# Patient Record
Sex: MALE | Race: WHITE | NOT HISPANIC OR LATINO | Employment: UNEMPLOYED | URBAN - METROPOLITAN AREA
[De-identification: names, ages, dates, MRNs, and addresses within clinical notes are randomized per-mention and may not be internally consistent; named-entity substitution may affect disease eponyms.]

---

## 2018-05-04 ENCOUNTER — CLINICAL SUPPORT (OUTPATIENT)
Dept: FAMILY MEDICINE | Facility: CLINIC | Age: 58
End: 2018-05-04
Payer: COMMERCIAL

## 2018-05-04 DIAGNOSIS — E11.65 UNCONTROLLED TYPE 2 DIABETES MELLITUS WITH COMPLICATION, UNSPECIFIED LONG TERM INSULIN USE STATUS: Primary | ICD-10-CM

## 2018-05-04 DIAGNOSIS — E11.8 UNCONTROLLED TYPE 2 DIABETES MELLITUS WITH COMPLICATION, UNSPECIFIED LONG TERM INSULIN USE STATUS: Primary | ICD-10-CM

## 2018-05-04 DIAGNOSIS — E78.2 MULTIPLE-TYPE HYPERLIPIDEMIA: ICD-10-CM

## 2018-05-04 PROCEDURE — 36415 COLL VENOUS BLD VENIPUNCTURE: CPT | Performed by: FAMILY MEDICINE

## 2018-05-05 LAB
ALBUMIN SERPL-MCNC: 4.4 G/DL (ref 3.5–5.5)
ALBUMIN/GLOB SERPL: 1.6 {RATIO} (ref 1.2–2.2)
ALP SERPL-CCNC: 60 IU/L (ref 39–117)
ALT SERPL-CCNC: 16 IU/L (ref 0–44)
AST SERPL-CCNC: 11 IU/L (ref 0–40)
BILIRUB SERPL-MCNC: 0.6 MG/DL (ref 0–1.2)
BUN SERPL-MCNC: 28 MG/DL (ref 6–24)
BUN/CREAT SERPL: 25 (ref 9–20)
CALCIUM SERPL-MCNC: 10 MG/DL (ref 8.7–10.2)
CHLORIDE SERPL-SCNC: 99 MMOL/L (ref 96–106)
CHOLEST SERPL-MCNC: 151 MG/DL (ref 100–199)
CO2 SERPL-SCNC: 23 MMOL/L (ref 18–29)
CREAT SERPL-MCNC: 1.1 MG/DL (ref 0.76–1.27)
GFR SERPLBLD CREATININE-BSD FMLA CKD-EPI: 75 ML/MIN/1.73
GFR SERPLBLD CREATININE-BSD FMLA CKD-EPI: 87 ML/MIN/1.73
GLOBULIN SER CALC-MCNC: 2.7 G/DL (ref 1.5–4.5)
GLUCOSE SERPL-MCNC: 270 MG/DL (ref 65–99)
HBA1C MFR BLD: 10.2 % (ref 4.8–5.6)
HDLC SERPL-MCNC: 32 MG/DL
LDLC SERPL CALC-MCNC: 66 MG/DL (ref 0–99)
POTASSIUM SERPL-SCNC: 5.3 MMOL/L (ref 3.5–5.2)
PROT SERPL-MCNC: 7.1 G/DL (ref 6–8.5)
SODIUM SERPL-SCNC: 138 MMOL/L (ref 134–144)
TRIGL SERPL-MCNC: 267 MG/DL (ref 0–149)
VLDLC SERPL CALC-MCNC: 53 MG/DL (ref 5–40)

## 2018-05-15 RX ORDER — INSULIN PUMP SYRINGE, 3 ML
EACH MISCELLANEOUS
COMMUNITY
Start: 2014-08-22

## 2018-05-15 RX ORDER — LISINOPRIL 20 MG/1
20 TABLET ORAL
COMMUNITY
Start: 2017-12-18 | End: 2019-01-23

## 2018-05-15 RX ORDER — BLOOD SUGAR DIAGNOSTIC
STRIP MISCELLANEOUS
COMMUNITY
Start: 2017-07-10 | End: 2018-05-15 | Stop reason: SDUPTHER

## 2018-05-15 RX ORDER — BLOOD SUGAR DIAGNOSTIC
STRIP MISCELLANEOUS
Qty: 300 EACH | Refills: 3 | Status: SHIPPED | OUTPATIENT
Start: 2018-05-15

## 2018-05-15 RX ORDER — METFORMIN HYDROCHLORIDE 1000 MG/1
TABLET ORAL
Status: ON HOLD | COMMUNITY
Start: 2017-07-07 | End: 2023-09-19 | Stop reason: ENTERED-IN-ERROR

## 2018-05-15 RX ORDER — LANCETS
EACH MISCELLANEOUS
COMMUNITY
Start: 2016-03-30

## 2018-05-15 RX ORDER — INSULIN GLARGINE 100 [IU]/ML
28 INJECTION, SOLUTION SUBCUTANEOUS NIGHTLY
COMMUNITY
Start: 2017-12-18

## 2018-05-15 RX ORDER — ROSUVASTATIN CALCIUM 20 MG/1
20 TABLET, COATED ORAL DAILY
COMMUNITY
Start: 2017-12-18 | End: 2023-09-26 | Stop reason: HOSPADM

## 2018-05-15 RX ORDER — CLOPIDOGREL BISULFATE 75 MG/1
75 TABLET ORAL
COMMUNITY
Start: 2017-07-07 | End: 2018-07-02 | Stop reason: SDUPTHER

## 2018-05-16 RX ORDER — BLOOD SUGAR DIAGNOSTIC
STRIP MISCELLANEOUS
Qty: 300 EACH | Refills: 3 | Status: SHIPPED | OUTPATIENT
Start: 2018-05-16

## 2018-05-29 NOTE — PROGRESS NOTES
Spoke with patient and HgbA1c 10.2 and he is going to really watch his diet and start to change his lifestyle.  He has FBW and follow up in 8 weeks.  Cholesterol and LDL at goal.  Trigs elevated likely from diabetes.

## 2018-07-02 RX ORDER — CLOPIDOGREL BISULFATE 75 MG/1
75 TABLET ORAL DAILY
Qty: 90 TABLET | Refills: 1 | Status: SHIPPED | OUTPATIENT
Start: 2018-07-02 | End: 2018-12-27 | Stop reason: SDUPTHER

## 2018-09-24 ENCOUNTER — PATIENT OUTREACH (OUTPATIENT)
Dept: FAMILY MEDICINE | Facility: CLINIC | Age: 58
End: 2018-09-24

## 2018-09-24 NOTE — PROGRESS NOTES
Care Gap Team has outreached to Harvey DE LA FUENTE Nic Maldonado on behalf of their primary care provider.      Care Gap Source:: IPPIP Gap Report         Care Gap Status:: Due    Outreach via:: Telephone    Adult Preventive Wellness Protocol(s) Used: : Colorectal Cancer Screening, HbA1c Completion, Diabetic Nephropathy Screening, Diabetic Retinopathy Screening    Chart Review Completed:: Yes  Patient interview completed:: Yes  Inclusion Criteria:: Met  Exclusion Criteria: None  Patient educated on recommended care:: Yes                                  I spoke at length with patient regarding past due testing. Patient stated that he is no longer a patient within the practice. I encouraged patient to inform his insurance company that he has switched providers.

## 2018-12-27 RX ORDER — CLOPIDOGREL BISULFATE 75 MG/1
TABLET ORAL
Qty: 90 TABLET | Refills: 0 | Status: SHIPPED | OUTPATIENT
Start: 2018-12-27

## 2018-12-27 NOTE — TELEPHONE ENCOUNTER
Please let patient know prescription filled but he is doing for office visit with Dr. Castro.  Last office visit was 1 year ago.

## 2019-01-04 ENCOUNTER — ANESTHESIA (INPATIENT)
Dept: OPERATING ROOM | Facility: HOSPITAL | Age: 59
DRG: 853 | End: 2019-01-04
Payer: COMMERCIAL

## 2019-01-04 ENCOUNTER — HOSPITAL ENCOUNTER (INPATIENT)
Facility: HOSPITAL | Age: 59
LOS: 15 days | Discharge: HOME HEALTH | DRG: 853 | End: 2019-01-19
Attending: PODIATRIST | Admitting: PODIATRIST
Payer: COMMERCIAL

## 2019-01-04 ENCOUNTER — ANESTHESIA EVENT (INPATIENT)
Dept: OPERATING ROOM | Facility: HOSPITAL | Age: 59
DRG: 853 | End: 2019-01-04
Payer: COMMERCIAL

## 2019-01-04 DIAGNOSIS — Z01.818 PREOP EXAMINATION: ICD-10-CM

## 2019-01-04 DIAGNOSIS — R60.0 LOCALIZED EDEMA: ICD-10-CM

## 2019-01-04 DIAGNOSIS — R78.81 BACTEREMIA: ICD-10-CM

## 2019-01-04 DIAGNOSIS — S91.309A OPEN WOUND OF ANKLE AND FOOT: ICD-10-CM

## 2019-01-04 DIAGNOSIS — S91.009A OPEN WOUND OF ANKLE AND FOOT: ICD-10-CM

## 2019-01-04 DIAGNOSIS — A48.0 GAS GANGRENE (CMS/HCC): Primary | ICD-10-CM

## 2019-01-04 PROBLEM — L08.9 LEFT FOOT INFECTION: Status: ACTIVE | Noted: 2019-01-04

## 2019-01-04 PROBLEM — D72.829 LEUKOCYTOSIS: Status: ACTIVE | Noted: 2019-01-04

## 2019-01-04 PROBLEM — D64.9 ANEMIA: Status: ACTIVE | Noted: 2019-01-04

## 2019-01-04 PROBLEM — D70.0: Status: ACTIVE | Noted: 2019-01-04

## 2019-01-04 PROBLEM — L02.612 CELLULITIS AND ABSCESS OF TOE OF LEFT FOOT: Status: ACTIVE | Noted: 2019-01-04

## 2019-01-04 PROBLEM — L03.032 CELLULITIS AND ABSCESS OF TOE OF LEFT FOOT: Status: ACTIVE | Noted: 2019-01-04

## 2019-01-04 LAB
ANION GAP SERPL CALC-SCNC: 15 MEQ/L (ref 3–15)
APTT PPP: 31 SEC (ref 23–35)
ATRIAL RATE: 100
BASOPHILS # BLD: 0 K/UL (ref 0.01–0.1)
BASOPHILS NFR BLD: 0 %
BUN SERPL-MCNC: 37 MG/DL (ref 8–20)
CALCIUM SERPL-MCNC: 8.4 MG/DL (ref 8.9–10.3)
CHLORIDE SERPL-SCNC: 88 MEQ/L (ref 98–109)
CO2 SERPL-SCNC: 21 MEQ/L (ref 22–32)
CREAT SERPL-MCNC: 1.4 MG/DL
CRP SERPL-MCNC: 292.7 MG/L
DIFFERENTIAL METHOD BLD: ABNORMAL
EOSINOPHIL # BLD: 0 K/UL (ref 0.04–0.54)
EOSINOPHIL NFR BLD: 0 %
ERYTHROCYTE [DISTWIDTH] IN BLOOD BY AUTOMATED COUNT: 13.8 % (ref 11.6–14.4)
ERYTHROCYTE [SEDIMENTATION RATE] IN BLOOD BY WESTERGREN METHOD: 71 MM/HR
EST. AVERAGE GLUCOSE BLD GHB EST-MCNC: 246 MG/DL
GFR SERPL CREATININE-BSD FRML MDRD: 52.4 ML/MIN/1.73M*2
GLUCOSE BLD-MCNC: 232 MG/DL (ref 70–99)
GLUCOSE BLD-MCNC: 233 MG/DL (ref 70–99)
GLUCOSE BLD-MCNC: 236 MG/DL (ref 70–99)
GLUCOSE BLD-MCNC: 303 MG/DL (ref 70–99)
GLUCOSE BLD-MCNC: 348 MG/DL (ref 70–99)
GLUCOSE BLD-MCNC: 348 MG/DL (ref 70–99)
GLUCOSE SERPL-MCNC: 363 MG/DL (ref 70–99)
HBA1C MFR BLD HPLC: 10.2 %
HCT VFR BLDCO AUTO: 32.5 %
HGB BLD-MCNC: 10.7 G/DL
INR PPP: 1.4 INR
LYMPHOCYTES # BLD: 0.2 K/UL (ref 1.2–3.5)
LYMPHOCYTES NFR BLD: 1 %
MCH RBC QN AUTO: 26.3 PG (ref 28–33.2)
MCHC RBC AUTO-ENTMCNC: 32.9 G/DL (ref 32.2–36.5)
MCV RBC AUTO: 79.9 FL (ref 83–98)
MONOCYTES # BLD: 2.66 K/UL (ref 0.3–1)
MONOCYTES NFR BLD: 13 %
NEUTS BAND # BLD: 17.6 K/UL (ref 1.7–7)
NEUTS SEG NFR BLD: 86 %
P AXIS: 26
PATH REV BLD -IMP: NORMAL
PDW BLD AUTO: 10.7 FL (ref 9.4–12.4)
PLAT MORPH BLD: NORMAL
PLATELET # BLD AUTO: 320 K/UL
PLATELET # BLD EST: ABNORMAL 10*3/UL
POCT TEST: ABNORMAL
POTASSIUM SERPL-SCNC: 4.5 MEQ/L (ref 3.6–5.1)
PR INTERVAL: 158
PROTHROMBIN TIME: 16.5 SEC (ref 12.2–14.5)
QRS DURATION: 104
QT INTERVAL: 340
QTC CALCULATION(BAZETT): 438
R AXIS: 38
RBC # BLD AUTO: 4.07 M/UL (ref 4.5–5.8)
RBC MORPH BLD: NORMAL
SODIUM SERPL-SCNC: 124 MEQ/L (ref 136–144)
T WAVE AXIS: 45
VENTRICULAR RATE: 100
WBC # BLD AUTO: 20.47 K/UL

## 2019-01-04 PROCEDURE — 87150 DNA/RNA AMPLIFIED PROBE: CPT | Performed by: PODIATRIST

## 2019-01-04 PROCEDURE — 87040 BLOOD CULTURE FOR BACTERIA: CPT | Performed by: PODIATRIST

## 2019-01-04 PROCEDURE — 85025 COMPLETE CBC W/AUTO DIFF WBC: CPT | Performed by: HOSPITALIST

## 2019-01-04 PROCEDURE — 36000012 HC OR LEVEL 2 EA ADDL MIN: Performed by: PODIATRIST

## 2019-01-04 PROCEDURE — 0J9R0ZX DRAINAGE OF LEFT FOOT SUBCUTANEOUS TISSUE AND FASCIA, OPEN APPROACH, DIAGNOSTIC: ICD-10-PCS | Performed by: PODIATRIST

## 2019-01-04 PROCEDURE — 25800000 HC PHARMACY IV SOLUTIONS: Performed by: NURSE ANESTHETIST, CERTIFIED REGISTERED

## 2019-01-04 PROCEDURE — 99253 IP/OBS CNSLTJ NEW/EST LOW 45: CPT | Performed by: HOSPITALIST

## 2019-01-04 PROCEDURE — 25800000 HC PHARMACY IV SOLUTIONS: Performed by: ANESTHESIOLOGY

## 2019-01-04 PROCEDURE — 71000011 HC PACU PHASE 1 EA ADDL MIN: Performed by: PODIATRIST

## 2019-01-04 PROCEDURE — 86140 C-REACTIVE PROTEIN: CPT | Performed by: PODIATRIST

## 2019-01-04 PROCEDURE — 85730 THROMBOPLASTIN TIME PARTIAL: CPT | Performed by: PODIATRIST

## 2019-01-04 PROCEDURE — 25000000 HC PHARMACY GENERAL: Performed by: PODIATRIST

## 2019-01-04 PROCEDURE — 85652 RBC SED RATE AUTOMATED: CPT | Performed by: PODIATRIST

## 2019-01-04 PROCEDURE — 87205 SMEAR GRAM STAIN: CPT | Performed by: PODIATRIST

## 2019-01-04 PROCEDURE — 63600000 HC DRUGS/DETAIL CODE: Performed by: STUDENT IN AN ORGANIZED HEALTH CARE EDUCATION/TRAINING PROGRAM

## 2019-01-04 PROCEDURE — 80048 BASIC METABOLIC PNL TOTAL CA: CPT | Performed by: HOSPITALIST

## 2019-01-04 PROCEDURE — 63600000 HC DRUGS/DETAIL CODE: Performed by: ANESTHESIOLOGY

## 2019-01-04 PROCEDURE — 93005 ELECTROCARDIOGRAM TRACING: CPT | Performed by: HOSPITALIST

## 2019-01-04 PROCEDURE — 63600000 HC DRUGS/DETAIL CODE: Performed by: PODIATRIST

## 2019-01-04 PROCEDURE — 25800000 HC PHARMACY IV SOLUTIONS: Performed by: STUDENT IN AN ORGANIZED HEALTH CARE EDUCATION/TRAINING PROGRAM

## 2019-01-04 PROCEDURE — 85610 PROTHROMBIN TIME: CPT | Performed by: HOSPITALIST

## 2019-01-04 PROCEDURE — 36000002 HC OR LEVEL 2 INITIAL 30MIN: Performed by: PODIATRIST

## 2019-01-04 PROCEDURE — 25000000 HC PHARMACY GENERAL: Performed by: NURSE ANESTHETIST, CERTIFIED REGISTERED

## 2019-01-04 PROCEDURE — 83036 HEMOGLOBIN GLYCOSYLATED A1C: CPT | Performed by: HOSPITALIST

## 2019-01-04 PROCEDURE — 37000001 HC ANESTHESIA GENERAL: Performed by: PODIATRIST

## 2019-01-04 PROCEDURE — 25000000 HC PHARMACY GENERAL: Performed by: STUDENT IN AN ORGANIZED HEALTH CARE EDUCATION/TRAINING PROGRAM

## 2019-01-04 PROCEDURE — 12000000 HC ROOM AND CARE MED/SURG

## 2019-01-04 PROCEDURE — 71000001 HC PACU PHASE 1 INITIAL 30MIN: Performed by: PODIATRIST

## 2019-01-04 PROCEDURE — 25000000 HC PHARMACY GENERAL: Performed by: INTERNAL MEDICINE

## 2019-01-04 PROCEDURE — 36415 COLL VENOUS BLD VENIPUNCTURE: CPT | Performed by: PODIATRIST

## 2019-01-04 PROCEDURE — 63600000 HC DRUGS/DETAIL CODE: Performed by: INTERNAL MEDICINE

## 2019-01-04 PROCEDURE — 63700000 HC SELF-ADMINISTRABLE DRUG: Performed by: PODIATRIST

## 2019-01-04 PROCEDURE — 63600000 HC DRUGS/DETAIL CODE: Performed by: HOSPITALIST

## 2019-01-04 RX ORDER — DEXTROSE 40 %
15-30 GEL (GRAM) ORAL AS NEEDED
Status: DISCONTINUED | OUTPATIENT
Start: 2019-01-04 | End: 2019-01-19 | Stop reason: HOSPADM

## 2019-01-04 RX ORDER — PHENYLEPHRINE HYDROCHLORIDE 10 MG/ML
INJECTION INTRAVENOUS AS NEEDED
Status: DISCONTINUED | OUTPATIENT
Start: 2019-01-04 | End: 2019-01-04 | Stop reason: SURG

## 2019-01-04 RX ORDER — LISINOPRIL 20 MG/1
20 TABLET ORAL DAILY
Status: DISCONTINUED | OUTPATIENT
Start: 2019-01-04 | End: 2019-01-05

## 2019-01-04 RX ORDER — SODIUM CHLORIDE 9 MG/ML
INJECTION, SOLUTION INTRAVENOUS CONTINUOUS PRN
Status: DISCONTINUED | OUTPATIENT
Start: 2019-01-04 | End: 2019-01-04 | Stop reason: SURG

## 2019-01-04 RX ORDER — HYDROMORPHONE HYDROCHLORIDE 1 MG/ML
.25-.5 INJECTION, SOLUTION INTRAMUSCULAR; INTRAVENOUS; SUBCUTANEOUS
Status: DISPENSED | OUTPATIENT
Start: 2019-01-04 | End: 2019-01-18

## 2019-01-04 RX ORDER — ACETAMINOPHEN 325 MG/1
650 TABLET ORAL EVERY 4 HOURS PRN
Status: DISCONTINUED | OUTPATIENT
Start: 2019-01-04 | End: 2019-01-19 | Stop reason: HOSPADM

## 2019-01-04 RX ORDER — MIDAZOLAM HYDROCHLORIDE 2 MG/2ML
INJECTION, SOLUTION INTRAMUSCULAR; INTRAVENOUS AS NEEDED
Status: DISCONTINUED | OUTPATIENT
Start: 2019-01-04 | End: 2019-01-04 | Stop reason: SURG

## 2019-01-04 RX ORDER — VANCOMYCIN 2 GRAM/500 ML IN 0.9 % SODIUM CHLORIDE INTRAVENOUS
2000 ONCE
Status: COMPLETED | OUTPATIENT
Start: 2019-01-04 | End: 2019-01-04

## 2019-01-04 RX ORDER — METRONIDAZOLE 500 MG/100ML
500 INJECTION, SOLUTION INTRAVENOUS
Status: DISCONTINUED | OUTPATIENT
Start: 2019-01-04 | End: 2019-01-08

## 2019-01-04 RX ORDER — LIDOCAINE HYDROCHLORIDE 10 MG/ML
INJECTION, SOLUTION INFILTRATION; PERINEURAL AS NEEDED
Status: DISCONTINUED | OUTPATIENT
Start: 2019-01-04 | End: 2019-01-04 | Stop reason: SURG

## 2019-01-04 RX ORDER — OXYCODONE HYDROCHLORIDE 5 MG/1
5-10 TABLET ORAL EVERY 4 HOURS PRN
Status: DISPENSED | OUTPATIENT
Start: 2019-01-04 | End: 2019-01-18

## 2019-01-04 RX ORDER — HYDROMORPHONE HYDROCHLORIDE 1 MG/ML
0.5 INJECTION, SOLUTION INTRAMUSCULAR; INTRAVENOUS; SUBCUTANEOUS
Status: DISCONTINUED | OUTPATIENT
Start: 2019-01-04 | End: 2019-01-04 | Stop reason: HOSPADM

## 2019-01-04 RX ORDER — FENTANYL CITRATE 50 UG/ML
INJECTION, SOLUTION INTRAMUSCULAR; INTRAVENOUS AS NEEDED
Status: DISCONTINUED | OUTPATIENT
Start: 2019-01-04 | End: 2019-01-04 | Stop reason: SURG

## 2019-01-04 RX ORDER — ONDANSETRON HYDROCHLORIDE 2 MG/ML
4 INJECTION, SOLUTION INTRAVENOUS
Status: DISCONTINUED | OUTPATIENT
Start: 2019-01-04 | End: 2019-01-04 | Stop reason: HOSPADM

## 2019-01-04 RX ORDER — PANTOPRAZOLE SODIUM 20 MG/1
20 TABLET, DELAYED RELEASE ORAL DAILY
Status: DISCONTINUED | OUTPATIENT
Start: 2019-01-04 | End: 2019-01-19 | Stop reason: HOSPADM

## 2019-01-04 RX ORDER — LISINOPRIL 20 MG/1
40 TABLET ORAL
Status: DISCONTINUED | OUTPATIENT
Start: 2019-01-05 | End: 2019-01-05

## 2019-01-04 RX ORDER — DIPHENHYDRAMINE HCL 25 MG
25 CAPSULE ORAL EVERY 6 HOURS PRN
Status: DISCONTINUED | OUTPATIENT
Start: 2019-01-04 | End: 2019-01-19 | Stop reason: HOSPADM

## 2019-01-04 RX ORDER — PROPOFOL 10 MG/ML
INJECTION, EMULSION INTRAVENOUS AS NEEDED
Status: DISCONTINUED | OUTPATIENT
Start: 2019-01-04 | End: 2019-01-04 | Stop reason: SURG

## 2019-01-04 RX ORDER — ETOMIDATE 2 MG/ML
INJECTION INTRAVENOUS AS NEEDED
Status: DISCONTINUED | OUTPATIENT
Start: 2019-01-04 | End: 2019-01-04 | Stop reason: SURG

## 2019-01-04 RX ORDER — FENTANYL CITRATE 50 UG/ML
50 INJECTION, SOLUTION INTRAMUSCULAR; INTRAVENOUS
Status: DISCONTINUED | OUTPATIENT
Start: 2019-01-04 | End: 2019-01-04 | Stop reason: HOSPADM

## 2019-01-04 RX ORDER — CEFAZOLIN SODIUM 2 G/50ML
2 SOLUTION INTRAVENOUS
Status: COMPLETED | OUTPATIENT
Start: 2019-01-04 | End: 2019-01-04

## 2019-01-04 RX ORDER — CLOPIDOGREL BISULFATE 75 MG/1
75 TABLET ORAL
Status: DISCONTINUED | OUTPATIENT
Start: 2019-01-05 | End: 2019-01-09

## 2019-01-04 RX ORDER — ONDANSETRON HYDROCHLORIDE 2 MG/ML
4 INJECTION, SOLUTION INTRAVENOUS EVERY 8 HOURS PRN
Status: DISCONTINUED | OUTPATIENT
Start: 2019-01-04 | End: 2019-01-19 | Stop reason: HOSPADM

## 2019-01-04 RX ORDER — ENOXAPARIN SODIUM 100 MG/ML
40 INJECTION SUBCUTANEOUS
Status: DISCONTINUED | OUTPATIENT
Start: 2019-01-05 | End: 2019-01-19 | Stop reason: HOSPADM

## 2019-01-04 RX ORDER — OMEPRAZOLE 20 MG/1
20 CAPSULE, DELAYED RELEASE ORAL
Refills: 0 | COMMUNITY
Start: 2019-01-02 | End: 2019-01-23

## 2019-01-04 RX ORDER — LISINOPRIL 40 MG/1
40 TABLET ORAL
Refills: 3 | COMMUNITY
Start: 2018-11-09 | End: 2023-09-26 | Stop reason: HOSPADM

## 2019-01-04 RX ORDER — ONDANSETRON 4 MG/1
4 TABLET, ORALLY DISINTEGRATING ORAL EVERY 8 HOURS PRN
Status: DISCONTINUED | OUTPATIENT
Start: 2019-01-04 | End: 2019-01-19 | Stop reason: HOSPADM

## 2019-01-04 RX ORDER — ALUMINUM HYDROXIDE, MAGNESIUM HYDROXIDE, AND SIMETHICONE 1200; 120; 1200 MG/30ML; MG/30ML; MG/30ML
30 SUSPENSION ORAL EVERY 4 HOURS PRN
Status: DISCONTINUED | OUTPATIENT
Start: 2019-01-04 | End: 2019-01-19 | Stop reason: HOSPADM

## 2019-01-04 RX ORDER — BUPIVACAINE HYDROCHLORIDE 5 MG/ML
INJECTION, SOLUTION PERINEURAL AS NEEDED
Status: DISCONTINUED | OUTPATIENT
Start: 2019-01-04 | End: 2019-01-04 | Stop reason: HOSPADM

## 2019-01-04 RX ORDER — DIPHENHYDRAMINE HCL 50 MG/ML
25 VIAL (ML) INJECTION EVERY 6 HOURS PRN
Status: DISCONTINUED | OUTPATIENT
Start: 2019-01-04 | End: 2019-01-19 | Stop reason: HOSPADM

## 2019-01-04 RX ORDER — DEXTROSE 50 % IN WATER (D50W) INTRAVENOUS SYRINGE
25 AS NEEDED
Status: DISCONTINUED | OUTPATIENT
Start: 2019-01-04 | End: 2019-01-19 | Stop reason: HOSPADM

## 2019-01-04 RX ORDER — INSULIN ASPART 100 [IU]/ML
3-5 INJECTION, SOLUTION INTRAVENOUS; SUBCUTANEOUS
Status: DISCONTINUED | OUTPATIENT
Start: 2019-01-04 | End: 2019-01-08

## 2019-01-04 RX ORDER — ROSUVASTATIN CALCIUM 10 MG/1
10 TABLET, COATED ORAL DAILY
Status: DISCONTINUED | OUTPATIENT
Start: 2019-01-04 | End: 2019-01-19 | Stop reason: HOSPADM

## 2019-01-04 RX ORDER — SODIUM CHLORIDE, SODIUM GLUCONATE, SODIUM ACETATE, POTASSIUM CHLORIDE AND MAGNESIUM CHLORIDE 30; 37; 368; 526; 502 MG/100ML; MG/100ML; MG/100ML; MG/100ML; MG/100ML
125 INJECTION, SOLUTION INTRAVENOUS CONTINUOUS
Status: DISCONTINUED | OUTPATIENT
Start: 2019-01-04 | End: 2019-01-05

## 2019-01-04 RX ORDER — SODIUM CHLORIDE, SODIUM GLUCONATE, SODIUM ACETATE, POTASSIUM CHLORIDE AND MAGNESIUM CHLORIDE 30; 37; 368; 526; 502 MG/100ML; MG/100ML; MG/100ML; MG/100ML; MG/100ML
INJECTION, SOLUTION INTRAVENOUS CONTINUOUS PRN
Status: DISCONTINUED | OUTPATIENT
Start: 2019-01-04 | End: 2019-01-04 | Stop reason: SURG

## 2019-01-04 RX ORDER — METFORMIN HYDROCHLORIDE 500 MG/1
1000 TABLET ORAL
Status: DISCONTINUED | OUTPATIENT
Start: 2019-01-04 | End: 2019-01-05

## 2019-01-04 RX ORDER — LIDOCAINE HYDROCHLORIDE 10 MG/ML
INJECTION, SOLUTION INFILTRATION; PERINEURAL AS NEEDED
Status: DISCONTINUED | OUTPATIENT
Start: 2019-01-04 | End: 2019-01-04 | Stop reason: HOSPADM

## 2019-01-04 RX ORDER — SENNOSIDES 8.6 MG/1
1 TABLET ORAL 2 TIMES DAILY PRN
Status: DISCONTINUED | OUTPATIENT
Start: 2019-01-04 | End: 2019-01-19 | Stop reason: HOSPADM

## 2019-01-04 RX ADMIN — ROSUVASTATIN CALCIUM 10 MG: 10 TABLET, FILM COATED ORAL at 20:50

## 2019-01-04 RX ADMIN — PANTOPRAZOLE SODIUM 20 MG: 20 TABLET, DELAYED RELEASE ORAL at 20:50

## 2019-01-04 RX ADMIN — SODIUM CHLORIDE, SODIUM GLUCONATE, SODIUM ACETATE, POTASSIUM CHLORIDE AND MAGNESIUM CHLORIDE: 526; 502; 368; 37; 30 INJECTION, SOLUTION INTRAVENOUS at 15:55

## 2019-01-04 RX ADMIN — FENTANYL CITRATE 50 MCG: 50 INJECTION, SOLUTION INTRAMUSCULAR; INTRAVENOUS at 15:08

## 2019-01-04 RX ADMIN — METFORMIN HYDROCHLORIDE 1000 MG: 500 TABLET ORAL at 20:50

## 2019-01-04 RX ADMIN — CEFTAZIDIME 2 G: 2 INJECTION, SOLUTION INTRAVENOUS at 16:38

## 2019-01-04 RX ADMIN — LISINOPRIL 20 MG: 20 TABLET ORAL at 20:51

## 2019-01-04 RX ADMIN — HYDROMORPHONE HYDROCHLORIDE 0.5 MG: 1 INJECTION, SOLUTION INTRAMUSCULAR; INTRAVENOUS; SUBCUTANEOUS at 19:11

## 2019-01-04 RX ADMIN — Medication 2000 MG: at 18:31

## 2019-01-04 RX ADMIN — SODIUM CHLORIDE, SODIUM GLUCONATE, SODIUM ACETATE, POTASSIUM CHLORIDE AND MAGNESIUM CHLORIDE 125 ML/HR: 526; 502; 368; 37; 30 INJECTION, SOLUTION INTRAVENOUS at 18:55

## 2019-01-04 RX ADMIN — HYDROMORPHONE HYDROCHLORIDE 0.25 MG: 1 INJECTION, SOLUTION INTRAMUSCULAR; INTRAVENOUS; SUBCUTANEOUS at 13:19

## 2019-01-04 RX ADMIN — FENTANYL CITRATE 50 MCG: 50 INJECTION, SOLUTION INTRAMUSCULAR; INTRAVENOUS at 15:14

## 2019-01-04 RX ADMIN — FENTANYL CITRATE 100 MCG: 50 INJECTION, SOLUTION INTRAMUSCULAR; INTRAVENOUS at 15:00

## 2019-01-04 RX ADMIN — Medication 120 MG: at 14:47

## 2019-01-04 RX ADMIN — PHENYLEPHRINE HYDROCHLORIDE 100 MCG: 10 INJECTION INTRAVENOUS at 15:24

## 2019-01-04 RX ADMIN — METRONIDAZOLE 500 MG: 500 INJECTION, SOLUTION INTRAVENOUS at 17:25

## 2019-01-04 RX ADMIN — INSULIN HUMAN 10 UNITS: 100 INJECTION, SOLUTION PARENTERAL at 14:51

## 2019-01-04 RX ADMIN — PROPOFOL 100 MG: 10 INJECTION, EMULSION INTRAVENOUS at 14:47

## 2019-01-04 RX ADMIN — SITAGLIPTIN 100 MG: 100 TABLET, FILM COATED ORAL at 20:49

## 2019-01-04 RX ADMIN — INSULIN HUMAN 15 UNITS: 100 INJECTION, SOLUTION PARENTERAL at 15:14

## 2019-01-04 RX ADMIN — INSULIN ASPART 3 UNITS: 100 INJECTION, SOLUTION INTRAVENOUS; SUBCUTANEOUS at 22:02

## 2019-01-04 RX ADMIN — PHENYLEPHRINE HYDROCHLORIDE 50 MCG: 10 INJECTION INTRAVENOUS at 15:18

## 2019-01-04 RX ADMIN — ETOMIDATE 10 MG: 2 INJECTION, SOLUTION INTRAVENOUS at 14:47

## 2019-01-04 RX ADMIN — FENTANYL CITRATE 25 MCG: 50 INJECTION, SOLUTION INTRAMUSCULAR; INTRAVENOUS at 14:45

## 2019-01-04 RX ADMIN — LIDOCAINE HYDROCHLORIDE 5 ML: 10 INJECTION, SOLUTION INFILTRATION; PERINEURAL at 14:47

## 2019-01-04 RX ADMIN — INSULIN ASPART 5 UNITS: 100 INJECTION, SOLUTION INTRAVENOUS; SUBCUTANEOUS at 13:50

## 2019-01-04 RX ADMIN — SODIUM CHLORIDE: 900 INJECTION, SOLUTION INTRAVENOUS at 14:39

## 2019-01-04 RX ADMIN — CEFAZOLIN 2 G: 10 INJECTION, POWDER, FOR SOLUTION INTRAVENOUS at 14:51

## 2019-01-04 RX ADMIN — MIDAZOLAM HYDROCHLORIDE 2 MG: 1 INJECTION, SOLUTION INTRAMUSCULAR; INTRAVENOUS at 14:37

## 2019-01-04 ASSESSMENT — LIFESTYLE VARIABLES: TOBACCO_USE: 1

## 2019-01-04 NOTE — ASSESSMENT & PLAN NOTE
He has gas gangrene of the left foot, he has a history of MSSA and beta hemolytic strep  Cultures will be taken in the OR today  He will be covered broadly with vancomycin dosing by pharmacy with a trough goal of 15-20,  Fortaz 2 g IV every 8 hours and Flagyl 500 IV every 8 hours

## 2019-01-04 NOTE — ANESTHESIA POSTPROCEDURE EVALUATION
Patient: Harvey Lucero Jr    Procedure Summary     Date:  01/04/19 Room / Location:  [facility] OR  / [facility] OR    Anesthesia Start:  1439 Anesthesia Stop:  1605    Procedure:  INCISION & DRAINAGE/HEMATOMA EVACUATION KNEE/LEG/ FOOT/ANKLE, POSSIBLE FTSG, VAC *NOT POST TOTAL* (Left ) Diagnosis:       Gas gangrene (CMS/HCC)      (Gas gangrene (CMS/HCC) [A48.0])    Surgeon:  Fabricio Tee DPM Responsible Provider:  Jose Nj MD    Anesthesia Type:  general ASA Status:  3 - Emergent          Anesthesia Type: general  PACU Vitals  1/4/2019 1556 - 1/4/2019 1623      1/4/2019 1601 1/4/2019 1615          BP: 122/65 116/63      Temp: 36.7 °C (98.1 °F) -      Pulse: 93 92      Resp: 18 20      SpO2: 100 % 100 %              Anesthesia Post Evaluation    Pain management: adequate  Mode of pain management: IV medication  Patient location during evaluation: PACU  Patient participation: complete - patient participated  Level of consciousness: awake and alert  Cardiovascular status: acceptable  Airway Patency: adequate  Respiratory status: acceptable and nasal cannula  Hydration status: acceptable  Anesthetic complications: no

## 2019-01-04 NOTE — CONSULTS
Infectious Disease Consult Note    Patient Name: Harvey Lucero Jr  MR#: [id number]  : 1960  Admission Date: 2019  Consult Date: 19 3:24 PM   Consultant: Severo Dias MD    Reason for Consult: Antibiotic recommendations  Referring Provider: Dr. Tee        Harvey Lucero Jr is a 58 y.o. male who was admitted on 2019.  This patient with a history of foot infections was found to have soft tissue gas in the podiatrist's office after feeling ill for 5 days with fevers feeling rundown fatigued and nauseous.  He was taken emergently to the OR for full debridement, he does have a history of MSSA and beta hemolytic strep in .  He has a history of CVA diabetes mellitus hypertension and apparently he had a callus that opened and started to drain fluid 3 days ago.  Fever was as high as 100.4 °F and his leukocytosis is 20.47      Allergies:   Allergies   Allergen Reactions   • No Known Allergies        Medical History:   Past Medical History:   Diagnosis Date   • GERD (gastroesophageal reflux disease)    • History of CVA (cerebrovascular accident)    • Hypertension    • Lipid disorder    • Type 2 diabetes mellitus (CMS/HCC) (HCC)        Surgical History: History reviewed. No pertinent surgical history.    Social History   Substance Use Topics   • Smoking status: Former Smoker   • Smokeless tobacco: Never Used   • Alcohol use No       Family History: History reviewed. No pertinent family history.    Review of Systems    All other systems reviewed and negative except as noted in the HPI.    Medications:    Current IP Meds         Frequency     insulin aspart U-100 (NovoLOG) pen 3-5 Units      4 times daily with meals and nightly     bacitracin irrigation 50,000/3000 mL NSS      As needed     phenylephrine (CANDELARIA-SYNEPHRINE) injection      As needed     bupivacaine (MARCAINE) 0.5 % (5 mg/mL) injection      As needed     lidocaine (XYLOCAINE) 10 mg/mL (1 %) injection      As needed     etomidate  (AMIDATE) injection      As needed     succinylcholine (ANECTINE) injection      As needed     propofol (DIPRIVAN) continuous infusion      As needed     lidocaine (XYLOCAINE) 10 mg/mL (1 %) injection      As needed     insulin regular 1 unit/mL infusion      Once     fentaNYL (PF) (SUBLIMAZE) injection      As needed     sodium chloride 0.9 % infusion      Continuous PRN     midazolam (VERSED) 1 mg/mL injection      As needed     ceFAZolin in dextrose (iso-os) (ANCEF) IVPB 2 g      Pre-op     dextrose 40 % oral gel 15-30 g of dextrose  (Hypoglycemia Treatment Protocol and Hyperglycemia Validation Protocol)      As needed     glucagon (GLUCAGEN) injection 1 mg  (Hypoglycemia Treatment Protocol and Hyperglycemia Validation Protocol)      As needed     dextrose in water injection 12.5 g  (Hypoglycemia Treatment Protocol and Hyperglycemia Validation Protocol)      As needed     acetaminophen (TYLENOL) tablet 650 mg      Every 4 hours PRN     oxyCODONE (ROXICODONE) immediate release tablet 5-10 mg  (Analgesics - Oral and IV Opiate combination orders)      Every 4 hours PRN     HYDROmorphone (DILAUDID) injection 0.25-0.5 mg  (Analgesics - Oral and IV Opiate combination orders)      Every 3 hours PRN     alum-mag hydroxide-simeth (MAALOX) 200-200-20 mg/5 mL suspension 30 mL  (Indigestion)      Every 4 hours PRN     ondansetron ODT (ZOFRAN-ODT) disintegrating tablet 4 mg  (Nausea/Vomiting)      Every 8 hours PRN     ondansetron (ZOFRAN) injection 4 mg  (Nausea/Vomiting)      Every 8 hours PRN     senna (SENOKOT) tablet 1 tablet  (Constipation)      2 times daily PRN     diphenhydrAMINE (BENADRYL) capsule 25 mg  (Itching/Pruritis)      Every 6 hours PRN     diphenhydrAMINE (BENADRYL) injection 25 mg  (Itching/Pruritis)      Every 6 hours PRN          Anti-infectives     Start     Dose/Rate Route Frequency Ordered Stop    01/04/19 1522  bacitracin irrigation 50,000/3000 mL NSS        As needed 01/04/19 1522               Vital Signs:    Temp:  [38 °C (100.4 °F)] 38 °C (100.4 °F)  Heart Rate:  [100] 100  Resp:  [16] 16  BP: (120)/(63) 120/63    Temp (72hrs), Av °C (100.4 °F), Min:38 °C (100.4 °F), Max:38 °C (100.4 °F)      Physical Exam     Gen: Aox3  HEENT: OP clear  Neck: Supple  LAD: No cervical LAD  Lungs: CTAB  CV: RRR no murmurs  Abd: Soft NTND +BS  Ext: Dressing clean dry and intact  Skin: no rash  Neuro: II-XII intact        Lines, Drains, Airways, Wounds:       Labs:    Lab Results   Component Value Date    WBC 20.47 (H) 2019    HGB 10.7 (L) 2019    HCT 32.5 (L) 2019    MCV 79.9 (L) 2019     2019     Lab Results   Component Value Date    GLUCOSE 363 (H) 2019    CALCIUM 8.4 (L) 2019     (LL) 2019    K 4.5 2019    CO2 21 (L) 2019    CL 88 (L) 2019    BUN 37 (H) 2019    CREATININE 1.4 (H) 2019     Lab Results   Component Value Date    ALT 16 2018    AST 11 2018    ALKPHOS 60 2018    BILITOT 0.6 2018     UA Results     No lab values to display.        Microbiology Results     ** No results found for the last 720 hours. **           Pathology Results     ** No results found for the last 720 hours. **          Echo:         Imaging:    Radiology Imaging   XR FOOT 3+ VW LEFT    Narrative DIAGNOSTIC RADIOLOGY - Aug 19 2014  1:47PM  - L FOOT COMPLETE  CLINICAL HISTORY: Edema.  COMMENT:  Comparison: Left foot radiographs 2012  Technique: Four radiographs of the left foot were performed.  Findings:  The joints maintain anatomic alignment. No acute fracture or dislocation.  There is osseous erosion of the fifth metatarsal head with associated  lateral soft tissue swelling and a soft tissue defect, likely related to  an ulcer. There are mild degenerative changes at the first  metatarsal-phalangeal joint. There is a small calcaneal heel spur.  IMPRESSION:  Findings suspicious for osteomyelitis involving the  left fifth metatarsal  head.  Findings were discussed with Dr. Tresa Taylor at 3:34 p.m. on 08/19/2014.  Transcribed by:              n/a : Aug 19 2014  3:25P  Dictated by:                    HEMA BROOK :   Aug 19 2014  3:25P  Dictation signed by:        KANE DOE :   Aug 19 2014  3:34P  CPT Code: 26070       Patient Active Problem List   Diagnosis Code   • Gas gangrene (CMS/HCC) A48.0   • History of CVA (cerebrovascular accident) Z86.73   • Type 2 diabetes mellitus (CMS/HCC) ([facility]) E11.9   • Lipid disorder E78.9   • Hypertension I10   • Encounter for other preprocedural examination Z01.818   • Left foot infection L08.9   • Anemia D64.9   • Cellulitis and abscess of toe of left foot L03.032, L02.612   • 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

## 2019-01-04 NOTE — ASSESSMENT & PLAN NOTE
Plan for graft and debridement tomorrow   Denies any hx of CAD, no arrhythmias, no CHF, no preoperative creatinine >2  Does have a hx of CVA 5 yrs ago and an insulin dependent diabetic. RCRI 6.6%  EKG benign without any signs of ischemia  Good exercise capacity able to reach>4 METs activity level    P  Can proceed  with  surgery without additional cardiac testing at an acceptable risk

## 2019-01-04 NOTE — H&P
General Surgery History and Physical        HPI     Patient is a 58 y.o. male who presents with left foot soft tissue gas. Patient to go to OR shortly. Patient has been feeling sick the last 5 days. In the office gas was seen in the foot xray.  Patient came in today as a direct admission.  His wife was bedside with him.  Reports feeling rundown and fatigued as well as nauseous.  Denies any current shortness of breath.    Medical History: No past medical history on file.    Surgical History: No past surgical history on file.    Social History:   Social History     Social History Narrative   • No narrative on file       Family History: No family history on file.    Allergies: Patient has no allergy information on record.    Home Medications:  •  blood sugar diagnostic, for blood glucose monitoring 3-4x day  •  blood-glucose meter, for blood glucose monitoring  •  clopidogrel, TAKE 1 TABLET BY MOUTH EVERY DAY  •  insulin glargine, 26 units at bedtime  •  lancets, for blood glucose monitoring 3x day  •  lisinopril, 20 mg.  •  metFORMIN, take 1 tablet by oral route 2 times every day with morning and evening meals  •  pen needle, diabetic, For insulin administration once daily  •  rosuvastatin, 10 mg.  •  SITagliptin, Take 1 tablet (100 mg total) by mouth daily.    Current Medications:    Physicial Exam    General appearance: alert, appears stated age and cooperative  Head: normocephalic, without obvious abnormality, atraumatic  Eyes: conjunctivae/corneas clear. PERRL, EOM's intact. Fundi benign.  Neck: no adenopathy, no carotid bruit, no JVD, supple, symmetrical, trachea midline and thyroid not enlarged, symmetric, no tenderness/mass/nodules  Back: symmetric, no curvature. ROM normal. No CVA tenderness.  Lungs: clear to auscultation bilaterally  Chest wall: no tenderness  Heart: regular rate and rhythm, S1, S2 normal, no murmur, click, rub or gallop  Abdomen: soft, non-tender; bowel sounds normal; no masses, no  organomegaly  Neurologic: Grossly normal    Objective:   LE Exam  Vascular: Dressings remained intact.  No strike through noted.          Capillary refill time <3seconds B/L.  MSK: +DP/PF of digits intact B/L.    No pain on palpation to the posterior legs B/L. No signs of clinical DVT B/L.   Derm: Dressings C/D/I. No strike through.  Neuro: Light touch and protective sensation is diminished.     Labs  Labs reviewed     Imaging  Imaging reviewed    -Assessment and Plan: Patient presents with emphysema of the left foot.  -Patient going to the operating room for an incision and drainage to open up the area.  - Patient examined, evaluated, and treated. All questions and concerns addressed  - Admitting to the service of Dr. Tee  - Patient dressings were left intact  - Patient may bear weight as tolerated to B/L LE.  With the use of walker, crutches, wheelchair as needed.   - Consulted [facility] for assistance in medical management and preoperative clearance - recommendations appreciated. We appreciate you stating in your note that the patient is clear for surgery. Thank you!   - Consulted Infectious Disease   - Vanc was started. Antibiotic recommendations per ID appreciated. We appreciate your assistance with appropriate abx course for the patient while he is inhouse as well as for d/c.Thank you!  - DVT prophylaxis Lovenox 40mg   - Pain control on board as patient needs.   - X rays show: Emphysema left foot   -Tib-fib x-rays pending  - Wound cultures will be taken intra operatively   - Blood cultures : pending  - Consulted CM/SW for discharge planning. We appreciate the help.   - Please contact on call podiatry resident with any questions or concerns. [phone] before 5.   - Thank you for this consult!    Suad Payne [id number]  Pager #[phone]      Code Status: Full Code

## 2019-01-04 NOTE — ANESTHESIA PREPROCEDURE EVALUATION
Anesthesia ROS/MED HX    Anesthesia History    Previous anesthetics  No history of anesthetic complications  Pulmonary    history of tobacco use and ex-smoker  Neuro/Psych    CVA  Cardiovascular   dyslipidemia   hypertension  Comments: BP stable, normal mentation A&O x3  Hematological    anemia (Hgb 10.7)  GI/Hepatic   GERD  Endo/Other   Diabetes and patient Insulin dependent  Body Habitus: Overweight  ROS/MED HX Comments:    Musculoskeletal: Gas gangrene - LLE   Hematological: WBC > 20      No past surgical history on file.    Physical Exam    Airway   Mallampati: II   TM distance: >3 FB   Neck ROM: full  Cardiovascular - normal   Rhythm: regular   Rate: normal  Pulmonary - normal   clear to auscultation  Other Findings   BP stable, normal mentation A&O x3  Dental - normal        Anesthesia Plan    Plan: general    Technique: general endotracheal     Lines and Monitors: PIV and additional IV     Airway: direct visual laryngoscopy and oral intubation   ASA 3 - emergent  Anesthetic plan and risks discussed with: patient  Induction:    intravenous   Postop Plan:   Pain Management: IV analgesics  Comments:    Plan: RSI, possible A line

## 2019-01-04 NOTE — CONSULTS
Hospital Medicine Service -  Inpatient Consultation         Requesting Physician: Dr. Tee    Reason for Consultation: Preop evaluation and post op medical management     HISTORY OF PRESENT ILLNESS        This is a 58 y.o. male with past medical history significant for history of CVA, type 2 diabetes mellitus, hypertension presenting with left foot infection and noted gas on his foot x-rays.  Patient states that he has been following podiatry for left foot callus for the last 2 years and was doing well until about 3 days ago when the callus opened up and started draining.  He also complains of increased left foot pain.  Today went to see his podiatrist Dr. Tee and while being evaluated in the office it was noted that he has a cast on his left foot x-ray so was sent to the hospital immediately and no plan for urgent surgical intervention.  It is noted that he does have a low-grade temp 100 0.4F though he denies any fevers at home.  Denies any chest pain or shortness of breath, no palpitations, no dizziness or lightheadedness, no headaches or visual changes, cough/cold, no abdominal pain, no nausea or vomiting or diarrhea, no urinary symptoms such as dysuria or increased frequency or hematuria, no bright red blood per rectum or melena.  Denies any history of blood clots.  No history of apnea.  In terms of activity he states prior to this left foot issue which started few days ago was easily able to go up a flight of stairs or walk 2-3 blocks at a good pace without any exertional symptoms.  Denies any cardiac history and states he had a stress test after his CVA 5 years ago which was normal.    PAST MEDICAL AND SURGICAL HISTORY        Past Medical History:   Diagnosis Date   • GERD (gastroesophageal reflux disease)    • History of CVA (cerebrovascular accident)    • Hypertension    • Lipid disorder    • Type 2 diabetes mellitus (CMS/HCC) (HCC)        No past surgical history on file.    PCP: Dianne Castro  "A, DO    MEDICATIONS        Home Medications:  Prescriptions Prior to Admission   Medication Sig Dispense Refill Last Dose   • clopidogrel (PLAVIX) 75 mg tablet TAKE 1 TABLET BY MOUTH EVERY DAY 90 tablet 0 1/3/2019 at Unknown time   • insulin glargine (BASAGLAR KWIKPEN U-100 INSULIN) 100 unit/mL (3 mL) subcutaneous pen 26 units at bedtime   1/3/2019 at Unknown time   • lisinopril (PRINIVIL) 20 mg tablet 20 mg.   1/3/2019 at Unknown time   • lisinopril (PRINIVIL) 40 mg tablet Take 40 mg by mouth once daily.  3 1/3/2019 at Unknown time   • metFORMIN (GLUCOPHAGE) 1,000 mg tablet take 1 tablet by oral route 2 times every day with morning and evening meals   1/3/2019 at Unknown time   • omeprazole (PriLOSEC) 20 mg capsule Take 20 mg by mouth once daily.  0 1/3/2019 at Unknown time   • rosuvastatin (CRESTOR) 10 mg tablet 10 mg.   1/3/2019 at Unknown time   • SITagliptin (JANUVIA) 100 mg tablet Take 1 tablet (100 mg total) by mouth daily. 90 tablet 2 1/3/2019 at Unknown time   • blood sugar diagnostic (ONETOUCH ULTRA TEST) strip for blood glucose monitoring 3-4x day      • blood-glucose meter kit for blood glucose monitoring      • lancets misc for blood glucose monitoring 3x day      • pen needle, diabetic (NOVOFINE 32) 32 gauge x 1/4\" needle For insulin administration once daily 300 each 3        Current inpatient medications were personally reviewed.    ALLERGIES        No known allergies    FAMILY HISTORY        No family history on file.    SOCIAL HISTORY        Social History     Social History   • Marital status:      Spouse name: N/A   • Number of children: N/A   • Years of education: N/A     Social History Main Topics   • Smoking status: Former Smoker   • Smokeless tobacco: Never Used   • Alcohol use No   • Drug use: No   • Sexual activity: Not on file     Other Topics Concern   • Not on file     Social History Narrative   • No narrative on file       REVIEW OF SYSTEMS        All other systems reviewed and " "negative except as noted in HPI    PHYSICAL EXAMINATION        /63 (BP Location: Left upper arm, Patient Position: Lying)   Pulse 100   Temp 38 °C (100.4 °F) (Oral)   Resp 16   Ht 1.854 m (6' 1\")   Wt 93.2 kg (205 lb 6.4 oz)   BMI 27.10 kg/m²   Body mass index is 27.1 kg/m².    Intake/Output Summary (Last 24 hours) at 01/04/19 1354  Last data filed at 01/04/19 1300   Gross per 24 hour   Intake                0 ml   Output              200 ml   Net             -200 ml       Physical Exam   Constitutional: He is oriented to person, place, and time. He appears well-developed and well-nourished. No distress.   HENT:   Head: Normocephalic and atraumatic.   Eyes: Conjunctivae are normal. Pupils are equal, round, and reactive to light.   Neck: Normal range of motion. Neck supple.   Cardiovascular: Normal rate, regular rhythm and normal heart sounds.    No murmur heard.  Pulmonary/Chest: Effort normal and breath sounds normal. No respiratory distress. He has no wheezes. He has no rales.   Abdominal: Soft. Bowel sounds are normal. He exhibits no distension. There is no tenderness. There is no guarding.   Musculoskeletal:   Left foot dressing in place   Neurological: He is alert and oriented to person, place, and time. No cranial nerve deficit.   Skin: Skin is warm and dry. No rash noted.   Psychiatric: He has a normal mood and affect.       LABS / EKG        Labs  Wbc 20.47  hgb - 10.7  ESR - 71  POCT - 348  BMP and coags pending    ECG/Telemetry  EKG personally reviewed shows normal sinus rhythm    Imaging  none    ASSESSMENT AND RECOMMENDATIONS           Encounter for other preprocedural examination   Assessment & Plan    Plan for urgent OR for left foot infection/gas   Denies any hx of CAD, no arrhythmias, no CHF, no preoperative creatinine >2  Does have a hx of CVA 5 yrs ago and an insulin dependent diabetic. RCRI 6.6%  EKG benign without any signs of ischemia  Good exercise capacity able to reach>4 METs " activity level    P  Can proceed with this urgent surgery without additional cardiac testing at an acceptable risk.          Anemia   Assessment & Plan    No recent hgb for comparion. Hgb 15.1 1 yr ago  Pt denies any symptoms. No signs of active bleeding  Microcytic anemia - suggestive of iron deficiency?  Will need further evaluation but this can be held off for post op  Monitor hgb        Type 2 diabetes mellitus (CMS/HCC) (HCC)   Assessment & Plan    A1C 10.8 in May 2018.   accucheck today 348 which could be exerbation from his infection vs poorly controlled diabetes  Check HgbA1C  Cont otupt meds for now  SSI, monitor accuchecks  Diabetic diet  Would need aggressive glycemic control for adequate wound healing          Left foot infection   Assessment & Plan    Plan for OR per podiatry  ID consulted  for antibiotic recs  Currently with low grade fever, leukocytosis.  Follow wound cultures. Check blood cultures  Trend wbc, temps.          Hypertension   Assessment & Plan    Hold lisinopril preop  Monitor bp        Lipid disorder   Assessment & Plan    Cont statin                  Vasu Whaley MD  1/4/2019

## 2019-01-04 NOTE — ANESTHESIA PROCEDURE NOTES
Airway  Urgency: elective    Start Time: 1/4/2019 2:49 PM  Airway not difficult    General Information and Staff    Patient location during procedure: OR  Anesthesiologist: INGRIS RAMIREZ  Resident/CRNA: OSIEL ZAYAS  Performed: resident/CRNA     Indications and Patient Condition  Indications for airway management: anesthesia  Sedation level: general  Preoxygenated: yes  Patient position: sniffing  Mask difficulty assessment: 0 - not attempted    Final Airway Details  Final airway type: endotracheal airway      Successful airway: ETT    Successful intubation technique: direct laryngoscopy  Facilitating devices/methods: intubating stylet and cricoid pressure  Endotracheal tube insertion site: oral  Blade: Jadiel  Blade size: #3  ETT size: 7.0 mm  Cormack-Lehane Classification: grade I - full view of glottis  Placement verified by: chest auscultation and capnometry   Measured from: lips  ETT to lips (cm): 22  Number of attempts at approach: 1  Atraumatic airway insertion

## 2019-01-04 NOTE — OP NOTE
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           Disposition: PACU - hemodynamically stable.           Condition: stable    Operative Note:   Summary: Patient presents to [facility]  for surgical intervention of their left foot.  Patient has necrotizing fasciitis of the left foot.  He reports he felt nauseous for 5 days and just came in today to the office, where significant gas was noted on x-ray.    The patient was transferred to the operating room and placed on the operating room table in the supine position.  The correct surgical site was identified which is the left foot.Once anesthesia was achieved, the above mentioned blocked was administered. The foot and ankle were sterilely prepped and draped in the usual aseptic technique and a timeout was performed before the beginning of the procedure.     Procedure:    Attention was directed to the left foot two incisions were made over the dorsal foot, an incision was made over the lateral ankle, an incision was made over the medial arch and an incision was made over the plantar foot.  All incisions were made over areas where  there was noted feeling of fluctuance.  The areas were bluntly opened with a curved hemostat.  It was noted that especially over the dorsal aspect of the foot there was significant purulence over 100 cc.  Culture was taken from the 3 separate pockets of pus noted.  The soft tissue looks a dusky most notably dorsally.  The area was copiously irrigated with the pulse lavage with bacitracin.  All incision sites were packed with iodoform packing.  He was dressed with gauze, ABDs, Terri, Coban and.      The patient tolerated the procedure well with all vital signs stable and neurovascular status intact. Once aroused from anesthesia the patient was transferred from the operating room to PACU and went back to his room    Patient is to be nonweightbearing to the left lower extremity. He will return to the OR on Monday for either closure or further debridement.    Fabricio Tee DPM  Phone Number: [phone]

## 2019-01-04 NOTE — PERIOPERATIVE NURSING NOTE
Updated about plan of care. Pt vital signs stable waiting for a bed assignment. Mentioned that patient wasn't interested in ordering food may want to get something in cafeteria for him.

## 2019-01-04 NOTE — PROGRESS NOTES
Vancomycin Dosing by Pharmacy Consult Initiated    Harvey Lucero Jr is a 58 y.o. male who has been consulted for vancomycin dosing for foot infection .    Reviewed relevant clinical data including weight, renal function, previous vancomycin doses, and vancomycin levels:  Creatinine   Date Value Ref Range Status   01/04/2019 1.4 (H) 0.8 - 1.3 mg/dL Final     Creatinine, Serum   Date Value Ref Range Status   05/04/2018 1.10 0.76 - 1.27 mg/dL Final   12/14/2017 1.05 0.76 - 1.27 mg/dL    06/28/2017 1.05 0.76 - 1.27 mg/dL            Vancomycin Administrations (last 96 hours)       None              Assessment/Plan  The patient is ordered vancomycin dosing by pharmacy.  The dose will be based on:   Wt Readings from Last 1 Encounters:   01/04/19 93.2 kg (205 lb 6.4 oz)     Estimated Creatinine Clearance: 66.6 mL/min (A) (by C-G formula based on SCr of 1.4 mg/dL (H))..      Will initiate a loading dose  dose 2000 mg IV x1 and maintenance dose at 1000 mg IV every 12 hours.    Target a trough of 15-20 ug/mL per vancomycin dosing per pharmacy order.  Pharmacy will continue to follow the patient's vancomycin dosing daily.      Eduardo Johnson RP

## 2019-01-05 ENCOUNTER — APPOINTMENT (INPATIENT)
Dept: RADIOLOGY | Facility: HOSPITAL | Age: 59
DRG: 853 | End: 2019-01-05
Attending: PODIATRIST
Payer: COMMERCIAL

## 2019-01-05 PROBLEM — D70.0: Status: RESOLVED | Noted: 2019-01-04 | Resolved: 2019-01-05

## 2019-01-05 PROBLEM — N17.9 ACUTE KIDNEY INJURY (CMS/HCC): Status: ACTIVE | Noted: 2019-01-05

## 2019-01-05 PROBLEM — L03.032 CELLULITIS AND ABSCESS OF TOE OF LEFT FOOT: Status: RESOLVED | Noted: 2019-01-04 | Resolved: 2019-01-05

## 2019-01-05 PROBLEM — E87.1 HYPONATREMIA: Status: ACTIVE | Noted: 2019-01-05

## 2019-01-05 PROBLEM — A48.0 GAS GANGRENE (CMS/HCC): Status: RESOLVED | Noted: 2019-01-04 | Resolved: 2019-01-05

## 2019-01-05 PROBLEM — L02.612 CELLULITIS AND ABSCESS OF TOE OF LEFT FOOT: Status: RESOLVED | Noted: 2019-01-04 | Resolved: 2019-01-05

## 2019-01-05 LAB
A BAUMANNII DNA SPEC QL NAA+PROBE: NOT DETECTED
ANION GAP SERPL CALC-SCNC: 12 MEQ/L (ref 3–15)
BASOPHILS # BLD: 0.06 K/UL (ref 0.01–0.1)
BASOPHILS NFR BLD: 0.3 %
BUN SERPL-MCNC: 46 MG/DL (ref 8–20)
C ALBICANS DNA BLD QL NAA+PROBE: NOT DETECTED
C GLABRATA DNA BLD QL NAA+PROBE: NOT DETECTED
C KRUSEI DNA BLD QL NAA+PROBE: NOT DETECTED
C PARAP DNA BLD QL NAA+PROBE: NOT DETECTED
C TROPICLS DNA BLD QL NAA+PROBE: NOT DETECTED
CALCIUM SERPL-MCNC: 7.5 MG/DL (ref 8.9–10.3)
CHLORIDE SERPL-SCNC: 95 MEQ/L (ref 98–109)
CO2 SERPL-SCNC: 21 MEQ/L (ref 22–32)
CREAT SERPL-MCNC: 1.7 MG/DL
DIFFERENTIAL METHOD BLD: ABNORMAL
E CLOAC COMP DNA BLD POS NAA+NON-PROBE: NOT DETECTED
E COLI DNA SPEC QL NAA+PROBE: NOT DETECTED
ENTEROBACTERIACEAE: NOT DETECTED
ENTEROCOC DNA SPEC QL NAA+PROBE: NOT DETECTED
EOSINOPHIL # BLD: 0.01 K/UL (ref 0.04–0.54)
EOSINOPHIL NFR BLD: 0.1 %
ERYTHROCYTE [DISTWIDTH] IN BLOOD BY AUTOMATED COUNT: 14.5 % (ref 11.6–14.4)
FERRITIN SERPL-MCNC: 1062 NG/ML (ref 24–250)
GFR SERPL CREATININE-BSD FRML MDRD: 41.9 ML/MIN/1.73M*2
GLUCOSE BLD-MCNC: 221 MG/DL (ref 70–99)
GLUCOSE BLD-MCNC: 226 MG/DL (ref 70–99)
GLUCOSE BLD-MCNC: 293 MG/DL (ref 70–99)
GLUCOSE BLD-MCNC: 305 MG/DL (ref 70–99)
GLUCOSE SERPL-MCNC: 288 MG/DL (ref 70–99)
GP B STREP DNA SPEC QL NAA+PROBE: NOT DETECTED
HAEM INFLU DNA SPEC QL NAA+PROBE: NOT DETECTED
HCT VFR BLDCO AUTO: 27.9 %
HGB BLD-MCNC: 9.2 G/DL
IMM GRANULOCYTES # BLD AUTO: 0.68 K/UL (ref 0–0.08)
IMM GRANULOCYTES NFR BLD AUTO: 3.5 %
IRON SATN MFR SERPL: 11 % (ref 15–45)
IRON SERPL-MCNC: 14 UG/DL (ref 35–150)
K OXYTOCA DNA BLD QL NAA+PROBE: NOT DETECTED
K PNEUMON DNA SPEC QL NAA+PROBE: NOT DETECTED
L MONOCYTOG DNA SPEC QL NAA+PROBE: NOT DETECTED
LYMPHOCYTES # BLD: 0.97 K/UL (ref 1.2–3.5)
LYMPHOCYTES NFR BLD: 4.9 %
MCH RBC QN AUTO: 26.7 PG (ref 28–33.2)
MCHC RBC AUTO-ENTMCNC: 33 G/DL (ref 32.2–36.5)
MCV RBC AUTO: 81.1 FL (ref 83–98)
MONOCYTES # BLD: 1.77 K/UL (ref 0.3–1)
MONOCYTES NFR BLD: 9 %
N MEN DNA BLD QL NAA+PROBE: NOT DETECTED
NEUTROPHILS # BLD: 16.12 K/UL (ref 1.7–7)
NEUTS SEG NFR BLD: 82.2 %
NRBC BLD-RTO: 0 %
P AERUGINOSA DNA SPEC QL NAA+PROBE: NOT DETECTED
PDW BLD AUTO: 10.9 FL (ref 9.4–12.4)
PLATELET # BLD AUTO: 305 K/UL
POCT TEST: ABNORMAL
POTASSIUM SERPL-SCNC: 4.1 MEQ/L (ref 3.6–5.1)
PROTEUS SP DNA BLD POS QL NAA+NON-PROBE: NOT DETECTED
RBC # BLD AUTO: 3.44 M/UL (ref 4.5–5.8)
S AUREUS DNA SPEC QL NAA+PROBE: NOT DETECTED
S AUREUS+CONS DNA BLD POS NAA+NON-PROBE: NOT DETECTED
S MARCESCENS DNA SPEC QL NAA+PROBE: NOT DETECTED
S PNEUM DNA BLD QL NAA+PROBE: NOT DETECTED
S PYO DNA SPEC NAA+PROBE: NOT DETECTED
SODIUM SERPL-SCNC: 128 MEQ/L (ref 136–144)
STREPTOCOCCUS DNA BLD QL NAA+PROBE: DETECTED
TEST PERFORMANCE INFO SPEC: ABNORMAL
TIBC SERPL-MCNC: 133 UG/DL (ref 270–460)
UIBC SERPL-MCNC: 119 UG/DL (ref 180–360)
WBC # BLD AUTO: 19.61 K/UL

## 2019-01-05 PROCEDURE — 63600000 HC DRUGS/DETAIL CODE: Performed by: PODIATRIST

## 2019-01-05 PROCEDURE — 73718 MRI LOWER EXTREMITY W/O DYE: CPT | Mod: LT

## 2019-01-05 PROCEDURE — 25800000 HC PHARMACY IV SOLUTIONS: Performed by: ANESTHESIOLOGY

## 2019-01-05 PROCEDURE — 83540 ASSAY OF IRON: CPT | Performed by: INTERNAL MEDICINE

## 2019-01-05 PROCEDURE — 12000000 HC ROOM AND CARE MED/SURG

## 2019-01-05 PROCEDURE — 63700000 HC SELF-ADMINISTRABLE DRUG: Performed by: INTERNAL MEDICINE

## 2019-01-05 PROCEDURE — 63600000 HC DRUGS/DETAIL CODE: Performed by: INTERNAL MEDICINE

## 2019-01-05 PROCEDURE — 25800000 HC PHARMACY IV SOLUTIONS: Performed by: INTERNAL MEDICINE

## 2019-01-05 PROCEDURE — 36415 COLL VENOUS BLD VENIPUNCTURE: CPT | Performed by: PODIATRIST

## 2019-01-05 PROCEDURE — 87040 BLOOD CULTURE FOR BACTERIA: CPT | Performed by: PODIATRIST

## 2019-01-05 PROCEDURE — 63700000 HC SELF-ADMINISTRABLE DRUG: Performed by: PODIATRIST

## 2019-01-05 PROCEDURE — 82728 ASSAY OF FERRITIN: CPT | Performed by: INTERNAL MEDICINE

## 2019-01-05 PROCEDURE — 25000000 HC PHARMACY GENERAL: Performed by: INTERNAL MEDICINE

## 2019-01-05 PROCEDURE — 85025 COMPLETE CBC W/AUTO DIFF WBC: CPT | Performed by: PODIATRIST

## 2019-01-05 PROCEDURE — 80048 BASIC METABOLIC PNL TOTAL CA: CPT | Performed by: PODIATRIST

## 2019-01-05 PROCEDURE — 99233 SBSQ HOSP IP/OBS HIGH 50: CPT | Performed by: INTERNAL MEDICINE

## 2019-01-05 RX ORDER — INSULIN ASPART 100 [IU]/ML
5 INJECTION, SOLUTION INTRAVENOUS; SUBCUTANEOUS
Status: DISCONTINUED | OUTPATIENT
Start: 2019-01-05 | End: 2019-01-19 | Stop reason: HOSPADM

## 2019-01-05 RX ORDER — METFORMIN HYDROCHLORIDE 500 MG/1
1000 TABLET ORAL 2 TIMES DAILY WITH MEALS
Status: DISCONTINUED | OUTPATIENT
Start: 2019-01-05 | End: 2019-01-06

## 2019-01-05 RX ORDER — INSULIN GLARGINE 100 [IU]/ML
25 INJECTION, SOLUTION SUBCUTANEOUS NIGHTLY
Status: DISCONTINUED | OUTPATIENT
Start: 2019-01-05 | End: 2019-01-10

## 2019-01-05 RX ORDER — SODIUM CHLORIDE 9 MG/ML
INJECTION, SOLUTION INTRAVENOUS CONTINUOUS
Status: DISCONTINUED | OUTPATIENT
Start: 2019-01-05 | End: 2019-01-09

## 2019-01-05 RX ADMIN — SODIUM CHLORIDE, SODIUM GLUCONATE, SODIUM ACETATE, POTASSIUM CHLORIDE AND MAGNESIUM CHLORIDE 125 ML/HR: 526; 502; 368; 37; 30 INJECTION, SOLUTION INTRAVENOUS at 06:12

## 2019-01-05 RX ADMIN — VANCOMYCIN HYDROCHLORIDE 1000 MG: 1 INJECTION, POWDER, LYOPHILIZED, FOR SOLUTION INTRAVENOUS at 19:00

## 2019-01-05 RX ADMIN — INSULIN ASPART 3 UNITS: 100 INJECTION, SOLUTION INTRAVENOUS; SUBCUTANEOUS at 23:08

## 2019-01-05 RX ADMIN — SITAGLIPTIN 100 MG: 100 TABLET, FILM COATED ORAL at 08:47

## 2019-01-05 RX ADMIN — METRONIDAZOLE 500 MG: 500 INJECTION, SOLUTION INTRAVENOUS at 01:42

## 2019-01-05 RX ADMIN — METFORMIN HYDROCHLORIDE 1000 MG: 500 TABLET ORAL at 09:57

## 2019-01-05 RX ADMIN — SODIUM CHLORIDE: 9 INJECTION, SOLUTION INTRAVENOUS at 12:20

## 2019-01-05 RX ADMIN — LISINOPRIL 40 MG: 20 TABLET ORAL at 06:11

## 2019-01-05 RX ADMIN — ROSUVASTATIN CALCIUM 10 MG: 10 TABLET, FILM COATED ORAL at 08:44

## 2019-01-05 RX ADMIN — CLOPIDOGREL BISULFATE 75 MG: 75 TABLET, FILM COATED ORAL at 06:06

## 2019-01-05 RX ADMIN — LISINOPRIL 20 MG: 20 TABLET ORAL at 08:45

## 2019-01-05 RX ADMIN — INSULIN ASPART 5 UNITS: 100 INJECTION, SOLUTION INTRAVENOUS; SUBCUTANEOUS at 18:36

## 2019-01-05 RX ADMIN — CEFTAZIDIME 2 G: 2 INJECTION, SOLUTION INTRAVENOUS at 16:43

## 2019-01-05 RX ADMIN — CEFTAZIDIME 2 G: 2 INJECTION, SOLUTION INTRAVENOUS at 08:46

## 2019-01-05 RX ADMIN — ENOXAPARIN SODIUM 40 MG: 100 INJECTION SUBCUTANEOUS at 18:38

## 2019-01-05 RX ADMIN — INSULIN ASPART 5 UNITS: 100 INJECTION, SOLUTION INTRAVENOUS; SUBCUTANEOUS at 12:43

## 2019-01-05 RX ADMIN — CEFTAZIDIME 2 G: 2 INJECTION, SOLUTION INTRAVENOUS at 00:32

## 2019-01-05 RX ADMIN — METRONIDAZOLE 500 MG: 500 INJECTION, SOLUTION INTRAVENOUS at 09:57

## 2019-01-05 RX ADMIN — INSULIN ASPART 5 UNITS: 100 INJECTION, SOLUTION INTRAVENOUS; SUBCUTANEOUS at 09:08

## 2019-01-05 RX ADMIN — ACETAMINOPHEN 650 MG: 325 TABLET, FILM COATED ORAL at 08:46

## 2019-01-05 RX ADMIN — VANCOMYCIN HYDROCHLORIDE 1000 MG: 1 INJECTION, POWDER, LYOPHILIZED, FOR SOLUTION INTRAVENOUS at 06:48

## 2019-01-05 RX ADMIN — INSULIN GLARGINE 25 UNITS: 100 INJECTION, SOLUTION SUBCUTANEOUS at 23:08

## 2019-01-05 RX ADMIN — METFORMIN HYDROCHLORIDE 1000 MG: 500 TABLET ORAL at 17:44

## 2019-01-05 RX ADMIN — METRONIDAZOLE 500 MG: 500 INJECTION, SOLUTION INTRAVENOUS at 17:44

## 2019-01-05 RX ADMIN — PANTOPRAZOLE SODIUM 20 MG: 20 TABLET, DELAYED RELEASE ORAL at 08:46

## 2019-01-05 ASSESSMENT — ENCOUNTER SYMPTOMS
RESPIRATORY NEGATIVE: 1
PSYCHIATRIC NEGATIVE: 1
DIAPHORESIS: 0
NEUROLOGICAL NEGATIVE: 1
FEVER: 0
CARDIOVASCULAR NEGATIVE: 1
GASTROINTESTINAL NEGATIVE: 1

## 2019-01-05 NOTE — PROGRESS NOTES
Patient: Harvey Lucero Jr  Location: [facility] 3C 0362  MRN: [id number]  Today's date: 1/5/2019    Attempted to see patient for therapy. Unable due to patient at test or procedure (test in AM, pt declined in PM - very fatigued).

## 2019-01-05 NOTE — SIGNIFICANT EVENT
Called by nursing that patient was sleepy  He has postive BCx and on abx  His BP is stable - continue IVF   Hold lisinopril   He is in the room with his wife and oriented   Repeat Bcx drawn   Lungs clear   Heart reg with m     Likely source of bacteremia is his foot infection

## 2019-01-05 NOTE — PROGRESS NOTES
Podiatry Progress Note    Subjective   58 y.o. patient POD#1 s/p L foot incision and drainage for gas gangrene. Dressing to the LLE intact with minimal strikethrough. Seen this morning with Dr. Tee, patient aware of the severity of his infection and possibility that LE may not be completely salvagable. Will need return to OR in several days.    NAD, no overnight events. Still a little nauseated. Still has not had much to eat. Denies f/c/sob.    All PMH/social hx/surgical hx/family hx reviewed for the patient for today's visit.    Objective   Labs  labs ordered this morning, pending   CBC Results       01/05/19 01/04/19 12/14/17                    0855 1324          WBC 19.61 (H) 20.47 (H) 9.9         RBC 3.44 (L) 4.07 (L) 5.10         HGB 9.2 (L) 10.7 (L) 15.1         HCT 27.9 (L) 32.5 (L) 43.8         MCV 81.1 (L) 79.9 (L) 86         MCH 26.7 (L) 26.3 (L) 29.6         MCHC 33.0 32.9 34.5          320 274                     BMP Results       01/05/19 01/04/19 05/04/18                    0855 1324 0758          (L) 124 (LL) 138         K 4.1 4.5 5.3 (H)         Cl 95 (L) 88 (L) 99         CO2 21 (L) 21 (L) 23         Glucose 288 (H) 363 (H) 270 (H)         BUN 46 (H) 37 (H) 28 (H)         Creatinine 1.7 (H) 1.4 (H) 1.10         Calcium 7.5 (L) 8.4 (L) -         Anion Gap 12 15 -         EGFR 41.9 (L) 52.4 (L) 75            87                       Imaging  I have reviewed the Imaging from the last 24 hrs.    L MRI ordered : pending    Vital signs in last 24 hours:  Temp:  [36.7 °C (98.1 °F)-38 °C (100.4 °F)] 37.3 °C (99.1 °F)  Heart Rate:  [] 94  Resp:  [13-20] 18  BP: (100-134)/(55-66) 117/62      Intake/Output Summary (Last 24 hours) at 01/05/19 0812  Last data filed at 01/05/19 0015   Gross per 24 hour   Intake             1000 ml   Output              625 ml   Net              375 ml       Physical Exam:  General appearance: AAOx3, appears stated age,cooperative  Head: normocephalic,  without obvious abnormality, atraumatic  Eyes: conjunctivae/corneas clear. PERRL, EOM's intact. Fundi benign.  Ears: normal TM's and external ear canals both ears  Nose: Nares normal. Septum midline. Mucosa normal. No drainage or sinus tenderness.  Lungs: clear to auscultation bilaterally  Heart:: regular rate and rhythm  Abdomen: soft, non-tender; bowel sounds normal; no masses, no organomegaly      LE Focused Physical Exam :  Vascular : non-palpable DP and PT pulses LLE, palpable on the R. CRT < 5 sec. Skin temp warm to warm with increase at the L distal leg/ankle/foot. No pain to the posterior proximal calf - no clinical signs of DVT.  Neuro : Gross sensation intact. Light touch and protective sensation diminished.  Ortho : DF/PF of remaining digits present. Equinus B/L ankle joints.   Derm : Surgical incisions at the medial L heel, plantar central midfoot, lateral foot, dorsal lateral foot, dorsal medial foot to the ankle. Entire foot/ankle is erythematous and edematous.   Plantar incision about 3cm in length with granular wound base, serosanginous drainage only. No signs of necrosis to tissue. No crepitus or fluctuance plantarly  Medial heel incision about 4-5cm in length and 2cm in width with granular wound base, serosanguinous drainage only. No purulence, no signs of necrosis  Lateral foot incision about 4-5 cm in length with granular wound base, serosanguinous drainage noted. No signs of necrosis  Dorsal lateral incision about 8cm in length and 2cm wide with exposed tendons. Mostly granular but medially edge is grey/dusky in appearance. Odor noted from this wound but no purulence expressed. Tunnels medial across dorsal foot  Dorsal medial incision about 10cm in length and 2cm wide with exposed tendon. Mostly granular but laterally with grey area. Odor noted from this wound, no purulence expressed.    A/P: 58 y.o. patient seen this morning with Dr. Tee s/p POD# L foot incision and drainage of gas  gangrene/soft tissue emphysema.  - Dressing changed today : flush/pack to all incision sites, redressed with DSD/ABD/kerlix. Nursing may reinforce as needed for strikethrough.  - Continue IV abx per ID recs : vancomycin, fortaz, flagyl  -In OR significant pus was drained over 200cc's. 3 cultures were taken from different pockets of pus.  - OR wound cultures : 2 out of 3 (+) for 4+ GPC  - Patient will need return to the OR in several days (Monday or Tuesday) for at least second washout/debridement. Pending tissue viability, possible wound vac, possible partial amputation. Hope to salvage as mush as possible for the patient.  - L Foot MRI ordered : pending  - L tib-fib views ordered to rule out soft tissue emphysema proximal : pending  - Labs ordered this morning, will follow and monitor  - Adult regular diet, 2000cal consistent carb, NCS. Adjust as needed  - Patient is to be NWB to the LLE with the use of crutches, walker, or wheelchair.   - Pain medication on board as patient needs.    - DVT prophylaxsis Lovenox  - Please contact on call podiatry resident with any questions or concerns     Jen Urban DPM PGY-2  Pager [phone]

## 2019-01-05 NOTE — PLAN OF CARE
Problem: Patient Care Overview  Goal: Plan of Care Review  Outcome: Ongoing (interventions implemented as appropriate)   01/04/19 2113   Coping/Psychosocial   Plan Of Care Reviewed With patient   Plan of Care Review   Progress improving   Outcome Summary Pt had surgery today. Pain is controlled with Dilaudid. IV fluids maintained. DVT at midnight. LLE elevated on pillows. Ice applied to area.       Problem: Pressure Ulcer Risk (Enmanuel Scale) (Adult,Obstetrics,Pediatric)  Goal: Identify Related Risk Factors and Signs and Symptoms  Outcome: Outcome(s) Achieved Date Met: 01/04/19    Goal: Skin Integrity  Outcome: Ongoing (interventions implemented as appropriate)

## 2019-01-05 NOTE — PROGRESS NOTES
Hospital Medicine Service -  Daily Progress Note       Patient Name: Harvey Lucero Jr  Patient MRN: [id number]  Date/Time: 01/05/19 12:11 PM  LOS: 1 day/days  Primary Care Physician: Dianne Castro DO    SUBJECTIVE   Interval History:   Chart was reviewed. He is doing well post-op. His BS are running higher and meds adjusted.     MEDICATIONS     Infusion Medications:   electrolyte-A 125 mL/hr Last Rate: 125 mL/hr (01/05/19 0612)   sodium chloride 0.9 %         Scheduled Medications:   cefTAZidime 2 g intravenous q8h INT   clopidogrel 75 mg oral Daily (6a)   enoxaparin 40 mg subcutaneous Daily (6p)   insulin aspart U-100 3-5 Units subcutaneous With meals & nightly   insulin aspart U-100 5 Units subcutaneous TID with meals   insulin glargine 25 Units subcutaneous Nightly   lisinopril 20 mg oral Daily   lisinopril 40 mg oral Daily (6a)   metFORMIN 1,000 mg oral BID with meals   metroNIDAZOLE 500 mg intravenous q8h INT   pantoprazole 20 mg oral Daily   rosuvastatin 10 mg oral Daily   SITagliptin 100 mg oral Daily   vancomycin 15-20 mg/kg intravenous q12h INT       PRN Medications: •  acetaminophen  •  alum-mag hydroxide-simeth  •  dextrose **OR** glucagon **OR** dextrose in water  •  diphenhydrAMINE **OR** diphenhydrAMINE  •  oxyCODONE **OR** HYDROmorphone  •  ondansetron ODT **OR** ondansetron  •  senna    REVIEW OF SYSTEMS   Review of Systems   Constitution: Negative for diaphoresis and fever.   HENT: Negative.    Cardiovascular: Negative.    Respiratory: Negative.    Musculoskeletal:        Left foot wrapped    Gastrointestinal: Negative.    Genitourinary: Negative.    Neurological: Negative.    Psychiatric/Behavioral: Negative.    All other systems reviewed and are negative.         OBJECTIVE      Vitals:    01/04/19 2200 01/05/19 0211 01/05/19 0600 01/05/19 0700   BP: 116/60 112/60 117/62 (!) 128/55   BP Location: Left upper arm Left upper arm Right upper arm Right upper arm   Patient Position: Lying Lying  Lying Lying   Pulse: 93 94  86   Resp: 18 18  18   Temp: 37.1 °C (98.8 °F) 37.3 °C (99.1 °F)  37.3 °C (99.2 °F)   TempSrc: Temporal Temporal  Oral   SpO2: 92% 93%  94%   Weight:       Height:         Temp:  [36.7 °C (98.1 °F)-38 °C (100.4 °F)] 37.3 °C (99.2 °F)  Heart Rate:  [] 86  Resp:  [13-20] 18  BP: (100-134)/(55-66) 128/55    Intake/Output Summary (Last 24 hours) at 01/05/19 1211  Last data filed at 01/05/19 0015   Gross per 24 hour   Intake             1000 ml   Output              625 ml   Net              375 ml     I/O last 3 completed shifts:  In: 1000 [I.V.:1000]  Out: 625 [Urine:625]  Weight change, 24 hours: Weight change:       PHYSICAL EXAMINATION      Physical Exam   Constitutional: He is oriented to person, place, and time. No distress.   HENT:   Head: Normocephalic and atraumatic.   Eyes: EOM are normal. Pupils are equal, round, and reactive to light. Left eye exhibits no discharge.   Neck: Neck supple. No JVD present.   Cardiovascular: Normal rate, regular rhythm and normal heart sounds.    No murmur heard.  Pulmonary/Chest: Effort normal and breath sounds normal.   Abdominal: Soft. Bowel sounds are normal. He exhibits no distension.   Musculoskeletal:   Left foot wrapped per podiatry    Neurological: He is alert and oriented to person, place, and time.   Psychiatric: He has a normal mood and affect. His behavior is normal. Thought content normal.   Vitals reviewed.     LINES, CATHETERS, DRAINS, AIRWAYS, AND WOUNDS   Lines, Drains, Airways, Wounds:  Peripheral IV 01/04/19 Right Arm (Active)   Number of days: 1       Surgical Incision Foot Left (Active)   Number of days: 1       Comments:      LABS / IMAGING / TELE        Laboratory Studies  Lab Results   Component Value Date    GLUCOSE 288 (H) 01/05/2019    CALCIUM 7.5 (L) 01/05/2019     (L) 01/05/2019    K 4.1 01/05/2019    CO2 21 (L) 01/05/2019    CL 95 (L) 01/05/2019    BUN 46 (H) 01/05/2019    CREATININE 1.7 (H) 01/05/2019        Lab Results   Component Value Date    HGBA1C 10.2 (H) 01/04/2019         Lab Results   Component Value Date    WBC 19.61 (H) 01/05/2019    HGB 9.2 (L) 01/05/2019    HCT 27.9 (L) 01/05/2019    MCV 81.1 (L) 01/05/2019     01/05/2019       Microbiology Results     Procedure Component Value Units Date/Time    Bacterial culture / smear, aerobic and anaerobic [[id number]] Collected:  01/04/19 1526    Specimen:  Tissue from Foot, Left Updated:  01/04/19 2237     Gram Stain Result 4+ Gram positive cocci      No WBC Seen    Bacterial culture / smear, aerobic and anaerobic [[id number]] Collected:  01/04/19 1512    Specimen:  Tissue from Foot, Left Updated:  01/04/19 2238     Gram Stain Result No WBC Seen      No organisms seen    Bacterial culture / smear, aerobic and anaerobic [[id number]] Collected:  01/04/19 1503    Specimen:  Tissue from Foot, Left Updated:  01/04/19 2238     Gram Stain Result 4+ Gram positive cocci      1+ WBC            Lab Results   Component Value Date    INR 1.4 01/04/2019       Imaging:    No results found.        ASSESSMENT AND PLAN      Principal Problem:    Left foot infection  Active Problems:    Type 2 diabetes mellitus (CMS/HCC) ([facility])    Acute kidney injury (CMS/HCC) ([facility])    History of CVA (cerebrovascular accident)    Hypertension    Anemia    Hyponatremia    Lipid disorder    Encounter for other preprocedural examination    Leukocytosis        Acute kidney injury (CMS/HCC) ([facility])   Assessment & Plan    Cr in May was 1.1   Has underlying DM and HTN   BUN also up - suspect pre-renal   Will give IVF        Type 2 diabetes mellitus (CMS/HCC) ([facility])   Assessment & Plan    A1C 10.8 in May 2018.   accucheck today 348 which could be exerbation from his infection vs poorly controlled diabetes  Cont otupt meds for now  Resume lanutus, add meal time insulin   Diabetic diet  Aim for tighter control with active infection     Lab Results   Component Value Date    HGBA1C 10.2 (H) 01/04/2019                * Left foot infection   Assessment & Plan    POD #1 per podiatry   Vanco/fortaz and MTNZ per ID   Currently with low grade fever, leukocytosis.  Bcx and   wound cultures so far neg . .            Hyponatremia   Assessment & Plan    Partly pseudo from increased BS  Control BS and trend   Will give NSS        Anemia   Assessment & Plan    No recent hgb for comparion. Hgb 15.1 1 yr ago  Pt denies any symptoms. No signs of active bleeding  Microcytic anemia - suggestive of iron deficiency?  Iron level ordered     Lab Results   Component Value Date    WBC 19.61 (H) 01/05/2019    HGB 9.2 (L) 01/05/2019    HCT 27.9 (L) 01/05/2019    MCV 81.1 (L) 01/05/2019     01/05/2019   .        Hypertension   Assessment & Plan    BP ok   Creatinine up slightly   Repeat in am         History of CVA (cerebrovascular accident)   Assessment & Plan    Cont plavix and statin        Leukocytosis   Assessment & Plan    Secondary to acute infection         Encounter for other preprocedural examination   Assessment & Plan    Plan for urgent OR for left foot infection/gas   Denies any hx of CAD, no arrhythmias, no CHF, no preoperative creatinine >2  Does have a hx of CVA 5 yrs ago and an insulin dependent diabetic. RCRI 6.6%  EKG benign without any signs of ischemia  Good exercise capacity able to reach>4 METs activity level    P  Can proceed to with this urgent surgery without additional cardiac testing at an acceptable risk          Lipid disorder   Assessment & Plan    Cont statin             VTE Assessment: Padua    VTE Prophylaxis Plan: lovenox   Code Status: Full Code  Estimated Discharge Date: 1/8/2019  Disposition Planning: TBD per podiatry      Kali Abernathy MD  1/5/2019

## 2019-01-05 NOTE — PROGRESS NOTES
Patient: Harvey Lucero   Location: [facility] 3C 0362  MRN: [id number]  Today's date: 1/5/2019    Attempted to see patient for therapy. Unable due to  .  Pt at MRI - unavailable for OT Eval

## 2019-01-05 NOTE — PLAN OF CARE
Problem: Patient Care Overview  Goal: Plan of Care Review   01/05/19 0431   Plan of Care Review   Outcome Summary pt. voiding well, R. leg elevated on 1 pillow        Problem: Pressure Ulcer Risk (Enmanuel Scale) (Adult,Obstetrics,Pediatric)  Goal: Skin Integrity  Outcome: Ongoing (interventions implemented as appropriate)

## 2019-01-05 NOTE — PROGRESS NOTES
RN CC met with patient for initial DC planning. Lives with spouse has full flight of stairs to go to bedroom and full bath, 1st floor powder room. Has transportation home. Has never had home care. No definitve medical management has been identified at this time. RN CC will continue to follow.

## 2019-01-06 PROBLEM — R78.81 BACTEREMIA: Status: ACTIVE | Noted: 2019-01-06

## 2019-01-06 PROBLEM — M86.072 ACUTE HEMATOGENOUS OSTEOMYELITIS OF LEFT FOOT (CMS/HCC): Status: ACTIVE | Noted: 2019-01-04

## 2019-01-06 LAB
ANION GAP SERPL CALC-SCNC: 8 MEQ/L (ref 3–15)
BASOPHILS # BLD: 0.08 K/UL (ref 0.01–0.1)
BASOPHILS NFR BLD: 0.4 %
BUN SERPL-MCNC: 40 MG/DL (ref 8–20)
CALCIUM SERPL-MCNC: 7.2 MG/DL (ref 8.9–10.3)
CHLORIDE SERPL-SCNC: 100 MEQ/L (ref 98–109)
CO2 SERPL-SCNC: 22 MEQ/L (ref 22–32)
CREAT SERPL-MCNC: 1.5 MG/DL
DIFFERENTIAL METHOD BLD: ABNORMAL
EOSINOPHIL # BLD: 0.11 K/UL (ref 0.04–0.54)
EOSINOPHIL NFR BLD: 0.5 %
ERYTHROCYTE [DISTWIDTH] IN BLOOD BY AUTOMATED COUNT: 14.5 % (ref 11.6–14.4)
GFR SERPL CREATININE-BSD FRML MDRD: 48.4 ML/MIN/1.73M*2
GLUCOSE BLD-MCNC: 130 MG/DL (ref 70–99)
GLUCOSE BLD-MCNC: 150 MG/DL (ref 70–99)
GLUCOSE BLD-MCNC: 184 MG/DL (ref 70–99)
GLUCOSE BLD-MCNC: 185 MG/DL (ref 70–99)
GLUCOSE BLD-MCNC: 196 MG/DL (ref 70–99)
GLUCOSE SERPL-MCNC: 191 MG/DL (ref 70–99)
HCT VFR BLDCO AUTO: 27.6 %
HGB BLD-MCNC: 9.1 G/DL
IMM GRANULOCYTES # BLD AUTO: 1.03 K/UL (ref 0–0.08)
IMM GRANULOCYTES NFR BLD AUTO: 4.8 %
LYMPHOCYTES # BLD: 1.17 K/UL (ref 1.2–3.5)
LYMPHOCYTES NFR BLD: 5.4 %
MCH RBC QN AUTO: 26.5 PG (ref 28–33.2)
MCHC RBC AUTO-ENTMCNC: 33 G/DL (ref 32.2–36.5)
MCV RBC AUTO: 80.2 FL (ref 83–98)
MONOCYTES # BLD: 1.42 K/UL (ref 0.3–1)
MONOCYTES NFR BLD: 6.6 %
NEUTROPHILS # BLD: 17.68 K/UL (ref 1.7–7)
NEUTS SEG NFR BLD: 82.3 %
NRBC BLD-RTO: 0 %
PDW BLD AUTO: 11.5 FL (ref 9.4–12.4)
PLATELET # BLD AUTO: 256 K/UL
POCT TEST: ABNORMAL
POTASSIUM SERPL-SCNC: 3.7 MEQ/L (ref 3.6–5.1)
RBC # BLD AUTO: 3.44 M/UL (ref 4.5–5.8)
SODIUM SERPL-SCNC: 130 MEQ/L (ref 136–144)
WBC # BLD AUTO: 21.49 K/UL

## 2019-01-06 PROCEDURE — 63600000 HC DRUGS/DETAIL CODE: Performed by: PODIATRIST

## 2019-01-06 PROCEDURE — 97165 OT EVAL LOW COMPLEX 30 MIN: CPT | Mod: GO

## 2019-01-06 PROCEDURE — 63600000 HC DRUGS/DETAIL CODE: Performed by: INTERNAL MEDICINE

## 2019-01-06 PROCEDURE — 80048 BASIC METABOLIC PNL TOTAL CA: CPT | Performed by: PODIATRIST

## 2019-01-06 PROCEDURE — 25000000 HC PHARMACY GENERAL: Performed by: INTERNAL MEDICINE

## 2019-01-06 PROCEDURE — 63700000 HC SELF-ADMINISTRABLE DRUG: Performed by: PODIATRIST

## 2019-01-06 PROCEDURE — 63700000 HC SELF-ADMINISTRABLE DRUG: Performed by: INTERNAL MEDICINE

## 2019-01-06 PROCEDURE — 12000000 HC ROOM AND CARE MED/SURG

## 2019-01-06 PROCEDURE — 36415 COLL VENOUS BLD VENIPUNCTURE: CPT | Performed by: PODIATRIST

## 2019-01-06 PROCEDURE — 97162 PT EVAL MOD COMPLEX 30 MIN: CPT | Mod: GP

## 2019-01-06 PROCEDURE — 99233 SBSQ HOSP IP/OBS HIGH 50: CPT | Performed by: INTERNAL MEDICINE

## 2019-01-06 PROCEDURE — 85025 COMPLETE CBC W/AUTO DIFF WBC: CPT | Performed by: PODIATRIST

## 2019-01-06 RX ADMIN — INSULIN GLARGINE 25 UNITS: 100 INJECTION, SOLUTION SUBCUTANEOUS at 22:54

## 2019-01-06 RX ADMIN — ONDANSETRON 4 MG: 2 INJECTION INTRAMUSCULAR; INTRAVENOUS at 14:45

## 2019-01-06 RX ADMIN — PANTOPRAZOLE SODIUM 20 MG: 20 TABLET, DELAYED RELEASE ORAL at 09:29

## 2019-01-06 RX ADMIN — INSULIN ASPART 3 UNITS: 100 INJECTION, SOLUTION INTRAVENOUS; SUBCUTANEOUS at 15:22

## 2019-01-06 RX ADMIN — METRONIDAZOLE 500 MG: 500 INJECTION, SOLUTION INTRAVENOUS at 17:37

## 2019-01-06 RX ADMIN — INSULIN ASPART 5 UNITS: 100 INJECTION, SOLUTION INTRAVENOUS; SUBCUTANEOUS at 15:22

## 2019-01-06 RX ADMIN — INSULIN ASPART 3 UNITS: 100 INJECTION, SOLUTION INTRAVENOUS; SUBCUTANEOUS at 08:07

## 2019-01-06 RX ADMIN — INSULIN ASPART 5 UNITS: 100 INJECTION, SOLUTION INTRAVENOUS; SUBCUTANEOUS at 08:06

## 2019-01-06 RX ADMIN — CLOPIDOGREL BISULFATE 75 MG: 75 TABLET, FILM COATED ORAL at 06:45

## 2019-01-06 RX ADMIN — METRONIDAZOLE 500 MG: 500 INJECTION, SOLUTION INTRAVENOUS at 01:49

## 2019-01-06 RX ADMIN — VANCOMYCIN HYDROCHLORIDE 1000 MG: 1 INJECTION, POWDER, LYOPHILIZED, FOR SOLUTION INTRAVENOUS at 19:21

## 2019-01-06 RX ADMIN — METFORMIN HYDROCHLORIDE 1000 MG: 500 TABLET ORAL at 08:09

## 2019-01-06 RX ADMIN — CEFTAZIDIME 2 G: 2 INJECTION, SOLUTION INTRAVENOUS at 01:05

## 2019-01-06 RX ADMIN — ENOXAPARIN SODIUM 40 MG: 100 INJECTION SUBCUTANEOUS at 18:07

## 2019-01-06 RX ADMIN — VANCOMYCIN HYDROCHLORIDE 1000 MG: 1 INJECTION, POWDER, LYOPHILIZED, FOR SOLUTION INTRAVENOUS at 06:45

## 2019-01-06 RX ADMIN — METRONIDAZOLE 500 MG: 500 INJECTION, SOLUTION INTRAVENOUS at 09:29

## 2019-01-06 RX ADMIN — ACETAMINOPHEN 650 MG: 325 TABLET, FILM COATED ORAL at 19:23

## 2019-01-06 RX ADMIN — CEFTAZIDIME 2 G: 2 INJECTION, SOLUTION INTRAVENOUS at 08:30

## 2019-01-06 RX ADMIN — HYDROMORPHONE HYDROCHLORIDE 0.25 MG: 1 INJECTION, SOLUTION INTRAMUSCULAR; INTRAVENOUS; SUBCUTANEOUS at 14:11

## 2019-01-06 RX ADMIN — ROSUVASTATIN CALCIUM 10 MG: 10 TABLET, FILM COATED ORAL at 08:10

## 2019-01-06 RX ADMIN — SITAGLIPTIN 100 MG: 100 TABLET, FILM COATED ORAL at 08:10

## 2019-01-06 RX ADMIN — CEFTAZIDIME 2 G: 2 INJECTION, SOLUTION INTRAVENOUS at 17:01

## 2019-01-06 ASSESSMENT — COGNITIVE AND FUNCTIONAL STATUS - GENERAL
DRESSING REGULAR UPPER BODY CLOTHING: 4 - NONE
DRESSING REGULAR LOWER BODY CLOTHING: 3 - A LITTLE
HELP NEEDED FOR PERSONAL GROOMING: 4 - NONE
HELP NEEDED FOR BATHING: 3 - A LITTLE
STANDING UP FROM CHAIR USING ARMS: 2 - A LOT
WALKING IN HOSPITAL ROOM: 1 - TOTAL
CLIMB 3 TO 5 STEPS WITH RAILING: 1 - TOTAL
EATING MEALS: 4 - NONE
MOVING TO AND FROM BED TO CHAIR: 2 - A LOT
TOILETING: 3 - A LITTLE

## 2019-01-06 ASSESSMENT — ENCOUNTER SYMPTOMS
NEUROLOGICAL NEGATIVE: 1
GASTROINTESTINAL NEGATIVE: 1
FEVER: 0
CARDIOVASCULAR NEGATIVE: 1
DIAPHORESIS: 0
RESPIRATORY NEGATIVE: 1
PSYCHIATRIC NEGATIVE: 1
EYES NEGATIVE: 1

## 2019-01-06 NOTE — PLAN OF CARE
Problem: Fall Risk (Adult)  Goal: Identify Related Risk Factors and Signs and Symptoms  Outcome: Outcome(s) Achieved Date Met: 01/06/19

## 2019-01-06 NOTE — PROGRESS NOTES
Patient: Harvey Lucero Jr  Location: [facility] 3C 0362  MRN: [id number]  Today's date: 1/6/2019    Pt left supine in bed, LLE elevated on pillow.  Call bell and phone within reach.  nsg informed of pt performance and safety concerns regarding maintaining weight bearing precautions.          Therapy Pain/Vitals - 01/06/19 1400        Pain/Comfort/Sleep    Pain Charting Type Pain Assessment    Pain Rating: Rest 0 - no pain    Pain Rating: Activity 0 - no pain          Prior Living Environment  Lives With: spouse  Living Arrangements: house  Home Accessibility: stairs to enter home (6 steps)  Living Environment Comment: pt lives in 3 story home, 1 railing on each set of stairs, 0 TRAE home, no AD at home; lives with spouseNumber of Stairs, Main Entrance: noneEquipment Currently Used at Home: none       Prior Level of Function  Ambulation: assistive equipment  Transferring: independent  Toileting: independent  Bathing: independent  Dressing: independent  Eating: independent  Communication: understands/communicates without difficulty  Swallowing: swallows foods/liquids without difficulty  Equipment Currently Used at Home: none           PT Evaluation - 01/06/19 1400        Session Details    Document Type initial evaluation    Mode of Treatment individual therapy;physical therapy       Time Calculation    Start Time 1345    Stop Time 1400    Time Calculation (min) 15 min       General Information    Patient Profile Reviewed? yes    Pertinent History of Current Functional Problem abcess/cellulitis of L foot; full debridment of wound; PMH of CVA, HTN, DMII     Existing Precautions/Restrictions weight bearing   NWB LLE    Limitations/Impairments safety/cognitive       Weight Bearing Status    Left LE Weight Bearing Status non-weight bearing       Cognition/Psychosocial    Safety Awareness impaired judgment demonstrated   impulsive       Bed Mobility    [place], Supine to Sit moderate assist (50% patient  effort)    Star[place], Sit to Supine moderate assist (50% patient effort)    Comment (Bed Mobility) pt implusive with poor safety awareness.   needed frequent cues to maintain NWB throughout.       Sit to Stand Transfer    Starlight, Sit to Stand Transfer moderate assist (50% patient effort)    Assistive Device walker, front-wheeled    Comment PA for safety due to poor technique and safety awareness.  cues to improve technique, safety, and to maintain NWB throughout.       Stand to Sit Transfer    Starlight, Stand to Sit Transfer moderate assist (50% patient effort)    Assistive Device walker, front-wheeled    Comment PA for safety to control sit.  cues to improve technique, maintain NWB, and for safety awareness.       Gait Training    Starlight, Gait unable to assess    Comment pt refused ambulation trial due to pain and fatigue       AM-PAC (TM) - Mobility (Current Function)    Turning from your back to your side while in a flat bed without using bedrails? 2 - A Lot    Moving from lying on your back to sitting on the side of a flat bed without using bedrails? 2 - A Lot    Moving to and from a bed to a chair? 2 - A Lot    Standing up from a chair using your arms? 2 - A Lot    To walk in a hospital room? 1 - Total    Climbing 3-5 steps with a railing? 1 - Total    AM-PAC (TM) Mobility Score 10       PT Clinical Impression    Patient's Goals For Discharge return to all previous roles/activities    Family Goals For Discharge patient able to return to all previous activities/roles    Plan For Care Reviewed: Physical Therapy PT plan for care discussed with patient;PT plan for care discussed with family;patient voices agreement with PT plan for care;family voices agreement with PT plan for care    System Pathology/Pathophysiology Noted musculoskeletal    Impairments Found (PT Eval) gait, locomotion, and balance    PT Frequency of Treatment 3-5 times per week    Estimated Length of Stay 1 week    Problem List  decreased strength;impaired balance;pain    Activity Limitations Related to Problem List functional mobility not performed adequately or safely for community activity;functional mobility not performed adequately or safely for household activity    Anticipated Equipment Needs at Discharge front wheeled walker    Expected Discharge Disposition home with home health    Daily Outcome Statement pt was implusive and agitated during evaluation.  required frequent cues to maintain WBing precaution.  pt required modA with bed mobility and sit<>stand due to pain, weakness, and poor techique & safety          Pain Assessment/Intervention  Pain Charting Type: Pain Assessment             Education provided this session. See the Patient Education summary report for full details.    PT Care Plan Goals      Most Recent Value   Stair Goal, PT   PT STG: Stairs  supervision required   PT STG: Number of Stairs  13   PT STG Assistive Device: Stairs  cane, straight, crutches, axillary   PT STG Duration: Stairs  3 days or less   PT STG Outcome: Stairs  goal ongoing      PT Care Plan Goals      Most Recent Value   Bed Mobility Goal   Time to Achieve Goal: Bed Mobility  by discharge   Goal Activity: Bed Mobility  all bed mobility activities   Level of [place] Goal: Bed Mobility  modified independence   Goal Outcome: Bed Mobility  goal ongoing   Gait Goal   Time to Achieve Goal: Gait Training  by discharge   Level of [place]  supervision required   Assistive Device: Gait Training  walker, rolling   Distance Goal: Gait Training (feet)  150 feet   Goal Outcome: Gait Training  goal ongoing   Transfer Goal   Time to Achieve Goal: Transfer Training  by discharge   Goal Activity: Transfer Training  all transfers   Level of [place] Goal: Transfer Training  modified independence   Assistive Device: Transfer Training  walker, rolling   Goal Outcome: Transfer Training  goal ongoing

## 2019-01-06 NOTE — CONSULTS
Cardiology Consult Note  Subjective     Harvey Lucero Jr is a 58 y.o. male who was admitted for Gas gangrene (CMS/[facility]) [A48.0]  Gas gangrene (CMS/[facility]) [A48.0]. Harvey Lucero Jr was referred by podiatry for management recommendations. Harvey Lucero Jr is a 58-year-old no significant cardiovascular history presenting with gas gangrene of the foot underwent urgent surgical debridement of his left foot by podiatry.  He has positive blood cultures and is on Vanco for antibiotics.  He has been having persistently elevated white blood count and bacteremia and there is concern for possible endocarditis.  He is being followed by ID.    Pertinent radiology results reviewed..    Past Medical History:   Diagnosis Date   • GERD (gastroesophageal reflux disease)    • History of CVA (cerebrovascular accident)    • Hypertension    • Lipid disorder    • Type 2 diabetes mellitus (CMS/[facility]) ([facility])        History reviewed. No pertinent surgical history.    Social History     Social History Narrative   • No narrative on file       History reviewed. No pertinent family history.    No known allergies    Current Facility-Administered Medications   Medication Dose Route Frequency Provider Last Rate Last Dose   • acetaminophen (TYLENOL) tablet 650 mg  650 mg oral q4h PRN Suad Payne DPM   650 mg at 01/05/19 0846   • alum-mag hydroxide-simeth (MAALOX) 200-200-20 mg/5 mL suspension 30 mL  30 mL oral q4h PRN Suad Payne DPM       • cefTAZidime (FORTAZ) IVPB 2 g/100 mL NSS vial in bag  2 g intravenous q8h INT Severo Dias MD   Stopped at 01/06/19 0900   • clopidogrel (PLAVIX) tablet 75 mg  75 mg oral Daily (6a) Suad Payne DPM   75 mg at 01/06/19 0645   • dextrose 40 % oral gel 15-30 g of dextrose  15-30 g of dextrose oral PRN Suad Payne DPM        Or   • glucagon (GLUCAGEN) injection 1 mg  1 mg intramuscular PRN Suad Payne DPM        Or   • dextrose in water injection 12.5 g  25 mL intravenous PRN Suad Payne DPM       •  diphenhydrAMINE (BENADRYL) capsule 25 mg  25 mg oral q6h PRN Suad Payne DPM        Or   • diphenhydrAMINE (BENADRYL) injection 25 mg  25 mg intravenous q6h PRN Suad Payne DPM       • enoxaparin (LOVENOX) syringe 40 mg  40 mg subcutaneous Daily (6p) Suad Payne DPM   40 mg at 01/05/19 1838   • oxyCODONE (ROXICODONE) immediate release tablet 5-10 mg  5-10 mg oral q4h PRN Suad Payne DPM        Or   • HYDROmorphone (DILAUDID) injection 0.25-0.5 mg  0.25-0.5 mg intravenous q3h PRN Suad Payne DPM   0.5 mg at 01/04/19 1911   • insulin aspart U-100 (NovoLOG) pen 3-5 Units  3-5 Units subcutaneous With meals & nightly Vasu Whaley MD   3 Units at 01/06/19 0807   • insulin aspart U-100 (NovoLOG) pen 5 Units  5 Units subcutaneous TID with meals Kali Harper MD   5 Units at 01/06/19 0806   • insulin glargine (LANTUS SOLOSTAR) pen 25 Units  25 Units subcutaneous Nightly Kali Harper MD   25 Units at 01/05/19 2308   • metFORMIN (GLUCOPHAGE) tablet 1,000 mg  1,000 mg oral BID with meals Kali Harper MD   1,000 mg at 01/06/19 0809   • metroNIDAZOLE in NaCl (iso-os) (FLAGYL) IVPB 500 mg  500 mg intravenous q8h INT Severo Dias  mL/hr at 01/06/19 0929 500 mg at 01/06/19 0929   • ondansetron ODT (ZOFRAN-ODT) disintegrating tablet 4 mg  4 mg oral q8h PRN Suad Payne DPM        Or   • ondansetron (ZOFRAN) injection 4 mg  4 mg intravenous q8h PRN Suad Payne DPM       • pantoprazole (PROTONIX) tablet,delayed release (DR/EC) 20 mg  20 mg oral Daily Suad Payne DPM   20 mg at 01/06/19 0929   • rosuvastatin (CRESTOR) tablet 10 mg  10 mg oral Daily Suad Payne DPM   10 mg at 01/06/19 0810   • senna (SENOKOT) tablet 1 tablet  1 tablet oral 2x daily PRN Suad Payne DPM       • SITagliptin (JANUVIA) tablet 100 mg  100 mg oral Daily Suad Payne DPM   100 mg at 01/06/19 0810   • sodium chloride 0.9 % infusion   intravenous Continuous Kali Harper MD 80 mL/hr at 01/05/19 1220     •  "vancomycin IVPB 1 g/250 mL NSS  1,000 mg intravenous q12h INT Severo Dias MD   Stopped at 01/06/19 0829       Review of Systems  All other systems reviewed and negative except as noted in the HPI.    Objective     Labs   Lab Results   Component Value Date    WBC 21.49 (H) 01/06/2019    HGB 9.1 (L) 01/06/2019    HCT 27.6 (L) 01/06/2019     01/06/2019    CHOL 151 05/04/2018    TRIG 267 (H) 05/04/2018    HDL 32 (L) 05/04/2018    ALT 16 05/04/2018    AST 11 05/04/2018     (L) 01/06/2019    K 3.7 01/06/2019     01/06/2019    CREATININE 1.5 (H) 01/06/2019    BUN 40 (H) 01/06/2019    CO2 22 01/06/2019    PSA 0.7 06/28/2017    INR 1.4 01/04/2019    HGBA1C 10.2 (H) 01/04/2019       Imaging  I have independently reviewed the pertinent imaging from the last 24 hrs.    ECG   sinus rhythm    Telemetry  sinus rhythm      Physical Exam  BP (!) 145/75 (BP Location: Right upper arm, Patient Position: Sitting)   Pulse 85   Temp 36.7 °C (98 °F) (Oral)   Resp 18   Ht 1.854 m (6' 1\")   Wt 93.2 kg (205 lb 6.4 oz)   SpO2 94%   BMI 27.10 kg/m²     General Appearance:    Alert, cooperative, no distress, appears stated age   Head:    Normocephalic, without obvious abnormality, atraumatic   Eyes:    PERRL, conjunctiva/corneas clear, EOM's intact, fundi     benign, both eyes        Ears:    Normal TM's and external ear canals, both ears   Neck:   Supple, symmetrical, trachea midline, no adenopathy;        thyroid:  No enlargement/tenderness/nodules; no carotid    bruit or JVD   Lungs:     Clear to auscultation bilaterally, respirations unlabored   Chest wall:    No tenderness or deformity   Heart:    Regular rate and rhythm, S1 and S2 normal, no murmur, rub   or gallop   Abdomen:     Soft, non-tender, bowel sounds active all four quadrants,     no masses, no organomegaly   Extremities:  Musculoskeletal:   Extremities normal, atraumatic, no cyanosis or edema    No injury or deformity   Pulses:   2+ and " symmetric all extremities   Skin:   Skin color, texture, turgor normal, no rashes or lesions   Lymph nodes:   Cervical, supraclavicular, and axillary nodes normal   Neurologic:    Behavior/  Emotional:   CNII-XII intact. 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  He is on IV antibiotics.  If there is concern for possible endocarditis I would obtain an echocardiogram as a first step to evaluate for obvious vegetation.  We will discuss with ID after that if the need a formal RUIZ.                Saul Barragan MD  1/6/2019

## 2019-01-06 NOTE — PLAN OF CARE
Problem: Patient Care Overview  Goal: Plan of Care Review  Outcome: Ongoing (interventions implemented as appropriate)   01/06/19 2498   Coping/Psychosocial   Plan Of Care Reviewed With patient;spouse   Plan of Care Review   Progress no change   Outcome Summary PT lethargic throughout the day. Able to get to the commode with heavy assist x2. Denies pain but nonverbal indicators present. N/V but refused medication.        Problem: Pressure Ulcer Risk (Enmanuel Scale) (Adult,Obstetrics,Pediatric)  Goal: Skin Integrity  Outcome: Ongoing (interventions implemented as appropriate)      Problem: Infection, Risk/Actual (Adult)  Goal: Identify Related Risk Factors and Signs and Symptoms  Outcome: Outcome(s) Achieved Date Met: 01/06/19    Goal: Infection Prevention/Resolution  Outcome: Ongoing (interventions implemented as appropriate)

## 2019-01-06 NOTE — PLAN OF CARE
Problem: Fall Risk (Adult)  Goal: Absence of Falls  Outcome: Ongoing (interventions implemented as appropriate)   01/06/19 9807   Fall Risk (Adult)   Absence of Falls making progress toward outcome

## 2019-01-06 NOTE — PROGRESS NOTES
Vancomycin Dosing by Pharmacy Consult Follow up    Assessment/Plan  The patient is ordered vancomycin dosing by pharmacy.      The patient’s renal function; improving.  CrCl = 62 mL/min    Will Continue dose to vancomycin 1000 mg IV Q12H.    Pharmacy will continue to follow the patient’s vancomycin dosing daily during this course of therapy.    Adam Bateman, HamiltonD

## 2019-01-06 NOTE — PLAN OF CARE
Problem: Patient Care Overview  Goal: Plan of Care Review  Outcome: Ongoing (interventions implemented as appropriate)   01/06/19 6843   Coping/Psychosocial   Plan Of Care Reviewed With patient   Plan of Care Review   Progress progress toward functional goals as expected   Outcome Summary Pt is lethargic, WBC elevated today, no fever or chills, had one episode of nausea; still on antibiotics, tomorrow to go for eeg to rule out oendocarditis. Vital signs remained stable.       Problem: Infection, Risk/Actual (Adult)  Goal: Infection Prevention/Resolution  Outcome: Ongoing (interventions implemented as appropriate)      Problem: Fall Risk (Adult)  Goal: Absence of Falls  Outcome: Ongoing (interventions implemented as appropriate)

## 2019-01-06 NOTE — PROGRESS NOTES
Hospital Medicine Service -  Daily Progress Note       Patient Name: Harvey Lucero Jr  Patient MRN: [id number]  Date/Time: 01/06/19 3:37 PM  LOS: 2 day/days  Primary Care Physician: Dianne Castro DO    SUBJECTIVE   Interval History:   Chart was reviewed. Clinically a bit better today. BS better     MEDICATIONS     Infusion Medications:     sodium chloride 0.9 %  Last Rate: 80 mL/hr at 01/05/19 1220       Scheduled Medications:     cefTAZidime 2 g intravenous q8h INT   clopidogrel 75 mg oral Daily (6a)   enoxaparin 40 mg subcutaneous Daily (6p)   insulin aspart U-100 3-5 Units subcutaneous With meals & nightly   insulin aspart U-100 5 Units subcutaneous TID with meals   insulin glargine 25 Units subcutaneous Nightly   metroNIDAZOLE 500 mg intravenous q8h INT   pantoprazole 20 mg oral Daily   rosuvastatin 10 mg oral Daily   SITagliptin 100 mg oral Daily   vancomycin 1,000 mg intravenous q12h INT       PRN Medications: •  acetaminophen  •  alum-mag hydroxide-simeth  •  dextrose **OR** glucagon **OR** dextrose in water  •  diphenhydrAMINE **OR** diphenhydrAMINE  •  oxyCODONE **OR** HYDROmorphone  •  ondansetron ODT **OR** ondansetron  •  senna    REVIEW OF SYSTEMS   Review of Systems   Constitution: Negative for diaphoresis and fever.   HENT: Negative.         Flushed    Eyes: Negative.    Cardiovascular: Negative.    Respiratory: Negative.    Musculoskeletal:        Left foot wrapped    Gastrointestinal: Negative.    Genitourinary: Negative.    Neurological: Negative.    Psychiatric/Behavioral: Negative.    All other systems reviewed and are negative.         OBJECTIVE      Vitals:    01/06/19 0647 01/06/19 0700 01/06/19 1300 01/06/19 1500   BP:  (!) 145/75 (!) 157/74 (!) 154/72   BP Location:  Right upper arm Right upper arm Right upper arm   Patient Position:  Sitting Lying Lying   Pulse:  85 88 86   Resp:  18 18 18   Temp: 37.2 °C (99 °F) 36.7 °C (98 °F) 36.8 °C (98.3 °F) 37.2 °C (99 °F)   TempSrc: Oral  Oral Oral Oral   SpO2:  94% 95% 95%   Weight:       Height:         Temp:  [36.7 °C (98 °F)-37.9 °C (100.3 °F)] 37.2 °C (99 °F)  Heart Rate:  [81-88] 86  Resp:  [18] 18  BP: (117-157)/(55-75) 154/72    Intake/Output Summary (Last 24 hours) at 01/06/19 1537  Last data filed at 01/06/19 1300   Gross per 24 hour   Intake           983.34 ml   Output              300 ml   Net           683.34 ml     I/O last 3 completed shifts:  In: 983.3 [I.V.:533.3; IV Piggyback:450]  Out: 725 [Urine:725]  Weight change, 24 hours: Weight change:       PHYSICAL EXAMINATION      Physical Exam   Constitutional: He is oriented to person, place, and time. No distress.   HENT:   Face flushed   Eyes: EOM are normal. Pupils are equal, round, and reactive to light.   Neck: Neck supple.   Cardiovascular: Normal rate and regular rhythm.    Murmur heard.  Pulmonary/Chest: Effort normal and breath sounds normal.   Abdominal: Soft. Bowel sounds are normal.   Musculoskeletal:   Left foot is wrapped    Neurological: He is alert and oriented to person, place, and time.   Skin:   Left leg wound    Psychiatric: He has a normal mood and affect. His behavior is normal. Thought content normal.   Vitals reviewed.     LINES, CATHETERS, DRAINS, AIRWAYS, AND WOUNDS   Lines, Drains, Airways, Wounds:  Peripheral IV 01/04/19 Right Arm (Active)   Number of days: 2       Surgical Incision Foot Left (Active)   Number of days: 2       Comments:      LABS / IMAGING / TELE        Laboratory Studies  Lab Results   Component Value Date    GLUCOSE 191 (H) 01/06/2019    CALCIUM 7.2 (L) 01/06/2019     (L) 01/06/2019    K 3.7 01/06/2019    CO2 22 01/06/2019     01/06/2019    BUN 40 (H) 01/06/2019    CREATININE 1.5 (H) 01/06/2019       Lab Results   Component Value Date    HGBA1C 10.2 (H) 01/04/2019       No results found for: TSH      Lab Results   Component Value Date    WBC 21.49 (H) 01/06/2019    HGB 9.1 (L) 01/06/2019    HCT 27.6 (L) 01/06/2019    MCV 80.2  (L) 01/06/2019     01/06/2019       Microbiology Results     Procedure Component Value Units Date/Time    Bacterial culture / smear, aerobic and anaerobic [[id number]]  (Abnormal) Collected:  01/04/19 1526    Specimen:  Tissue from Foot, Left Updated:  01/06/19 1501     Culture **Positive Culture** (A)      4+ Streptococcus anginosus      4+ Enterococcus faecalis     Gram Stain Result 4+ Gram positive cocci      No WBC Seen    Bacterial culture / smear, aerobic and anaerobic [[id number]]  (Abnormal) Collected:  01/04/19 1512    Specimen:  Tissue from Foot, Left Updated:  01/06/19 1322     Culture **Positive Culture** (A)     Gram Stain Result No WBC Seen      No organisms seen    Bacterial culture / smear, aerobic and anaerobic [[id number]]  (Abnormal) Collected:  01/04/19 1503    Specimen:  Tissue from Foot, Left Updated:  01/06/19 1512     Culture **Positive Culture** (A)      4+ Streptococcus species      3+ Enterobacter species     Gram Stain Result 4+ Gram positive cocci      1+ WBC    Blood Culture [[id number]]  (Abnormal) Collected:  01/04/19 1324    Specimen:  Blood from Blood, Venous Updated:  01/06/19 0946     Culture **Positive Culture** (AA)      Streptococcus anginosus     Gram Stain Result Gram positive cocci in chains      Gram positive cocci in chains    Blood Culture [[id number]]  (Abnormal) Collected:  01/04/19 1320    Specimen:  Blood from Blood, Venous Updated:  01/06/19 0945     Culture **Positive Culture** (AA)      Streptococcus anginosus     Gram Stain Result Gram positive cocci in chains    Blood Culture PCR Panel [[id number]]  (Abnormal) Collected:  01/04/19 1320    Specimen:  Blood from Blood, Venous Updated:  01/05/19 1329     Acinetobacter baumannii Not Detected     Candida albicans Not Detected     Candida glabrata Not Detected     Candida krusei Not Detected     Candida parapsilosis Not Detected     Candida tropicalis Not Detected     Enterobacteriaceae Not Detected     Escherichia coli Not  Detected     Enterobacter cloacae complex Not Detected     Pseudomonas aeruginosa Not Detected     Klebsiella oxytoca Not Detected     Klebsiella pneumoniae Not Detected     Proteus Not Detected     Serratia marcescens Not Detected     Haemophilus influenzae Not Detected     Neiseria meningitidis Not Detected     Listeria monocytogenes Not Detected     Staphylococcus Not Detected     Staphylococcus aureus Not Detected     Enterococcus Not Detected     Streptococcus Detected (A)     Streptococcus agalactiae (Group B) Not Detected     Streptococcus pneumoniae Not Detected     Streptococcus pyogenes (Group A) Not Detected     Comment: See below    Narrative:       This positive blood culture was tested with a rapid molecular panel that detects Enterococcus species, Listeria monocytogenes, Staphylococcus species, Staphylococcus aureus, Streptococcus agalactiae (Group B), Streptococcus pneumonia, Streptococcus pyogenes (Group A), Acinetobacter baumannii, Haemophilus influenza, Neisseria meningitides, Pseudomonas aeruginosa, Enterobacter cloacae complex, Escherichia coli, Klebsiella oxytoca, Klebsiella pneumonia, Proteus species, Serratia marcescens, Candida albicans, Candida glabrata, Candida krusei, Candida parapsilosis, and Candida tropicalis.            Lab Results   Component Value Date    INR 1.4 01/04/2019       Imaging:    Mri Foot Left Without Contrast    Result Date: 1/5/2019  IMPRESSION: 1.  Marrow signal changes consistent with osteomyelitis in the base and remaining proximal shaft of the fifth metatarsal in the left foot.  Study severely degraded by motion artifact. 2.  Findings most suggestive of extensive cellulitis and myositis in the left foot, without discrete drainable collection. 3.  Markedly abnormal bone marrow signal in the left second, third, and fourth metatarsals and to a lesser extent in the cuneiforms and navicular which suggests sclerosis, new since 8/20/2014.  This should be correlated with  any available recent radiographs.    .          ASSESSMENT AND PLAN      Principal Problem:    Acute hematogenous osteomyelitis of left foot (CMS/HCC) ([facility])  Active Problems:    Type 2 diabetes mellitus (CMS/HCC) ([facility])    Acute kidney injury (CMS/HCC) ([facility])    Bacteremia    History of CVA (cerebrovascular accident)    Hypertension    Anemia    Hyponatremia    Lipid disorder    Encounter for other preprocedural examination    Leukocytosis        Bacteremia   Assessment & Plan    ID following and on vanco   TTE ordered   Wound with the same organism         Acute kidney injury (CMS/HCC) ([facility])   Assessment & Plan    Cr in May was 1.1   Has underlying DM and HTN   BUN also up - suspect pre-renal     Lab Results   Component Value Date    GLUCOSE 191 (H) 01/06/2019    CALCIUM 7.2 (L) 01/06/2019     (L) 01/06/2019    K 3.7 01/06/2019    CO2 22 01/06/2019     01/06/2019    BUN 40 (H) 01/06/2019    CREATININE 1.5 (H) 01/06/2019             Type 2 diabetes mellitus (CMS/HCC) ([facility])   Assessment & Plan    A1C 10.8 in May 2018.   Better control   Cont otupt meds for now  Resume lanutus, add meal time insulin   Diabetic diet  Aim for tighter control with active infection     Lab Results   Component Value Date    HGBA1C 10.2 (H) 01/04/2019               * Acute hematogenous osteomyelitis of left foot (CMS/HCC) ([facility])   Assessment & Plan    POD #2 per podiatry   Vanco/fortaz and MTNZ per ID - abx modification per ID   .  Wound culture with same organism as blood   MRI with OM             Hyponatremia   Assessment & Plan    Partly pseudo from increased BS  Control BS and trend   Will give NSS  Improving         Anemia   Assessment & Plan    No recent hgb for comparion. Hgb 15.1 1 yr ago  Pt denies any symptoms. No signs of active bleeding  .  Iron level ordered     Lab Results   Component Value Date    WBC 21.49 (H) 01/06/2019    HGB 9.1 (L) 01/06/2019    HCT 27.6 (L) 01/06/2019    MCV 80.2 (L) 01/06/2019      01/06/2019   .  Lab Results   Component Value Date    IRON 14 (L) 01/05/2019    TIBC 133 (L) 01/05/2019    FERRITIN 1,062 (H) 01/05/2019     More c/w CD and ferritin up from infection         Hypertension   Assessment & Plan    BP up slightly         History of CVA (cerebrovascular accident)   Assessment & Plan    Cont plavix and statin        Leukocytosis   Assessment & Plan    Secondary to acute infection         Encounter for other preprocedural examination   Assessment & Plan    Plan for urgent OR for left foot infection/gas   Denies any hx of CAD, no arrhythmias, no CHF, no preoperative creatinine >2  Does have a hx of CVA 5 yrs ago and an insulin dependent diabetic. RCRI 6.6%  EKG benign without any signs of ischemia  Good exercise capacity able to reach>4 METs activity level    P  Can proceed to with this urgent surgery without additional cardiac testing at an acceptable risk          Lipid disorder   Assessment & Plan    Cont statin             VTE Assessment: Padua    VTE Prophylaxis Plan: lovenox  Code Status: Full Code  Estimated Discharge Date: 1/8/2019  .     Kali Abernathy MD  1/6/2019

## 2019-01-06 NOTE — PLAN OF CARE
Problem: Patient Care Overview  Goal: Plan of Care Review  Outcome: Ongoing (interventions implemented as appropriate)   01/06/19 4237   Coping/Psychosocial   Plan Of Care Reviewed With patient       Problem: Acute Therapy Services Goal & Intervention Plan  Goal: Bed Mobility Goal  Outcome: Ongoing (interventions implemented as appropriate)    Goal: Gait Training Goal  Outcome: Ongoing (interventions implemented as appropriate)    Goal: Stairs Goal  Outcome: Ongoing (interventions implemented as appropriate)    Goal: Transfer Training Goal  Outcome: Ongoing (interventions implemented as appropriate)

## 2019-01-06 NOTE — PLAN OF CARE
Problem: Patient Care Overview  Goal: Plan of Care Review  Outcome: Ongoing (interventions implemented as appropriate)      Problem: Pressure Ulcer Risk (Enmanuel Scale) (Adult,Obstetrics,Pediatric)  Goal: Skin Integrity  Outcome: Ongoing (interventions implemented as appropriate)   01/06/19 0951   Pressure Ulcer Risk (Enmanuel Scale) (Adult,Obstetrics,Pediatric)   Skin Integrity making progress toward outcome

## 2019-01-06 NOTE — PLAN OF CARE
Problem: Patient Care Overview  Goal: Plan of Care Review  Outcome: Ongoing (interventions implemented as appropriate)   01/06/19 7352   Coping/Psychosocial   Plan Of Care Reviewed With patient   Plan of Care Review   Progress improving   Outcome Summary highest temp 100.3F. Tolerating antibiotics oob to commode for bowel movement. L. leg elevated on pillows

## 2019-01-06 NOTE — PROGRESS NOTES
Podiatry Progress Note    Subjective   58 y.o. patient POD#2 s/p L foot incision and drainage for gas gangrene. Dressing to the LLE intact with minimal strikethrough. Seen this morning with Dr. Tee, patient aware of the severity of his infection. Will need long term treatment +/- surgery, HBO, wound vac, grafts, etc     NAD, no overnight events. Very lethargic. Still has not had much to eat. Denies f/c/sob.      All PMH/social hx/surgical hx/family hx reviewed for the patient for today's visit.    Objective   Labs  All labs and vitals reviewed     CBC Results       01/06/19 01/05/19 01/04/19                    0512 0855 1324         WBC 21.49 (H) 19.61 (H) 20.47 (H)         RBC 3.44 (L) 3.44 (L) 4.07 (L)         HGB 9.1 (L) 9.2 (L) 10.7 (L)         HCT 27.6 (L) 27.9 (L) 32.5 (L)         MCV 80.2 (L) 81.1 (L) 79.9 (L)         MCH 26.5 (L) 26.7 (L) 26.3 (L)         MCHC 33.0 33.0 32.9          305 320                     BMP Results       01/06/19 01/05/19 01/04/19                    0512 0855 1324          (L) 128 (L) 124 (LL)         K 3.7 4.1 4.5         Cl 100 95 (L) 88 (L)         CO2 22 21 (L) 21 (L)         Glucose 191 (H) 288 (H) 363 (H)         BUN 40 (H) 46 (H) 37 (H)         Creatinine 1.5 (H) 1.7 (H) 1.4 (H)         Calcium 7.2 (L) 7.5 (L) 8.4 (L)         Anion Gap 8 12 15         EGFR 48.4 (L) 41.9 (L) 52.4 (L)                       Imaging  I have reviewed the Imaging from the last 24 hrs.    L Foot MRI IMPRESSION:  1.  Marrow signal changes consistent with osteomyelitis in the base and remaining proximal shaft of the fifth metatarsal in the left foot. Study severely degraded by motion artifact.  2.  Findings most suggestive of extensive cellulitis and myositis in the left foot, without discrete drainable collection.  3.  Markedly abnormal bone marrow signal in the left second, third, and fourth metatarsals and to a lesser extent in the cuneiforms and navicular which suggests sclerosis,  new since 8/20/2014.  This should be correlated with any available recent radiographs.    Vital signs in last 24 hours:  Temp:  [36.7 °C (98.1 °F)-37.9 °C (100.3 °F)] 37.2 °C (99 °F)  Heart Rate:  [75-88] 88  Resp:  [18] 18  BP: ()/(50-61) 125/55      Intake/Output Summary (Last 24 hours) at 01/06/19 0657  Last data filed at 01/05/19 1900   Gross per 24 hour   Intake           983.34 ml   Output              300 ml   Net           683.34 ml       Physical Exam:  General appearance: AAOx3, appears stated age,cooperative  Head: normocephalic, without obvious abnormality, atraumatic  Eyes: conjunctivae/corneas clear. PERRL, EOM's intact. Fundi benign.  Ears: normal TM's and external ear canals both ears  Nose: Nares normal. Septum midline. Mucosa normal. No drainage or sinus tenderness.  Lungs: clear to auscultation bilaterally  Heart:: regular rate and rhythm  Abdomen: soft, non-tender; bowel sounds normal; no masses, no organomegaly      LE Focused Physical Exam :  Vascular : non-palpable DP and PT pulses LLE, palpable on the R. CRT < 5 sec. Skin temp warm to warm with increase at the L distal leg/ankle/foot. No pain to the posterior proximal calf - no clinical signs of DVT.  Neuro : Gross sensation intact. Light touch and protective sensation diminished.  Ortho : DF/PF of remaining digits present. Equinus B/L ankle joints.   Derm : Surgical incisions at the medial L heel, plantar central midfoot, lateral foot, dorsal lateral foot, dorsal medial foot to the ankle. Entire foot/ankle is erythematous and edematous.   Plantar incision about 3cm in length with granular wound base, serosanginous drainage only. No signs of necrosis to tissue. No crepitus or fluctuance plantarly  Medial heel incision about 4-5cm in length and 2cm in width with granular wound base, serosanguinous drainage only. No purulence, no signs of necrosis  Lateral foot incision about 4-5 cm in length with granular wound base, serosanguinous  drainage noted. No signs of necrosis  Dorsal lateral incision about 8cm in length and 2cm wide with exposed tendons. Mostly granular but medially edge is grey/dusky in appearance. Odor noted from this wound but no purulence expressed. Tunnels medial across dorsal foot with overlying skin purple in color.  Dorsal medial incision about 10cm in length and 2cm wide with exposed tendon. Mostly granular but laterally with grey area. Odor noted from this wound, no purulence expressed.     A/P: 58 y.o. patient seen this morning with Dr. Tee s/p POD#2 L foot incision and drainage of gas gangrene/soft tissue emphysema.  - Dressing changed today : flush/pack to all incision sites, redressed with DSD/ABD/kerlix/ACE. Nursing may reinforce as needed for strikethrough. No purulence discovered or expressed from the LLE this morning. Tissue appears viable (dorsal skin bridge may become necrotic).   - Continue IV abx per ID recs : vancomycin, fortaz, flagyl  -In OR significant pus was drained over 200cc's. 3 cultures were taken from different pockets of pus.  - OR wound cultures : 2 out of 3 (+) for 4+ GPC  - Patient with WBC still up at 21. Fever 100.3. Consult for Dr. Barragan for possible eval of cardiac state in regards to sepsis, need for RUIZ? Patient may just need more time with IV abx.  - Pending tissue viability, possible wound vac, possible partial amputation. Hope to salvage as mush as possible for the patient.  - Consult placed for HBO evaluation with Dr. Smith in the [facility]  - L Foot MRI reviewed. Discussed with patient. At this time, will continue with local   - L tib-fib XR ordered : still pending  - DAVID/PVR ordered : pending  - Adult regular diet, 2000cal consistent carb, NCS. Adjust as needed.  - Patient is to be NWB to the LLE with the use of crutches, walker, or wheelchair.   - Pain medication on board as patient needs.    - DVT prophylaxsis Lovenox while inhouse  - Please contact on call podiatry  resident with any questions or concerns      Jen Urban DPM PGY-2  Pager [phone]

## 2019-01-06 NOTE — PLAN OF CARE
Problem: Acute Therapy Services Goal & Intervention Plan  Goal: Bed Mobility Goal  Outcome: Ongoing (interventions implemented as appropriate)   01/06/19 0952   Bed Mobility Goal   Time to Achieve Goal: Bed Mobility 5 - 7 days   Level of [place] Goal: Bed Mobility modified independence   Goal Outcome: Bed Mobility goal ongoing     Goal: Lower Body Dressing Goal  Outcome: Ongoing (interventions implemented as appropriate)    Goal: Toilet Transfer Goal  Outcome: Ongoing (interventions implemented as appropriate)

## 2019-01-07 ENCOUNTER — APPOINTMENT (INPATIENT)
Dept: RADIOLOGY | Facility: HOSPITAL | Age: 59
DRG: 853 | End: 2019-01-07
Attending: PODIATRIST
Payer: COMMERCIAL

## 2019-01-07 ENCOUNTER — ANESTHESIA (INPATIENT)
Dept: OPERATING ROOM | Facility: HOSPITAL | Age: 59
DRG: 853 | End: 2019-01-07
Payer: COMMERCIAL

## 2019-01-07 ENCOUNTER — ANESTHESIA EVENT (INPATIENT)
Dept: OPERATING ROOM | Facility: HOSPITAL | Age: 59
DRG: 853 | End: 2019-01-07
Payer: COMMERCIAL

## 2019-01-07 ENCOUNTER — DOCUMENTATION (OUTPATIENT)
Dept: WOUND CARE | Facility: HOSPITAL | Age: 59
End: 2019-01-07

## 2019-01-07 PROBLEM — A40.8 OTHER STREPTOCOCCAL SEPSIS: Status: ACTIVE | Noted: 2019-01-07

## 2019-01-07 PROBLEM — Z01.810 PREOP CARDIOVASCULAR EXAM: Status: ACTIVE | Noted: 2019-01-07

## 2019-01-07 PROBLEM — A48.0 GAS GANGRENE (CMS/HCC): Status: ACTIVE | Noted: 2019-01-04

## 2019-01-07 LAB
ABO + RH BLD: NORMAL
ANION GAP SERPL CALC-SCNC: 9 MEQ/L (ref 3–15)
BACTERIA BLD CULT: ABNORMAL
BACTERIA BLD CULT: ABNORMAL
BASOPHILS # BLD: 0.08 K/UL (ref 0.01–0.1)
BASOPHILS NFR BLD: 0.4 %
BLD GP AB SCN SERPL QL: NEGATIVE
BUN SERPL-MCNC: 24 MG/DL (ref 8–20)
CALCIUM SERPL-MCNC: 7.5 MG/DL (ref 8.9–10.3)
CHLORIDE SERPL-SCNC: 102 MEQ/L (ref 98–109)
CO2 SERPL-SCNC: 23 MEQ/L (ref 22–32)
CREAT SERPL-MCNC: 1.2 MG/DL
CROSSMATCH: NORMAL
D AG BLD QL: POSITIVE
DIFFERENTIAL METHOD BLD: ABNORMAL
EOSINOPHIL # BLD: 0.2 K/UL (ref 0.04–0.54)
EOSINOPHIL NFR BLD: 0.9 %
ERYTHROCYTE [DISTWIDTH] IN BLOOD BY AUTOMATED COUNT: 14.6 % (ref 11.6–14.4)
ERYTHROCYTE [DISTWIDTH] IN BLOOD BY AUTOMATED COUNT: 14.7 % (ref 11.6–14.4)
GFR SERPL CREATININE-BSD FRML MDRD: >60 ML/MIN/1.73M*2
GLUCOSE BLD-MCNC: 134 MG/DL (ref 70–99)
GLUCOSE BLD-MCNC: 142 MG/DL (ref 70–99)
GLUCOSE BLD-MCNC: 144 MG/DL (ref 70–99)
GLUCOSE BLD-MCNC: 145 MG/DL (ref 70–99)
GLUCOSE BLD-MCNC: 147 MG/DL (ref 70–99)
GLUCOSE BLD-MCNC: 157 MG/DL (ref 70–99)
GLUCOSE SERPL-MCNC: 145 MG/DL (ref 70–99)
GRAM STN SPEC: ABNORMAL
GRAM STN SPEC: ABNORMAL
HCT VFR BLDCO AUTO: 21 %
HCT VFR BLDCO AUTO: 27.6 %
HCT VFR BLDCO AUTO: 28 %
HGB BLD-MCNC: 6.8 G/DL
HGB BLD-MCNC: 8.7 G/DL
HGB BLD-MCNC: 8.9 G/DL
IMM GRANULOCYTES # BLD AUTO: 1.2 K/UL (ref 0–0.08)
IMM GRANULOCYTES NFR BLD AUTO: 5.3 %
ISBT CODE: 6200
LABORATORY COMMENT REPORT: NORMAL
LYMPHOCYTES # BLD: 1.28 K/UL (ref 1.2–3.5)
LYMPHOCYTES NFR BLD: 5.6 %
MCH RBC QN AUTO: 25.8 PG (ref 28–33.2)
MCH RBC QN AUTO: 27.5 PG (ref 28–33.2)
MCHC RBC AUTO-ENTMCNC: 31.5 G/DL (ref 32.2–36.5)
MCHC RBC AUTO-ENTMCNC: 32.4 G/DL (ref 32.2–36.5)
MCV RBC AUTO: 81.9 FL (ref 83–98)
MCV RBC AUTO: 85 FL (ref 83–98)
MONOCYTES # BLD: 1.28 K/UL (ref 0.3–1)
MONOCYTES NFR BLD: 5.6 %
NEUTROPHILS # BLD: 18.76 K/UL (ref 1.7–7)
NEUTS SEG NFR BLD: 82.2 %
NRBC BLD-RTO: 0 %
PDW BLD AUTO: 11.5 FL (ref 9.4–12.4)
PDW BLD AUTO: 11.7 FL (ref 9.4–12.4)
PLATELET # BLD AUTO: 237 K/UL
PLATELET # BLD AUTO: 249 K/UL
POCT TEST: ABNORMAL
POTASSIUM SERPL-SCNC: 3.6 MEQ/L (ref 3.6–5.1)
PRODUCT CODE: NORMAL
PRODUCT STATUS: NORMAL
RBC # BLD AUTO: 2.47 M/UL (ref 4.5–5.8)
RBC # BLD AUTO: 3.37 M/UL (ref 4.5–5.8)
SODIUM SERPL-SCNC: 134 MEQ/L (ref 136–144)
SPECIMEN EXP DATE BLD: NORMAL
UNIT ABO: NORMAL
UNIT ID: NORMAL
UNIT RH: POSITIVE
WBC # BLD AUTO: 22.8 K/UL
WBC # BLD AUTO: 31.39 K/UL

## 2019-01-07 PROCEDURE — 63700000 HC SELF-ADMINISTRABLE DRUG: Performed by: PODIATRIST

## 2019-01-07 PROCEDURE — 87206 SMEAR FLUORESCENT/ACID STAI: CPT | Performed by: PODIATRIST

## 2019-01-07 PROCEDURE — 25000000 HC PHARMACY GENERAL: Performed by: INTERNAL MEDICINE

## 2019-01-07 PROCEDURE — 25000000 HC PHARMACY GENERAL: Performed by: PODIATRIST

## 2019-01-07 PROCEDURE — 71000011 HC PACU PHASE 1 EA ADDL MIN: Performed by: PODIATRIST

## 2019-01-07 PROCEDURE — 86920 COMPATIBILITY TEST SPIN: CPT

## 2019-01-07 PROCEDURE — 86900 BLOOD TYPING SEROLOGIC ABO: CPT

## 2019-01-07 PROCEDURE — 80048 BASIC METABOLIC PNL TOTAL CA: CPT | Performed by: PODIATRIST

## 2019-01-07 PROCEDURE — 12000000 HC ROOM AND CARE MED/SURG

## 2019-01-07 PROCEDURE — 85027 COMPLETE CBC AUTOMATED: CPT | Performed by: INTERNAL MEDICINE

## 2019-01-07 PROCEDURE — 99233 SBSQ HOSP IP/OBS HIGH 50: CPT | Performed by: HOSPITALIST

## 2019-01-07 PROCEDURE — 36000002 HC OR LEVEL 2 INITIAL 30MIN: Performed by: PODIATRIST

## 2019-01-07 PROCEDURE — 88311 DECALCIFY TISSUE: CPT | Performed by: PODIATRIST

## 2019-01-07 PROCEDURE — 63600105 HC IODINE BASED CONTRAST: Performed by: PODIATRIST

## 2019-01-07 PROCEDURE — 63600000 HC DRUGS/DETAIL CODE: Performed by: ANESTHESIOLOGY

## 2019-01-07 PROCEDURE — 25800000 HC PHARMACY IV SOLUTIONS: Performed by: ANESTHESIOLOGY

## 2019-01-07 PROCEDURE — 99291 CRITICAL CARE FIRST HOUR: CPT | Performed by: INTERNAL MEDICINE

## 2019-01-07 PROCEDURE — 85025 COMPLETE CBC W/AUTO DIFF WBC: CPT | Performed by: PODIATRIST

## 2019-01-07 PROCEDURE — 63600000 HC DRUGS/DETAIL CODE: Performed by: INTERNAL MEDICINE

## 2019-01-07 PROCEDURE — 27200000 HC STERILE SUPPLY: Performed by: PODIATRIST

## 2019-01-07 PROCEDURE — 25000000 HC PHARMACY GENERAL: Performed by: ANESTHESIOLOGY

## 2019-01-07 PROCEDURE — 71000001 HC PACU PHASE 1 INITIAL 30MIN: Performed by: PODIATRIST

## 2019-01-07 PROCEDURE — 37000001 HC ANESTHESIA GENERAL: Performed by: PODIATRIST

## 2019-01-07 PROCEDURE — 87102 FUNGUS ISOLATION CULTURE: CPT | Performed by: PODIATRIST

## 2019-01-07 PROCEDURE — 36000012 HC OR LEVEL 2 EA ADDL MIN: Performed by: PODIATRIST

## 2019-01-07 PROCEDURE — 25800000 HC PHARMACY IV SOLUTIONS: Performed by: INTERNAL MEDICINE

## 2019-01-07 PROCEDURE — 0QBM0ZX EXCISION OF LEFT TARSAL, OPEN APPROACH, DIAGNOSTIC: ICD-10-PCS | Performed by: PODIATRIST

## 2019-01-07 PROCEDURE — P9016 RBC LEUKOCYTES REDUCED: HCPCS

## 2019-01-07 PROCEDURE — 36415 COLL VENOUS BLD VENIPUNCTURE: CPT | Performed by: PODIATRIST

## 2019-01-07 PROCEDURE — 85018 HEMOGLOBIN: CPT | Performed by: PODIATRIST

## 2019-01-07 PROCEDURE — 73706 CT ANGIO LWR EXTR W/O&W/DYE: CPT | Mod: LT

## 2019-01-07 PROCEDURE — 63600000 HC DRUGS/DETAIL CODE: Performed by: PODIATRIST

## 2019-01-07 PROCEDURE — 36430 TRANSFUSION BLD/BLD COMPNT: CPT | Performed by: ANESTHESIOLOGY

## 2019-01-07 PROCEDURE — 87077 CULTURE AEROBIC IDENTIFY: CPT | Performed by: PODIATRIST

## 2019-01-07 RX ORDER — MIDAZOLAM HYDROCHLORIDE 2 MG/2ML
INJECTION, SOLUTION INTRAMUSCULAR; INTRAVENOUS AS NEEDED
Status: DISCONTINUED | OUTPATIENT
Start: 2019-01-07 | End: 2019-01-07 | Stop reason: SURG

## 2019-01-07 RX ORDER — DEXTROSE 50 % IN WATER (D50W) INTRAVENOUS SYRINGE
25 AS NEEDED
Status: DISCONTINUED | OUTPATIENT
Start: 2019-01-07 | End: 2019-01-07 | Stop reason: HOSPADM

## 2019-01-07 RX ORDER — ROCURONIUM BROMIDE 10 MG/ML
INJECTION, SOLUTION INTRAVENOUS AS NEEDED
Status: DISCONTINUED | OUTPATIENT
Start: 2019-01-07 | End: 2019-01-07 | Stop reason: SURG

## 2019-01-07 RX ORDER — PROPOFOL 10 MG/ML
INJECTION, EMULSION INTRAVENOUS AS NEEDED
Status: DISCONTINUED | OUTPATIENT
Start: 2019-01-07 | End: 2019-01-07 | Stop reason: SURG

## 2019-01-07 RX ORDER — ONDANSETRON HYDROCHLORIDE 2 MG/ML
4 INJECTION, SOLUTION INTRAVENOUS
Status: DISCONTINUED | OUTPATIENT
Start: 2019-01-07 | End: 2019-01-07 | Stop reason: HOSPADM

## 2019-01-07 RX ORDER — LIDOCAINE HYDROCHLORIDE 10 MG/ML
INJECTION, SOLUTION INFILTRATION; PERINEURAL AS NEEDED
Status: DISCONTINUED | OUTPATIENT
Start: 2019-01-07 | End: 2019-01-07 | Stop reason: SURG

## 2019-01-07 RX ORDER — ONDANSETRON HYDROCHLORIDE 2 MG/ML
INJECTION, SOLUTION INTRAVENOUS AS NEEDED
Status: DISCONTINUED | OUTPATIENT
Start: 2019-01-07 | End: 2019-01-07 | Stop reason: SURG

## 2019-01-07 RX ORDER — HYDROMORPHONE HYDROCHLORIDE 1 MG/ML
0.5 INJECTION, SOLUTION INTRAMUSCULAR; INTRAVENOUS; SUBCUTANEOUS ONCE
Status: COMPLETED | OUTPATIENT
Start: 2019-01-07 | End: 2019-01-08

## 2019-01-07 RX ORDER — HYDROMORPHONE HYDROCHLORIDE 1 MG/ML
0.5 INJECTION, SOLUTION INTRAMUSCULAR; INTRAVENOUS; SUBCUTANEOUS
Status: DISCONTINUED | OUTPATIENT
Start: 2019-01-07 | End: 2019-01-07 | Stop reason: HOSPADM

## 2019-01-07 RX ORDER — FENTANYL CITRATE 50 UG/ML
50 INJECTION, SOLUTION INTRAMUSCULAR; INTRAVENOUS
Status: DISCONTINUED | OUTPATIENT
Start: 2019-01-07 | End: 2019-01-07 | Stop reason: HOSPADM

## 2019-01-07 RX ORDER — DEXTROSE 40 %
15-30 GEL (GRAM) ORAL AS NEEDED
Status: DISCONTINUED | OUTPATIENT
Start: 2019-01-07 | End: 2019-01-07 | Stop reason: HOSPADM

## 2019-01-07 RX ORDER — SODIUM CHLORIDE, SODIUM GLUCONATE, SODIUM ACETATE, POTASSIUM CHLORIDE AND MAGNESIUM CHLORIDE 30; 37; 368; 526; 502 MG/100ML; MG/100ML; MG/100ML; MG/100ML; MG/100ML
INJECTION, SOLUTION INTRAVENOUS CONTINUOUS PRN
Status: DISCONTINUED | OUTPATIENT
Start: 2019-01-07 | End: 2019-01-07 | Stop reason: SURG

## 2019-01-07 RX ORDER — PHENYLEPHRINE HYDROCHLORIDE 10 MG/ML
INJECTION INTRAVENOUS AS NEEDED
Status: DISCONTINUED | OUTPATIENT
Start: 2019-01-07 | End: 2019-01-07 | Stop reason: SURG

## 2019-01-07 RX ORDER — METOCLOPRAMIDE HYDROCHLORIDE 5 MG/ML
INJECTION INTRAMUSCULAR; INTRAVENOUS AS NEEDED
Status: DISCONTINUED | OUTPATIENT
Start: 2019-01-07 | End: 2019-01-07 | Stop reason: SURG

## 2019-01-07 RX ORDER — MIDAZOLAM HYDROCHLORIDE 2 MG/2ML
1 INJECTION, SOLUTION INTRAMUSCULAR; INTRAVENOUS ONCE AS NEEDED
Status: DISCONTINUED | OUTPATIENT
Start: 2019-01-07 | End: 2019-01-07 | Stop reason: HOSPADM

## 2019-01-07 RX ORDER — LABETALOL HCL 20 MG/4 ML
5 SYRINGE (ML) INTRAVENOUS AS NEEDED
Status: DISCONTINUED | OUTPATIENT
Start: 2019-01-07 | End: 2019-01-07 | Stop reason: HOSPADM

## 2019-01-07 RX ORDER — IBUPROFEN 200 MG
16-32 TABLET ORAL AS NEEDED
Status: DISCONTINUED | OUTPATIENT
Start: 2019-01-07 | End: 2019-01-07 | Stop reason: HOSPADM

## 2019-01-07 RX ORDER — SODIUM CHLORIDE 9 MG/ML
5 INJECTION, SOLUTION INTRAVENOUS AS NEEDED
Status: ACTIVE | OUTPATIENT
Start: 2019-01-07 | End: 2019-01-14

## 2019-01-07 RX ORDER — FENTANYL CITRATE 50 UG/ML
INJECTION, SOLUTION INTRAMUSCULAR; INTRAVENOUS AS NEEDED
Status: DISCONTINUED | OUTPATIENT
Start: 2019-01-07 | End: 2019-01-07 | Stop reason: SURG

## 2019-01-07 RX ADMIN — PHENYLEPHRINE HYDROCHLORIDE 50 MCG: 10 INJECTION INTRAVENOUS at 16:37

## 2019-01-07 RX ADMIN — SODIUM CHLORIDE, SODIUM GLUCONATE, SODIUM ACETATE, POTASSIUM CHLORIDE AND MAGNESIUM CHLORIDE: 526; 502; 368; 37; 30 INJECTION, SOLUTION INTRAVENOUS at 16:40

## 2019-01-07 RX ADMIN — IOHEXOL 120 ML: 300 INJECTION, SOLUTION INTRAVENOUS at 21:21

## 2019-01-07 RX ADMIN — HYDROMORPHONE HYDROCHLORIDE 0.5 MG: 1 INJECTION, SOLUTION INTRAMUSCULAR; INTRAVENOUS; SUBCUTANEOUS at 23:58

## 2019-01-07 RX ADMIN — ROSUVASTATIN CALCIUM 10 MG: 10 TABLET, FILM COATED ORAL at 09:52

## 2019-01-07 RX ADMIN — FENTANYL CITRATE 25 MCG: 50 INJECTION INTRAMUSCULAR; INTRAVENOUS at 16:55

## 2019-01-07 RX ADMIN — HYDROMORPHONE HYDROCHLORIDE 0.5 MG: 1 INJECTION, SOLUTION INTRAMUSCULAR; INTRAVENOUS; SUBCUTANEOUS at 22:18

## 2019-01-07 RX ADMIN — ONDANSETRON 4 MG: 2 INJECTION INTRAMUSCULAR; INTRAVENOUS at 16:30

## 2019-01-07 RX ADMIN — FENTANYL CITRATE 50 MCG: 50 INJECTION, SOLUTION INTRAMUSCULAR; INTRAVENOUS at 19:05

## 2019-01-07 RX ADMIN — PHENYLEPHRINE HYDROCHLORIDE 100 MCG: 10 INJECTION INTRAVENOUS at 16:30

## 2019-01-07 RX ADMIN — METOCLOPRAMIDE 10 MG: 5 INJECTION, SOLUTION INTRAMUSCULAR; INTRAVENOUS at 16:26

## 2019-01-07 RX ADMIN — METRONIDAZOLE 500 MG: 500 INJECTION, SOLUTION INTRAVENOUS at 09:51

## 2019-01-07 RX ADMIN — PHENYLEPHRINE HYDROCHLORIDE 200 MCG: 10 INJECTION INTRAVENOUS at 17:18

## 2019-01-07 RX ADMIN — FENTANYL CITRATE 50 MCG: 50 INJECTION INTRAMUSCULAR; INTRAVENOUS at 16:28

## 2019-01-07 RX ADMIN — VANCOMYCIN HYDROCHLORIDE 1000 MG: 1 INJECTION, POWDER, LYOPHILIZED, FOR SOLUTION INTRAVENOUS at 06:54

## 2019-01-07 RX ADMIN — FENTANYL CITRATE 25 MCG: 50 INJECTION INTRAMUSCULAR; INTRAVENOUS at 16:44

## 2019-01-07 RX ADMIN — METRONIDAZOLE 500 MG: 500 INJECTION, SOLUTION INTRAVENOUS at 02:03

## 2019-01-07 RX ADMIN — CLOPIDOGREL BISULFATE 75 MG: 75 TABLET, FILM COATED ORAL at 06:54

## 2019-01-07 RX ADMIN — METRONIDAZOLE 500 MG: 500 INJECTION, SOLUTION INTRAVENOUS at 16:46

## 2019-01-07 RX ADMIN — PROPOFOL 140 MG: 10 INJECTION, EMULSION INTRAVENOUS at 15:31

## 2019-01-07 RX ADMIN — MIDAZOLAM HYDROCHLORIDE 2 MG: 1 INJECTION, SOLUTION INTRAMUSCULAR; INTRAVENOUS at 15:27

## 2019-01-07 RX ADMIN — Medication 140 MG: at 15:32

## 2019-01-07 RX ADMIN — CEFTAZIDIME 2 G: 2 INJECTION, SOLUTION INTRAVENOUS at 00:53

## 2019-01-07 RX ADMIN — CEFTAZIDIME 2 G: 2 INJECTION, SOLUTION INTRAVENOUS at 08:38

## 2019-01-07 RX ADMIN — CEFTAZIDIME 2 G: 2 INJECTION, SOLUTION INTRAVENOUS at 15:12

## 2019-01-07 RX ADMIN — FENTANYL CITRATE 100 MCG: 50 INJECTION INTRAMUSCULAR; INTRAVENOUS at 15:31

## 2019-01-07 RX ADMIN — SODIUM CHLORIDE, SODIUM GLUCONATE, SODIUM ACETATE, POTASSIUM CHLORIDE AND MAGNESIUM CHLORIDE: 526; 502; 368; 37; 30 INJECTION, SOLUTION INTRAVENOUS at 15:27

## 2019-01-07 RX ADMIN — FENTANYL CITRATE 50 MCG: 50 INJECTION, SOLUTION INTRAMUSCULAR; INTRAVENOUS at 17:56

## 2019-01-07 RX ADMIN — FENTANYL CITRATE 50 MCG: 50 INJECTION, SOLUTION INTRAMUSCULAR; INTRAVENOUS at 18:14

## 2019-01-07 RX ADMIN — ROCURONIUM BROMIDE 10 MG: 10 INJECTION, SOLUTION INTRAVENOUS at 15:31

## 2019-01-07 RX ADMIN — PANTOPRAZOLE SODIUM 20 MG: 20 TABLET, DELAYED RELEASE ORAL at 09:51

## 2019-01-07 RX ADMIN — SODIUM CHLORIDE: 9 INJECTION, SOLUTION INTRAVENOUS at 20:44

## 2019-01-07 RX ADMIN — LIDOCAINE HYDROCHLORIDE 5 ML: 10 INJECTION, SOLUTION INFILTRATION; PERINEURAL at 15:31

## 2019-01-07 RX ADMIN — PROPOFOL 40 MG: 10 INJECTION, EMULSION INTRAVENOUS at 16:26

## 2019-01-07 RX ADMIN — PHENYLEPHRINE HYDROCHLORIDE 200 MCG: 10 INJECTION INTRAVENOUS at 17:08

## 2019-01-07 RX ADMIN — PHENYLEPHRINE HYDROCHLORIDE 50 MCG: 10 INJECTION INTRAVENOUS at 16:43

## 2019-01-07 RX ADMIN — SODIUM CHLORIDE: 9 INJECTION, SOLUTION INTRAVENOUS at 03:21

## 2019-01-07 NOTE — ASSESSMENT & PLAN NOTE
For debridement today  Decrease Fortaz 1 g IV every 8, continue metronidazole, stop vancomycin and start ampicillin

## 2019-01-07 NOTE — PLAN OF CARE
Problem: Patient Care Overview  Goal: Plan of Care Review  Outcome: Ongoing (interventions implemented as appropriate)   01/07/19 0609   Coping/Psychosocial   Plan Of Care Reviewed With patient   Plan of Care Review   Progress improving   Outcome Summary no fever or chills. for Echo today.

## 2019-01-07 NOTE — PROGRESS NOTES
"Daily Progress Note    Subjective    Interval History: No complaints.  No known cardiac history.  No h/o CP or Sob with his usual activities.  For OR today    Objective    Vital signs in last 24 hours:  Temp:  [36.7 °C (98.1 °F)-37.2 °C (99 °F)] 36.7 °C (98.1 °F)  Heart Rate:  [79-88] 87  Resp:  [16-18] 18  BP: (139-160)/(67-80) 139/67      Intake/Output Summary (Last 24 hours) at 01/07/19 1356  Last data filed at 01/06/19 1737   Gross per 24 hour   Intake          2009.33 ml   Output                0 ml   Net          2009.33 ml       Physical Exam:  /67 (BP Location: Right upper arm, Patient Position: Lying)   Pulse 87   Temp 36.7 °C (98.1 °F) (Temporal)   Resp 18   Ht 1.854 m (6' 1\")   Wt 93.2 kg (205 lb 6.4 oz)   SpO2 96%   BMI 27.10 kg/m²               Lungs:      Heart:     Extremities:  GI:      Other:          Labs  Lab Results   Component Value Date    WBC 22.80 (H) 01/07/2019    HGB 8.7 (L) 01/07/2019    HCT 27.6 (L) 01/07/2019     01/07/2019    CHOL 151 05/04/2018    TRIG 267 (H) 05/04/2018    HDL 32 (L) 05/04/2018    ALT 16 05/04/2018    AST 11 05/04/2018     (L) 01/07/2019    K 3.6 01/07/2019     01/07/2019    CREATININE 1.2 01/07/2019    BUN 24 (H) 01/07/2019    CO2 23 01/07/2019    INR 1.4 01/04/2019    HGBA1C 10.2 (H) 01/04/2019         ECG/Telemetry         Assessment & Plan    Preop cardiovascular exam   Assessment & Plan    OK for OR today from a cardiac standpoint.  Cardiac risk is low        Bacteremia   Assessment & Plan    Will review echo once completed.  Will d/w ID whether RUIZ necessary        Hypertension   Assessment & Plan    BP reasonable.  Cont meds for now.  Will follow post-op        Lipid disorder   Assessment & Plan    Continue statin        Type 2 diabetes mellitus (CMS/HCC) ([facility])   Assessment & Plan    Per the primary team        * Acute hematogenous osteomyelitis of left foot (CMS/[facility]) (HCC)   Assessment & Plan    .                  Kwaku HERNÁNDEZ" Mendelson, MD  1/7/2019

## 2019-01-07 NOTE — PLAN OF CARE
Problem: Patient Care Overview  Goal: Plan of Care Review  Outcome: Ongoing (interventions implemented as appropriate)   01/07/19 6124   Coping/Psychosocial   Plan Of Care Reviewed With patient   Plan of Care Review   Progress no change   Outcome Summary Patient for OR - this PM for further debridement       Problem: Infection, Risk/Actual (Adult)  Goal: Infection Prevention/Resolution  Outcome: Ongoing (interventions implemented as appropriate)

## 2019-01-07 NOTE — BRIEF OP NOTE
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           Disposition: PACU - hemodynamically stable.           Condition: stable    Jen Urban DPM PGY-2  Pager [phone]    Ca Lr DPM  Phone Number: [phone]

## 2019-01-07 NOTE — ANESTHESIA PREPROCEDURE EVALUATION
Anesthesia ROS/MED HX    Anesthesia History    Previous anesthetics  Pulmonary - neg  Neuro/Psych    CVA  Cardiovascular   hypertension   ECG reviewed and cardiac clearance reviewed  Hematological    anemia  GI/Hepatic   GERD    Control: poorly controlled  Musculoskeletal   Osteoarthritis  Renal Disease   electrolyte problem  Endo/Other   Diabetes   Infectious disease  Body Habitus: Normal      History reviewed. No pertinent surgical history.    Physical Exam    Airway   Mallampati: II   TM distance: >3 FB   Neck ROM: full  Cardiovascular - normal   Rhythm: regular   Rate: normal  Pulmonary - normal   clear to auscultation  Dental - normal        Anesthesia Plan    Plan: general    Technique: general endotracheal     Lines and Monitors: PIV     Airway: oral intubation   ASA 3  Blood Products:   Use of Blood Products Discussed: No   Anesthetic plan and risks discussed with: patient  Induction:    intravenous   Postop Plan:   Patient Disposition: inpatient floor planned admission   Pain Management: IV analgesics

## 2019-01-07 NOTE — OP NOTE
Patient : Harvey Lucero    MR: [id number]    Date of procedure: January 7, 2019     Surgeon: FRANK WaltersP.M.    Assistant: FRANK CollierP.M. PGY 2    Preoperative Dx: Left lower extremity gas gangrene, necrotizing fasciitis    Postoperative Dx: Same    Procedure: Left lower extremity incision and debridement with washout and removal of nonviable soft tissue and bone and application of wound VAC, bone biopsy of cuboid talus. Ankle Arthrotomy. Fasciotomy Posterior superficial and deep compartment.    Anesthesia: General anesthesia    Hemostasis: anatomic dissection     EBL: 100mL    Materials: 0 prolene, KCI wound vac    Injections: none    Specimens:     Microbiology: Left cuboid, left talus  Pathology: Left cuboid, left talus    Summary:     The patient presents to [facility] today for surgical intervention of his left lower extremity.  He was admitted to the hospital on Friday, January 4, 2019 for left foot infection with gas gangrene and necrotizing fasciitis.  He had an incision and drainage performed on January 4, with 5 incisions packed open.  He has been receiving IV antibiotics as well as IV fluids since that time, but has not improving.  Local soft tissue at the left foot is becoming necrotic, and infection is still present within the deep soft tissue and bone.  MRI reveals no further abscess.  However, there appears to be ostium myelitis of the remaining fifth metatarsal as well as cuboid and possibly second through fourth metatarsals with abnormal bone marrow signal in the cuneiforms.  He has opted for surgical intervention at this time for repeat incision and drainage with washout and removal of all nonviable soft tissue.  In preop, the patient's vital signs are stable and neurovascular status is intact.    The patient was transferred to the operating room and placed on the operating room table in the supine position. The correct surgical site was identified which is the left  lower extremity. Once anesthesia was achieved, the left foot, ankle, and lower leg were prepped and draped in the usual aseptic technique and a timeout was performed before the beginning of the procedure.     Procedure:     Attention was directed to the left lower extremity where 5 incisions were made at previous surgical debridement.  A #10 blade was used to remove the dorsal skin and subcutaneous tissue, which appeared dark and necrotic.  The lateral foot incision from prior surgery was extended up along the peroneal tendons above the level of the ankle joint the previous wound at the left fifth metatarsal base was ellipsed out, revealing gray fifth metatarsal bone.  The #10 blade was then used to excise the remaining piece of the fifth metatarsal bone which was removed from the surgical field.  A bone biopsy of the cuboid was then performed, and the specimen was sent to pathology and microbiology for specimen processing.  The peroneus brevis tendon was ruptured from the attachment at the fifth metatarsal base with frayed ends.  The peroneus brevis tendon was tagged with 0 Prolene.  The incision was then extended proximally along the peroneal tendon without disruption to the tendon sheath.  Careful inspection of this area revealed healthy tendon and muscle belly of the peroneals without signs of infection.     Next, the dorsal lateral incision from previous surgery was extended and connected to the dorsal medial incision from prior surgery.  These soft tissue bridge between the 2 incisions was then debrided and removed from the surgical field the remaining skin and subcutaneous tissue from the dorsal foot was excised extending proximal above the ankle joint to the distal tibia and fibula.  At this time, it was noted that purulence could be expressed from deep within the midfoot and the dorsal soft tissue had undergone fat and muscle necrosis secondary to the strep infection.  Debridement of the soft tissue at the  midfoot region was performed including transection of the extensor brevis tendons as well as the extensor longus tendons.  The extensor digitorum brevis muscle belly was also debrided and excised from the surgical field.  Debridement with wet sponge removed necrotic and nonviable soft tissue at the level of the midfoot, exposing the second through fourth metatarsals.  Then a #10 blade was then used and extended surgical debridement of soft tissue at the level of the first metatarsal, revealing bacterial emboli at the dorsal and medial aspect of the first MTPJ.  Soft tissue was debrided proximally down the level of the first metatarsal extending upwards to the ankle joint.  An incision was made connecting the posterior medial aspect where prior incision was made at the last surgery.  This was extended up proximally to the mid leg.  It was noted at this time that purulence was tracking proximally in the deep soft tissue, along the posterior tibial tendon.  Dissection and inspection of the tendon sheath was performed, and exsanguination from proximal to distal was performed to exsanguinate purulence at this level.  Careful inspection of the deep posterior compartment revealed healthy and viable muscle belly.  The muscle belly and tendon with aponeurosis of the Achilles and gastroc soleal complex was intact healthy without signs of infection.    Attention was then directed to the plantar aspect of the foot, and debridement of the soft tissue revealed further necrosis of the fat and muscle at the intermetatarsal spaces as well as under the first metatarsal.  Mechanical debridement with wet sponge was performed removing all nonviable tissue, exposing the plantar aspect of the second through fourth metatarsals.  The soft tissue and plantar fat pad of the heel was spared, attempting to salvage the lower extremity as best as possible.    Careful inspection of the midfoot joints as well as the ankle joint revealed purulent  and malodorous drainage, consistent with septic joints.  The first through fourth metatarsals were gray in appearance.  Exsanguination of the lower extremity no longer revealed any purulent drainage could be expressed.    A bone biopsy was then taken of the dorsal talus, and the specimen was sent to pathology microbiology for specimen processing.  Cysto tubing was then utilized for flush of the the area, using 9 L of normal sterile saline mixed with a total of 300,000 units of bacitracin.  All areas of the left lower extremity were flushed while scrubbed with Betadine scrub brush for further debridement.  Inspection after the antibiotic flush revealed no purulent drainage.      0 Prolene was then utilized for closure along the peroneal tendon incision down to the level of the lateral malleolus.  0 Prolene was also utilized for retention sutures at the medial aspect just proximal to the level of the ankle joint.  A KCI wound VAC was then applied to the remaining surgical wound circumferentially around the distal and mid foot as well as across the anterior ankle and medial ankle - adaptic was laid down along all areas of the surgical wound with black wound VAC sponge overlying..  The wound VAC was set to 125 mmHg, and a appropriate seal was achieved with Tegaderm.  The left lower extremity was then dressed with ABD pads and Kerlix and Ace.    The patient tolerated the procedure well with all vital signs stable and neurovascular status intact. Once aroused from anesthesia the patient was transferred from the operating room to PACU and discharged to inpatient hospital room per PACU and anesthesia protocol.    Patient is instructed to be nonweightbearing to the left lower extremity.  We will follow-up with OR microbiology and pathology specimens.  He will remain on IV antibiotics with hopes that second washout and debridement will allow white blood cell count to normalize.  We will continue with local wound care and limb  salvage at this time with hopes to salvage as much of the lower extremity as possible.

## 2019-01-07 NOTE — PROGRESS NOTES
Infectious Disease Progress Note    Patient Name: Harvey Lucero Jr  MR#: [id number]  : 1960  Admission Date: 2019  Date: 19   Time: 5:49 PM   Author: Severo Dias MD    OBJECTIVE:  Ongoing leukocytosis and fever to 100.4 °F, taken back to the OR today  Cultures are growing Enterobacter enterococcus as well as Streptococcus anginosus  Cultures are positive for Streptococcus species    Antibiotics:    Anti-infectives     Start     Dose/Rate Route Frequency Ordered Stop    19 1900  [MAR Hold]  vancomycin (VANCOCIN) 1,250 mg in sodium chloride 0.9 % 250 mL IVPB     (MAR Hold since 19 1507)    1,250 mg  166.7 mL/hr over 90 Minutes intravenous Every 12 hours interval 19 0816      19 1715  [MAR Hold]  metroNIDAZOLE in NaCl (iso-os) (FLAGYL) IVPB 500 mg     (MAR Hold since 19 1507)    500 mg  100 mL/hr over 60 Minutes intravenous Every 8 hours interval 19 1619      19 1630  [MAR Hold]  cefTAZidime (FORTAZ) IVPB 2 g/100 mL NSS vial in bag     (MAR Hold since 19 1507)    2 g  200 mL/hr over 30 Minutes intravenous Every 8 hours interval 19 1619            ROS    Vital Signs:    Temp:  [36.7 °C (98.1 °F)-37.1 °C (98.7 °F)] 36.9 °C (98.5 °F)  Heart Rate:  [79-88] 82  Resp:  [16-18] 18  BP: (113-160)/(58-80) 113/58    Temp (72hrs), Av.2 °C (98.9 °F), Min:36.7 °C (98 °F), Max:37.9 °C (100.3 °F)      Physical Exam    Gen: Aox3  HEENT: OP clear  Neck: Supple  LAD: No cervical LAD  Lungs: CTAB  CV: RRR no murmurs  Abd: Soft NTND +BS  Ext: Foot CDI        Lines, Drains, Airways, Wounds:  Peripheral IV 19 Right Arm (Active)   Number of days: 3       Peripheral IV 19 Left Hand (Active)   Number of days: 0       Closed/Suction Drain Left Foot (Active)   Number of days: 0       Surgical Incision Foot Left (Active)   Number of days: 3       Labs:    Lab Results   Component Value Date    WBC 22.80 (H) 2019    HGB 8.7 (L) 2019    HCT  27.6 (L) 01/07/2019    MCV 81.9 (L) 01/07/2019     01/07/2019     Lab Results   Component Value Date    GLUCOSE 145 (H) 01/07/2019    CALCIUM 7.5 (L) 01/07/2019     (L) 01/07/2019    K 3.6 01/07/2019    CO2 23 01/07/2019     01/07/2019    BUN 24 (H) 01/07/2019    CREATININE 1.2 01/07/2019     Lab Results   Component Value Date    ALT 16 05/04/2018    AST 11 05/04/2018    ALKPHOS 60 05/04/2018    BILITOT 0.6 05/04/2018         Patient Active Problem List   Diagnosis Code   • History of CVA (cerebrovascular accident) Z86.73   • Type 2 diabetes mellitus (CMS/[facility]) ([facility]) E11.9   • Lipid disorder E78.9   • Hypertension I10   • Encounter for other preprocedural examination Z01.818   • Acute hematogenous osteomyelitis of left foot (CMS/[facility]) ([facility]) M86.072   • Anemia D64.9   • Leukocytosis D72.829   • Hyponatremia E87.1   • Acute kidney injury (CMS/[facility]) ([facility]) N17.9   • Bacteremia R78.81   • Gas gangrene (CMS/HCC) A48.0   • Preop cardiovascular exam Z01.810     Other streptococcal sepsis (CMS/[facility])   Assessment & Plan    DC vancomycin  Start ampicillin 2 g IV every 6 hours        Gas gangrene (CMS/[facility])   Assessment & Plan    For debridement today  Decrease Fortaz 1 g IV every 8, continue metronidazole, stop vancomycin and start ampicillin              Severo Dias MD  1/7/20195:49 PM

## 2019-01-07 NOTE — ASSESSMENT & PLAN NOTE
Echo does not show evidence of IE. ID has not indicated that they are concerned for IE. No plans for RUIZ at this time. The patient had extensive surgical debridement of the foot finding tenosynovitis, myositis, necrotizing fasciitis, septic arthritis of the ankle. The lab corrected the mistake and changed enterococcus for Enterobacter. IE seems unlikely given these findings.

## 2019-01-07 NOTE — PROGRESS NOTES
Vancomycin Dosing by Pharmacy Consult Follow up    Harvey Lucero Jr is a 58 y.o. male who has been consulted for vancomycin dosing for osteomyelitis.    Reviewed relevant clinical data including weight, renal function, previous vancomycin doses, and vancomycin levels  Creatinine   Date Value Ref Range Status   01/07/2019 1.2 0.8 - 1.3 mg/dL Final   01/06/2019 1.5 (H) 0.8 - 1.3 mg/dL Final   01/05/2019 1.7 (H) 0.8 - 1.3 mg/dL Final     No results found for: VANCORANDOM, VANCOTROUGH, VANCOPEAK      Vancomycin Administrations (last 96 hours)       Date/Time Action Medication Dose Rate    01/07/19 0654 New Bag    vancomycin IVPB 1 g/250 mL NSS 1,000 mg 166.7 mL/hr    01/06/19 1921 New Bag    vancomycin IVPB 1 g/250 mL NSS 1,000 mg 166.7 mL/hr    01/06/19 0645 New Bag    vancomycin IVPB 1 g/250 mL NSS 1,000 mg 166.7 mL/hr    01/05/19 1900 New Bag    vancomycin IVPB 1 g/250 mL NSS 1,000 mg 250 mL/hr    01/05/19 0648 New Bag    vancomycin IVPB 1 g/250 mL NSS 1,000 mg 166.7 mL/hr    01/04/19 1831 New Bag    vancomycin (VANCOCIN) 2 gram/500 mL IVPB 2,000 mg 2,000 mg 250 mL/hr                Assessment/Plan  The patient is ordered vancomycin dosing by pharmacy.      The patient’s renal function; improving with an estimated CrCl of 77 ml/min        Will Adjust dose to vancomycin 1250 mg IV Q 12 H .    Pharmacy will continue to follow the patient’s vancomycin dosing daily during this course of therapy.    Sergio Mccoy, HamiltonD

## 2019-01-07 NOTE — PROGRESS NOTES
A/P : 58 year old male s/p POD#0 left lower extremity debridement with extensive soft tissue excision, bone biopsies, and wound vac application.  -Patient tolerated the procedure well without neurovascular status and vital signs intact.  - He is to have KC wound VAC left intact with dressing changes every other day, starting on Wednesday, January 9.  -Patient received 1 unit of PRBC during the procedure.  -Nonweightbearing to the left lower extremity.  PT/OT consults placed.  - OR specimens obtained: Bone biopsy of talus sent to microbiology and pathology, cuboid bone biopsy for microbiology and pathology.  -CTA ordered for evaluation of left lower extremity arterial supply.  Patient has extensive soft tissue deficits at the foot and ankle level which will require extensive wound care in order to save the extremity.  -Continue IV antibiotics per ID recommendations.  -Echo ordered per cardiology to rule out vegetation from sepsis.  -Repeat blood cultures on January 5 currently no growth 18-24 hrs.  -DVT prophylaxis: Lovenox  -Continue home meds  -Pain control as needed by the patient, pain meds are ordered PO and IV  -Regular adult diet 2000-calorie consistent carbohydrates with no concentrated sweets  -Full code  -Please contact podiatry resident on call with any questions or concerns    Jen Urban DPM PGY-2  Pager [phone]

## 2019-01-07 NOTE — PROGRESS NOTES
Podiatry Progress Note    Subjective  58 y.o. patient POD#3 s/p L foot incision and drainage for gas gangrene. Dressing to the LLE intact with minimal strikethrough. Seen this morning with Dr. Bryan, patient aware of the severity of his infection. To OR today with Dr. Lr or Dr. Tee for repeat washout     NAD, no overnight events. Denies fevers        All PMH/social hx/surgical hx/family hx reviewed for the patient for today's visit.    Objective   Labs  labs and vitals reviewed     CBC Results       01/07/19 01/06/19 01/05/19                    0324 0512 0855         WBC 22.80 (H) 21.49 (H) 19.61 (H)         RBC 3.37 (L) 3.44 (L) 3.44 (L)         HGB 8.7 (L) 9.1 (L) 9.2 (L)         HCT 27.6 (L) 27.6 (L) 27.9 (L)         MCV 81.9 (L) 80.2 (L) 81.1 (L)         MCH 25.8 (L) 26.5 (L) 26.7 (L)         MCHC 31.5 (L) 33.0 33.0          256 305                     BMP Results       01/07/19 01/06/19 01/05/19                    0324 0512 0855          (L) 130 (L) 128 (L)         K 3.6 3.7 4.1         Cl 102 100 95 (L)         CO2 23 22 21 (L)         Glucose 145 (H) 191 (H) 288 (H)         BUN 24 (H) 40 (H) 46 (H)         Creatinine 1.2 1.5 (H) 1.7 (H)         Calcium 7.5 (L) 7.2 (L) 7.5 (L)         Anion Gap 9 8 12         EGFR &gt;60.0 48.4 (L) 41.9 (L)                       Imaging  I have reviewed the Imaging from the last 24 hrs.    Vital signs in last 24 hours:  Temp:  [36.7 °C (98.1 °F)-37.2 °C (99 °F)] 36.7 °C (98.1 °F)  Heart Rate:  [79-88] 87  Resp:  [16-18] 18  BP: (139-160)/(67-80) 139/67      Intake/Output Summary (Last 24 hours) at 01/07/19 1023  Last data filed at 01/06/19 1737   Gross per 24 hour   Intake          2009.33 ml   Output              300 ml   Net          1709.33 ml       Physical Exam:  General appearance: AAOx3, appears stated age,cooperative  Head: normocephalic, without obvious abnormality, atraumatic  Eyes: conjunctivae/corneas clear. PERRL, EOM's intact. Fundi  benign.  Ears: normal TM's and external ear canals both ears  Nose: Nares normal. Septum midline. Mucosa normal. No drainage or sinus tenderness.  Lungs: clear to auscultation bilaterally  Heart:: regular rate and rhythm  Abdomen: soft, non-tender; bowel sounds normal; no masses, no organomegaly      LE Focused Physical Exam :  Vascular : non-palpable DP and PT pulses LLE, palpable on the R. CRT < 5 sec. Skin temp warm to warm with increase at the L distal leg/ankle/foot. No pain to the posterior proximal calf - no clinical signs of DVT.  Neuro : Gross sensation intact. Light touch and protective sensation diminished.  Ortho : DF/PF of remaining digits present. Equinus B/L ankle joints.   Derm : Surgical incisions at the medial L heel, plantar central midfoot, lateral foot, dorsal lateral foot, dorsal medial foot to the ankle. Entire foot/ankle is erythematous and edematous.   Plantar incision about 3cm in length with granular wound base, serosanginous drainage only. No signs of necrosis to tissue. No crepitus or fluctuance plantarly  Medial heel incision about 4-5cm in length and 2cm in width with granular wound base, serosanguinous drainage only  Lateral foot incision about 4-5 cm in length with granular wound base, no fluid expressed. Bone exposed, dark and dusky tissue in view.  Dorsal lateral incision about 8cm in length and 2cm wide with exposed tendons. Mostly granular but medially edge is grey/dusky in appearance. Odor noted from this wound, purulence expressed from deep towards the ankle joint as well as from proximal lateral ankle. Tunnels medial across dorsal foot with overlying skin purple in color.  Dorsal medial incision about 10cm in length and 2cm wide with exposed tendon. Mostly granular but laterally with grey area. Odor noted from this wound, purulence experssed from the medial plantar foot.     A/P: 58 y.o. patient seen this morning with Dr. Bryan s/p POD#3 L foot incision and drainage of gas  gangrene/soft tissue emphysema.  - Dressing changed today : flush/pack to all incision sites, redressed with DSD/ABD/kerlix/ACE. Nursing may reinforce as needed for strikethrough. Purulence expressed from the LLE in multiple deep areas.   - Continue IV abx per ID recs : vancomycin, fortaz, flagyl  -In OR on Friday, significant pus was drained over 200cc's. 3 cultures were taken from different pockets of pus.  - OR wound cultures : 2 out of 3 (+) :   4+ Streptococcus anginosus       4+ Enterococcus faecalis    - Patient with WBC 22, up from 21.  - Consult for Dr. Barragan for possible eval of cardiac state in regards to sepsis, will consider echo.  - Consult placed for HBO evaluation with Dr. Smith in the [facility]  - L Foot MRI reviewed  - Repeat L ankle, tib-fib, and knee XR ordered to confirm no soft tissue emphysema proximally.  - DAVID/PVR ordered : pending  - Adult regular diet, 2000cal consistent carb, NCS. Adjust as needed.  - Patient is to be NWB to the LLE with the use of crutches, walker, or wheelchair.   - Pain medication on board as patient needs.    - DVT prophylaxsis Lovenox while inhouse  - Please contact on call podiatry resident with any questions or concerns      Jen Urban DPM PGY-2  Pager [phone]

## 2019-01-07 NOTE — ANESTHESIA PROCEDURE NOTES
Airway  Urgency: elective    Start Time: 1/7/2019 3:34 PM    General Information and Staff    Patient location during procedure: OR  Anesthesiologist: SUE HILLS  Performed: anesthesiologist     Indications and Patient Condition  Indications for airway management: anesthesia  Sedation level: deep  Preoxygenated: yes  Patient position: sniffing  Mask difficulty assessment: 2 - vent by mask + OA or adjuvant +/- NMBA    Final Airway Details  Final airway type: endotracheal airway      Successful airway: ETT    Successful intubation technique: direct laryngoscopy  Facilitating devices/methods: intubating stylet and cricoid pressure  Endotracheal tube insertion site: oral  Blade: Jadiel  Blade size: #3  ETT size: 8.0 mm  Cormack-Lehane Classification: grade IIb - view of arytenoids or posterior of glottis only  Placement verified by: chest auscultation and capnometry   Measured from: lips  Number of attempts at approach: 1  Number of other approaches attempted: 0  Atraumatic airway insertion

## 2019-01-07 NOTE — PROGRESS NOTES
Hospital Medicine Service -  Daily Progress Note       SUBJECTIVE   Interval History: Patient seen and examined.   Patient feels improved. Reports no pain  No chest pain,dyspnea,dizziness.  No abdominal pain,  No urinary symptoms   Discussed plan of care with patient      Other ros-         OBJECTIVE      Vital signs in last 24 hours:  Temp:  [36.7 °C (98.1 °F)-37.2 °C (99 °F)] 36.7 °C (98.1 °F)  Heart Rate:  [79-88] 87  Resp:  [16-18] 18  BP: (139-160)/(67-80) 139/67    Intake/Output Summary (Last 24 hours) at 01/07/19 1115  Last data filed at 01/06/19 1737   Gross per 24 hour   Intake          2009.33 ml   Output              300 ml   Net          1709.33 ml       PHYSICAL EXAMINATION      Physical Exam   Constitutional: He is oriented to person, place, and time. He appears well-nourished.   HENT:   Head: Atraumatic.   Eyes: Pupils are equal, round, and reactive to light.   Neck: Neck supple.   Cardiovascular: Normal rate and regular rhythm.    No murmur heard.  Pulmonary/Chest: Effort normal and breath sounds normal. No respiratory distress.   Abdominal: Soft. Bowel sounds are normal. He exhibits no distension.   Musculoskeletal:   Left leg wound+  Dressing+   Neurological: He is alert and oriented to person, place, and time. No cranial nerve deficit.   Skin: Skin is warm. No erythema.   Psychiatric: His behavior is normal.   Nursing note and vitals reviewed.     LINES, CATHETERS, DRAINS, AIRWAYS, AND WOUNDS   Lines, Drains, Airways, Wounds:  Peripheral IV 01/04/19 Right Arm (Active)   Number of days: 3       Surgical Incision Foot Left (Active)   Number of days: 3       Comments:      LABS / IMAGING / TELE      Labs  I have reviewed the patient's pertinent labs.  Significant abnormals are below.  Lab Results   Component Value Date     (L) 01/07/2019    K 3.6 01/07/2019     01/07/2019    BUN 24 (H) 01/07/2019    CREATININE 1.2 01/07/2019    WBC 22.80 (H) 01/07/2019    HGB 8.7 (L) 01/07/2019    HCT  27.6 (L) 01/07/2019     01/07/2019    ALT 16 05/04/2018    AST 11 05/04/2018    INR 1.4 01/04/2019    HGBA1C 10.2 (H) 01/04/2019       Imaging  I have independently reviewed the patient's pertinent imaging for this hospital visit. Pertinent Imaging findings are within normal limits.    ECG/Telemetry  n/a    ASSESSMENT AND PLAN      * Acute hematogenous osteomyelitis of left foot (CMS/HCC) ([facility])   Assessment & Plan    POD #3 per podiatry   Vanco/fortaz and MTNZ per ID - abx modification per ID   .  Wound culture with same organism as blood   MRI with OM              Bacteremia   Assessment & Plan    ID following and on vanco   TTE ordered   Wound with the same organism         Acute kidney injury (CMS/[facility]) ([facility])   Assessment & Plan    Cr in May was 1.1   Has underlying DM and HTN   BUN also up - suspect pre-renal     Lab Results   Component Value Date    GLUCOSE 191 (H) 01/06/2019    CALCIUM 7.2 (L) 01/06/2019     (L) 01/06/2019    K 3.7 01/06/2019    CO2 22 01/06/2019     01/06/2019    BUN 40 (H) 01/06/2019    CREATININE 1.5 (H) 01/06/2019             Hyponatremia   Assessment & Plan    Partly pseudo from increased BS  Control BS and trend   Will give NSS  Improving         Leukocytosis   Assessment & Plan    Secondary to acute infection         Anemia   Assessment & Plan    No recent hgb for comparion. Hgb 15.1 1 yr ago  Pt denies any symptoms. No signs of active bleeding  .  Iron level ordered     Lab Results   Component Value Date    WBC 21.49 (H) 01/06/2019    HGB 9.1 (L) 01/06/2019    HCT 27.6 (L) 01/06/2019    MCV 80.2 (L) 01/06/2019     01/06/2019   .  Lab Results   Component Value Date    IRON 14 (L) 01/05/2019    TIBC 133 (L) 01/05/2019    FERRITIN 1,062 (H) 01/05/2019     More c/w CD and ferritin up from infection         Encounter for other preprocedural examination   Assessment & Plan    Plan for urgent OR for left foot infection/gas   Denies any hx of CAD, no arrhythmias, no  CHF, no preoperative creatinine >2  Does have a hx of CVA 5 yrs ago and an insulin dependent diabetic. RCRI 6.6%  EKG benign without any signs of ischemia  Good exercise capacity able to reach>4 METs activity level    P  Can proceed to with this urgent surgery without additional cardiac testing at an acceptable risk          Hypertension   Assessment & Plan    BP up slightly         Lipid disorder   Assessment & Plan    Cont statin        Type 2 diabetes mellitus (CMS/HCC) ([facility])   Assessment & Plan    A1C 10.8 in May 2018.   Better control   Cont otupt meds for now  Resume lanutus, add meal time insulin   Diabetic diet  Aim for tighter control with active infection     Lab Results   Component Value Date    HGBA1C 10.2 (H) 01/04/2019               History of CVA (cerebrovascular accident)   Assessment & Plan    Cont plavix and statin             VTE Assessment: Padua    VTE Prophylaxis Plan: lovenox  Code Status: Full Code  Estimated Discharge Date: 1/8/2019  Disposition Planning: per podiatry     Betty Clifford MD  1/7/2019

## 2019-01-07 NOTE — PLAN OF CARE
Problem: Patient Care Overview  Goal: Discharge Needs Assessment  Outcome: Ongoing (interventions implemented as appropriate)  Met with pt at bedside.  Pt lives in 2 story home with spouse.  Has 1/2 bath on first floor.  He is ind pta, uses no AD but has a cane and walker if needed.  He stated he has a glucome   01/04/19 1200 01/07/19 0953   DC Needs Assessment   Concerns To Be Addressed --  care coordination/care conferences;discharge planning concerns   Concerns Comments --  dc planning for home   Readmission Within The Last 30 Days --  no previous admission in last 30 days   Anticipated Discharge Disposition --  home with home health services   Type of Home Care Services --  nursing;infusion therapy   Equipment Needed After Discharge --  none   Current Health   Anticipated Changes Related to Illness --  none   Activity/Self Care ROS   Equipment Currently Used at Home none --    ter and is compliant. He has never had home care but stated he did home infusion in the past and does not remember the name of the company.  Unsure of dc plan at this time, will continue to follow for needs.

## 2019-01-08 ENCOUNTER — APPOINTMENT (INPATIENT)
Dept: CARDIOLOGY | Facility: HOSPITAL | Age: 59
DRG: 853 | End: 2019-01-08
Attending: PODIATRIST
Payer: COMMERCIAL

## 2019-01-08 PROBLEM — A40.9 STREPTOCOCCAL SEPSIS (CMS/HCC): Status: ACTIVE | Noted: 2019-01-07

## 2019-01-08 PROBLEM — Z01.810 PREOP CARDIOVASCULAR EXAM: Status: RESOLVED | Noted: 2019-01-07 | Resolved: 2019-01-08

## 2019-01-08 PROBLEM — M72.6: Status: ACTIVE | Noted: 2019-01-08

## 2019-01-08 LAB
ANION GAP SERPL CALC-SCNC: 9 MEQ/L (ref 3–15)
AORTIC ROOT ANNULUS: 3.2 CM
BASOPHILS # BLD: 0 K/UL (ref 0.01–0.1)
BASOPHILS NFR BLD: 0 %
BSA FOR ECHO PROCEDURE: 2.24 M2
BUN SERPL-MCNC: 21 MG/DL (ref 8–20)
CALCIUM SERPL-MCNC: 6.6 MG/DL (ref 8.9–10.3)
CHLORIDE SERPL-SCNC: 104 MEQ/L (ref 98–109)
CO2 SERPL-SCNC: 21 MEQ/L (ref 22–32)
CREAT SERPL-MCNC: 1.2 MG/DL
DIFFERENTIAL METHOD BLD: ABNORMAL
E WAVE DECELERATION TIME: 225 MS
E/A RATIO: 0.8
E/E' RATIO: 7.5
E/LAT E' RATIO: 9.7
EDV (BP): 81.1 CM3
EF (A4C): 59.1 %
EF A2C: 76 %
EJECTION FRACTION: 67.7 %
EOSINOPHIL # BLD: 0.81 K/UL (ref 0.04–0.54)
EOSINOPHIL NFR BLD: 3 %
ERYTHROCYTE [DISTWIDTH] IN BLOOD BY AUTOMATED COUNT: 15.2 % (ref 11.6–14.4)
ESV (BP): 26.2 CM3
FRACTIONAL SHORTENING: 34.6 %
FUNGUS STAIN: NORMAL
FUNGUS STAIN: NORMAL
GFR SERPL CREATININE-BSD FRML MDRD: >60 ML/MIN/1.73M*2
GLUCOSE BLD-MCNC: 154 MG/DL (ref 70–99)
GLUCOSE BLD-MCNC: 187 MG/DL (ref 70–99)
GLUCOSE BLD-MCNC: 191 MG/DL (ref 70–99)
GLUCOSE BLD-MCNC: 194 MG/DL (ref 70–99)
GLUCOSE BLD-MCNC: 196 MG/DL (ref 70–99)
GLUCOSE SERPL-MCNC: 196 MG/DL (ref 70–99)
HCT VFR BLDCO AUTO: 20.1 %
HCT VFR BLDCO AUTO: 22.8 %
HCT VFR BLDCO AUTO: 23.3 %
HGB BLD-MCNC: 6.7 G/DL
HGB BLD-MCNC: 7.4 G/DL
HGB BLD-MCNC: 7.9 G/DL
HYPOCHROMIA BLD QL SMEAR: ABNORMAL
INTERVENTRICULAR SEPTUM: 1.07 CM
LA ESV (BP): 56 CM3
LA ESV INDEX (A2C): 25.22 CM3/M2
LA ESV INDEX (BP): 25 CM3/M2
LA/AORTA RATIO: 1.34
LAAS-AP2: 19.4 CM2
LAAS-AP4: 19.6 CM2
LAD 2D: 4.3 CM
LALD A4C: 5.55 CM
LALD A4C: 5.96 CM
LAV-S: 56.5 CM3
LEFT ATRIUM VOLUME INDEX: 23.21 CM3/M2
LEFT ATRIUM VOLUME: 52 CM3
LEFT INTERNAL DIMENSION IN SYSTOLE: 3.38 CM (ref 3.82–5.78)
LEFT VENTRICLE DIASTOLIC VOLUME INDEX: 42.63 CM3/M2
LEFT VENTRICLE DIASTOLIC VOLUME: 95.5 CM3
LEFT VENTRICLE SYSTOLIC VOLUME INDEX: 17.41 CM3/M2
LEFT VENTRICLE SYSTOLIC VOLUME: 39 CM3
LEFT VENTRICULAR INTERNAL DIMENSION IN DIASTOLE: 5.17 CM (ref 6.57–9.12)
LEFT VENTRICULAR POSTERIOR WALL IN END DIASTOLE: 1.1 CM (ref 0.8–1.49)
LV DIASTOLIC VOLUME: 68.1 CM3
LV ESV (APICAL 2 CHAMBER): 16.3 CM3
LVAD-AP2: 24.7 CM2
LVAD-AP4: 29.2 CM2
LVAS-AP2: 10.1 CM2
LVAS-AP4: 16.6 CM2
LVEDVI(A2C): 30.4 CM3/M2
LVEDVI(BP): 36.21 CM3/M2
LVESVI(A2C): 7.28 CM3/M2
LVESVI(BP): 11.7 CM3/M2
LVLD-AP2: 7.49 CM
LVLD-AP4: 7.37 CM
LVLS-AP2: 5.51 CM
LVLS-AP4: 6 CM
LYMPHOCYTES # BLD: 2.7 K/UL (ref 1.2–3.5)
LYMPHOCYTES NFR BLD: 10 %
MCH RBC QN AUTO: 27.4 PG (ref 28–33.2)
MCHC RBC AUTO-ENTMCNC: 32.5 G/DL (ref 32.2–36.5)
MCV RBC AUTO: 84.4 FL (ref 83–98)
METAMYELOCYTES # BLD MANUAL: 0.27 K/UL
METAMYELOCYTES NFR BLD MANUAL: 1 %
MONOCYTES # BLD: 0.54 K/UL (ref 0.3–1)
MONOCYTES NFR BLD: 2 %
MV E'TISSUE VEL-LAT: 0.08 M/S
MV E'TISSUE VEL-MED: 0.11 M/S
MV PEAK A VEL: 1.04 M/S
MV PEAK E VEL: 0.81 M/S
MV VALVE AREA P 1/2 METHOD: 3.33 CM2
NEUTS BAND # BLD: 0.54 K/UL
NEUTS BAND # BLD: 22.17 K/UL (ref 1.7–7)
NEUTS BAND NFR BLD: 2 %
NEUTS SEG NFR BLD: 82 %
PDW BLD AUTO: 11.4 FL (ref 9.4–12.4)
PLATELET # BLD AUTO: 182 K/UL
PLATELET # BLD EST: ABNORMAL 10*3/UL
PLATELET CLUMP BLD QL SMEAR: PRESENT
POCT TEST: ABNORMAL
POSTERIOR WALL: 1.1 CM
POTASSIUM SERPL-SCNC: 4.2 MEQ/L (ref 3.6–5.1)
RBC # BLD AUTO: 2.7 M/UL (ref 4.5–5.8)
SODIUM SERPL-SCNC: 134 MEQ/L (ref 136–144)
TOXIC GRANULES BLD QL SMEAR: ABNORMAL
TR MAX PG: 24 MMHG
TRICUSPID VALVE PEAK REGURGITATION VELOCITY: 2.45 M/S
WBC # BLD AUTO: 27.04 K/UL
Z-SCORE OF LEFT VENTRICULAR DIMENSION IN END DIASTOLE: -4.04
Z-SCORE OF LEFT VENTRICULAR DIMENSION IN END SYSTOLE: -2.6
Z-SCORE OF LEFT VENTRICULAR POSTERIOR WALL IN END DIASTOLE: 0.04

## 2019-01-08 PROCEDURE — 36415 COLL VENOUS BLD VENIPUNCTURE: CPT | Performed by: PODIATRIST

## 2019-01-08 PROCEDURE — 99233 SBSQ HOSP IP/OBS HIGH 50: CPT | Performed by: HOSPITALIST

## 2019-01-08 PROCEDURE — P9016 RBC LEUKOCYTES REDUCED: HCPCS

## 2019-01-08 PROCEDURE — 85014 HEMATOCRIT: CPT | Performed by: PODIATRIST

## 2019-01-08 PROCEDURE — 85018 HEMOGLOBIN: CPT | Performed by: INTERNAL MEDICINE

## 2019-01-08 PROCEDURE — 85025 COMPLETE CBC W/AUTO DIFF WBC: CPT | Performed by: PODIATRIST

## 2019-01-08 PROCEDURE — 63600000 HC DRUGS/DETAIL CODE: Performed by: PODIATRIST

## 2019-01-08 PROCEDURE — 80048 BASIC METABOLIC PNL TOTAL CA: CPT | Performed by: PODIATRIST

## 2019-01-08 PROCEDURE — 12000000 HC ROOM AND CARE MED/SURG

## 2019-01-08 PROCEDURE — 25000000 HC PHARMACY GENERAL: Performed by: INTERNAL MEDICINE

## 2019-01-08 PROCEDURE — 25800000 HC PHARMACY IV SOLUTIONS: Performed by: INTERNAL MEDICINE

## 2019-01-08 PROCEDURE — 36430 TRANSFUSION BLD/BLD COMPNT: CPT

## 2019-01-08 PROCEDURE — 93306 TTE W/DOPPLER COMPLETE: CPT

## 2019-01-08 PROCEDURE — 63600000 HC DRUGS/DETAIL CODE: Performed by: INTERNAL MEDICINE

## 2019-01-08 PROCEDURE — 63700000 HC SELF-ADMINISTRABLE DRUG: Performed by: PODIATRIST

## 2019-01-08 RX ORDER — SODIUM CHLORIDE 9 MG/ML
5 INJECTION, SOLUTION INTRAVENOUS AS NEEDED
Status: ACTIVE | OUTPATIENT
Start: 2019-01-08 | End: 2019-01-09

## 2019-01-08 RX ORDER — INSULIN ASPART 100 [IU]/ML
3-5 INJECTION, SOLUTION INTRAVENOUS; SUBCUTANEOUS
Status: DISCONTINUED | OUTPATIENT
Start: 2019-01-08 | End: 2019-01-19 | Stop reason: HOSPADM

## 2019-01-08 RX ADMIN — INSULIN GLARGINE 25 UNITS: 100 INJECTION, SOLUTION SUBCUTANEOUS at 03:15

## 2019-01-08 RX ADMIN — ROSUVASTATIN CALCIUM 10 MG: 10 TABLET, FILM COATED ORAL at 09:20

## 2019-01-08 RX ADMIN — METRONIDAZOLE 500 MG: 500 INJECTION, SOLUTION INTRAVENOUS at 09:19

## 2019-01-08 RX ADMIN — METRONIDAZOLE 500 MG: 500 INJECTION, SOLUTION INTRAVENOUS at 02:17

## 2019-01-08 RX ADMIN — HYDROMORPHONE HYDROCHLORIDE 0.5 MG: 1 INJECTION, SOLUTION INTRAMUSCULAR; INTRAVENOUS; SUBCUTANEOUS at 14:20

## 2019-01-08 RX ADMIN — INSULIN ASPART 3 UNITS: 100 INJECTION, SOLUTION INTRAVENOUS; SUBCUTANEOUS at 09:40

## 2019-01-08 RX ADMIN — AMPICILLIN SODIUM 2 G: 1 INJECTION, POWDER, FOR SOLUTION INTRAMUSCULAR; INTRAVENOUS at 15:34

## 2019-01-08 RX ADMIN — HYDROMORPHONE HYDROCHLORIDE 0.5 MG: 1 INJECTION, SOLUTION INTRAMUSCULAR; INTRAVENOUS; SUBCUTANEOUS at 20:36

## 2019-01-08 RX ADMIN — INSULIN ASPART 3 UNITS: 100 INJECTION, SOLUTION INTRAVENOUS; SUBCUTANEOUS at 16:57

## 2019-01-08 RX ADMIN — SITAGLIPTIN 100 MG: 100 TABLET, FILM COATED ORAL at 09:20

## 2019-01-08 RX ADMIN — ENOXAPARIN SODIUM 40 MG: 100 INJECTION SUBCUTANEOUS at 18:22

## 2019-01-08 RX ADMIN — AMPICILLIN SODIUM 2 G: 1 INJECTION, POWDER, FOR SOLUTION INTRAMUSCULAR; INTRAVENOUS at 19:10

## 2019-01-08 RX ADMIN — AMPICILLIN SODIUM 2 G: 1 INJECTION, POWDER, FOR SOLUTION INTRAMUSCULAR; INTRAVENOUS at 05:20

## 2019-01-08 RX ADMIN — INSULIN ASPART 5 UNITS: 100 INJECTION, SOLUTION INTRAVENOUS; SUBCUTANEOUS at 09:44

## 2019-01-08 RX ADMIN — CEFTAZIDIME 2 G: 2 INJECTION, SOLUTION INTRAVENOUS at 00:15

## 2019-01-08 RX ADMIN — AMPICILLIN SODIUM 2 G: 1 INJECTION, POWDER, FOR SOLUTION INTRAMUSCULAR; INTRAVENOUS at 11:42

## 2019-01-08 RX ADMIN — HYDROMORPHONE HYDROCHLORIDE 0.5 MG: 1 INJECTION, SOLUTION INTRAMUSCULAR; INTRAVENOUS; SUBCUTANEOUS at 09:45

## 2019-01-08 RX ADMIN — CEFTAZIDIME 2 G: 2 INJECTION, SOLUTION INTRAVENOUS at 08:46

## 2019-01-08 RX ADMIN — AMPICILLIN SODIUM 2 G: 1 INJECTION, POWDER, FOR SOLUTION INTRAMUSCULAR; INTRAVENOUS at 22:37

## 2019-01-08 RX ADMIN — INSULIN ASPART 5 UNITS: 100 INJECTION, SOLUTION INTRAVENOUS; SUBCUTANEOUS at 16:57

## 2019-01-08 RX ADMIN — AMPICILLIN SODIUM 2 G: 1 INJECTION, POWDER, FOR SOLUTION INTRAMUSCULAR; INTRAVENOUS at 00:15

## 2019-01-08 RX ADMIN — PANTOPRAZOLE SODIUM 20 MG: 20 TABLET, DELAYED RELEASE ORAL at 09:19

## 2019-01-08 RX ADMIN — INSULIN ASPART 3 UNITS: 100 INJECTION, SOLUTION INTRAVENOUS; SUBCUTANEOUS at 02:08

## 2019-01-08 RX ADMIN — INSULIN GLARGINE 25 UNITS: 100 INJECTION, SOLUTION SUBCUTANEOUS at 22:35

## 2019-01-08 RX ADMIN — SODIUM CHLORIDE: 9 INJECTION, SOLUTION INTRAVENOUS at 22:32

## 2019-01-08 ASSESSMENT — PAIN SCALES - GENERAL: PAIN_LEVEL: 2

## 2019-01-08 NOTE — ANESTHESIA POSTPROCEDURE EVALUATION
Patient: Harvey Lucero Jr    Procedure Summary     Date:  01/07/19 Room / Location:  [facility] OR  / [facility] OR    Anesthesia Start:  1527 Anesthesia Stop:  1736    Procedure:  INCISION & DRAINAGE/HEMATOMA EVACUATION KNEE/LEG/ FOOT/ANKLE, POSSIBLE FTSG, VAC *NOT POST TOTAL* (Left Ankle) Diagnosis:       Gas gangrene (CMS/HCC)      (Gas gangrene (CMS/HCC) [A48.0])    Surgeon:  Ca Lr DPM Responsible Provider:  Helga Scott MD    Anesthesia Type:  general ASA Status:  3          Anesthesia Type: general  PACU Vitals  1/7/2019 1733 - 1/7/2019 1833      1/7/2019 1737 1/7/2019 1745 1/7/2019 1800 1/7/2019 1815    BP: (!)  113/58 125/60 (!)  114/55 (!)  107/57    Temp: 36.9 °C (98.5 °F) - - -    Pulse: 82 82 80 79    Resp: 18 - 12 (!)  23    SpO2: 100 % 100 % 100 % 100 %              1/7/2019 1830             BP: (!)  104/56       Temp: -       Pulse: 79       Resp: 19       SpO2: 100 %               Anesthesia Post Evaluation    Pain score: 2  Pain management: adequate  Patient location during evaluation: PACU  Patient participation: complete - patient participated  Level of consciousness: awake and alert  Cardiovascular status: acceptable  Airway Patency: adequate  Respiratory status: acceptable  Hydration status: acceptable  Anesthetic complications: no

## 2019-01-08 NOTE — PROGRESS NOTES
Hospital Medicine Service -  Daily Progress Note       SUBJECTIVE   Interval History: Patient seen and examined.   Events of last night noted.bleeding issues,received 2 units prbc  Getting another unit today,plavix stopped  No chest pain,dyspnea,dizziness.  No abdominal pain,  No urinary symptoms   Discussed plan of care with patient  and nurse    Other ros-         OBJECTIVE      Vital signs in last 24 hours:  Temp:  [36.8 °C (98.2 °F)-37.7 °C (99.9 °F)] 37.2 °C (99 °F)  Heart Rate:  [79-95] 82  Resp:  [12-23] 20  BP: ()/(45-67) 104/56    Intake/Output Summary (Last 24 hours) at 01/08/19 1305  Last data filed at 01/08/19 0500   Gross per 24 hour   Intake          5701.33 ml   Output              875 ml   Net          4826.33 ml       PHYSICAL EXAMINATION      Physical Exam   Constitutional: He is oriented to person, place, and time. He appears well-developed.   HENT:   Head: Normocephalic.   Eyes: Pupils are equal, round, and reactive to light.   Neck: Neck supple.   Cardiovascular: Normal rate and regular rhythm.    No murmur heard.  Pulmonary/Chest: Effort normal and breath sounds normal. No respiratory distress.   Abdominal: Soft. Bowel sounds are normal. He exhibits no distension.   Musculoskeletal: Normal range of motion. He exhibits no edema.   Patient able to dorsiflex and plantarflex digits of the left foot.   surgery site in dressing, there is no purulence or active drainage apart from the bleeding noted   Neurological: He is alert and oriented to person, place, and time. No cranial nerve deficit.   Skin: Skin is warm. No erythema.   Psychiatric: His behavior is normal.   Nursing note and vitals reviewed.     LINES, CATHETERS, DRAINS, AIRWAYS, AND WOUNDS   Lines, Drains, Airways, Wounds:  Peripheral IV 01/04/19 Right Arm (Active)   Number of days: 3       Surgical Incision Foot Left (Active)   Number of days: 3       Comments:      LABS / IMAGING / TELE      Labs  I have reviewed the patient's  pertinent labs.  Significant abnormals are below.  Lab Results   Component Value Date     (L) 01/08/2019    K 4.2 01/08/2019     01/08/2019    BUN 21 (H) 01/08/2019    CREATININE 1.2 01/08/2019    WBC 27.04 (H) 01/08/2019    HGB 6.7 (LL) 01/08/2019    HCT 20.1 (L) 01/08/2019     01/08/2019    ALT 16 05/04/2018    AST 11 05/04/2018    INR 1.4 01/04/2019    HGBA1C 10.2 (H) 01/04/2019       Imaging  I have independently reviewed the patient's pertinent imaging for this hospital visit. Pertinent Imaging findings are within normal limits.    ECG/Telemetry  n/a    ASSESSMENT AND PLAN      Streptococcal sepsis (CMS/[facility]) ([facility])   Assessment & Plan    Increase ampicillin to 2 g IV every 4 hours, he will need a minimum of 6 weeks  ID following  ECHO ordered        * Acute hematogenous osteomyelitis of left foot (CMS/[facility]) ([facility])   Assessment & Plan    Per podiatry and ID  Will need 6 weeks of antibiotics          Necrotizing fasciitis (CMS/[facility])   Assessment & Plan    Underwent extensive debridement, he has a septic joint and osteomyelitis  Treatment as above           Acute kidney injury (CMS/[facility]) ([facility])   Assessment & Plan    Cr in May was 1.1   Has underlying DM and HTN   BUN also up - suspect pre-renal     Lab Results   Component Value Date    GLUCOSE 191 (H) 01/06/2019    CALCIUM 7.2 (L) 01/06/2019     (L) 01/06/2019    K 3.7 01/06/2019    CO2 22 01/06/2019     01/06/2019    BUN 40 (H) 01/06/2019    CREATININE 1.5 (H) 01/06/2019             Hyponatremia   Assessment & Plan    Partly pseudo from increased BS  Control BS and trend   Will give NSS  Improving         Leukocytosis   Assessment & Plan    Secondary to acute infection         Anemia   Assessment & Plan    No recent hgb for comparion. Hgb 15.1 1 yr ago  Had beeding issues last night after surgery  Wound vac removed  Received 2 units prbc    receiving another unit  Recheck HB          Encounter for other preprocedural examination    Assessment & Plan    Plan for urgent OR for left foot infection/gas   Denies any hx of CAD, no arrhythmias, no CHF, no preoperative creatinine >2  Does have a hx of CVA 5 yrs ago and an insulin dependent diabetic. RCRI 6.6%  EKG benign without any signs of ischemia  Good exercise capacity able to reach>4 METs activity level    P  Can proceed to with this urgent surgery without additional cardiac testing at an acceptable risk          Hypertension   Assessment & Plan    BP up slightly         Lipid disorder   Assessment & Plan    Cont statin        Type 2 diabetes mellitus (CMS/HCC) ([facility])   Assessment & Plan    A1C 10.8 in May 2018.   Better control   Cont otupt meds for now  Resume lanutus, add meal time insulin   Diabetic diet  Aim for tighter control with active infection     Lab Results   Component Value Date    HGBA1C 10.2 (H) 01/04/2019               History of CVA (cerebrovascular accident)   Assessment & Plan    Cont statin  Plavix on hold due to bleeding             VTE Assessment: Padua    VTE Prophylaxis Plan: scd,no lovenox because of bleeding  Code Status: Full Code  Estimated Discharge Date: 1/8/2019  Disposition Planning: per podiatry     Betty Clifford MD  1/8/2019

## 2019-01-08 NOTE — PERIOPERATIVE NURSING NOTE
Reinforced with ace and abd pad for oozing area, also obtained a new collection container from store room for wound vac

## 2019-01-08 NOTE — PERIOPERATIVE NURSING NOTE
Paged podiatry the resident called me back aware of oozing of blood on heel area. Verbal order by Sav Roach to reinforce with 4x4 and wrap

## 2019-01-08 NOTE — PROGRESS NOTES
Infectious Disease Progress Note    Patient Name: Harvey Lucero Jr  MR#: [id number]  : 1960  Admission Date: 2019  Date: 19   Time: 12:01 PM   Author: Severo Dias MD    OBJECTIVE:  No more fevers, white count down to 27,000, extensive surgical debridement of the foot finding tenosynovitis, myositis, necrotizing fasciitis, septic arthritis of the ankle, the lab corrected the mistake and changed enterococcus for Enterobacter  He had extensive bleeding overnight with 800 cc on the wound VAC which was discontinued  Anti-infectives     Start     Dose/Rate Route Frequency Ordered Stop    19 1045  ampicillin (OMNIPEN) 2 g in sodium chloride 0.9 % 100 mL IVPB      2 g  200 mL/hr over 30 Minutes intravenous Every 4 hours interval 19 1042            ROS  Vomiting diarrhea fevers chills  Vital Signs:    Temp:  [36.8 °C (98.2 °F)-37.7 °C (99.9 °F)] 37.2 °C (99 °F)  Heart Rate:  [79-95] 82  Resp:  [12-23] 20  BP: ()/(45-67) 104/56    Temp (72hrs), Av.2 °C (98.9 °F), Min:36.7 °C (98 °F), Max:37.9 °C (100.3 °F)      Physical Exam    Gen: Aox3  HEENT: OP clear  Neck: Supple  LAD: No cervical LAD  Lungs: CTAB  CV: RRR no murmurs  Abd: Soft NTND +BS  Ext: Left foot CDI        Lines, Drains, Airways, Wounds:  Peripheral IV 19 Right Arm (Active)   Number of days: 3       Peripheral IV 19 Left Hand (Active)   Number of days: 0       Closed/Suction Drain Left Foot (Active)   Number of days: 0       Surgical Incision Foot Left (Active)   Number of days: 3       Labs:    Lab Results   Component Value Date    WBC 27.04 (H) 2019    HGB 6.7 (LL) 2019    HCT 20.1 (L) 2019    MCV 84.4 2019     2019     Lab Results   Component Value Date    GLUCOSE 196 (H) 2019    CALCIUM 6.6 (L) 2019     (L) 2019    K 4.2 2019    CO2 21 (L) 2019     2019    BUN 21 (H) 2019    CREATININE 1.2 2019     Lab  Results   Component Value Date    ALT 16 05/04/2018    AST 11 05/04/2018    ALKPHOS 60 05/04/2018    BILITOT 0.6 05/04/2018         Patient Active Problem List   Diagnosis Code   • History of CVA (cerebrovascular accident) Z86.73   • Type 2 diabetes mellitus (CMS/[facility]) ([facility]) E11.9   • Lipid disorder E78.9   • Hypertension I10   • Encounter for other preprocedural examination Z01.818   • Acute hematogenous osteomyelitis of left foot (CMS/HCC) ([facility]) M86.072   • Anemia D64.9   • Leukocytosis D72.829   • Hyponatremia E87.1   • Acute kidney injury (CMS/HCC) ([facility]) N17.9   • Bacteremia R78.81   • Gas gangrene (CMS/HCC) A48.0   • Preop cardiovascular exam Z01.810   • Other streptococcal sepsis (CMS/HCC) A40.8   • Necrotizing fasciitis of ankle and foot (CMS/HCC) M72.6     Other streptococcal sepsis (CMS/HCC)   Assessment & Plan    Increase ampicillin to 2 g IV every 4 hours, he will need a minimum of 6 weeks        Necrotizing fasciitis of ankle and foot (CMS/HCC)   Assessment & Plan    Underwent extensive debridement, he has a septic joint and osteomyelitis  Treatment as above        Leukocytosis   Assessment & Plan    Secondary to the foot infection will continue to trend daily                Severo Dias MD  1/8/201912:01 PM

## 2019-01-08 NOTE — NURSING NOTE
At 2200 Wound vac 300 cc bloody drainage Doctor Scott notified Ace bandage applied to L. Ankle turn the wound vac off as ordered

## 2019-01-08 NOTE — PLAN OF CARE
Problem: Patient Care Overview  Goal: Plan of Care Review  Outcome: Ongoing (interventions implemented as appropriate)   01/08/19 5156   Coping/Psychosocial   Plan Of Care Reviewed With patient;spouse   Plan of Care Review   Progress no change   Outcome Summary HGB 6.7 - Patient receiving 2 units PRBC's this shift       Problem: Infection, Risk/Actual (Adult)  Goal: Infection Prevention/Resolution  Outcome: Ongoing (interventions implemented as appropriate)      Problem: Fall Risk (Adult)  Goal: Absence of Falls  Outcome: Ongoing (interventions implemented as appropriate)

## 2019-01-08 NOTE — PROGRESS NOTES
Spoke to nursing regarding patient's postoperative course and bleeding into the wound VAC canister.  The patient's nurse will apply multiple ABD pads and Ace bandage to the left foot then turned the wound VAC machine off.  The wound VAC dressing is not to be discharged at this time.      An order was placed for Surgifoam/Surgicel which I will applied to assist with coagulation tonight.    Spoke to [facility] regarding the patient's hemoglobin status.  The patient's hemoglobin this morning was 8.7.  Patient was given 1 bag of packed red blood cells intraoperatively.Postoperatively the postoperatively the patient's hemoglobin was 8.9.  Spoke HMS regarding the possibility of drawing future H&H's and an additional blood transfusion.  Will start patient on 125 cc/h of IV saline and draw another H&H at 12:30 AM per the recommendation of [facility].      Richard Chavez, FREEDOM, PGY 1  Pager [phone]

## 2019-01-08 NOTE — PROGRESS NOTES
Subjective:   Patient seen at bedside POD #0 status post left lower extremity debridement with extensive soft tissue excision, bone biopsies, and wound VAC application.  Patient with significant bleeding from the left foot.  He is in mild pain in the left foot at this time. Denies any N/V/F/C/CP/SOB.     ROS:   Past Medical/Surgical history, Allergies, Meds reviewed in detail as charted  FH/SH reviewed in detail as charted  Review of Systems:  Head and Neck: No complaints  Chest : No complaints  Abdomen: No Complaints  Constitutional: Unremarkable       Objective:      Vitals:  Vitals:    01/08/19 0015   BP: (!) 93/51   Pulse: 84   Resp: 18   Temp: 37.2 °C (99 °F)   SpO2: 94%       Radiology: No new Radiology.  CT angiogram left lower extremity: Exam completed, official report pending.      Labs:     CBC Results       01/07/19 01/07/19 01/07/19                    2309 [id number] [id number]         WBC 31.39 (HH) - 22.80 (H)         RBC 2.47 (L) - 3.37 (L)         HGB 6.8 (LL) 8.9 (L) 8.7 (L)         HCT 21.0 (L) 28.0 (L) 27.6 (L)         MCV 85.0 - 81.9 (L)         MCH 27.5 (L) - 25.8 (L)         MCHC 32.4 - 31.5 (L)          - 237         Comment for WBC at 2309 on 01/07/19:  This result has been called to OFELIA by Devora Aaron on 01 07 19 at 23:35, and has been read back. 3C    Comment for HGB at 2309 on 01/07/19:  SPECIMEN QUALITY CHECKED. This result has been called to OFELIA by Devora Aaron on 01 07 19 at 23:35, and has been read back. 3C         BMP Results       01/07/19 01/06/19 01/05/19                    0324 0512 0855          (L) 130 (L) 128 (L)         K 3.6 3.7 4.1         Cl 102 100 95 (L)         CO2 23 22 21 (L)         Glucose 145 (H) 191 (H) 288 (H)         BUN 24 (H) 40 (H) 46 (H)         Creatinine 1.2 1.5 (H) 1.7 (H)         Calcium 7.5 (L) 7.2 (L) 7.5 (L)         Anion Gap 9 8 12         EGFR &gt;60.0 48.4 (L) 41.9 (L)                         Lower Extremity Physical Exam:     -  Vasc: DP and PT pulses are nonpalpable left foot.  Delayed capillary refill time to the digits left foot.  Skin temperature to the digits of left foot is cool to the touch.  - Ortho: Mild pain on palpation about the surgical site in the left foot.  Active dorsiflexion and plantarflexion intact at all remaining digits.  - Neuro: Light touch and epicritic sensation diminished  - Derm: Status post extensive debridement on the left foot, ankle, and lower leg.  Surgical site with visible tendons and deep tissue structures.  Significant bleeding noted from the surgical site, most severe about the heel.  475 cc, then 50 cc, then 300 more cc of blood noted in the wound VAC canister.    After dressing was removed there is significant hematoma underneath the wound VAC sponges.  Bleeding was noted from the surgical site most severely over the plantar aspect of the heel.  No purulence with expression.  Still with mild to moderate malodor.          Assessment and Plan:     58-year-old male seen at bedside POD #0 status post left lower extremity debridement with extensive soft tissue excision, bone biopsies, and wound VAC application.  Patient with extensive bleeding postoperatively with more than 800 cc of blood emptied from the wound VAC canisters.    -Detailed discussion had with [facility] regarding plan.  Patient was started on IV fluids per [facility] instructions.  Blood transfusion started at the recommendation of [facility].  We appreciate [facility] assistance with this patient.  -The wound VAC was removed as we are concerned that this was a cause of excess bleeding from the surgical site.  The wound was dressed with Surgicel/4 x 4's/ABD/Ace.  -We will hold future wound VAC application at this time  -We will monitor the patient's hemoglobin closely while in house  -Patient underwent a CTA of the left lower extremity this evening, official report pending  -Continue IV antibiotics per ID recommendations.  -Nonweightbearing to the left lower  extremity.  PT/OT consults placed.  -OR specimens obtained: Bone biopsy of talus sent to microbiology and pathology, cuboid bone biopsy for microbiology and pathology.  -Echo ordered per cardiology to rule out vegetation from sepsis.  -Repeat blood cultures on January 5 currently no growth 18-24 hrs.  -DVT prophylaxis: Lovenox  -Continue home meds  -Pain control as needed by the patient, pain meds are ordered PO and IV  -Regular adult diet 2000-calorie consistent carbohydrates with no concentrated sweets  -Full code  - Please contact on call podiatry resident with any questions or concerns. [phone] before 5 PM.      Richard Chavez DPM  Pager #[phone]

## 2019-01-08 NOTE — PROGRESS NOTES
"Daily Progress Note    Subjective    Interval History: He is having pain in the left foot. Denies chest pain or SOB.     Objective    Vital signs in last 24 hours:  Temp:  [36.8 °C (98.2 °F)-37.7 °C (99.9 °F)] 37 °C (98.6 °F)  Heart Rate:  [74-95] 79  Resp:  [12-23] 18  BP: ()/(45-60) 109/51      Intake/Output Summary (Last 24 hours) at 01/08/19 1450  Last data filed at 01/08/19 0500   Gross per 24 hour   Intake          5701.33 ml   Output              875 ml   Net          4826.33 ml       Physical Exam:  BP (!) 109/51 (BP Location: Left upper arm)   Pulse 79   Temp 37 °C (98.6 °F) (Oral)   Resp 18   Ht 1.854 m (6' 1\")   Wt 97.5 kg (215 lb)   SpO2 95%   BMI 28.37 kg/m²               Lungs:  CTA bilaterally    Heart: RRR    Extremities: Bandage on left foot with bleeding  GI:      Other:          Labs  Lab Results   Component Value Date    WBC 27.04 (H) 01/08/2019    HGB 6.7 (LL) 01/08/2019    HCT 20.1 (L) 01/08/2019     01/08/2019    CHOL 151 05/04/2018    TRIG 267 (H) 05/04/2018    HDL 32 (L) 05/04/2018    ALT 16 05/04/2018    AST 11 05/04/2018     (L) 01/08/2019    K 4.2 01/08/2019     01/08/2019    CREATININE 1.2 01/08/2019    BUN 21 (H) 01/08/2019    CO2 21 (L) 01/08/2019    INR 1.4 01/04/2019    HGBA1C 10.2 (H) 01/04/2019         ECG/Telemetry: not on telemetry    TTE 1/1/19:  · Normal-sized left ventricular cavity. Normal systolic function. Estimated EF = 60- 65%. No regional wall motion abnormalities.  · Mildly dilated left atrium.  · Normal-sized right ventricular cavity with preserved systolic function.  · Small (<10mm) circumferential pericardial effusion.        Assessment & Plan    Hypertension   Assessment & Plan    BP is controlled overall.  Monitor blood pressure for now. Pain control.         Bacteremia   Assessment & Plan    Echo does not show evidence of IE. ID has not indicated that they are concerned for IE. No plans for RUIZ at this time. The patient had " extensive surgical debridement of the foot finding tenosynovitis, myositis, necrotizing fasciitis, septic arthritis of the ankle. The lab corrected the mistake and changed enterococcus for Enterobacter. IE seems unlikely given these findings.         Anemia   Assessment & Plan    He has had active bleeding from the left foot requiring blood transfusions. Management as per podiatry.         Lipid disorder   Assessment & Plan    Continue statin        Type 2 diabetes mellitus (CMS/[facility]) ([facility])   Assessment & Plan    Per the primary team        * Acute hematogenous osteomyelitis of left foot (CMS/[facility]) ([facility])   Assessment & Plan    .                  DANIEL Kaur C  1/8/2019

## 2019-01-08 NOTE — PROGRESS NOTES
Podiatry S/O Problem Oriented Progress Note    Subjective   Patient seen at bedside, NAD, patient had excessive amount of bleeding yesterday.  He was transfused 1 unit close to midnight and this morning also has strikethrough his dressings.  He denies any pain or any constitutional symptoms.    Review of Systems:  Past Medical/Surgical history, Allergies, Meds reviewed in detail as charted  FH/SH reviewed in detail as charted  Review of Systems:  Head and Neck: No complaints  Chest : No complaints  Abdomen: No Complaints  Constitutional: Unremarkable        Objective   Labs  CBC Results       01/08/19 01/08/19 01/07/19                    1002 0532 2309         WBC - 27.04 (H) 31.39 (HH)         RBC - 2.70 (L) 2.47 (L)         HGB 6.7 (LL) 7.4 (L) 6.8 (LL)         HCT 20.1 (L) 22.8 (L) 21.0 (L)         MCV - 84.4 85.0         MCH - 27.4 (L) 27.5 (L)         MCHC - 32.5 32.4         PLT - 182 249         Comment for WBC at 2309 on 01/07/19:  This result has been called to OFELIA by Devora Aaron on 01 07 19 at 23:35, and has been read back. 3C    Comment for HGB at 1002 on 01/08/19:  ALL RESULTS HAVE BEEN CHECKED. This result has been called to IVAN AYALA by Azeem Sue on 01 08 19 at 10:47, and has been read back.     Comment for HGB at 2309 on 01/07/19:  SPECIMEN QUALITY CHECKED. This result has been called to OFELIA by Devora Aaron on 01 07 19 at 23:35, and has been read back. 3C        BMP Results       01/08/19 01/07/19 01/06/19                    0532 0324 0512          (L) 134 (L) 130 (L)         K 4.2 3.6 3.7         Cl 104 102 100         CO2 21 (L) 23 22         Glucose 196 (H) 145 (H) 191 (H)         BUN 21 (H) 24 (H) 40 (H)         Creatinine 1.2 1.2 1.5 (H)         Calcium 6.6 (L) 7.5 (L) 7.2 (L)         Anion Gap 9 9 8         EGFR &gt;60.0 &gt;60.0 48.4 (L)                       Vital signs in last 24 hours:  Temp:  [36.8 °C (98.2 °F)-37.7 °C (99.9 °F)] 36.9 °C (98.4 °F)  Heart Rate:   [79-95] 81  Resp:  [12-23] 20  BP: ()/(45-67) 114/57      Intake/Output Summary (Last 24 hours) at 01/08/19 1055  Last data filed at 01/08/19 0500   Gross per 24 hour   Intake          5701.33 ml   Output              875 ml   Net          4826.33 ml       Physical Exam:  General appearance: alert, appears stated age and cooperative  Vascular: PT artery is visible but not lacerated, there is a moderate amount of subcutaneous venous bleeding noted on the lateral aspect of the foot as well as the medial aspect of the ankle.  Capillary refill is less than 3 seconds.  Neuro: Light sensation is severely diminished  MSK: Patient able to dorsiflex and plantarflex digits of the left foot.  Derm: Postsurgical debridement site on the dorsal aspect of the foot  extending from the MPJs to the anterior ankle, debridement site also involves the plantar medial aspect of the foot as well as the lateral aspect of the foot as well.  There is exposed flexor and tibialis anterior tendons as well as exposed extensor tendons, there is no purulence or active drainage apart from the bleeding noted      Assessment and Plan:     -Dressing taken down, Surgicel and spray thrombin was used at bleeding areas, the rest the foot was dressed with Adaptic and dry sterile dressing with a compression Ace bandage.    -New hemoglobin this morning is 6.7, will transfuse 2 units and recheck  -Instructed patient to keep the left leg as elevated as possible to control bleeding  -We will discuss with [facility] about stopping Plavix  -Continue DVT prophylaxis with Lovenox  -OR cultures are pending  -No surgical plans as of now, will control back bleeding from bedside  -Nonweightbearing to the left lower extremity   -We will follow closely, attending to round this afternoon

## 2019-01-08 NOTE — ASSESSMENT & PLAN NOTE
Underwent extensive debridement, he has a septic joint and osteomyelitis  Further management per podiatry/ID

## 2019-01-08 NOTE — PROGRESS NOTES
Noted pt may need IV infusions.  Anabel Hernandez RN checked pts benefits and he is covered at 100% for home infusion.  Unsure of when pt will be discharged.  RNCC will continue to follow for dc needs.

## 2019-01-08 NOTE — PROGRESS NOTES
Patient: Harvey Lucero   Location: [facility] 3C 0362  MRN: [id number]  Today's date: 1/8/2019    Attempted to see patient for therapy. Unable due to medical hold (hgb 6.8- to be transfused.).

## 2019-01-08 NOTE — PERIOPERATIVE NURSING NOTE
Paged podiatry resident about lab results H and H 8.9 and 28.0 post op and that patient was moved to room 362.

## 2019-01-08 NOTE — NURSING NOTE
hgb 7.4 after 2 unit of PRBC Doctor Magaly notified also hold plavix this morning, doctor carlito awared of moderate of  bloody drain on L. Ankle

## 2019-01-08 NOTE — PLAN OF CARE
Problem: Pressure Ulcer (Adult)  Goal: Signs and Symptoms of Listed Potential Problems Will be Absent, Minimized or Managed (Pressure Ulcer)  Outcome: Outcome(s) Achieved Date Met: 01/08/19 01/08/19 0518   Pressure Ulcer   Problems Assessed (Pressure Ulcer) all   Problems Present (Pressure Ulcer) wound complications

## 2019-01-08 NOTE — PROGRESS NOTES
Written in retrospect    Was initially called by podiatric resident around 9:30 PM and was informed about the clinical situation and also concern for blood loss during the procedure and also postop after wound VAC application.   Based on his current labs and recent labs along with his vital signs suggested to repeat CBC at 12:30 AM and also start IV fluids around 125 cc an hour.     I then called floor into the nose and suggested to repeat the vital signs at 10.30 p.m. and to inform    Received a call around 10:30 PM with systolic blood pressures varying between 70s and 80s    Called back immediately and suggested to start the fluids wide open  Placed head down  Suggested that we will to 2 units of PRBCs and asked for a repeat CBC stat now  Went to asses patient immediately    Patient denied any symptoms of chest pain, shortness of breath, lightheadedness or palpitations  With the head down and wide open fluids blood pressure improved to 90s/100s systolic    Continue same plan as above  Informed the podiatric resident regarding hypotension and order of 2 units of stat RBCs    Repeat hemoglobin back 6.8  Continue 2 units of PRBCs    Recheck hemoglobin in the morning    Total time spent in caring for the patient and coordinating the care -35 minutes

## 2019-01-08 NOTE — ASSESSMENT & PLAN NOTE
He has had active bleeding from the left foot requiring blood transfusions. Management as per podiatry.

## 2019-01-09 PROBLEM — S91.009A OPEN WOUND OF ANKLE AND FOOT: Status: ACTIVE | Noted: 2019-01-04

## 2019-01-09 PROBLEM — S91.309A OPEN WOUND OF ANKLE AND FOOT: Status: ACTIVE | Noted: 2019-01-04

## 2019-01-09 LAB
ANION GAP SERPL CALC-SCNC: 5 MEQ/L (ref 3–15)
BACTERIA BLD CULT: ABNORMAL
BACTERIA BLD CULT: ABNORMAL
BASOPHILS # BLD: 0 K/UL (ref 0.01–0.1)
BASOPHILS NFR BLD: 0 %
BUN SERPL-MCNC: 17 MG/DL (ref 8–20)
CALCIUM SERPL-MCNC: 6.5 MG/DL (ref 8.9–10.3)
CASE RPRT: NORMAL
CHLORIDE SERPL-SCNC: 105 MEQ/L (ref 98–109)
CLINICAL INFO: NORMAL
CO2 SERPL-SCNC: 23 MEQ/L (ref 22–32)
CREAT SERPL-MCNC: 1.2 MG/DL
DIFFERENTIAL METHOD BLD: ABNORMAL
EOSINOPHIL # BLD: 0.19 K/UL (ref 0.04–0.54)
EOSINOPHIL NFR BLD: 1 %
ERYTHROCYTE [DISTWIDTH] IN BLOOD BY AUTOMATED COUNT: 14.6 % (ref 11.6–14.4)
ERYTHROCYTE [DISTWIDTH] IN BLOOD BY AUTOMATED COUNT: 14.8 % (ref 11.6–14.4)
GFR SERPL CREATININE-BSD FRML MDRD: >60 ML/MIN/1.73M*2
GLUCOSE BLD-MCNC: 101 MG/DL (ref 70–99)
GLUCOSE BLD-MCNC: 113 MG/DL (ref 70–99)
GLUCOSE BLD-MCNC: 121 MG/DL (ref 70–99)
GLUCOSE BLD-MCNC: 91 MG/DL (ref 70–99)
GLUCOSE SERPL-MCNC: 106 MG/DL (ref 70–99)
GRAM STN SPEC: ABNORMAL
HCT VFR BLDCO AUTO: 22 %
HCT VFR BLDCO AUTO: 25.6 %
HGB BLD-MCNC: 7.3 G/DL
HGB BLD-MCNC: 8.8 G/DL
LYMPHOCYTES # BLD: 0.97 K/UL (ref 1.2–3.5)
LYMPHOCYTES NFR BLD: 5 %
MCH RBC QN AUTO: 28.3 PG (ref 28–33.2)
MCH RBC QN AUTO: 28.9 PG (ref 28–33.2)
MCHC RBC AUTO-ENTMCNC: 33.2 G/DL (ref 32.2–36.5)
MCHC RBC AUTO-ENTMCNC: 34.4 G/DL (ref 32.2–36.5)
MCV RBC AUTO: 83.9 FL (ref 83–98)
MCV RBC AUTO: 85.3 FL (ref 83–98)
METAMYELOCYTES # BLD MANUAL: 0.19 K/UL
METAMYELOCYTES NFR BLD MANUAL: 1 %
MICROORGANISM SPEC CULT: ABNORMAL
MONOCYTES # BLD: 0.78 K/UL (ref 0.3–1)
MONOCYTES NFR BLD: 4 %
NEUTS BAND # BLD: 0.39 K/UL
NEUTS BAND # BLD: 16.94 K/UL (ref 1.7–7)
NEUTS BAND NFR BLD: 2 %
NEUTS SEG NFR BLD: 87 %
OVALOCYTES BLD QL SMEAR: ABNORMAL
PATH REPORT.FINAL DX SPEC: NORMAL
PATH REPORT.GROSS SPEC: NORMAL
PDW BLD AUTO: 10.9 FL (ref 9.4–12.4)
PDW BLD AUTO: 11.6 FL (ref 9.4–12.4)
PLATELET # BLD AUTO: 176 K/UL
PLATELET # BLD AUTO: 260 K/UL
PLATELET # BLD EST: ABNORMAL 10*3/UL
PLATELET CLUMP BLD QL SMEAR: PRESENT
POCT TEST: ABNORMAL
POCT TEST: NORMAL
POTASSIUM SERPL-SCNC: 3.6 MEQ/L (ref 3.6–5.1)
RBC # BLD AUTO: 2.58 M/UL (ref 4.5–5.8)
RBC # BLD AUTO: 3.05 M/UL (ref 4.5–5.8)
SODIUM SERPL-SCNC: 133 MEQ/L (ref 136–144)
TOXIC GRANULES BLD QL SMEAR: ABNORMAL
WBC # BLD AUTO: 19.47 K/UL
WBC # BLD AUTO: 20.76 K/UL

## 2019-01-09 PROCEDURE — 63700000 HC SELF-ADMINISTRABLE DRUG: Performed by: PODIATRIST

## 2019-01-09 PROCEDURE — 63600000 HC DRUGS/DETAIL CODE: Performed by: PODIATRIST

## 2019-01-09 PROCEDURE — 85027 COMPLETE CBC AUTOMATED: CPT | Performed by: PODIATRIST

## 2019-01-09 PROCEDURE — 36430 TRANSFUSION BLD/BLD COMPNT: CPT

## 2019-01-09 PROCEDURE — 85025 COMPLETE CBC W/AUTO DIFF WBC: CPT | Performed by: PODIATRIST

## 2019-01-09 PROCEDURE — 97530 THERAPEUTIC ACTIVITIES: CPT | Mod: GO

## 2019-01-09 PROCEDURE — 12000000 HC ROOM AND CARE MED/SURG

## 2019-01-09 PROCEDURE — 97168 OT RE-EVAL EST PLAN CARE: CPT | Mod: GO

## 2019-01-09 PROCEDURE — 25800000 HC PHARMACY IV SOLUTIONS: Performed by: INTERNAL MEDICINE

## 2019-01-09 PROCEDURE — P9016 RBC LEUKOCYTES REDUCED: HCPCS

## 2019-01-09 PROCEDURE — 63600000 HC DRUGS/DETAIL CODE: Performed by: INTERNAL MEDICINE

## 2019-01-09 PROCEDURE — 80048 BASIC METABOLIC PNL TOTAL CA: CPT | Performed by: PODIATRIST

## 2019-01-09 PROCEDURE — 99233 SBSQ HOSP IP/OBS HIGH 50: CPT | Performed by: HOSPITALIST

## 2019-01-09 PROCEDURE — 36415 COLL VENOUS BLD VENIPUNCTURE: CPT | Performed by: PODIATRIST

## 2019-01-09 RX ORDER — SODIUM CHLORIDE 9 MG/ML
5 INJECTION, SOLUTION INTRAVENOUS AS NEEDED
Status: ACTIVE | OUTPATIENT
Start: 2019-01-09 | End: 2019-01-10

## 2019-01-09 RX ADMIN — INSULIN ASPART 5 UNITS: 100 INJECTION, SOLUTION INTRAVENOUS; SUBCUTANEOUS at 12:49

## 2019-01-09 RX ADMIN — AMPICILLIN SODIUM 2 G: 1 INJECTION, POWDER, FOR SOLUTION INTRAMUSCULAR; INTRAVENOUS at 14:57

## 2019-01-09 RX ADMIN — AMPICILLIN SODIUM 2 G: 1 INJECTION, POWDER, FOR SOLUTION INTRAMUSCULAR; INTRAVENOUS at 11:44

## 2019-01-09 RX ADMIN — ACETAMINOPHEN 650 MG: 325 TABLET, FILM COATED ORAL at 00:58

## 2019-01-09 RX ADMIN — SODIUM CHLORIDE: 9 INJECTION, SOLUTION INTRAVENOUS at 13:44

## 2019-01-09 RX ADMIN — INSULIN ASPART 5 UNITS: 100 INJECTION, SOLUTION INTRAVENOUS; SUBCUTANEOUS at 09:19

## 2019-01-09 RX ADMIN — OXYCODONE HYDROCHLORIDE 5 MG: 5 TABLET ORAL at 18:48

## 2019-01-09 RX ADMIN — HYDROMORPHONE HYDROCHLORIDE 0.5 MG: 1 INJECTION, SOLUTION INTRAMUSCULAR; INTRAVENOUS; SUBCUTANEOUS at 10:55

## 2019-01-09 RX ADMIN — ENOXAPARIN SODIUM 40 MG: 100 INJECTION SUBCUTANEOUS at 17:53

## 2019-01-09 RX ADMIN — ROSUVASTATIN CALCIUM 10 MG: 10 TABLET, FILM COATED ORAL at 08:53

## 2019-01-09 RX ADMIN — INSULIN ASPART 5 UNITS: 100 INJECTION, SOLUTION INTRAVENOUS; SUBCUTANEOUS at 17:53

## 2019-01-09 RX ADMIN — AMPICILLIN SODIUM 2 G: 1 INJECTION, POWDER, FOR SOLUTION INTRAMUSCULAR; INTRAVENOUS at 22:39

## 2019-01-09 RX ADMIN — ACETAMINOPHEN 650 MG: 325 TABLET, FILM COATED ORAL at 13:43

## 2019-01-09 RX ADMIN — AMPICILLIN SODIUM 2 G: 1 INJECTION, POWDER, FOR SOLUTION INTRAMUSCULAR; INTRAVENOUS at 03:37

## 2019-01-09 RX ADMIN — OXYCODONE HYDROCHLORIDE 5 MG: 5 TABLET ORAL at 00:57

## 2019-01-09 RX ADMIN — AMPICILLIN SODIUM 2 G: 1 INJECTION, POWDER, FOR SOLUTION INTRAMUSCULAR; INTRAVENOUS at 18:48

## 2019-01-09 RX ADMIN — SITAGLIPTIN 100 MG: 100 TABLET, FILM COATED ORAL at 08:53

## 2019-01-09 RX ADMIN — PANTOPRAZOLE SODIUM 20 MG: 20 TABLET, DELAYED RELEASE ORAL at 08:53

## 2019-01-09 RX ADMIN — INSULIN GLARGINE 25 UNITS: 100 INJECTION, SOLUTION SUBCUTANEOUS at 21:19

## 2019-01-09 RX ADMIN — AMPICILLIN SODIUM 2 G: 1 INJECTION, POWDER, FOR SOLUTION INTRAMUSCULAR; INTRAVENOUS at 07:00

## 2019-01-09 ASSESSMENT — COGNITIVE AND FUNCTIONAL STATUS - GENERAL
DRESSING REGULAR UPPER BODY CLOTHING: 4 - NONE
HELP NEEDED FOR BATHING: 3 - A LITTLE
EATING MEALS: 4 - NONE
DRESSING REGULAR LOWER BODY CLOTHING: 3 - A LITTLE
HELP NEEDED FOR PERSONAL GROOMING: 4 - NONE
TOILETING: 3 - A LITTLE

## 2019-01-09 NOTE — PROGRESS NOTES
Hospital Medicine Service -  Daily Progress Note       SUBJECTIVE   Interval History: Patient seen and examined.   Some bloody drainage with no active bleeding  Getting another unit today,plavix stopped  No chest pain,dyspnea,dizziness.  No abdominal pain,  No urinary symptoms   Discussed plan of care with patient  and nurse and podiatry    Other ros-         OBJECTIVE      Vital signs in last 24 hours:  Temp:  [36.4 °C (97.5 °F)-37.2 °C (99 °F)] 36.4 °C (97.5 °F)  Heart Rate:  [74-93] 76  Resp:  [18-20] 18  BP: (104-145)/(51-72) 141/72    Intake/Output Summary (Last 24 hours) at 01/09/19 1051  Last data filed at 01/09/19 1000   Gross per 24 hour   Intake          1730.67 ml   Output              200 ml   Net          1530.67 ml       PHYSICAL EXAMINATION      Physical Exam   Constitutional: He is oriented to person, place, and time.   Cardiovascular: Normal heart sounds and intact distal pulses.    No murmur heard.  Pulmonary/Chest: Effort normal and breath sounds normal. No respiratory distress.   Abdominal: Soft. Bowel sounds are normal.   Musculoskeletal:   Left foot in dressing,no active bleeding   Neurological: He is alert and oriented to person, place, and time. No cranial nerve deficit.   Skin: Skin is warm. No erythema.   Psychiatric: His behavior is normal.     flat      LINES, CATHETERS, DRAINS, AIRWAYS, AND WOUNDS   Lines, Drains, Airways, Wounds:  Peripheral IV 01/04/19 Right Arm (Active)   Number of days: 3       Surgical Incision Foot Left (Active)   Number of days: 3       Comments:      LABS / IMAGING / TELE      Labs  I have reviewed the patient's pertinent labs.  Significant abnormals are below.  Lab Results   Component Value Date     (L) 01/09/2019    K 3.6 01/09/2019     01/09/2019    BUN 17 01/09/2019    CREATININE 1.2 01/09/2019    WBC 19.47 (H) 01/09/2019    HGB 7.3 (L) 01/09/2019    HCT 22.0 (L) 01/09/2019     01/09/2019    ALT 16 05/04/2018    AST 11 05/04/2018    INR  1.4 01/04/2019    HGBA1C 10.2 (H) 01/04/2019       Imaging  I have independently reviewed the patient's pertinent imaging for this hospital visit. Pertinent Imaging findings are within normal limits.    ECG/Telemetry  n/a    ASSESSMENT AND PLAN      Streptococcal sepsis (CMS/[facility]) ([facility])   Assessment & Plan    Increase ampicillin to 2 g IV every 4 hours, he will need a minimum of 6 weeks  ID following  ECHO with no vegetation        * Acute hematogenous osteomyelitis of left foot (CMS/[facility]) ([facility])   Assessment & Plan    Per podiatry and ID  Will need 6 weeks of antibiotics          Necrotizing fasciitis (CMS/[facility])   Assessment & Plan    Underwent extensive debridement, he has a septic joint and osteomyelitis  Treatment as above           Acute kidney injury (CMS/[facility]) ([facility])   Assessment & Plan    Cr in May was 1.1   Has underlying DM and HTN   stable              Hyponatremia   Assessment & Plan    Partly pseudo from increased BS  Control BS and trend   stable        Leukocytosis   Assessment & Plan    Secondary to acute infection         Anemia   Assessment & Plan    No recent hgb for comparion. Hgb 15.1 1 yr ago  Had beeding issues  after surgery  Wound vac removed  Received 5 units prbc    receiving another unit today  Recheck HB  Hold plavix  scd for dvt prophylaxis  dvt prophylaxis when ok with podiatry          Encounter for other preprocedural examination   Assessment & Plan    Plan for urgent OR for left foot infection/gas   Denies any hx of CAD, no arrhythmias, no CHF, no preoperative creatinine >2  Does have a hx of CVA 5 yrs ago and an insulin dependent diabetic. RCRI 6.6%  EKG benign without any signs of ischemia  Good exercise capacity able to reach>4 METs activity level    P  Can proceed to with this urgent surgery without additional cardiac testing at an acceptable risk          Hypertension   Assessment & Plan    BP up slightly         Lipid disorder   Assessment & Plan    Cont statin        Type 2  diabetes mellitus (CMS/HCC) (HCC)   Assessment & Plan    A1C 10.8 in May 2018.   Better control   Cont otupt meds for now  Continue  lanutus, add meal time insulin   Diabetic diet  Aim for tighter control with active infection     Lab Results   Component Value Date    HGBA1C 10.2 (H) 01/04/2019               History of CVA (cerebrovascular accident)   Assessment & Plan    Cont statin  Plavix on hold due to bleeding             VTE Assessment: Padua    VTE Prophylaxis Plan: scd,no lovenox because of bleeding  Code Status: Full Code  Estimated Discharge Date: 1/11/2019  Disposition Planning: per podiatry     Betty Clifford MD  1/9/2019

## 2019-01-09 NOTE — PLAN OF CARE
Problem: Patient Care Overview  Goal: Plan of Care Review  Outcome: Ongoing (interventions implemented as appropriate)   01/09/19 1118   Coping/Psychosocial   Plan Of Care Reviewed With patient   Plan of Care Review   Progress progress toward functional goals as expected   Outcome Summary Pt currently requires Min A x2 and is limited by pain. Cont to follow and rec home with assist and home OT when medically stable.       Problem: Infection, Risk/Actual (Adult)  Goal: Identify Related Risk Factors and Signs and Symptoms  Outcome: Ongoing (interventions implemented as appropriate)

## 2019-01-09 NOTE — PLAN OF CARE
Problem: Patient Care Overview  Goal: Plan of Care Review  Outcome: Ongoing (interventions implemented as appropriate)   01/09/19 7869   Coping/Psychosocial   Plan Of Care Reviewed With patient   Plan of Care Review   Progress improving   Outcome Summary relief from pain with .5 Dilaudid once and Tylenol once throught day shift, tolerating regular diet, BS controlled, voiding spontaneously, NWB on LLE, bed rest, podiatry changed dressing today, some serosanguinous moist/dry drainage, leg elevated, IV abx, 2 units of PRBC infused, rechecking hgb at 1800, plan for OR on Fri, poss I&D/skin graft? VSS, nursing will continue to monitor       Problem: Infection, Risk/Actual (Adult)  Goal: Identify Related Risk Factors and Signs and Symptoms  Outcome: Ongoing (interventions implemented as appropriate)    Goal: Infection Prevention/Resolution  Outcome: Ongoing (interventions implemented as appropriate)      Problem: Fall Risk (Adult)  Goal: Absence of Falls  Outcome: Ongoing (interventions implemented as appropriate)      Problem: Pressure Ulcer (Adult)  Goal: Signs and Symptoms of Listed Potential Problems Will be Absent, Minimized or Managed (Pressure Ulcer)  Outcome: Ongoing (interventions implemented as appropriate)

## 2019-01-09 NOTE — PROGRESS NOTES
Infectious Disease Progress Note    Patient Name: Harvey Lucero Jr  MR#: [id number]  : 1960  Admission Date: 2019  Date: 19   Time: 2:46 PM   Author: Severo Dias MD    OBJECTIVE:  No fevers, white count down to 19,000, tolerating the IV antibiotics without nausea vomiting diarrhea fevers or chills  Anti-infectives     Start     Dose/Rate Route Frequency Ordered Stop    19 1045  ampicillin (OMNIPEN) 2 g in sodium chloride 0.9 % 100 mL IVPB      2 g  200 mL/hr over 30 Minutes intravenous Every 4 hours interval 19 1042            ROS  No vomiting diarrhea fevers chills  Vital Signs:    Temp:  [36.4 °C (97.5 °F)-37.3 °C (99.1 °F)] 37.3 °C (99.1 °F)  Heart Rate:  [76-93] 85  Resp:  [16-18] 16  BP: (120-145)/(58-72) 120/58    Temp (72hrs), Av.1 °C (98.7 °F), Min:36.4 °C (97.5 °F), Max:37.7 °C (99.9 °F)      Physical Exam    Gen: Aox3  HEENT: OP clear  Neck: Supple  LAD: No cervical LAD  Lungs: CTAB  CV: RRR no murmurs  Abd: Soft NTND +BS  Ext: Left foot CDI        Lines, Drains, Airways, Wounds:  Peripheral IV 19 Right Arm (Active)   Number of days: 3       Peripheral IV 19 Left Hand (Active)   Number of days: 0       Closed/Suction Drain Left Foot (Active)   Number of days: 0       Surgical Incision Foot Left (Active)   Number of days: 3       Labs:    Lab Results   Component Value Date    WBC 19.47 (H) 2019    HGB 7.3 (L) 2019    HCT 22.0 (L) 2019    MCV 85.3 2019     2019     Lab Results   Component Value Date    GLUCOSE 106 (H) 2019    CALCIUM 6.5 (L) 2019     (L) 2019    K 3.6 2019    CO2 23 2019     2019    BUN 17 2019    CREATININE 1.2 2019     Lab Results   Component Value Date    ALT 16 2018    AST 11 2018    ALKPHOS 60 2018    BILITOT 0.6 2018         Patient Active Problem List   Diagnosis Code   • History of CVA (cerebrovascular  accident) Z86.73   • Type 2 diabetes mellitus (CMS/HCC) ([facility]) E11.9   • Lipid disorder E78.9   • Hypertension I10   • Encounter for other preprocedural examination Z01.818   • Acute hematogenous osteomyelitis of left foot (CMS/HCC) ([facility]) M86.072   • Anemia D64.9   • Leukocytosis D72.829   • Hyponatremia E87.1   • Acute kidney injury (CMS/HCC) ([facility]) N17.9   • Bacteremia R78.81   • Gas gangrene (CMS/[facility]) A48.0   • Streptococcal sepsis (CMS/[facility]) ([facility]) A40.9   • Necrotizing fasciitis (CMS/[facility]) M72.6   • Open wound of ankle and foot S91.009A, S91.309A     Other streptococcal sepsis (CMS/[facility])   Assessment & Plan    Continue IV ampicillin, will need 6 weeks, will need PICC before discharge        Necrotizing fasciitis of ankle and foot (CMS/[facility])   Assessment & Plan    To get skin grafted before discharge per podiatry        Leukocytosis   Assessment & Plan    Secondary to the foot infection will continue to trend daily deemed to be improving                Severo Dias MD  1/9/20192:46 PM

## 2019-01-09 NOTE — PROGRESS NOTES
Patient: Harvey Lucero Jr  Location: [facility] 3C 0362  MRN: [id number]  Today's date: 1/9/2019    Pt received and left supine on sheet and incontinence pad.  Needs in place and call bell in reach.  RN aware.          Therapy Pain/Vitals - 01/09/19 1020        Pain/Comfort/Sleep    Presence of Pain complains of pain/discomfort    Preferred Pain Scale word (verbal rating pain scale)    Pain Body Location - Side Left    Pain Body Location foot    Pain Rating: Rest 0 - no pain    Pain Rating: Activity 8 - severe pain    Pain Management Interventions position adjusted       Vital Signs    Pulse 80    SpO2 96 %    Patient Activity At rest    Oxygen Therapy None (Room air)    /65    BP Location Right upper arm    BP Method Automatic    Patient Position Lying          Prior Living Environment  Lives With: spouse  Living Arrangements: house  Home Accessibility: stairs to enter home (6 steps)  Living Environment Comment: pt lives in 3 story home, 1 railing on each set of stairs, 0 TRAE home, no AD at home; lives with spouseNumber of Stairs, Main Entrance: noneEquipment Currently Used at Home: none       Prior Level of Function  Ambulation: assistive equipment  Transferring: independent  Toileting: independent  Bathing: independent  Dressing: independent  Eating: independent  Communication: understands/communicates without difficulty  Swallowing: swallows foods/liquids without difficulty  Equipment Currently Used at Home: none           OT Treatment Summary - 01/09/19 1000        Session Details    Document Type re-evaluation    Mode of Treatment occupational therapy       Time Calculation    Start Time 1000    Stop Time 1020    Time Calculation (min) 20 min       General Information    Patient Profile Reviewed? yes    Onset of Illness/Injury or Date of Surgery 01/04/19    Referring Physician Dr. Tee    Pertinent History of Current Functional Problem 58 y.o. male with abscess and cellulitis of     Existing  Precautions/Restrictions weight bearing       Weight Bearing Status    Left LE Weight Bearing Status non-weight bearing       Coping Strategies    Trust Relationship/Rapport care explained       Orientation Log    Kind of Place 3-->spontaneous/free recall    Name of Hospital 3-->spontaneous/free recall    Month 3-->spontaneous/free recall    Date 3-->spontaneous/free recall    Year 3-->spontaneous/free recall       Cognition/Psychosocial    Safety Awareness impaired judgment demonstrated   impulsive    Safety Awareness Impairment unaware of consequences of functional deficits       Attention    Behavioral Observations consistent cueing for long duration activity       Sensory    Sensory General Assessment no sensation deficits identified   BUEs WFL       Range of Motion (ROM)    General Range of Motion no range of motion deficits identified    Comment, General Range of Motion BUEs WFL       Manual Muscle Testing (MMT)    General MMT Assessment no strength deficits identified    Comment BUEs WFL       Bed Mobility    [place], Supine to Sit supervision    [place], Sit to Supine supervision    Assistive Device (Bed Mobility) bed rails;head of bed elevated       Sit to Stand Transfer    [place], Sit to Stand Transfer minimum assist (75% patient effort);2 person assist    Assistive Device walker, front-wheeled       Stand to Sit Transfer    [place], Stand to Sit Transfer minimum assist (75% patient effort);2 person assist    Assistive Device walker, front-wheeled       Gait Training    Comment pt deferred 2/2 increased pain       AM-PAC (TM) - ADL (Current Function)    Putting on and taking off regular lower body clothing? 3 - A Little    Bathing? 3 - A Little    Toileting? 3 - A Little    Putting on/taking off regular upper body clothing? 4 - None    How much help for taking care of personal grooming? 4 - None    Eating meals? 4 - None    AM-PAC (TM) ADL Score 21       OT Clinical Impression     Patient's Goals For Discharge return home    Plan For Care Reviewed: Occupational Therapy OT plan for care discussed with patient    Impairments Found (OT Eval) aerobic capacity/endurance;arousal, attention, and cognition;ergonomics and body mechanics;gait, locomotion, and balance;joint integrity and mobility    Functional Limitations in Following Categories self-care;home management    Rehab Potential/Prognosis: Occupational Therapy good, to achieve stated therapy goals    OT Frequency of Treatment 3-5 times per week    Problem List: Occupational Therapy strength decreased;impaired balance;pain    Activity Limitations Related to Problem List BADL activities not performed adequately or safely;functional mobility not performed adequately or safely for household activity    Anticipated Equipment Needs at Discharge bathing equipment;bedside commode;front wheeled walker;dressing equipment;shower chair    Daily Outcome Statement Pt currently requires Min A x2 and is limited by pain.  Cont to follow and rec home with assist and home OT when medically stable.                   Education provided this session. See the Patient Education summary report for full details.         OT Care Plan Goals      Most Recent Value   Bed Mobility Goal   Time to Achieve Goal: Bed Mobility  by discharge   Goal Activity: Bed Mobility  all bed mobility activities   Level of [place] Goal: Bed Mobility  modified independence   Goal Outcome: Bed Mobility  goal ongoing   Lower Body Dressing Goal   Time to Achieve Goal: Lower Body Dressing  5 - 7 days   Level of [place]  modified independence   Adaptive Equipment: Lower Body Dressing  dressing stick, shoe horn, long handled, sock-aid, reacher   Goal Outcome: Lower Body Dressing  goal ongoing   Toilet Transfer Goal   Time to Achieve Goal: Toilet Transfer Training  5 - 7 days   Assistive Device: Toilet Transfer Training  grab bars/safety frame, raised toilet seat, walker, front-wheeled    Goal Outcome: Toilet Transfer Training  goal ongoing   Transfer Goal   Time to Achieve Goal: Transfer Training  by discharge   Goal Activity: Transfer Training  all transfers   Level of [place] Goal: Transfer Training  modified independence   Assistive Device: Transfer Training  walker, rolling   Goal Outcome: Transfer Training  goal ongoing

## 2019-01-09 NOTE — NURSING NOTE
Pt LLE cool dusky and >3 second capillary refill. Dr. Jane notified and aware. Also aware of bloody drainage from dresssing, some dried and some new from ON. Held Plavix per [facility] [place], [place].

## 2019-01-09 NOTE — PLAN OF CARE
Problem: Patient Care Overview  Goal: Plan of Care Review   01/09/19 0703   Plan of Care Review   Outcome Summary hgb 7.3 tolerated po. pain med

## 2019-01-10 PROBLEM — E87.6 HYPOKALEMIA: Status: ACTIVE | Noted: 2019-01-10

## 2019-01-10 LAB
ANION GAP SERPL CALC-SCNC: 11 MEQ/L (ref 3–15)
BACTERIA BLD CULT: NORMAL
BACTERIA BLD CULT: NORMAL
BASOPHILS # BLD: 0 K/UL (ref 0.01–0.1)
BASOPHILS NFR BLD: 0 %
BUN SERPL-MCNC: 12 MG/DL (ref 8–20)
CALCIUM SERPL-MCNC: 6.5 MG/DL (ref 8.9–10.3)
CHLORIDE SERPL-SCNC: 102 MEQ/L (ref 98–109)
CO2 SERPL-SCNC: 21 MEQ/L (ref 22–32)
CREAT SERPL-MCNC: 1.1 MG/DL
CROSSMATCH: NORMAL
DIFFERENTIAL METHOD BLD: ABNORMAL
EOSINOPHIL # BLD: 0 K/UL (ref 0.04–0.54)
EOSINOPHIL NFR BLD: 0 %
ERYTHROCYTE [DISTWIDTH] IN BLOOD BY AUTOMATED COUNT: 14.6 % (ref 11.6–14.4)
GFR SERPL CREATININE-BSD FRML MDRD: >60 ML/MIN/1.73M*2
GLUCOSE BLD-MCNC: 102 MG/DL (ref 70–99)
GLUCOSE BLD-MCNC: 123 MG/DL (ref 70–99)
GLUCOSE BLD-MCNC: 126 MG/DL (ref 70–99)
GLUCOSE BLD-MCNC: 131 MG/DL (ref 70–99)
GLUCOSE BLD-MCNC: 97 MG/DL (ref 70–99)
GLUCOSE SERPL-MCNC: 85 MG/DL (ref 70–99)
HCT VFR BLDCO AUTO: 25.4 %
HGB BLD-MCNC: 8.5 G/DL
ISBT CODE: 600
ISBT CODE: 600
ISBT CODE: 6200
ISBT CODE: 6200
LYMPHOCYTES # BLD: 1.15 K/UL (ref 1.2–3.5)
LYMPHOCYTES NFR BLD: 7 %
MCH RBC QN AUTO: 28.5 PG (ref 28–33.2)
MCHC RBC AUTO-ENTMCNC: 33.5 G/DL (ref 32.2–36.5)
MCV RBC AUTO: 85.2 FL (ref 83–98)
METAMYELOCYTES # BLD MANUAL: 0.33 K/UL
METAMYELOCYTES NFR BLD MANUAL: 2 %
MONOCYTES # BLD: 0.98 K/UL (ref 0.3–1)
MONOCYTES NFR BLD: 6 %
MYELOCYTES # BLD MANUAL: 0.33 K/UL
MYELOCYTES NFR BLD MANUAL: 2 %
NEUTS BAND # BLD: 13.6 K/UL (ref 1.7–7)
NEUTS SEG NFR BLD: 83 %
OVALOCYTES BLD QL SMEAR: ABNORMAL
PDW BLD AUTO: 10.6 FL (ref 9.4–12.4)
PLATELET # BLD AUTO: 134 K/UL
PLATELET # BLD EST: ABNORMAL 10*3/UL
PLATELET CLUMP BLD QL SMEAR: PRESENT
POCT TEST: ABNORMAL
POCT TEST: NORMAL
POTASSIUM SERPL-SCNC: 3.5 MEQ/L (ref 3.6–5.1)
PRODUCT CODE: NORMAL
PRODUCT STATUS: NORMAL
RBC # BLD AUTO: 2.98 M/UL (ref 4.5–5.8)
SODIUM SERPL-SCNC: 134 MEQ/L (ref 136–144)
SPECIMEN EXP DATE BLD: NORMAL
UNIT ABO: NORMAL
UNIT ID: NORMAL
UNIT RH: NEGATIVE
UNIT RH: NEGATIVE
UNIT RH: POSITIVE
UNIT RH: POSITIVE
WBC # BLD AUTO: 16.38 K/UL

## 2019-01-10 PROCEDURE — 63700000 HC SELF-ADMINISTRABLE DRUG: Performed by: PODIATRIST

## 2019-01-10 PROCEDURE — 63600000 HC DRUGS/DETAIL CODE: Performed by: PODIATRIST

## 2019-01-10 PROCEDURE — 80048 BASIC METABOLIC PNL TOTAL CA: CPT | Performed by: PODIATRIST

## 2019-01-10 PROCEDURE — 63600000 HC DRUGS/DETAIL CODE: Performed by: INTERNAL MEDICINE

## 2019-01-10 PROCEDURE — 25800000 HC PHARMACY IV SOLUTIONS: Performed by: INTERNAL MEDICINE

## 2019-01-10 PROCEDURE — 99233 SBSQ HOSP IP/OBS HIGH 50: CPT | Performed by: HOSPITALIST

## 2019-01-10 PROCEDURE — 85025 COMPLETE CBC W/AUTO DIFF WBC: CPT | Performed by: PODIATRIST

## 2019-01-10 PROCEDURE — 36415 COLL VENOUS BLD VENIPUNCTURE: CPT | Performed by: PODIATRIST

## 2019-01-10 PROCEDURE — P9016 RBC LEUKOCYTES REDUCED: HCPCS

## 2019-01-10 PROCEDURE — 12000000 HC ROOM AND CARE MED/SURG

## 2019-01-10 RX ORDER — SODIUM CHLORIDE 9 MG/ML
5 INJECTION, SOLUTION INTRAVENOUS AS NEEDED
Status: DISCONTINUED | OUTPATIENT
Start: 2019-01-10 | End: 2019-01-11

## 2019-01-10 RX ORDER — INSULIN GLARGINE 100 [IU]/ML
25 INJECTION, SOLUTION SUBCUTANEOUS NIGHTLY
Status: DISCONTINUED | OUTPATIENT
Start: 2019-01-10 | End: 2019-01-19 | Stop reason: HOSPADM

## 2019-01-10 RX ORDER — INSULIN GLARGINE 100 [IU]/ML
12.5 INJECTION, SOLUTION SUBCUTANEOUS NIGHTLY
Status: DISCONTINUED | OUTPATIENT
Start: 2019-01-10 | End: 2019-01-10

## 2019-01-10 RX ORDER — SODIUM CHLORIDE 9 MG/ML
INJECTION, SOLUTION INTRAVENOUS CONTINUOUS
Status: ACTIVE | OUTPATIENT
Start: 2019-01-11 | End: 2019-01-18

## 2019-01-10 RX ADMIN — PANTOPRAZOLE SODIUM 20 MG: 20 TABLET, DELAYED RELEASE ORAL at 08:14

## 2019-01-10 RX ADMIN — INSULIN ASPART 5 UNITS: 100 INJECTION, SOLUTION INTRAVENOUS; SUBCUTANEOUS at 16:41

## 2019-01-10 RX ADMIN — INSULIN GLARGINE 12 UNITS: 100 INJECTION, SOLUTION SUBCUTANEOUS at 22:45

## 2019-01-10 RX ADMIN — ROSUVASTATIN CALCIUM 10 MG: 10 TABLET, FILM COATED ORAL at 08:14

## 2019-01-10 RX ADMIN — OXYCODONE HYDROCHLORIDE 5 MG: 5 TABLET ORAL at 11:11

## 2019-01-10 RX ADMIN — SITAGLIPTIN 100 MG: 100 TABLET, FILM COATED ORAL at 08:14

## 2019-01-10 RX ADMIN — OXYCODONE HYDROCHLORIDE 5 MG: 5 TABLET ORAL at 15:55

## 2019-01-10 RX ADMIN — AMPICILLIN SODIUM 2 G: 1 INJECTION, POWDER, FOR SOLUTION INTRAMUSCULAR; INTRAVENOUS at 11:09

## 2019-01-10 RX ADMIN — AMPICILLIN SODIUM 2 G: 1 INJECTION, POWDER, FOR SOLUTION INTRAMUSCULAR; INTRAVENOUS at 07:18

## 2019-01-10 RX ADMIN — AMPICILLIN SODIUM 2 G: 1 INJECTION, POWDER, FOR SOLUTION INTRAMUSCULAR; INTRAVENOUS at 18:52

## 2019-01-10 RX ADMIN — AMPICILLIN SODIUM 2 G: 1 INJECTION, POWDER, FOR SOLUTION INTRAMUSCULAR; INTRAVENOUS at 22:50

## 2019-01-10 RX ADMIN — ENOXAPARIN SODIUM 40 MG: 100 INJECTION SUBCUTANEOUS at 18:08

## 2019-01-10 RX ADMIN — INSULIN ASPART 5 UNITS: 100 INJECTION, SOLUTION INTRAVENOUS; SUBCUTANEOUS at 13:37

## 2019-01-10 RX ADMIN — AMPICILLIN SODIUM 2 G: 1 INJECTION, POWDER, FOR SOLUTION INTRAMUSCULAR; INTRAVENOUS at 15:08

## 2019-01-10 RX ADMIN — AMPICILLIN SODIUM 2 G: 1 INJECTION, POWDER, FOR SOLUTION INTRAMUSCULAR; INTRAVENOUS at 03:39

## 2019-01-10 NOTE — PROGRESS NOTES
Hospital Medicine Service -  Daily Progress Note       SUBJECTIVE   Interval History: Patient seen and examined.   No reports of bleeding,dry dressing   No chest pain,dyspnea,dizziness.  No abdominal pain,  No urinary symptoms   Discussed plan of care with patient  and nurse and podiatry    Other ros-         OBJECTIVE      Vital signs in last 24 hours:  Temp:  [36.3 °C (97.4 °F)-37.3 °C (99.1 °F)] 36.3 °C (97.4 °F)  Heart Rate:  [77-85] 80  Resp:  [16-18] 18  BP: (120-153)/(58-77) 153/77    Intake/Output Summary (Last 24 hours) at 01/10/19 1145  Last data filed at 01/10/19 0748   Gross per 24 hour   Intake          1211.33 ml   Output              950 ml   Net           261.33 ml       PHYSICAL EXAMINATION      Physical Exam   Constitutional: He is oriented to person, place, and time.   Cardiovascular: Normal rate and regular rhythm.    Pulmonary/Chest: Effort normal and breath sounds normal.   Abdominal: Bowel sounds are normal. He exhibits no distension.   Musculoskeletal:   Left foot in dressing,no bleeding   Neurological: He is alert and oriented to person, place, and time. No cranial nerve deficit.   Psychiatric: His behavior is normal.   Nursing note and vitals reviewed.     LINES, CATHETERS, DRAINS, AIRWAYS, AND WOUNDS   Lines, Drains, Airways, Wounds:  Peripheral IV 01/04/19 Right Arm (Active)   Number of days: 3       Surgical Incision Foot Left (Active)   Number of days: 3       Comments:      LABS / IMAGING / TELE      Labs  I have reviewed the patient's pertinent labs.  Significant abnormals are below.  Lab Results   Component Value Date     (L) 01/10/2019    K 3.5 (L) 01/10/2019     01/10/2019    BUN 12 01/10/2019    CREATININE 1.1 01/10/2019    WBC 16.38 (H) 01/10/2019    HGB 8.5 (L) 01/10/2019    HCT 25.4 (L) 01/10/2019     (L) 01/10/2019    ALT 16 05/04/2018    AST 11 05/04/2018    INR 1.4 01/04/2019    HGBA1C 10.2 (H) 01/04/2019       Imaging  I have independently reviewed the  patient's pertinent imaging for this hospital visit. Pertinent Imaging findings are within normal limits.    ECG/Telemetry  n/a    ASSESSMENT AND PLAN      Streptococcal sepsis (CMS/[facility]) ([facility])   Assessment & Plan    Increase ampicillin to 2 g IV every 4 hours, he will need a minimum of 6 weeks  ID following  ECHO with no vegetation        * Acute hematogenous osteomyelitis of left foot (CMS/[facility]) ([facility])   Assessment & Plan    Per podiatry and ID  Will need 6 weeks of antibiotics          Necrotizing fasciitis (CMS/[facility])   Assessment & Plan    Underwent extensive debridement, he has a septic joint and osteomyelitis  Treatment as above  Plan for surgery and graft tomorrow            Hypokalemia   Assessment & Plan    repleted    Follow         Acute kidney injury (CMS/[facility]) ([facility])   Assessment & Plan    Cr in May was 1.1   Has underlying DM and HTN   stable              Hyponatremia   Assessment & Plan    Partly pseudo from increased BS  Control BS and trend   stable        Leukocytosis   Assessment & Plan    Secondary to acute infection         Anemia   Assessment & Plan    No recent hgb for comparion. Hgb 15.1 1 yr ago  Had beeding issues  after surgery  Wound vac removed  Received 6 units prbc  Hb stable    Hold plavix till OR tomorrow-resume after  scd for dvt prophylaxis  dvt prophylaxis when ok with podiatry          Encounter for other preprocedural examination   Assessment & Plan    Plan for graft and debridement tomorrow   Denies any hx of CAD, no arrhythmias, no CHF, no preoperative creatinine >2  Does have a hx of CVA 5 yrs ago and an insulin dependent diabetic. RCRI 6.6%  EKG benign without any signs of ischemia  Good exercise capacity able to reach>4 METs activity level    P  Can proceed  with  surgery without additional cardiac testing at an acceptable risk          Hypertension   Assessment & Plan    BP up slightly         Lipid disorder   Assessment & Plan    Cont statin        Type 2 diabetes mellitus  (CMS/HCC) (HCC)   Assessment & Plan    A1C 10.8 in May 2018.   Better control   Cont otupt meds for now  Continue  lanutus, add meal time insulin   Diabetic diet  Aim for tighter control with active infection     Lab Results   Component Value Date    HGBA1C 10.2 (H) 01/04/2019               History of CVA (cerebrovascular accident)   Assessment & Plan    Cont statin  Plavix on hold due to bleeding             VTE Assessment: [place]    VTE Prophylaxis Plan: lovenox  Code Status: Full Code  Estimated Discharge Date: 1/11/2019  Disposition Planning: per podiatry     Betty Clifford MD  1/10/2019

## 2019-01-10 NOTE — PLAN OF CARE
Problem: Patient Care Overview  Goal: Plan of Care Review  Outcome: Ongoing (interventions implemented as appropriate)  Mild Nutrition Risk   01/10/19 4172   Coping/Psychosocial   Plan Of Care Reviewed With patient;spouse   Plan of Care Review   Progress no change   Outcome Summary Pt hesitant to answer questions during interview. Per RN pt is consuming very little food at meals. Pt reported decreased appetite due to being in the hospital. Pt refused high protein supplements at this. Encouraged protein w/ each meal for surgical healing. Blood glucose well controlled.    Goals: Pt will consume at least 75% of meals and protein w/ each meal.   Blood glucose will be  mg/dL pre-prandial.  Recommendations:  1. Agree w/ current diet order. Consider Cardiac restriction.   2. Will order high protein supplements w/ meals if pt agrees.   3. Consider daily MVI w/ minerals.

## 2019-01-10 NOTE — PROGRESS NOTES
Podiatry S/O Problem Oriented Progress Note    Subjective   Patient seen at bedside with attending SIRISHA Maurer, no overnight events or complaints.  Resting in bed comfortably    Review of Systems:  Past Medical/Surgical history, Allergies, Meds reviewed in detail as charted  FH/SH reviewed in detail as charted  Review of Systems:  Head and Neck: No complaints  Chest : No complaints  Abdomen: No Complaints  Constitutional: Unremarkable        Objective   Labs  CBC Results       01/10/19 01/09/19 01/09/19                    0347 1824 0606         WBC 16.38 (H) 20.76 (H) 19.47 (H)         RBC 2.98 (L) 3.05 (L) 2.58 (L)         HGB 8.5 (L) 8.8 (L) 7.3 (L)         HCT 25.4 (L) 25.6 (L) 22.0 (L)         MCV 85.2 83.9 85.3         MCH 28.5 28.9 28.3         MCHC 33.5 34.4 33.2          (L) 260 176                     BMP Results       01/10/19 01/09/19 01/08/19                    0347 0606 0532          (L) 133 (L) 134 (L)         K 3.5 (L) 3.6 4.2         Cl 102 105 104         CO2 21 (L) 23 21 (L)         Glucose 85 106 (H) 196 (H)         BUN 12 17 21 (H)         Creatinine 1.1 1.2 1.2         Calcium 6.5 (L) 6.5 (L) 6.6 (L)         Anion Gap 11 5 9         EGFR &gt;60.0 &gt;60.0 &gt;60.0                       Vital signs in last 24 hours:  Temp:  [36.3 °C (97.4 °F)-37.3 °C (99.1 °F)] 36.3 °C (97.4 °F)  Heart Rate:  [77-85] 80  Resp:  [16-18] 18  BP: (120-153)/(58-77) 153/77      Intake/Output Summary (Last 24 hours) at 01/10/19 1016  Last data filed at 01/10/19 0748   Gross per 24 hour   Intake          1311.33 ml   Output              950 ml   Net           361.33 ml       Physical Exam:  General appearance: alert, appears stated age and cooperative  No change in neurovascular status or MSK exam compared to previous  Derm: Postsurgical debridement site on the dorsal aspect of the foot extending from the MPJs to the anterior ankle, debridement site also involves the plantar medial aspect of the  foot as well as the lateral aspect of the foot as well.  There is exposed flexor and tibialis anterior tendons as well as exposed extensor tendons, there is no purulence or active drainage, no malodor.      Assessment and Plan:     POD#2 s/p left lower extremity debridement with extensive soft tissue excision, bone biopsies    -Change dressings with Adaptic and DSD  -New hemoglobin this morning is 7.2, will transfuse 2 units and recheck  -Instructed patient to keep the left leg as elevated as possible to control bleeding  -Hold plavix  -Continue DVT prophylaxis with Lovenox  -OR cultures and path are pending  -No surgical plans as of now, will control back bleeding from bedside  -Nonweightbearing to the left lower extremity   -We will follow closely, attending to round this afternoon  -Likely OR on Friday for debridement and wound graft application.

## 2019-01-10 NOTE — PROGRESS NOTES
Infectious Disease Progress Note    Patient Name: Harvey Lucero Jr  MR#: [id number]  : 1960  Admission Date: 2019  Date: 01/10/19   Time: 11:22 AM   Author: Severo Dias MD    OBJECTIVE:  Continues to improved with a white count down to 16,000 no fevers and improving renal function doing well with the antibiotics without any nausea vomiting diarrhea or skin rash      Anti-infectives     Start     Dose/Rate Route Frequency Ordered Stop    19 1045  ampicillin (OMNIPEN) 2 g in sodium chloride 0.9 % 100 mL IVPB      2 g  200 mL/hr over 30 Minutes intravenous Every 4 hours interval 19 1042            ROS  No vomiting diarrhea fevers chills all other systems are negative   vital Signs:    Temp:  [36.3 °C (97.4 °F)-37.3 °C (99.1 °F)] 36.3 °C (97.4 °F)  Heart Rate:  [77-85] 80  Resp:  [16-18] 18  BP: (120-153)/(58-77) 153/77    Temp (72hrs), Av.1 °C (98.7 °F), Min:36.3 °C (97.4 °F), Max:37.7 °C (99.9 °F)      Physical Exam    Gen: Aox3  HEENT: OP clear  Neck: Supple  LAD: No cervical LAD  Lungs: CTAB  CV: RRR no murmurs  Abd: Soft NTND +BS  Ext: Left foot dressing CDI        Lines, Drains, Airways, Wounds:  Peripheral IV 19 Right Arm (Active)   Number of days: 3       Peripheral IV 19 Left Hand (Active)   Number of days: 0       Closed/Suction Drain Left Foot (Active)   Number of days: 0       Surgical Incision Foot Left (Active)   Number of days: 3       Labs:    Lab Results   Component Value Date    WBC 16.38 (H) 01/10/2019    HGB 8.5 (L) 01/10/2019    HCT 25.4 (L) 01/10/2019    MCV 85.2 01/10/2019     (L) 01/10/2019     Lab Results   Component Value Date    GLUCOSE 85 01/10/2019    CALCIUM 6.5 (L) 01/10/2019     (L) 01/10/2019    K 3.5 (L) 01/10/2019    CO2 21 (L) 01/10/2019     01/10/2019    BUN 12 01/10/2019    CREATININE 1.1 01/10/2019     Lab Results   Component Value Date    ALT 16 2018    AST 11 2018    ALKPHOS 60 2018    BILITOT  0.6 05/04/2018         Patient Active Problem List   Diagnosis Code   • History of CVA (cerebrovascular accident) Z86.73   • Type 2 diabetes mellitus (CMS/[facility]) ([facility]) E11.9   • Lipid disorder E78.9   • Hypertension I10   • Encounter for other preprocedural examination Z01.818   • Acute hematogenous osteomyelitis of left foot (CMS/[facility]) ([facility]) M86.072   • Anemia D64.9   • Leukocytosis D72.829   • Hyponatremia E87.1   • Acute kidney injury (CMS/[facility]) ([facility]) N17.9   • Bacteremia R78.81   • Gas gangrene (CMS/HCC) A48.0   • Streptococcal sepsis (CMS/HCC) ([facility]) A40.9   • Necrotizing fasciitis (CMS/HCC) M72.6   • Open wound of ankle and foot S91.009A, S91.309A     Other streptococcal sepsis (CMS/[facility])   Assessment & Plan    Continue IV ampicillin, we will set him up for PICC line before discharge        Necrotizing fasciitis of ankle and foot (CMS/[facility])   Assessment & Plan    Further surgical plans per podiatry tomorrow        Leukocytosis   Assessment & Plan    Down to 16,000, continue to trend                Severo Dias MD  1/10/576161:21 AM

## 2019-01-10 NOTE — PROGRESS NOTES
Podiatry S/O Problem Oriented Progress Note    Subjective   Patient seen at bedside, with attending SIRISHA Brantley, no overnight events or complaints.  Ready for or tomorrow.    Review of Systems:  Past Medical/Surgical history, Allergies, Meds reviewed in detail as charted  FH/SH reviewed in detail as charted  Review of Systems:  Head and Neck: No complaints  Chest : No complaints  Abdomen: No Complaints  Constitutional: Unremarkable        Objective   Labs  CBC Results       01/10/19 01/09/19 01/09/19                    0347 1824 0606         WBC 16.38 (H) 20.76 (H) 19.47 (H)         RBC 2.98 (L) 3.05 (L) 2.58 (L)         HGB 8.5 (L) 8.8 (L) 7.3 (L)         HCT 25.4 (L) 25.6 (L) 22.0 (L)         MCV 85.2 83.9 85.3         MCH 28.5 28.9 28.3         MCHC 33.5 34.4 33.2          (L) 260 176                     BMP Results       01/10/19 01/09/19 01/08/19                    0347 0606 0532          (L) 133 (L) 134 (L)         K 3.5 (L) 3.6 4.2         Cl 102 105 104         CO2 21 (L) 23 21 (L)         Glucose 85 106 (H) 196 (H)         BUN 12 17 21 (H)         Creatinine 1.1 1.2 1.2         Calcium 6.5 (L) 6.5 (L) 6.6 (L)         Anion Gap 11 5 9         EGFR &gt;60.0 &gt;60.0 &gt;60.0                         Imaging  I have reviewed the Imaging from the last 24 hrs.    Vital signs in last 24 hours:  Temp:  [36.3 °C (97.4 °F)-37.3 °C (99.1 °F)] 36.3 °C (97.4 °F)  Heart Rate:  [77-85] 80  Resp:  [16-18] 18  BP: (120-153)/(58-77) 153/77      Intake/Output Summary (Last 24 hours) at 01/10/19 1022  Last data filed at 01/10/19 0748   Gross per 24 hour   Intake          1311.33 ml   Output              950 ml   Net           361.33 ml       Physical Exam:  General appearance: alert, appears stated age and cooperative  No change of muscular skeletal exam compared to previous day, left foot digits are slightly warmer than previous day and capillary refill is less than 5 seconds    Derm: Postsurgical  debridement site on the dorsal aspect of the foot extending from the MPJs to the anterior ankle, debridement site also involves the plantar medial aspect of the foot as well as the lateral aspect of the foot as well.  There is exposed flexor and tibialis anterior tendons as well as exposed extensor tendons, there is no purulence or active drainage, no malodor.  Soft tissue appears to be granular, no areas of necrosis noted.     Assessment and Plan:     POD#3 s/p left lower extremity debridement with extensive soft tissue excision, bone biopsies     -Change dressings with Adaptic and DSD  -New hemoglobin this morning is 8.5 after 2 units received yesterday, fluids have been stopped  -Instructed patient to keep the left leg as elevated as possible to control bleeding  -Hold plavix  -Continue DVT prophylaxis with Lovenox  -OR bone cultures growing Streptococcus and Enterococcus faecalis  -OR bone pathology for cuboid and talus are both positive for acute osteomyelitis  -Patient is scheduled for OR tomorrow at 11:15 AM, for wound debridement and application of synthetic skin graft.  Patient does have acute osteomyelitis however he will be receiving 6 weeks of IV antibiotics regardless, it is better to graft earlier than later at this point to achieve coverage over exposed tendons and muscle in an attempt to salvage the left leg.  -Nonweightbearing to the left lower extremity

## 2019-01-10 NOTE — NURSING NOTE
Pt with poor appetie, only ate half yogurt and granola for breakfast. BS was 96. Did not give 5 units of insulin with breakfast. Notified Dr. Jane and he is aware.

## 2019-01-10 NOTE — PLAN OF CARE
Problem: Patient Care Overview  Goal: Plan of Care Review  Outcome: Ongoing (interventions implemented as appropriate)   01/10/19 7733   Coping/Psychosocial   Plan Of Care Reviewed With patient   Plan of Care Review   Progress no change   Outcome Summary oxycodone for pain relief, tolerating regular diet although appetite poor, BS controlled with 5 units standing order, for OR tomorrow, voiding spontaneously, wound change today by podiatry,       Problem: Infection, Risk/Actual (Adult)  Goal: Identify Related Risk Factors and Signs and Symptoms  Outcome: Ongoing (interventions implemented as appropriate)      Problem: Fall Risk (Adult)  Goal: Absence of Falls  Outcome: Ongoing (interventions implemented as appropriate)      Problem: Pressure Ulcer (Adult)  Goal: Signs and Symptoms of Listed Potential Problems Will be Absent, Minimized or Managed (Pressure Ulcer)  Outcome: Ongoing (interventions implemented as appropriate)

## 2019-01-11 ENCOUNTER — ANESTHESIA EVENT (INPATIENT)
Dept: OPERATING ROOM | Facility: HOSPITAL | Age: 59
DRG: 853 | End: 2019-01-11
Payer: COMMERCIAL

## 2019-01-11 ENCOUNTER — ANESTHESIA (INPATIENT)
Dept: OPERATING ROOM | Facility: HOSPITAL | Age: 59
DRG: 853 | End: 2019-01-11
Payer: COMMERCIAL

## 2019-01-11 LAB
ABO + RH BLD: NORMAL
ANION GAP SERPL CALC-SCNC: 11 MEQ/L (ref 3–15)
BASOPHILS # BLD: 0 K/UL (ref 0.01–0.1)
BASOPHILS NFR BLD: 0 %
BLD GP AB SCN SERPL QL: NEGATIVE
BUN SERPL-MCNC: 9 MG/DL (ref 8–20)
CALCIUM SERPL-MCNC: 6.6 MG/DL (ref 8.9–10.3)
CHLORIDE SERPL-SCNC: 103 MEQ/L (ref 98–109)
CO2 SERPL-SCNC: 20 MEQ/L (ref 22–32)
CREAT SERPL-MCNC: 1.1 MG/DL
CROSSMATCH: NORMAL
CROSSMATCH: NORMAL
D AG BLD QL: POSITIVE
DIFFERENTIAL METHOD BLD: ABNORMAL
EOSINOPHIL # BLD: 0.13 K/UL (ref 0.04–0.54)
EOSINOPHIL NFR BLD: 1 %
ERYTHROCYTE [DISTWIDTH] IN BLOOD BY AUTOMATED COUNT: 15.2 % (ref 11.6–14.4)
GFR SERPL CREATININE-BSD FRML MDRD: >60 ML/MIN/1.73M*2
GLUCOSE BLD-MCNC: 132 MG/DL (ref 70–99)
GLUCOSE BLD-MCNC: 141 MG/DL (ref 70–99)
GLUCOSE BLD-MCNC: 152 MG/DL (ref 70–99)
GLUCOSE BLD-MCNC: 246 MG/DL (ref 70–99)
GLUCOSE SERPL-MCNC: 126 MG/DL (ref 70–99)
HCT VFR BLDCO AUTO: 29.2 %
HGB BLD-MCNC: 9.8 G/DL
ISBT CODE: 6200
ISBT CODE: 6200
LABORATORY COMMENT REPORT: NORMAL
LYMPHOCYTES # BLD: 0.78 K/UL (ref 1.2–3.5)
LYMPHOCYTES NFR BLD: 6 %
MCH RBC QN AUTO: 28.2 PG (ref 28–33.2)
MCHC RBC AUTO-ENTMCNC: 33.6 G/DL (ref 32.2–36.5)
MCV RBC AUTO: 84.1 FL (ref 83–98)
METAMYELOCYTES # BLD MANUAL: 0.78 K/UL
METAMYELOCYTES NFR BLD MANUAL: 6 %
MONOCYTES # BLD: 1.56 K/UL (ref 0.3–1)
MONOCYTES NFR BLD: 12 %
MYELOCYTES # BLD MANUAL: 0.39 K/UL
MYELOCYTES NFR BLD MANUAL: 3 %
NEUTS BAND # BLD: 9.37 K/UL (ref 1.7–7)
NEUTS SEG NFR BLD: 72 %
OVALOCYTES BLD QL SMEAR: ABNORMAL
PDW BLD AUTO: 11.1 FL (ref 9.4–12.4)
PLATELET # BLD AUTO: 242 K/UL
PLATELET # BLD EST: ABNORMAL 10*3/UL
PLATELET CLUMP BLD QL SMEAR: PRESENT
POCT TEST: ABNORMAL
POTASSIUM SERPL-SCNC: 3.7 MEQ/L (ref 3.6–5.1)
PRODUCT CODE: NORMAL
PRODUCT CODE: NORMAL
PRODUCT STATUS: NORMAL
PRODUCT STATUS: NORMAL
RBC # BLD AUTO: 3.47 M/UL (ref 4.5–5.8)
SODIUM SERPL-SCNC: 134 MEQ/L (ref 136–144)
SPECIMEN EXP DATE BLD: NORMAL
SPECIMEN EXP DATE BLD: NORMAL
UNIT ABO: NORMAL
UNIT ABO: NORMAL
UNIT ID: NORMAL
UNIT ID: NORMAL
UNIT RH: POSITIVE
UNIT RH: POSITIVE
WBC # BLD AUTO: 13.02 K/UL

## 2019-01-11 PROCEDURE — 25000000 HC PHARMACY GENERAL: Performed by: ANESTHESIOLOGY

## 2019-01-11 PROCEDURE — 71000011 HC PACU PHASE 1 EA ADDL MIN: Performed by: PODIATRIST

## 2019-01-11 PROCEDURE — 63600000 HC DRUGS/DETAIL CODE: Performed by: ANESTHESIOLOGY

## 2019-01-11 PROCEDURE — 85025 COMPLETE CBC W/AUTO DIFF WBC: CPT | Performed by: PODIATRIST

## 2019-01-11 PROCEDURE — 80048 BASIC METABOLIC PNL TOTAL CA: CPT | Performed by: PODIATRIST

## 2019-01-11 PROCEDURE — 63600000 HC DRUGS/DETAIL CODE: Performed by: PODIATRIST

## 2019-01-11 PROCEDURE — 27200000 HC STERILE SUPPLY: Performed by: PODIATRIST

## 2019-01-11 PROCEDURE — 63700000 HC SELF-ADMINISTRABLE DRUG: Performed by: PODIATRIST

## 2019-01-11 PROCEDURE — 12000000 HC ROOM AND CARE MED/SURG

## 2019-01-11 PROCEDURE — 36415 COLL VENOUS BLD VENIPUNCTURE: CPT | Performed by: PODIATRIST

## 2019-01-11 PROCEDURE — 0HRNXK3 REPLACEMENT OF LEFT FOOT SKIN WITH NONAUTOLOGOUS TISSUE SUBSTITUTE, FULL THICKNESS, EXTERNAL APPROACH: ICD-10-PCS | Performed by: PODIATRIST

## 2019-01-11 PROCEDURE — 25800000 HC PHARMACY IV SOLUTIONS: Performed by: INTERNAL MEDICINE

## 2019-01-11 PROCEDURE — 86920 COMPATIBILITY TEST SPIN: CPT

## 2019-01-11 PROCEDURE — 63600000 HC DRUGS/DETAIL CODE: Mod: JW | Performed by: ANESTHESIOLOGY

## 2019-01-11 PROCEDURE — 36000002 HC OR LEVEL 2 INITIAL 30MIN: Performed by: PODIATRIST

## 2019-01-11 PROCEDURE — 36000012 HC OR LEVEL 2 EA ADDL MIN: Performed by: PODIATRIST

## 2019-01-11 PROCEDURE — 37000001 HC ANESTHESIA GENERAL: Performed by: PODIATRIST

## 2019-01-11 PROCEDURE — 86901 BLOOD TYPING SEROLOGIC RH(D): CPT

## 2019-01-11 PROCEDURE — 25800000 HC PHARMACY IV SOLUTIONS: Performed by: PODIATRIST

## 2019-01-11 PROCEDURE — 25000000 HC PHARMACY GENERAL: Performed by: PODIATRIST

## 2019-01-11 PROCEDURE — 71000001 HC PACU PHASE 1 INITIAL 30MIN: Performed by: PODIATRIST

## 2019-01-11 PROCEDURE — 0JBR0ZZ EXCISION OF LEFT FOOT SUBCUTANEOUS TISSUE AND FASCIA, OPEN APPROACH: ICD-10-PCS | Performed by: PODIATRIST

## 2019-01-11 PROCEDURE — 99232 SBSQ HOSP IP/OBS MODERATE 35: CPT | Performed by: HOSPITALIST

## 2019-01-11 PROCEDURE — 63600000 HC DRUGS/DETAIL CODE: Performed by: INTERNAL MEDICINE

## 2019-01-11 DEVICE — IMPLANTABLE DEVICE: Type: IMPLANTABLE DEVICE | Site: FOOT | Status: FUNCTIONAL

## 2019-01-11 RX ORDER — ROCURONIUM BROMIDE 10 MG/ML
INJECTION, SOLUTION INTRAVENOUS AS NEEDED
Status: DISCONTINUED | OUTPATIENT
Start: 2019-01-11 | End: 2019-01-11 | Stop reason: SURG

## 2019-01-11 RX ORDER — ONDANSETRON HYDROCHLORIDE 2 MG/ML
4 INJECTION, SOLUTION INTRAVENOUS
Status: DISCONTINUED | OUTPATIENT
Start: 2019-01-11 | End: 2019-01-11 | Stop reason: HOSPADM

## 2019-01-11 RX ORDER — PHENYLEPHRINE HYDROCHLORIDE 10 MG/ML
INJECTION INTRAVENOUS AS NEEDED
Status: DISCONTINUED | OUTPATIENT
Start: 2019-01-11 | End: 2019-01-11 | Stop reason: SURG

## 2019-01-11 RX ORDER — SODIUM CHLORIDE 9 MG/ML
5 INJECTION, SOLUTION INTRAVENOUS AS NEEDED
Status: DISCONTINUED | OUTPATIENT
Start: 2019-01-11 | End: 2019-01-11

## 2019-01-11 RX ORDER — HYDROMORPHONE HYDROCHLORIDE 1 MG/ML
0.5 INJECTION, SOLUTION INTRAMUSCULAR; INTRAVENOUS; SUBCUTANEOUS
Status: DISCONTINUED | OUTPATIENT
Start: 2019-01-11 | End: 2019-01-11 | Stop reason: HOSPADM

## 2019-01-11 RX ORDER — MIDAZOLAM HYDROCHLORIDE 2 MG/2ML
INJECTION, SOLUTION INTRAMUSCULAR; INTRAVENOUS AS NEEDED
Status: DISCONTINUED | OUTPATIENT
Start: 2019-01-11 | End: 2019-01-11 | Stop reason: SURG

## 2019-01-11 RX ORDER — EPHEDRINE SULFATE/0.9% NACL/PF 50 MG/5 ML
10 SYRINGE (ML) INTRAVENOUS AS NEEDED
Status: DISCONTINUED | OUTPATIENT
Start: 2019-01-11 | End: 2019-01-11 | Stop reason: HOSPADM

## 2019-01-11 RX ORDER — EPHEDRINE SULFATE 50 MG/ML
INJECTION, SOLUTION INTRAVENOUS AS NEEDED
Status: DISCONTINUED | OUTPATIENT
Start: 2019-01-11 | End: 2019-01-11 | Stop reason: SURG

## 2019-01-11 RX ORDER — LIDOCAINE HYDROCHLORIDE 10 MG/ML
INJECTION, SOLUTION INFILTRATION; PERINEURAL AS NEEDED
Status: DISCONTINUED | OUTPATIENT
Start: 2019-01-11 | End: 2019-01-11 | Stop reason: SURG

## 2019-01-11 RX ORDER — FENTANYL CITRATE 50 UG/ML
INJECTION, SOLUTION INTRAMUSCULAR; INTRAVENOUS AS NEEDED
Status: DISCONTINUED | OUTPATIENT
Start: 2019-01-11 | End: 2019-01-11 | Stop reason: SURG

## 2019-01-11 RX ORDER — FENTANYL CITRATE 50 UG/ML
50 INJECTION, SOLUTION INTRAMUSCULAR; INTRAVENOUS
Status: DISCONTINUED | OUTPATIENT
Start: 2019-01-11 | End: 2019-01-11 | Stop reason: HOSPADM

## 2019-01-11 RX ORDER — LABETALOL HCL 20 MG/4 ML
5 SYRINGE (ML) INTRAVENOUS AS NEEDED
Status: DISCONTINUED | OUTPATIENT
Start: 2019-01-11 | End: 2019-01-11 | Stop reason: HOSPADM

## 2019-01-11 RX ORDER — PROPOFOL 10 MG/ML
INJECTION, EMULSION INTRAVENOUS AS NEEDED
Status: DISCONTINUED | OUTPATIENT
Start: 2019-01-11 | End: 2019-01-11 | Stop reason: SURG

## 2019-01-11 RX ADMIN — PROPOFOL 210 MG: 10 INJECTION, EMULSION INTRAVENOUS at 12:00

## 2019-01-11 RX ADMIN — ROCURONIUM BROMIDE 80 MG: 10 INJECTION, SOLUTION INTRAVENOUS at 12:00

## 2019-01-11 RX ADMIN — ENOXAPARIN SODIUM 40 MG: 100 INJECTION SUBCUTANEOUS at 18:06

## 2019-01-11 RX ADMIN — MIDAZOLAM HYDROCHLORIDE 2 MG: 1 INJECTION, SOLUTION INTRAMUSCULAR; INTRAVENOUS at 11:52

## 2019-01-11 RX ADMIN — FENTANYL CITRATE 50 MCG: 50 INJECTION INTRAMUSCULAR; INTRAVENOUS at 14:05

## 2019-01-11 RX ADMIN — FENTANYL CITRATE 100 MCG: 50 INJECTION INTRAMUSCULAR; INTRAVENOUS at 11:52

## 2019-01-11 RX ADMIN — SODIUM CHLORIDE: 9 INJECTION, SOLUTION INTRAVENOUS at 06:32

## 2019-01-11 RX ADMIN — HYDROMORPHONE HYDROCHLORIDE 0.25 MG: 1 INJECTION, SOLUTION INTRAMUSCULAR; INTRAVENOUS; SUBCUTANEOUS at 18:49

## 2019-01-11 RX ADMIN — SODIUM CHLORIDE: 900 INJECTION, SOLUTION INTRAVENOUS at 11:52

## 2019-01-11 RX ADMIN — FENTANYL CITRATE 50 MCG: 50 INJECTION INTRAMUSCULAR; INTRAVENOUS at 13:55

## 2019-01-11 RX ADMIN — AMPICILLIN SODIUM 2 G: 1 INJECTION, POWDER, FOR SOLUTION INTRAMUSCULAR; INTRAVENOUS at 21:24

## 2019-01-11 RX ADMIN — SODIUM CHLORIDE: 900 INJECTION, SOLUTION INTRAVENOUS at 14:35

## 2019-01-11 RX ADMIN — PHENYLEPHRINE HYDROCHLORIDE 100 MCG: 10 INJECTION INTRAVENOUS at 13:17

## 2019-01-11 RX ADMIN — EPHEDRINE SULFATE 10 MG: 50 INJECTION INTRAVENOUS at 13:44

## 2019-01-11 RX ADMIN — SUGAMMADEX 200 MG: 100 INJECTION, SOLUTION INTRAVENOUS at 14:00

## 2019-01-11 RX ADMIN — AMPICILLIN SODIUM 2 G: 1 INJECTION, POWDER, FOR SOLUTION INTRAMUSCULAR; INTRAVENOUS at 03:32

## 2019-01-11 RX ADMIN — FENTANYL CITRATE 50 MCG: 50 INJECTION, SOLUTION INTRAMUSCULAR; INTRAVENOUS at 15:50

## 2019-01-11 RX ADMIN — LIDOCAINE HYDROCHLORIDE 5 ML: 10 INJECTION, SOLUTION INFILTRATION; PERINEURAL at 12:00

## 2019-01-11 RX ADMIN — PHENYLEPHRINE HYDROCHLORIDE 100 MCG: 10 INJECTION INTRAVENOUS at 14:15

## 2019-01-11 RX ADMIN — PHENYLEPHRINE HYDROCHLORIDE 100 MCG: 10 INJECTION INTRAVENOUS at 13:31

## 2019-01-11 RX ADMIN — INSULIN ASPART 5 UNITS: 100 INJECTION, SOLUTION INTRAVENOUS; SUBCUTANEOUS at 18:08

## 2019-01-11 RX ADMIN — FENTANYL CITRATE 50 MCG: 50 INJECTION, SOLUTION INTRAMUSCULAR; INTRAVENOUS at 15:22

## 2019-01-11 RX ADMIN — AMPICILLIN SODIUM 2 G: 1 INJECTION, POWDER, FOR SOLUTION INTRAMUSCULAR; INTRAVENOUS at 08:20

## 2019-01-11 RX ADMIN — FENTANYL CITRATE 50 MCG: 50 INJECTION INTRAMUSCULAR; INTRAVENOUS at 14:33

## 2019-01-11 RX ADMIN — HYDROMORPHONE HYDROCHLORIDE 0.5 MG: 1 INJECTION, SOLUTION INTRAMUSCULAR; INTRAVENOUS; SUBCUTANEOUS at 16:28

## 2019-01-11 RX ADMIN — AMPICILLIN SODIUM 2 G: 1 INJECTION, POWDER, FOR SOLUTION INTRAMUSCULAR; INTRAVENOUS at 16:44

## 2019-01-11 RX ADMIN — PHENYLEPHRINE HYDROCHLORIDE 100 MCG: 10 INJECTION INTRAVENOUS at 13:25

## 2019-01-11 RX ADMIN — SODIUM CHLORIDE: 900 INJECTION, SOLUTION INTRAVENOUS at 12:48

## 2019-01-11 RX ADMIN — EPHEDRINE SULFATE 10 MG: 50 INJECTION INTRAVENOUS at 13:00

## 2019-01-11 RX ADMIN — INSULIN ASPART 3 UNITS: 100 INJECTION, SOLUTION INTRAVENOUS; SUBCUTANEOUS at 21:27

## 2019-01-11 RX ADMIN — INSULIN GLARGINE 25 UNITS: 100 INJECTION, SOLUTION SUBCUTANEOUS at 21:26

## 2019-01-11 RX ADMIN — PHENYLEPHRINE HYDROCHLORIDE 100 MCG: 10 INJECTION INTRAVENOUS at 14:37

## 2019-01-11 RX ADMIN — FENTANYL CITRATE 50 MCG: 50 INJECTION INTRAMUSCULAR; INTRAVENOUS at 14:00

## 2019-01-11 RX ADMIN — ACETAMINOPHEN 650 MG: 325 TABLET, FILM COATED ORAL at 18:50

## 2019-01-11 RX ADMIN — PHENYLEPHRINE HYDROCHLORIDE 100 MCG: 10 INJECTION INTRAVENOUS at 13:39

## 2019-01-11 RX ADMIN — AMPICILLIN SODIUM 2 G: 1 INJECTION, POWDER, FOR SOLUTION INTRAMUSCULAR; INTRAVENOUS at 12:05

## 2019-01-11 NOTE — PROGRESS NOTES
Hospital Medicine Service -  Daily Progress Note       SUBJECTIVE   Interval History: Patient seen and examined. Wife at bedside  Anxious for procedure,irritable  No reports of bleeding,dry dressing   No chest pain,dyspnea,dizziness.  No abdominal pain,  No urinary symptoms   Discussed plan of care with patient  and nurse and wife    Other ros-         OBJECTIVE      Vital signs in last 24 hours:  Temp:  [36.5 °C (97.7 °F)-37.3 °C (99.2 °F)] 37.1 °C (98.7 °F)  Heart Rate:  [77-84] 82  Resp:  [16-20] 20  BP: (137-186)/(66-91) 186/91    Intake/Output Summary (Last 24 hours) at 01/11/19 1121  Last data filed at 01/11/19 0823   Gross per 24 hour   Intake           878.67 ml   Output              550 ml   Net           328.67 ml       PHYSICAL EXAMINATION      Physical Exam   Constitutional: He is oriented to person, place, and time.   Cardiovascular: Normal rate and regular rhythm.    No murmur heard.  Pulmonary/Chest: Effort normal and breath sounds normal. No respiratory distress.   Abdominal: Soft. Bowel sounds are normal. He exhibits no distension.   Musculoskeletal:   Left foot in dressing   Neurological: He is alert and oriented to person, place, and time. No cranial nerve deficit.   Psychiatric: His behavior is normal.      LINES, CATHETERS, DRAINS, AIRWAYS, AND WOUNDS   Lines, Drains, Airways, Wounds:  Peripheral IV 01/04/19 Right Arm (Active)   Number of days: 3       Surgical Incision Foot Left (Active)   Number of days: 3       Comments:      LABS / IMAGING / TELE      Labs  I have reviewed the patient's pertinent labs.  Significant abnormals are below.  Lab Results   Component Value Date     (L) 01/11/2019    K 3.7 01/11/2019     01/11/2019    BUN 9 01/11/2019    CREATININE 1.1 01/11/2019    WBC 13.02 (H) 01/11/2019    HGB 9.8 (L) 01/11/2019    HCT 29.2 (L) 01/11/2019     01/11/2019    ALT 16 05/04/2018    AST 11 05/04/2018    INR 1.4 01/04/2019    HGBA1C 10.2 (H) 01/04/2019        Imaging  I have independently reviewed the patient's pertinent imaging for this hospital visit. Pertinent Imaging findings are within normal limits.    ECG/Telemetry  n/a    ASSESSMENT AND PLAN      Streptococcal sepsis (CMS/[facility]) ([facility])   Assessment & Plan     he will need a minimum of 6 weeks  ID following  ECHO with no vegetation        * Acute hematogenous osteomyelitis of left foot (CMS/[facility]) ([facility])   Assessment & Plan    Per podiatry and ID  Will need 6 weeks of antibiotics          Necrotizing fasciitis (CMS/[facility])   Assessment & Plan    Underwent extensive debridement, he has a septic joint and osteomyelitis  Treatment as above  Plan for surgery and graft today           Hypokalemia   Assessment & Plan    repleted    Follow         Acute kidney injury (CMS/[facility]) ([facility])   Assessment & Plan    Cr in May was 1.1   Has underlying DM and HTN   stable              Hyponatremia   Assessment & Plan    Partly pseudo from increased BS  Control BS and trend   stable        Leukocytosis   Assessment & Plan    Secondary to acute infection         Anemia   Assessment & Plan    No recent hgb for comparion. Hgb 15.1 1 yr ago  Had beeding issues  after surgery  Wound vac removed  Received 6 units prbc  Hb stable    Hold plavix for  OR today  scd for dvt prophylaxis  dvt prophylaxis when ok with podiatry          Encounter for other preprocedural examination   Assessment & Plan    Plan for graft and debridement tomorrow   Denies any hx of CAD, no arrhythmias, no CHF, no preoperative creatinine >2  Does have a hx of CVA 5 yrs ago and an insulin dependent diabetic. RCRI 6.6%  EKG benign without any signs of ischemia  Good exercise capacity able to reach>4 METs activity level    P  Can proceed  with  surgery without additional cardiac testing at an acceptable risk          Hypertension   Assessment & Plan    BP up due to anxiety         Lipid disorder   Assessment & Plan    Cont statin        Type 2 diabetes mellitus (CMS/[facility])  (HCC)   Assessment & Plan    A1C 10.8 in May 2018.   Better control   Cont otupt meds for now  Continue  lanutus, add meal time insulin   Diabetic diet  Aim for tighter control with active infection     Lab Results   Component Value Date    HGBA1C 10.2 (H) 01/04/2019               History of CVA (cerebrovascular accident)   Assessment & Plan    Cont statin  Plavix on hold due to bleeding and for surgery today             VTE Assessment: Padua    VTE Prophylaxis Plan: lovenox  Code Status: Full Code  Estimated Discharge Date: 1/12/2019  Disposition Planning: per podiatry     Betty Clifford MD  1/11/2019

## 2019-01-11 NOTE — ANESTHESIA POSTPROCEDURE EVALUATION
Patient: Harvey Lucero Jr    Procedure Summary     Date:  01/11/19 Room / Location:  [facility] OR  / [facility] OR    Anesthesia Start:  1152 Anesthesia Stop:  1454    Procedure:  Wound debridement with application of allograft, Dr. Tee available before 12pm. (Left Foot) Diagnosis:       Open wound of ankle and foot      (Open wound of ankle and foot [S91.009A, S91.309A])    Surgeon:  Fabricio Tee DPM Responsible Provider:  Kevin Cruz MD    Anesthesia Type:  general ASA Status:  2          Anesthesia Type: general  PACU Vitals     No data found.            Anesthesia Post Evaluation    Pain management: adequate  Patient location during evaluation: PACU  Patient participation: complete - patient participated  Level of consciousness: awake and alert  Cardiovascular status: acceptable  Airway Patency: adequate  Respiratory status: acceptable  Hydration status: acceptable  Anesthetic complications: no

## 2019-01-11 NOTE — ANESTHESIA PREPROCEDURE EVALUATION
Anesthesia ROS/MED HX    Anesthesia History    Previous anesthetics  Pulmonary - neg  Neuro/Psych    CVA  Cardiovascular   hypertension  Hematological - neg  GI/Hepatic   GERD    Control: well controlled  Musculoskeletal- neg  Renal Disease- neg  Endo/Other   Diabetes  Body Habitus: Normal      History reviewed. No pertinent surgical history.    Physical Exam    Airway   Mallampati: II   TM distance: >3 FB   Neck ROM: full  Cardiovascular - normal   Rhythm: regular   Rate: normal  Pulmonary - normal   clear to auscultation  Dental - normal        Anesthesia Plan    Plan: general    Technique: general endotracheal   ASA 2  Blood Products:   Use of Blood Products Discussed: No   Anesthetic plan and risks discussed with: patient  Induction:    intravenous   Postop Plan:   Patient Disposition: inpatient floor planned admission   Pain Management: IV analgesics

## 2019-01-11 NOTE — ANESTHESIA PROCEDURE NOTES
Airway  Urgency: elective    Start Time: 1/11/2019 12:02 PM  Airway not difficult    General Information and Staff    Patient location during procedure: OR    Indications and Patient Condition  Indications for airway management: anesthesia  Sedation level: general  Preoxygenated: yes  Patient position: sniffing  MILS not maintained throughout  Mask difficulty assessment: 1 - vent by mask    Final Airway Details  Final airway type: endotracheal airway      Successful airway: ETT  Cuffed: yes   Successful intubation technique: direct laryngoscopy  Endotracheal tube insertion site: oral  Blade: Jadiel  Blade size: #3  ETT size: 8.0 mm  Cormack-Lehane Classification: grade I - full view of glottis  Placement verified by: chest auscultation and capnometry   Measured from: lips  ETT to lips (cm): 24  Number of attempts at approach: 1  Number of other approaches attempted: 0  Atraumatic airway insertion

## 2019-01-11 NOTE — PLAN OF CARE
Problem: Patient Care Overview  Goal: Plan of Care Review  Outcome: Ongoing (interventions implemented as appropriate)   01/11/19 0701   Coping/Psychosocial   Plan Of Care Reviewed With patient   Plan of Care Review   Progress improving   Outcome Summary Pt tolerated PRBC transfusion am H&H 9.8. pt denies pain , had a BM. has been NPO since midnight pending OR today       Problem: Infection, Risk/Actual (Adult)  Goal: Identify Related Risk Factors and Signs and Symptoms  Outcome: Ongoing (interventions implemented as appropriate)    Goal: Infection Prevention/Resolution  Outcome: Ongoing (interventions implemented as appropriate)      Problem: Fall Risk (Adult)  Goal: Absence of Falls  Outcome: Ongoing (interventions implemented as appropriate)

## 2019-01-11 NOTE — PROGRESS NOTES
Patient: Harvey DE LA FUENTE Nic Maldonado  Location: [facility] Operating Room OR  MRN: [id number]  Today's date: 1/11/2019    Attempted to see patient for therapy. Unable due to patient at test or procedure.  pt at OR

## 2019-01-11 NOTE — PERIOPERATIVE NURSING NOTE
Additional Staff in room during trinity use:  Dr. zaccaria Dr. patel M. Behmke, [facility]  Dr mckeon

## 2019-01-11 NOTE — PROGRESS NOTES
Infectious Disease Progress Note    Patient Name: Harvey Lucero Jr  MR#: [id number]  : 1960  Admission Date: 2019  Date: 19   Time: 1:36 PM   Author: Severo Dias MD    OBJECTIVE:  Going to the OR today for skin grafting tolerating the antibiotics well without any nausea vomiting Diarrhea fevers or chills      Anti-infectives     Start     Dose/Rate Route Frequency Ordered Stop    19 1045  [MAR Hold]  ampicillin (OMNIPEN) 2 g in sodium chloride 0.9 % 100 mL IVPB     (MAR Hold since 19 1107)    2 g  200 mL/hr over 30 Minutes intravenous Every 4 hours interval 19 1042            ROS  No vomiting diarrhea fevers chills all other systems are negative   vital Signs:    Temp:  [36.5 °C (97.7 °F)-37.3 °C (99.2 °F)] 37.1 °C (98.7 °F)  Heart Rate:  [77-84] 82  Resp:  [16-20] 20  BP: (137-186)/(66-91) 186/91    Temp (72hrs), Av °C (98.6 °F), Min:36.3 °C (97.4 °F), Max:37.3 °C (99.2 °F)      Physical Exam    Gen: Aox3  HEENT: OP clear  Neck: Supple  LAD: No cervical LAD  Lungs: CTAB  CV: RRR no murmurs  Abd: Soft NTND +BS  Ext: Left foot dressing CDI        Lines, Drains, Airways, Wounds:  Peripheral IV 19 Right Arm (Active)   Number of days: 3       Peripheral IV 19 Left Hand (Active)   Number of days: 0       Closed/Suction Drain Left Foot (Active)   Number of days: 0       Surgical Incision Foot Left (Active)   Number of days: 3       Labs:    Lab Results   Component Value Date    WBC 13.02 (H) 2019    HGB 9.8 (L) 2019    HCT 29.2 (L) 2019    MCV 84.1 2019     2019     Lab Results   Component Value Date    GLUCOSE 126 (H) 2019    CALCIUM 6.6 (L) 2019     (L) 2019    K 3.7 2019    CO2 20 (L) 2019     2019    BUN 9 2019    CREATININE 1.1 2019     Lab Results   Component Value Date    ALT 16 2018    AST 11 2018    ALKPHOS 60 2018    BILITOT 0.6 2018          Patient Active Problem List   Diagnosis Code   • History of CVA (cerebrovascular accident) Z86.73   • Type 2 diabetes mellitus (CMS/[facility]) ([facility]) E11.9   • Lipid disorder E78.9   • Hypertension I10   • Encounter for other preprocedural examination Z01.818   • Acute hematogenous osteomyelitis of left foot (CMS/[facility]) ([facility]) M86.072   • Anemia D64.9   • Leukocytosis D72.829   • Hyponatremia E87.1   • Acute kidney injury (CMS/[facility]) ([facility]) N17.9   • Bacteremia R78.81   • Gas gangrene (CMS/HCC) A48.0   • Streptococcal sepsis (CMS/[facility]) ([facility]) A40.9   • Necrotizing fasciitis (CMS/[facility]) M72.6   • Open wound of ankle and foot S91.009A, S91.309A   • Hypokalemia E87.6     Other streptococcal sepsis (CMS/[facility])   Assessment & Plan    Continue IV ampicillin Will Need a PICC line For 6 weeks        Necrotizing fasciitis of ankle and foot (CMS/[facility])   Assessment & Plan            Leukocytosis   Assessment & Plan    Down to 16,000, continue to trend                Severo Dias MD  1/11/20191:36 PM

## 2019-01-12 PROBLEM — R79.89 ELEVATED SERUM CREATININE: Status: ACTIVE | Noted: 2019-01-05

## 2019-01-12 LAB
ANION GAP SERPL CALC-SCNC: 10 MEQ/L (ref 3–15)
ANION GAP SERPL CALC-SCNC: 11 MEQ/L (ref 3–15)
ANION GAP SERPL CALC-SCNC: 8 MEQ/L (ref 3–15)
ANISOCYTOSIS BLD QL SMEAR: ABNORMAL
ATRIAL RATE: 84
BASOPHILS # BLD: 0 K/UL (ref 0.01–0.1)
BASOPHILS NFR BLD: 0 %
BUN SERPL-MCNC: 10 MG/DL (ref 8–20)
BUN SERPL-MCNC: 13 MG/DL (ref 8–20)
BUN SERPL-MCNC: 14 MG/DL (ref 8–20)
CA-I BLD-SCNC: 0.96 MMOL/L (ref 1.15–1.27)
CALCIUM SERPL-MCNC: 6.2 MG/DL (ref 8.9–10.3)
CALCIUM SERPL-MCNC: 6.6 MG/DL (ref 8.9–10.3)
CALCIUM SERPL-MCNC: <2 MG/DL (ref 8.9–10.3)
CHLORIDE SERPL-SCNC: 100 MEQ/L (ref 98–109)
CHLORIDE SERPL-SCNC: 101 MEQ/L (ref 98–109)
CHLORIDE SERPL-SCNC: 102 MEQ/L (ref 98–109)
CO2 SERPL-SCNC: 19 MEQ/L (ref 22–32)
CO2 SERPL-SCNC: 23 MEQ/L (ref 22–32)
CO2 SERPL-SCNC: 23 MEQ/L (ref 22–32)
CREAT SERPL-MCNC: 1.1 MG/DL
CREAT SERPL-MCNC: 1.2 MG/DL
CREAT SERPL-MCNC: 1.2 MG/DL
CROSSMATCH: NORMAL
CROSSMATCH: NORMAL
DIFFERENTIAL METHOD BLD: ABNORMAL
EOSINOPHIL # BLD: 0.31 K/UL (ref 0.04–0.54)
EOSINOPHIL NFR BLD: 2 %
ERYTHROCYTE [DISTWIDTH] IN BLOOD BY AUTOMATED COUNT: 15.4 % (ref 11.6–14.4)
ERYTHROCYTE [DISTWIDTH] IN BLOOD BY AUTOMATED COUNT: 15.5 % (ref 11.6–14.4)
EST. AVERAGE GLUCOSE BLD GHB EST-MCNC: 146 MG/DL
GFR SERPL CREATININE-BSD FRML MDRD: >60 ML/MIN/1.73M*2
GLUCOSE BLD-MCNC: 128 MG/DL (ref 70–99)
GLUCOSE BLD-MCNC: 187 MG/DL (ref 70–99)
GLUCOSE BLD-MCNC: 192 MG/DL (ref 70–99)
GLUCOSE BLD-MCNC: 196 MG/DL (ref 70–99)
GLUCOSE SERPL-MCNC: 112 MG/DL (ref 70–99)
GLUCOSE SERPL-MCNC: 194 MG/DL (ref 70–99)
GLUCOSE SERPL-MCNC: 203 MG/DL (ref 70–99)
GRAM STN SPEC: ABNORMAL
HBA1C MFR BLD HPLC: 6.7 %
HCT VFR BLDCO AUTO: 22.4 %
HCT VFR BLDCO AUTO: 24.8 %
HGB BLD-MCNC: 7.6 G/DL
HGB BLD-MCNC: 8.4 G/DL
ISBT CODE: 6200
ISBT CODE: 6200
LYMPHOCYTES # BLD: 0.93 K/UL (ref 1.2–3.5)
LYMPHOCYTES NFR BLD: 6 %
MCH RBC QN AUTO: 28.7 PG (ref 28–33.2)
MCH RBC QN AUTO: 29.3 PG (ref 28–33.2)
MCHC RBC AUTO-ENTMCNC: 33.9 G/DL (ref 32.2–36.5)
MCHC RBC AUTO-ENTMCNC: 33.9 G/DL (ref 32.2–36.5)
MCV RBC AUTO: 84.5 FL (ref 83–98)
MCV RBC AUTO: 86.4 FL (ref 83–98)
METAMYELOCYTES # BLD MANUAL: 0.16 K/UL
METAMYELOCYTES NFR BLD MANUAL: 1 %
MICROORGANISM SPEC CULT: ABNORMAL
MONOCYTES # BLD: 1.09 K/UL (ref 0.3–1)
MONOCYTES NFR BLD: 7 %
NEUTS BAND # BLD: 12.91 K/UL (ref 1.7–7)
NEUTS SEG NFR BLD: 83 %
NEUTS VAC BLD QL SMEAR: ABNORMAL
OVALOCYTES BLD QL SMEAR: ABNORMAL
P AXIS: 21
PDW BLD AUTO: 10.2 FL (ref 9.4–12.4)
PDW BLD AUTO: 10.7 FL (ref 9.4–12.4)
PLATELET # BLD AUTO: 368 K/UL
PLATELET # BLD AUTO: 411 K/UL
PLATELET # BLD EST: ABNORMAL 10*3/UL
PLATELET CLUMP BLD QL SMEAR: PRESENT
POCT TEST: ABNORMAL
POLYCHROMASIA BLD QL SMEAR: ABNORMAL
POTASSIUM SERPL-SCNC: 10 MEQ/L (ref 3.6–5.1)
POTASSIUM SERPL-SCNC: 3.7 MEQ/L (ref 3.6–5.1)
POTASSIUM SERPL-SCNC: 3.9 MEQ/L (ref 3.6–5.1)
PR INTERVAL: 142
PRODUCT CODE: NORMAL
PRODUCT CODE: NORMAL
PRODUCT STATUS: NORMAL
PRODUCT STATUS: NORMAL
QRS DURATION: 98
QT INTERVAL: 416
QTC CALCULATION(BAZETT): 491
R AXIS: 25
RBC # BLD AUTO: 2.65 M/UL (ref 4.5–5.8)
RBC # BLD AUTO: 2.87 M/UL (ref 4.5–5.8)
SODIUM SERPL-SCNC: 131 MEQ/L (ref 136–144)
SODIUM SERPL-SCNC: 132 MEQ/L (ref 136–144)
SODIUM SERPL-SCNC: 134 MEQ/L (ref 136–144)
SPECIMEN EXP DATE BLD: NORMAL
SPECIMEN EXP DATE BLD: NORMAL
T WAVE AXIS: 38
TOXIC GRANULES BLD QL SMEAR: SLIGHT
UNIT ABO: NORMAL
UNIT ABO: NORMAL
UNIT ID: NORMAL
UNIT ID: NORMAL
UNIT RH: POSITIVE
UNIT RH: POSITIVE
VARIANT LYMPHS NFR BLD: 1 %
VENTRICULAR RATE: 84
WBC # BLD AUTO: 15.56 K/UL
WBC # BLD AUTO: 16.74 K/UL

## 2019-01-12 PROCEDURE — 12000000 HC ROOM AND CARE MED/SURG

## 2019-01-12 PROCEDURE — 99232 SBSQ HOSP IP/OBS MODERATE 35: CPT | Performed by: HOSPITALIST

## 2019-01-12 PROCEDURE — P9016 RBC LEUKOCYTES REDUCED: HCPCS

## 2019-01-12 PROCEDURE — 93005 ELECTROCARDIOGRAM TRACING: CPT | Performed by: PODIATRIST

## 2019-01-12 PROCEDURE — 36415 COLL VENOUS BLD VENIPUNCTURE: CPT | Performed by: PODIATRIST

## 2019-01-12 PROCEDURE — 36430 TRANSFUSION BLD/BLD COMPNT: CPT

## 2019-01-12 PROCEDURE — 63700000 HC SELF-ADMINISTRABLE DRUG: Performed by: PODIATRIST

## 2019-01-12 PROCEDURE — 85025 COMPLETE CBC W/AUTO DIFF WBC: CPT | Performed by: PODIATRIST

## 2019-01-12 PROCEDURE — 63600000 HC DRUGS/DETAIL CODE: Performed by: INTERNAL MEDICINE

## 2019-01-12 PROCEDURE — 85027 COMPLETE CBC AUTOMATED: CPT | Performed by: HOSPITALIST

## 2019-01-12 PROCEDURE — 83036 HEMOGLOBIN GLYCOSYLATED A1C: CPT | Performed by: PODIATRIST

## 2019-01-12 PROCEDURE — 25800000 HC PHARMACY IV SOLUTIONS: Performed by: PODIATRIST

## 2019-01-12 PROCEDURE — 63600000 HC DRUGS/DETAIL CODE: Performed by: PODIATRIST

## 2019-01-12 PROCEDURE — 80048 BASIC METABOLIC PNL TOTAL CA: CPT | Performed by: HOSPITALIST

## 2019-01-12 PROCEDURE — 80048 BASIC METABOLIC PNL TOTAL CA: CPT | Performed by: PODIATRIST

## 2019-01-12 PROCEDURE — 25800000 HC PHARMACY IV SOLUTIONS: Performed by: INTERNAL MEDICINE

## 2019-01-12 PROCEDURE — 82330 ASSAY OF CALCIUM: CPT | Performed by: INTERNAL MEDICINE

## 2019-01-12 PROCEDURE — 80048 BASIC METABOLIC PNL TOTAL CA: CPT | Performed by: INTERNAL MEDICINE

## 2019-01-12 RX ORDER — SODIUM CHLORIDE 9 MG/ML
5 INJECTION, SOLUTION INTRAVENOUS AS NEEDED
Status: ACTIVE | OUTPATIENT
Start: 2019-01-12 | End: 2019-01-13

## 2019-01-12 RX ADMIN — AMPICILLIN SODIUM 2 G: 1 INJECTION, POWDER, FOR SOLUTION INTRAMUSCULAR; INTRAVENOUS at 04:58

## 2019-01-12 RX ADMIN — INSULIN ASPART 3 UNITS: 100 INJECTION, SOLUTION INTRAVENOUS; SUBCUTANEOUS at 12:46

## 2019-01-12 RX ADMIN — OXYCODONE HYDROCHLORIDE 5 MG: 5 TABLET ORAL at 05:49

## 2019-01-12 RX ADMIN — OXYCODONE HYDROCHLORIDE 5 MG: 5 TABLET ORAL at 13:46

## 2019-01-12 RX ADMIN — SITAGLIPTIN 100 MG: 100 TABLET, FILM COATED ORAL at 09:15

## 2019-01-12 RX ADMIN — ROSUVASTATIN CALCIUM 10 MG: 10 TABLET, FILM COATED ORAL at 09:15

## 2019-01-12 RX ADMIN — ENOXAPARIN SODIUM 40 MG: 100 INJECTION SUBCUTANEOUS at 17:53

## 2019-01-12 RX ADMIN — OXYCODONE HYDROCHLORIDE 5 MG: 5 TABLET ORAL at 01:26

## 2019-01-12 RX ADMIN — PANTOPRAZOLE SODIUM 20 MG: 20 TABLET, DELAYED RELEASE ORAL at 09:15

## 2019-01-12 RX ADMIN — ACETAMINOPHEN 650 MG: 325 TABLET, FILM COATED ORAL at 15:57

## 2019-01-12 RX ADMIN — INSULIN ASPART 3 UNITS: 100 INJECTION, SOLUTION INTRAVENOUS; SUBCUTANEOUS at 21:45

## 2019-01-12 RX ADMIN — AMPICILLIN SODIUM 2 G: 1 INJECTION, POWDER, FOR SOLUTION INTRAMUSCULAR; INTRAVENOUS at 09:15

## 2019-01-12 RX ADMIN — OXYCODONE HYDROCHLORIDE 10 MG: 5 TABLET ORAL at 19:07

## 2019-01-12 RX ADMIN — AMPICILLIN SODIUM 2 G: 1 INJECTION, POWDER, FOR SOLUTION INTRAMUSCULAR; INTRAVENOUS at 13:43

## 2019-01-12 RX ADMIN — AMPICILLIN SODIUM 2 G: 1 INJECTION, POWDER, FOR SOLUTION INTRAMUSCULAR; INTRAVENOUS at 21:41

## 2019-01-12 RX ADMIN — INSULIN ASPART 5 UNITS: 100 INJECTION, SOLUTION INTRAVENOUS; SUBCUTANEOUS at 12:47

## 2019-01-12 RX ADMIN — SODIUM CHLORIDE: 9 INJECTION, SOLUTION INTRAVENOUS at 01:28

## 2019-01-12 RX ADMIN — AMPICILLIN SODIUM 2 G: 1 INJECTION, POWDER, FOR SOLUTION INTRAMUSCULAR; INTRAVENOUS at 17:52

## 2019-01-12 RX ADMIN — INSULIN GLARGINE 25 UNITS: 100 INJECTION, SOLUTION SUBCUTANEOUS at 21:46

## 2019-01-12 RX ADMIN — INSULIN ASPART 3 UNITS: 100 INJECTION, SOLUTION INTRAVENOUS; SUBCUTANEOUS at 10:14

## 2019-01-12 RX ADMIN — INSULIN ASPART 5 UNITS: 100 INJECTION, SOLUTION INTRAVENOUS; SUBCUTANEOUS at 17:47

## 2019-01-12 RX ADMIN — OXYCODONE HYDROCHLORIDE 5 MG: 5 TABLET ORAL at 09:29

## 2019-01-12 RX ADMIN — INSULIN ASPART 5 UNITS: 100 INJECTION, SOLUTION INTRAVENOUS; SUBCUTANEOUS at 10:15

## 2019-01-12 RX ADMIN — AMPICILLIN SODIUM 2 G: 1 INJECTION, POWDER, FOR SOLUTION INTRAMUSCULAR; INTRAVENOUS at 01:28

## 2019-01-12 NOTE — PLAN OF CARE
Problem: Patient Care Overview  Goal: Plan of Care Review  Outcome: Ongoing (interventions implemented as appropriate)   01/12/19 8937   Coping/Psychosocial   Plan Of Care Reviewed With patient   Plan of Care Review   Progress progress toward functional goals as expected   Outcome Summary Pain meds ongoing for c/o pain LLE. LLE dsg intact with wound vacs x 2 intact. Pt received 1 unit of PRBCs this AM. IV ABT ongoing. ACCU checks ongoing.       Problem: Infection, Risk/Actual (Adult)  Goal: Infection Prevention/Resolution  Outcome: Ongoing (interventions implemented as appropriate)

## 2019-01-12 NOTE — PLAN OF CARE
Problem: Patient Care Overview  Goal: Plan of Care Review  Outcome: Ongoing (interventions implemented as appropriate)   01/12/19 0605   Coping/Psychosocial   Plan Of Care Reviewed With patient   Plan of Care Review   Progress no change   Outcome Summary 5 mg oxycodone given for LLE pain with moderate relief, wound VAC with approx 100 cc bloody drainage, recieving 1 uRPBC for hgb 7.6 this AM with no s/s of reaction, critical labs noted- STAT redrawn labs sent & EKG completed, pt resting comfortably in bed with LLE elevated       Problem: Infection, Risk/Actual (Adult)  Goal: Identify Related Risk Factors and Signs and Symptoms  Outcome: Outcome(s) Achieved Date Met: 01/12/19    Goal: Infection Prevention/Resolution  Outcome: Ongoing (interventions implemented as appropriate)      Problem: Fall Risk (Adult)  Goal: Absence of Falls  Outcome: Ongoing (interventions implemented as appropriate)      Problem: Pressure Ulcer (Adult)  Goal: Signs and Symptoms of Listed Potential Problems Will be Absent, Minimized or Managed (Pressure Ulcer)  Outcome: Ongoing (interventions implemented as appropriate)

## 2019-01-12 NOTE — NURSING NOTE
Hgb this AM 7.6, down from 9.8 preoperatively. Approx 100 cc bloody drainage in wound VAC since 11 PM. VSS, pt asymptomatic. Spoke with Dr. Retana & orders received to transfuse 1 unit PRBC.

## 2019-01-12 NOTE — PROGRESS NOTES
Hospital Medicine Service -  Daily Progress Note       SUBJECTIVE   Interval History: pain well controlled. Denies nausea and vomiting     OBJECTIVE      Vital signs in last 24 hours:  Temp:  [36.1 °C (97 °F)-37 °C (98.6 °F)] 37 °C (98.6 °F)  Heart Rate:  [] 82  Resp:  [10-33] 16  BP: ()/(52-70) 131/63    Intake/Output Summary (Last 24 hours) at 01/12/19 1413  Last data filed at 01/12/19 0915   Gross per 24 hour   Intake          3014.84 ml   Output             1370 ml   Net          1644.84 ml       PHYSICAL EXAMINATION      GEN: well-developed and well-nourished; not in acute distress  HEENT: normocephalic; atraumatic  EYES: EOMI  CARDIO: regular rate and rhythm; no murmurs or rubs  RESP: clear to auscultation bilaterally; no rales, rhonchi, or wheezes  ABD: soft, non-distended, non-tender, normal bowel sounds  EXT: no cyanosis or edema  SKIN: left foot dressing intact, + wound vac  NEURO: alert and oriented x 3; nonfocal  BEHAVIOR/EMOTIONAL: appropriate; cooperative           LINES, CATHETERS, DRAINS, AIRWAYS, AND WOUNDS   Lines, Drains, Airways, Wounds:  Peripheral IV 01/07/19 Left Hand (Active)   Number of days: 5       Peripheral IV 01/08/19 Right Wrist (Active)   Number of days: 4       Closed/Suction Drain 1 Left Foot (Active)   Number of days: 1       Closed/Suction Drain 2 Left Foot (Active)   Number of days: 1       Surgical Incision Foot Left (Active)   Number of days: 8       Comments:      LABS / IMAGING / TELE      Labs    Results from last 7 days  Lab Units 01/12/19  0350 01/11/19  0335 01/10/19  0347   WBC K/uL 15.56* 13.02* 16.38*   HEMOGLOBIN g/dL 7.6* 9.8* 8.5*   HEMATOCRIT % 22.4* 29.2* 25.4*   PLATELETS K/uL 368* 242 134*       Results from last 7 days  Lab Units 01/12/19  0540 01/12/19  0350 01/11/19  0335   SODIUM mEQ/L 131* 132* 134*   POTASSIUM mEQ/L 3.9 10.0* 3.7   CHLORIDE mEQ/L 100 102 103   CO2 mEQ/L 23 19* 20*   BUN mg/dL 13 14 9   CREATININE mg/dL 1.2 1.1 1.1   GLUCOSE  mg/dL 194* 203* 126*   CALCIUM mg/dL 6.2* <2.0* 6.6*           ASSESSMENT AND PLAN      * Acute hematogenous osteomyelitis of left foot (CMS/[facility]) ([facility])   Assessment & Plan    Per podiatry and ID  Will need 6 weeks of antibiotics          Hypokalemia   Assessment & Plan    repleted    Follow         Necrotizing fasciitis (CMS/[facility])   Assessment & Plan    Underwent extensive debridement, he has a septic joint and osteomyelitis  Treatment as above  S/p OR 1/11        Streptococcal sepsis (CMS/[facility]) ([facility])   Assessment & Plan     he will need a minimum of 6 weeks of IV abx  ID following  ECHO with no vegetation        Elevated serum creatinine   Assessment & Plan    Cr in May was 1.1   Has underlying DM and HTN   stable              Hyponatremia   Assessment & Plan    Encourage oral hydration   trend        Leukocytosis   Assessment & Plan    Secondary to acute infection   trend        Anemia   Assessment & Plan    No recent hgb for comparion. Hgb 15.1 1 yr ago  Had beeding issues  after surgery  Wound vac removed  Received 6 units prbc  Hb stable    Follow Hb closely  Restart plavix when ok with podiatry          Hypertension   Assessment & Plan    BP stable today        Lipid disorder   Assessment & Plan    Cont statin        Type 2 diabetes mellitus (CMS/[facility]) ([facility])   Assessment & Plan    A1C 10.8 in May 2018.   Better control   Cont otupt meds for now  Continue  lanutus, and meal time insulin   Diabetic diet  Aim for tighter control with active infection     Lab Results   Component Value Date    HGBA1C 10.2 (H) 01/04/2019               History of CVA (cerebrovascular accident)   Assessment & Plan    Cont statin  Restart plavix when ok with ortho             VTE Assessment: Padua    VTE Prophylaxis Plan:lovenox  Code Status: Full Code       Peggy Hinds MD  1/12/2019     Case discussed with patient and his wife

## 2019-01-12 NOTE — PROGRESS NOTES
Met with pt today who has two wound vacs in place on POD#1. Faxed all [employer] forms to [place]. D/c date not yet determined. Will follow.

## 2019-01-12 NOTE — PROGRESS NOTES
Subjective:   Pt seen at bedside for POD#1 s/p Versajet debridement with Integra/Primatrix graft placed and POD#4 s/p left lower extremity debridement with extensive soft tissue excision, bone biopsies.  States he has some sore pain in the area where the grafts were placed.      .NAD. No overnight events. Dressing clean, dry, and intact. Denies any N/V/F/C/CP/SOB.     ROS:   Past Medical/Surgical history, Allergies, Meds reviewed in detail as charted  FH/SH reviewed in detail as charted  Review of Systems:  Head and Neck: No complaints  Chest : No complaints  Abdomen: No Complaints  Constitutional: Unremarkable     Vitals: I have reviewed the patient's current vital signs as documented in the patient's EMR.    Radiology: No new Radiology.    Labs:   CBC Results       01/12/19 01/11/19 01/10/19                    0350 0335 0347         WBC 15.56 (H) 13.02 (H) 16.38 (H)         RBC 2.65 (L) 3.47 (L) 2.98 (L)         HGB 7.6 (L) 9.8 (L) 8.5 (L)         HCT 22.4 (L) 29.2 (L) 25.4 (L)         MCV 84.5 84.1 85.2         MCH 28.7 28.2 28.5         MCHC 33.9 33.6 33.5          (H) 242 134 (L)         Comment for HGB at 0350 on 01/12/19:  ALL RESULTS HAVE BEEN CHECKED    Comment for PLT at 0350 on 01/12/19:  RESULTS CHECKED    Comment for PLT at 0335 on 01/11/19:  SPECIMEN QUALITY CHECKED           Objective:   -V: CRT >3 seconds, toes are cool to the touch but noted to be warmer and less discolored than yesterday.  No change of muscular skeletal exam compared to previous day, left foot digits are slightly warmer than previous day and capillary refill is less than 5 seconds  -D: Dressing left intact today.  Dressings are c/d/i with no strikethrough noted.     A/P: POD#1 s/p Versajet debridement with Integra/Primatrix graft placed and POD#4 s/p left lower extremity debridement with extensive soft tissue excision, bone biopsies     -New hemoglobin this morning is 7.6 this morning with no strike through noted.  [facility] has  been contacted and has given 1 unit of PRBC to pt.  -Instructed patient to keep the left leg as elevated as possible to control bleeding  -Hold plavix  -Continue DVT prophylaxis with Lovenox  -OR bone cultures growing Streptococcus and Enterococcus faecalis  -OR bone pathology for cuboid and talus are both positive for acute osteomyelitis  -Patient dressing will remain intact over the weekend and wound vac dressing will be changed on Monday then on Thursday if patient is in house.  -The paperwork for wound vac has been placed in chart.  Will contact CM regarding need of patient to have two separate wound vacs given the extent of the wound.    -Nonweightbearing to the left lower extremity        Kyle Paige DPM PGY-1  0642

## 2019-01-12 NOTE — NURSING NOTE
Received call from STAT lab, K=10 & calcium= <2.0. Pt VSS, asymptomatic. Spoke with Dr. Retana & orders received for STAT redraw labs & ionized calcium and STAT EKG. EKG read NSR with prolonged QT, informed Dr. Retana. Await lab results. 1 U PRBC transfusing with no s/s of transfusion reaction.    0630- Labs resulted, K=3.9, calcium=6.2, ionized calcium= 0.96. Dr. Retana alphapaged to make aware.

## 2019-01-12 NOTE — PLAN OF CARE
Problem: Patient Care Overview  Goal: Plan of Care Review  Outcome: Ongoing (interventions implemented as appropriate)   01/11/19 2053   Coping/Psychosocial   Plan Of Care Reviewed With patient   Plan of Care Review   Progress no change   Outcome Summary given .5 of dilaudid for pain with relief, tolerating regular diet, Bm x1 with using upper arm strength & pivot to ambulate to commode, voiding spontaneously, poor appetite, ampicillin q4 hours        Problem: Infection, Risk/Actual (Adult)  Goal: Identify Related Risk Factors and Signs and Symptoms  Outcome: Ongoing (interventions implemented as appropriate)    Goal: Infection Prevention/Resolution  Outcome: Ongoing (interventions implemented as appropriate)      Problem: Fall Risk (Adult)  Goal: Absence of Falls  Outcome: Ongoing (interventions implemented as appropriate)      Problem: Pressure Ulcer (Adult)  Goal: Signs and Symptoms of Listed Potential Problems Will be Absent, Minimized or Managed (Pressure Ulcer)  Outcome: Ongoing (interventions implemented as appropriate)

## 2019-01-13 ENCOUNTER — APPOINTMENT (INPATIENT)
Dept: RADIOLOGY | Facility: HOSPITAL | Age: 59
DRG: 853 | End: 2019-01-13
Attending: HOSPITALIST
Payer: COMMERCIAL

## 2019-01-13 PROBLEM — R60.0 LOCALIZED EDEMA: Status: ACTIVE | Noted: 2019-01-13

## 2019-01-13 LAB
ANION GAP SERPL CALC-SCNC: 10 MEQ/L (ref 3–15)
BASOPHILS # BLD: 0.03 K/UL (ref 0.01–0.1)
BASOPHILS NFR BLD: 0.2 %
BUN SERPL-MCNC: 7 MG/DL (ref 8–20)
CALCIUM SERPL-MCNC: 6.6 MG/DL (ref 8.9–10.3)
CHLORIDE SERPL-SCNC: 100 MEQ/L (ref 98–109)
CO2 SERPL-SCNC: 23 MEQ/L (ref 22–32)
CREAT SERPL-MCNC: 1 MG/DL
DIFFERENTIAL METHOD BLD: ABNORMAL
EOSINOPHIL # BLD: 0.1 K/UL (ref 0.04–0.54)
EOSINOPHIL NFR BLD: 0.8 %
ERYTHROCYTE [DISTWIDTH] IN BLOOD BY AUTOMATED COUNT: 15.2 % (ref 11.6–14.4)
GFR SERPL CREATININE-BSD FRML MDRD: >60 ML/MIN/1.73M*2
GLUCOSE BLD-MCNC: 128 MG/DL (ref 70–99)
GLUCOSE BLD-MCNC: 162 MG/DL (ref 70–99)
GLUCOSE BLD-MCNC: 206 MG/DL (ref 70–99)
GLUCOSE BLD-MCNC: 96 MG/DL (ref 70–99)
GLUCOSE SERPL-MCNC: 92 MG/DL (ref 70–99)
HCT VFR BLDCO AUTO: 21.7 %
HGB BLD-MCNC: 7.2 G/DL
IMM GRANULOCYTES # BLD AUTO: 0.58 K/UL (ref 0–0.08)
IMM GRANULOCYTES NFR BLD AUTO: 4.4 %
LYMPHOCYTES # BLD: 0.95 K/UL (ref 1.2–3.5)
LYMPHOCYTES NFR BLD: 7.2 %
MCH RBC QN AUTO: 28.7 PG (ref 28–33.2)
MCHC RBC AUTO-ENTMCNC: 33.2 G/DL (ref 32.2–36.5)
MCV RBC AUTO: 86.5 FL (ref 83–98)
MONOCYTES # BLD: 1.05 K/UL (ref 0.3–1)
MONOCYTES NFR BLD: 8 %
NEUTROPHILS # BLD: 10.41 K/UL (ref 1.7–7)
NEUTS SEG NFR BLD: 79.4 %
NRBC BLD-RTO: 0.1 %
PDW BLD AUTO: 10.4 FL (ref 9.4–12.4)
PLATELET # BLD AUTO: 273 K/UL
POCT TEST: ABNORMAL
POCT TEST: NORMAL
POTASSIUM SERPL-SCNC: 3.5 MEQ/L (ref 3.6–5.1)
RBC # BLD AUTO: 2.51 M/UL (ref 4.5–5.8)
SODIUM SERPL-SCNC: 133 MEQ/L (ref 136–144)
WBC # BLD AUTO: 13.12 K/UL

## 2019-01-13 PROCEDURE — 25800000 HC PHARMACY IV SOLUTIONS: Performed by: PODIATRIST

## 2019-01-13 PROCEDURE — 63700000 HC SELF-ADMINISTRABLE DRUG: Performed by: PODIATRIST

## 2019-01-13 PROCEDURE — 85025 COMPLETE CBC W/AUTO DIFF WBC: CPT | Performed by: PODIATRIST

## 2019-01-13 PROCEDURE — 80048 BASIC METABOLIC PNL TOTAL CA: CPT | Performed by: PODIATRIST

## 2019-01-13 PROCEDURE — 97164 PT RE-EVAL EST PLAN CARE: CPT | Mod: GP

## 2019-01-13 PROCEDURE — P9016 RBC LEUKOCYTES REDUCED: HCPCS

## 2019-01-13 PROCEDURE — 99232 SBSQ HOSP IP/OBS MODERATE 35: CPT | Performed by: HOSPITALIST

## 2019-01-13 PROCEDURE — 12000000 HC ROOM AND CARE MED/SURG

## 2019-01-13 PROCEDURE — 63600000 HC DRUGS/DETAIL CODE: Performed by: PODIATRIST

## 2019-01-13 PROCEDURE — 99252 IP/OBS CONSLTJ NEW/EST SF 35: CPT | Performed by: HOSPITALIST

## 2019-01-13 PROCEDURE — 97530 THERAPEUTIC ACTIVITIES: CPT | Mod: GP

## 2019-01-13 PROCEDURE — 25800000 HC PHARMACY IV SOLUTIONS: Performed by: INTERNAL MEDICINE

## 2019-01-13 PROCEDURE — 93971 EXTREMITY STUDY: CPT | Mod: RT

## 2019-01-13 PROCEDURE — 63700000 HC SELF-ADMINISTRABLE DRUG: Performed by: HOSPITALIST

## 2019-01-13 PROCEDURE — 36415 COLL VENOUS BLD VENIPUNCTURE: CPT | Performed by: PODIATRIST

## 2019-01-13 PROCEDURE — 63600000 HC DRUGS/DETAIL CODE: Performed by: INTERNAL MEDICINE

## 2019-01-13 RX ORDER — POTASSIUM CHLORIDE 14.9 MG/ML
20 INJECTION INTRAVENOUS AS NEEDED
Status: DISCONTINUED | OUTPATIENT
Start: 2019-01-13 | End: 2019-01-19 | Stop reason: HOSPADM

## 2019-01-13 RX ORDER — POTASSIUM CHLORIDE 1.5 G/1.58G
20 POWDER, FOR SOLUTION ORAL AS NEEDED
Status: DISCONTINUED | OUTPATIENT
Start: 2019-01-13 | End: 2019-01-19 | Stop reason: HOSPADM

## 2019-01-13 RX ORDER — POTASSIUM CHLORIDE 750 MG/1
20 TABLET, FILM COATED, EXTENDED RELEASE ORAL AS NEEDED
Status: DISCONTINUED | OUTPATIENT
Start: 2019-01-13 | End: 2019-01-19 | Stop reason: HOSPADM

## 2019-01-13 RX ORDER — CLOPIDOGREL BISULFATE 75 MG/1
75 TABLET ORAL DAILY
Status: DISCONTINUED | OUTPATIENT
Start: 2019-01-13 | End: 2019-01-19 | Stop reason: HOSPADM

## 2019-01-13 RX ORDER — POTASSIUM CHLORIDE 750 MG/1
40 TABLET, FILM COATED, EXTENDED RELEASE ORAL AS NEEDED
Status: DISCONTINUED | OUTPATIENT
Start: 2019-01-13 | End: 2019-01-19 | Stop reason: HOSPADM

## 2019-01-13 RX ORDER — SODIUM CHLORIDE 9 MG/ML
5 INJECTION, SOLUTION INTRAVENOUS AS NEEDED
Status: ACTIVE | OUTPATIENT
Start: 2019-01-13 | End: 2019-01-14

## 2019-01-13 RX ORDER — POTASSIUM CHLORIDE 1.5 G/1.58G
40 POWDER, FOR SOLUTION ORAL AS NEEDED
Status: DISCONTINUED | OUTPATIENT
Start: 2019-01-13 | End: 2019-01-19 | Stop reason: HOSPADM

## 2019-01-13 RX ADMIN — AMPICILLIN SODIUM 2 G: 1 INJECTION, POWDER, FOR SOLUTION INTRAMUSCULAR; INTRAVENOUS at 22:40

## 2019-01-13 RX ADMIN — AMPICILLIN SODIUM 2 G: 1 INJECTION, POWDER, FOR SOLUTION INTRAMUSCULAR; INTRAVENOUS at 05:14

## 2019-01-13 RX ADMIN — AMPICILLIN SODIUM 2 G: 1 INJECTION, POWDER, FOR SOLUTION INTRAMUSCULAR; INTRAVENOUS at 01:41

## 2019-01-13 RX ADMIN — ENOXAPARIN SODIUM 40 MG: 100 INJECTION SUBCUTANEOUS at 17:51

## 2019-01-13 RX ADMIN — SODIUM CHLORIDE: 9 INJECTION, SOLUTION INTRAVENOUS at 15:07

## 2019-01-13 RX ADMIN — AMPICILLIN SODIUM 2 G: 1 INJECTION, POWDER, FOR SOLUTION INTRAMUSCULAR; INTRAVENOUS at 17:50

## 2019-01-13 RX ADMIN — SITAGLIPTIN 100 MG: 100 TABLET, FILM COATED ORAL at 10:08

## 2019-01-13 RX ADMIN — ACETAMINOPHEN 650 MG: 325 TABLET, FILM COATED ORAL at 15:38

## 2019-01-13 RX ADMIN — AMPICILLIN SODIUM 2 G: 1 INJECTION, POWDER, FOR SOLUTION INTRAMUSCULAR; INTRAVENOUS at 13:46

## 2019-01-13 RX ADMIN — PANTOPRAZOLE SODIUM 20 MG: 20 TABLET, DELAYED RELEASE ORAL at 10:08

## 2019-01-13 RX ADMIN — OXYCODONE HYDROCHLORIDE 10 MG: 5 TABLET ORAL at 13:46

## 2019-01-13 RX ADMIN — OXYCODONE HYDROCHLORIDE 10 MG: 5 TABLET ORAL at 17:50

## 2019-01-13 RX ADMIN — CLOPIDOGREL BISULFATE 75 MG: 75 TABLET, FILM COATED ORAL at 15:02

## 2019-01-13 RX ADMIN — INSULIN ASPART 5 UNITS: 100 INJECTION, SOLUTION INTRAVENOUS; SUBCUTANEOUS at 12:10

## 2019-01-13 RX ADMIN — INSULIN ASPART 3 UNITS: 100 INJECTION, SOLUTION INTRAVENOUS; SUBCUTANEOUS at 22:40

## 2019-01-13 RX ADMIN — INSULIN ASPART 5 UNITS: 100 INJECTION, SOLUTION INTRAVENOUS; SUBCUTANEOUS at 17:09

## 2019-01-13 RX ADMIN — INSULIN GLARGINE 25 UNITS: 100 INJECTION, SOLUTION SUBCUTANEOUS at 22:41

## 2019-01-13 RX ADMIN — POTASSIUM CHLORIDE 20 MEQ: 750 TABLET, FILM COATED, EXTENDED RELEASE ORAL at 15:02

## 2019-01-13 RX ADMIN — INSULIN ASPART 5 UNITS: 100 INJECTION, SOLUTION INTRAVENOUS; SUBCUTANEOUS at 10:05

## 2019-01-13 RX ADMIN — ROSUVASTATIN CALCIUM 10 MG: 10 TABLET, FILM COATED ORAL at 10:08

## 2019-01-13 ASSESSMENT — COGNITIVE AND FUNCTIONAL STATUS - GENERAL
STANDING UP FROM CHAIR USING ARMS: 3 - A LITTLE
CLIMB 3 TO 5 STEPS WITH RAILING: 2 - A LOT
WALKING IN HOSPITAL ROOM: 2 - A LOT
MOVING TO AND FROM BED TO CHAIR: 3 - A LITTLE

## 2019-01-13 NOTE — NURSING NOTE
Spoke with Dr Gale gave results of potassium of 3.5 and Hgb. Of 7.2. She is ordering replacement for potassium and 1 unit of blood to be transfused. Also informed her pts right arm is edematous. She is ordering ultrasound to rule out clot.

## 2019-01-13 NOTE — PLAN OF CARE
Problem: Patient Care Overview  Goal: Plan of Care Review  Outcome: Ongoing (interventions implemented as appropriate)   01/13/19 0446   Coping/Psychosocial   Plan Of Care Reviewed With patient   Plan of Care Review   Progress improving   Outcome Summary Pt did not require pain meds overnight. Temp. hanging at 99.F.        Problem: Fall Risk (Adult)  Goal: Absence of Falls  Outcome: Ongoing (interventions implemented as appropriate)

## 2019-01-13 NOTE — PLAN OF CARE
Problem: Patient Care Overview  Goal: Plan of Care Review  Outcome: Ongoing (interventions implemented as appropriate)   01/13/19 0422   Coping/Psychosocial   Plan Of Care Reviewed With patient       Problem: Fall Risk (Adult)  Goal: Absence of Falls  Outcome: Ongoing (interventions implemented as appropriate)

## 2019-01-13 NOTE — PROGRESS NOTES
[facility] Medicine Service -  Daily Progress Note       SUBJECTIVE   Interval History: denies CP and SOB     OBJECTIVE      Vital signs in last 24 hours:  Temp:  [36.7 °C (98 °F)-37.6 °C (99.7 °F)] 37.3 °C (99.1 °F)  Heart Rate:  [81-86] 81  Resp:  [16-18] 16  BP: (124-152)/(60-70) 152/70    Intake/Output Summary (Last 24 hours) at 01/13/19 1137  Last data filed at 01/13/19 0657   Gross per 24 hour   Intake              720 ml   Output                0 ml   Net              720 ml       PHYSICAL EXAMINATION      GEN: well-developed and well-nourished; not in acute distress  HEENT: normocephalic; atraumatic  EYES: EOMI   CARDIO: regular rate and rhythm  RESP: clear to auscultation bilaterally; no rales, rhonchi, or wheezes  ABD: soft, non-distended, non-tender, normal bowel sounds  EXT: no cyanosis or edema  SKIN: dressing intact left foot, + drain left foot  NEURO: alert and oriented x 3; nonfocal  BEHAVIOR/EMOTIONAL: appropriate; cooperative           LINES, CATHETERS, DRAINS, AIRWAYS, AND WOUNDS   Lines, Drains, Airways, Wounds:  Peripheral IV 01/12/19 Left Forearm (Active)   Number of days: 1       Closed/Suction Drain 1 Left Foot (Active)   Number of days: 2       Closed/Suction Drain 2 Left Foot (Active)   Number of days: 2       Surgical Incision Foot Left (Active)   Number of days: 9       Comments:      LABS / IMAGING / TELE      Labs    Results from last 7 days  Lab Units 01/13/19  0514 01/12/19  1537 01/12/19  0350   WBC K/uL 13.12* 16.74* 15.56*   HEMOGLOBIN g/dL 7.2* 8.4* 7.6*   HEMATOCRIT % 21.7* 24.8* 22.4*   PLATELETS K/uL 273 411* 368*       Results from last 7 days  Lab Units 01/13/19  0514 01/12/19  1537 01/12/19  0540   SODIUM mEQ/L 133* 134* 131*   POTASSIUM mEQ/L 3.5* 3.7 3.9   CHLORIDE mEQ/L 100 101 100   CO2 mEQ/L 23 23 23   BUN mg/dL 7* 10 13   CREATININE mg/dL 1.0 1.2 1.2   GLUCOSE mg/dL 92 112* 194*   CALCIUM mg/dL 6.6* 6.6* 6.2*           ASSESSMENT AND PLAN      * Acute hematogenous  osteomyelitis of left foot (CMS/Formerly [facility]) (Formerly [facility])   Assessment & Plan    Per podiatry and ID  Will need 6 weeks of antibiotics          Hypokalemia   Assessment & Plan    repleted    Follow         Necrotizing fasciitis (CMS/Formerly [facility])   Assessment & Plan    Underwent extensive debridement, he has a septic joint and osteomyelitis  Treatment as above  S/p OR 1/11        Streptococcal sepsis (CMS/Formerly [facility]) (Formerly [facility])   Assessment & Plan     he will need a minimum of 6 weeks of IV abx  ID following  ECHO with no vegetation        Elevated serum creatinine   Assessment & Plan    Cr in May was 1.1   Has underlying DM and HTN   stable              Hyponatremia   Assessment & Plan    Encourage oral hydration   trend        Leukocytosis   Assessment & Plan    Secondary to acute infection   trend        Anemia   Assessment & Plan    No recent hgb for comparion. Hgb 15.1 1 yr ago  Had beeding issues  after surgery  Wound vac removed  Received 6 units prbc  Hb stable    Follow Hb closely  Getting 1 unit PRBC today  Restart plavix when ok with podiatry          Hypertension   Assessment & Plan    BP stable         Lipid disorder   Assessment & Plan    Cont statin        Type 2 diabetes mellitus (CMS/[facility]ly [facility]) ([facility])   Assessment & Plan    A1C 10.8 in May 2018.   Better control   Cont otupt meds for now  Continue  lanutus, and meal time insulin   Diabetic diet  Aim for tighter control with active infection     Lab Results   Component Value Date    HGBA1C 10.2 (H) 01/04/2019               History of CVA (cerebrovascular accident)   Assessment & Plan    Cont statin  Restart plavix when ok with podiatry             VTE Assessment: [place]    VTE Prophylaxis Plan: Lovenox  Code Status: Full Code       Peggy Hinds MD  1/13/2019     Case discussed with patient and family

## 2019-01-13 NOTE — PROGRESS NOTES
Subjective:   Pt seen at bedside for POD#2 s/p Versajet debridement with Integra/Primatrix graft placed and POD#5 s/p left lower extremity debridement with extensive soft tissue excision, bone biopsies.  States no pain at this time.   NAD. No overnight events. Dressing clean, dry, and intact. Denies any N/V/F/C/CP/SOB.     ROS:   Past Medical/Surgical history, Allergies, Meds reviewed in detail as charted  FH/SH reviewed in detail as charted  Review of Systems:  Head and Neck: No complaints  Chest : No complaints  Abdomen: No Complaints  Constitutional: Unremarkable     Vitals: I have reviewed the patient's current vital signs as documented in the patient's EMR.    Radiology: No new Radiology.    Labs:   CBC Results       01/12/19 01/12/19 01/11/19                    1537 0350 0335         WBC 16.74 (H) 15.56 (H) 13.02 (H)         RBC 2.87 (L) 2.65 (L) 3.47 (L)         HGB 8.4 (L) 7.6 (L) 9.8 (L)         HCT 24.8 (L) 22.4 (L) 29.2 (L)         MCV 86.4 84.5 84.1         MCH 29.3 28.7 28.2         MCHC 33.9 33.9 33.6          (H) 368 (H) 242         Comment for HGB at 0350 on 01/12/19:  ALL RESULTS HAVE BEEN CHECKED    Comment for PLT at 0350 on 01/12/19:  RESULTS CHECKED    Comment for PLT at 0335 on 01/11/19:  SPECIMEN QUALITY CHECKED           Objective:   -V: CRT >3 seconds, toes are cool to the touch but noted to be warmer and less discolored than yesterday.  No change of muscular skeletal exam compared to previous day, left foot digits are slightly warmer than previous day and capillary refill is less than 5 seconds  -D: Dressing left intact today.  Dressings are c/d/i with no strikethrough noted.     A/P: POD#2 s/p Versajet debridement with Integra/Primatrix graft placed and POD#5 s/p left lower extremity debridement with extensive soft tissue excision, bone biopsies     -New hemoglobin this morning is 8.4 last night, blood drawn at time of encounter this morning with no strike through noted on dressing.     -Instructed patient to keep the left leg as elevated as possible to control bleeding  -Plavix 75mg/daily resumed  -Continue DVT prophylaxis with Lovenox  -OR bone cultures growing Streptococcus and Enterococcus faecalis  -OR bone pathology for cuboid and talus are both positive for acute osteomyelitis  -Patient dressing will remain intact over the weekend and wound vac dressing will be changed on Monday then on Thursday if patient is in house.  -The paperwork for wound vac has been placed in chart.   Contacted CM regarding need of patient to have two separate wound vacs given the extent of the wound.    -Nonweightbearing to the left lower extremity         Kyle Paige DPM PGY-1

## 2019-01-13 NOTE — PLAN OF CARE
Problem: Patient Care Overview  Goal: Plan of Care Review  Outcome: Ongoing (interventions implemented as appropriate)   01/13/19 7370   Coping/Psychosocial   Plan Of Care Reviewed With patient   Plan of Care Review   Progress progress toward functional goals as expected   Outcome Summary Dsg remains intact to LLE.Wound vacs x 2 intact and draining Mod amt of bloody drainage. LLE elevated on pillows. Utilizes bedside commode. Seen by PT today . Pain meds ongoing. Received PRBCs 1 unit today. Received PO K+ today.       Problem: Infection, Risk/Actual (Adult)  Goal: Infection Prevention/Resolution  Outcome: Ongoing (interventions implemented as appropriate)

## 2019-01-13 NOTE — NURSING NOTE
Pt received one unit PRBCs without difficulty via the left arm rodrick. No adverse reactions noted. VSS. Pt juan the procedure well.

## 2019-01-13 NOTE — NURSING NOTE
Labs are back K=3.5 christopher Hgb=7.2 informed Dr. Paige with results, who had me relay them to [facility] Dr. Gale.

## 2019-01-13 NOTE — PROGRESS NOTES
Patient: Harvey Lucero Jr  Location: [facility] 3C 0371D  MRN: [id number]  Today's date: 1/13/2019  Pt left supine in bed. NAD. Call bell within reach.         Therapy Pain/Vitals - 01/13/19 1313        Pain/Comfort/Sleep    Pain Charting Type Pain Assessment    Presence of Pain complains of pain/discomfort    Preferred Pain Scale word (verbal rating pain scale)    Pain Body Location foot    Pain Rating: Rest 2 - mild pain    Pain Rating: Activity 2 - mild pain    Pain Management Interventions position adjusted          Prior Living Environment  Lives With: spouse  Living Arrangements: house  Home Accessibility: stairs to enter home (6 steps)  Living Environment Comment: pt lives in 3 story home, 1 railing on each set of stairs, 0 TRAE home, no AD at home; lives with spouseNumber of Stairs, Main Entrance: noneEquipment Currently Used at Home: none       Prior Level of Function  Ambulation: assistive equipment  Transferring: independent  Toileting: independent  Bathing: independent  Dressing: independent  Eating: independent  Communication: understands/communicates without difficulty  Swallowing: swallows foods/liquids without difficulty  Equipment Currently Used at Home: none           PT Treatment Summary - 01/13/19 1313        Session Details    Document Type re-evaluation    Mode of Treatment individual therapy;physical therapy       Time Calculation    Start Time 1301    Stop Time 1312    Time Calculation (min) 11 min       General Information    Patient Profile Reviewed? yes    Existing Precautions/Restrictions weight bearing;fall       Weight Bearing Status    Left LE Weight Bearing Status non-weight bearing       Bed Mobility    [place], Supine to Sit distant supervision;verbal cues    Verbal Cues (Supine to Sit) preparatory posture;safety;technique    [place], Sit to Supine distant supervision;verbal cues    Verbal Cues (Sit to Supine) maintaining precautions;safety;technique;preparatory  posture    Assistive Device (Bed Mobility) head of bed elevated    Comment (Bed Mobility) pt with significant impulsivity, requiring cues to maintain safety       Sit to Stand Transfer    [place], Sit to Stand Transfer close supervision;verbal cues    Verbal Cues hand placement;technique;safety;proper use of assistive device;preparatory posture;maintaining precautions    Assistive Device walker, front-wheeled       Stand to Sit Transfer    [place], Stand to Sit Transfer close supervision;verbal cues    Verbal Cues hand placement;preparatory posture;proper use of assistive device;safety;technique    Assistive Device walker, front-wheeled       Stand Pivot/Stand Step Transfer    [place], Stand Pivot/Stand Step Transfer close supervision;verbal cues    Verbal Cues hand placement;technique;safety;proper use of assistive device;preparatory posture;maintaining precautions    Assistive Device walker, front-wheeled    Comment pt not following commands to ensure safe transfers. refuses ambulation at this time.        Toilet Transfer    [place], Toilet Transfer close supervision;verbal cues    Verbal Cues hand placement;technique;safety;proper use of assistive device;preparatory posture;maintaining precautions    Assistive Device walker, front-wheeled;commode chair       AM-PAC (TM) - Mobility (Current Function)    Turning from your back to your side while in a flat bed without using bedrails? 3 - A Little    Moving from lying on your back to sitting on the side of a flat bed without using bedrails? 3 - A Little    Moving to and from a bed to a chair? 3 - A Little    Standing up from a chair using your arms? 3 - A Little    To walk in a hospital room? 2 - A Lot    Climbing 3-5 steps with a railing? 2 - A Lot    AM-PAC (TM) Mobility Score 16       PT Clinical Impression    Plan For Care Reviewed: Physical Therapy PT plan for care discussed with patient;patient voices agreement with PT plan for care;family  voices agreement with PT plan for care;PT plan for care discussed with family    System Pathology/Pathophysiology Noted musculoskeletal    Impairments Found (PT Eval) gait, locomotion, and balance    Functional Limitations in Following Categories (PT Eval) self-care    Rehab Potential/Prognosis adequate, monitor progress closely    PT Frequency of Treatment 3-5 times per week    Estimated Length of Stay 1 week    Problem List impaired balance    Anticipated Equipment Needs at Discharge front wheeled walker    Expected Discharge Disposition home with home health    Daily Outcome Statement standing pivot to commode only. pt refusing further ambulation. impulsive, not following commands to ensure safe transfers.        PT Weekly Outcome Summary    Functional Goal Overall Progress not progressing toward functional goals as expected          Pain Assessment/Intervention  Pain Charting Type: Pain Assessment             Education provided this session. See the Patient Education summary report for full details.    PT Care Plan Goals      Most Recent Value   Stair Goal, PT   PT STG: Stairs  supervision required   PT STG: Number of Stairs  13   PT STG Assistive Device: Stairs  cane, straight, crutches, axillary   PT STG Duration: Stairs  3 days or less   PT STG Outcome: Stairs  goal ongoing      PT Care Plan Goals      Most Recent Value   Bed Mobility Goal   Time to Achieve Goal: Bed Mobility  by discharge   Goal Activity: Bed Mobility  all bed mobility activities   Level of [place] Goal: Bed Mobility  modified independence   Goal Outcome: Bed Mobility  goal ongoing   Gait Goal   Time to Achieve Goal: Gait Training  by discharge   Level of [place]  supervision required   Assistive Device: Gait Training  walker, rolling   Distance Goal: Gait Training (feet)  150 feet   Goal Outcome: Gait Training  goal ongoing   Transfer Goal   Time to Achieve Goal: Transfer Training  by discharge   Goal Activity: Transfer Training   all transfers   Level of [place] Goal: Transfer Training  modified independence   Assistive Device: Transfer Training  walker, rolling   Goal Outcome: Transfer Training  goal ongoing

## 2019-01-14 LAB
ANION GAP SERPL CALC-SCNC: 6 MEQ/L (ref 3–15)
BASOPHILS # BLD: 0.05 K/UL (ref 0.01–0.1)
BASOPHILS NFR BLD: 0.5 %
BUN SERPL-MCNC: 9 MG/DL (ref 8–20)
CALCIUM SERPL-MCNC: 6.8 MG/DL (ref 8.9–10.3)
CHLORIDE SERPL-SCNC: 106 MEQ/L (ref 98–109)
CO2 SERPL-SCNC: 21 MEQ/L (ref 22–32)
CREAT SERPL-MCNC: 1 MG/DL
DIFFERENTIAL METHOD BLD: ABNORMAL
EOSINOPHIL # BLD: 0.12 K/UL (ref 0.04–0.54)
EOSINOPHIL NFR BLD: 1.1 %
ERYTHROCYTE [DISTWIDTH] IN BLOOD BY AUTOMATED COUNT: 16 % (ref 11.6–14.4)
GFR SERPL CREATININE-BSD FRML MDRD: >60 ML/MIN/1.73M*2
GLUCOSE BLD-MCNC: 108 MG/DL (ref 70–99)
GLUCOSE BLD-MCNC: 142 MG/DL (ref 70–99)
GLUCOSE BLD-MCNC: 147 MG/DL (ref 70–99)
GLUCOSE BLD-MCNC: 238 MG/DL (ref 70–99)
GLUCOSE SERPL-MCNC: 200 MG/DL (ref 70–99)
HCT VFR BLDCO AUTO: 24.4 %
HCT VFR BLDCO AUTO: 27 %
HGB BLD-MCNC: 7.9 G/DL
HGB BLD-MCNC: 8.4 G/DL
IMM GRANULOCYTES # BLD AUTO: 0.37 K/UL (ref 0–0.08)
IMM GRANULOCYTES NFR BLD AUTO: 3.5 %
LYMPHOCYTES # BLD: 1.07 K/UL (ref 1.2–3.5)
LYMPHOCYTES NFR BLD: 10.1 %
MCH RBC QN AUTO: 28.2 PG (ref 28–33.2)
MCHC RBC AUTO-ENTMCNC: 31.1 G/DL (ref 32.2–36.5)
MCV RBC AUTO: 90.6 FL (ref 83–98)
MONOCYTES # BLD: 0.82 K/UL (ref 0.3–1)
MONOCYTES NFR BLD: 7.8 %
NEUTROPHILS # BLD: 8.14 K/UL (ref 1.7–7)
NEUTS SEG NFR BLD: 77 %
NRBC BLD-RTO: 0.1 %
PDW BLD AUTO: 10 FL (ref 9.4–12.4)
PLATELET # BLD AUTO: 349 K/UL
POCT TEST: ABNORMAL
POTASSIUM SERPL-SCNC: 4.1 MEQ/L (ref 3.6–5.1)
RBC # BLD AUTO: 2.98 M/UL (ref 4.5–5.8)
SODIUM SERPL-SCNC: 133 MEQ/L (ref 136–144)
WBC # BLD AUTO: 10.57 K/UL

## 2019-01-14 PROCEDURE — 63700000 HC SELF-ADMINISTRABLE DRUG: Performed by: PODIATRIST

## 2019-01-14 PROCEDURE — 80048 BASIC METABOLIC PNL TOTAL CA: CPT | Performed by: PODIATRIST

## 2019-01-14 PROCEDURE — 99232 SBSQ HOSP IP/OBS MODERATE 35: CPT | Performed by: HOSPITALIST

## 2019-01-14 PROCEDURE — 36415 COLL VENOUS BLD VENIPUNCTURE: CPT | Performed by: PODIATRIST

## 2019-01-14 PROCEDURE — 85027 COMPLETE CBC AUTOMATED: CPT | Performed by: HOSPITALIST

## 2019-01-14 PROCEDURE — 63600000 HC DRUGS/DETAIL CODE: Performed by: PODIATRIST

## 2019-01-14 PROCEDURE — 97530 THERAPEUTIC ACTIVITIES: CPT | Mod: GO

## 2019-01-14 PROCEDURE — 85014 HEMATOCRIT: CPT | Performed by: HOSPITALIST

## 2019-01-14 PROCEDURE — 12000000 HC ROOM AND CARE MED/SURG

## 2019-01-14 PROCEDURE — 97116 GAIT TRAINING THERAPY: CPT | Mod: GP

## 2019-01-14 PROCEDURE — 63600000 HC DRUGS/DETAIL CODE: Performed by: INTERNAL MEDICINE

## 2019-01-14 PROCEDURE — 25800000 HC PHARMACY IV SOLUTIONS: Performed by: PODIATRIST

## 2019-01-14 PROCEDURE — 25800000 HC PHARMACY IV SOLUTIONS: Performed by: INTERNAL MEDICINE

## 2019-01-14 RX ADMIN — AMPICILLIN SODIUM 2 G: 1 INJECTION, POWDER, FOR SOLUTION INTRAMUSCULAR; INTRAVENOUS at 01:53

## 2019-01-14 RX ADMIN — INSULIN GLARGINE 25 UNITS: 100 INJECTION, SOLUTION SUBCUTANEOUS at 21:58

## 2019-01-14 RX ADMIN — AMPICILLIN SODIUM 2 G: 1 INJECTION, POWDER, FOR SOLUTION INTRAMUSCULAR; INTRAVENOUS at 13:30

## 2019-01-14 RX ADMIN — INSULIN ASPART 5 UNITS: 100 INJECTION, SOLUTION INTRAVENOUS; SUBCUTANEOUS at 18:12

## 2019-01-14 RX ADMIN — OXYCODONE HYDROCHLORIDE 5 MG: 5 TABLET ORAL at 14:26

## 2019-01-14 RX ADMIN — ROSUVASTATIN CALCIUM 10 MG: 10 TABLET, FILM COATED ORAL at 09:25

## 2019-01-14 RX ADMIN — ACETAMINOPHEN 650 MG: 325 TABLET, FILM COATED ORAL at 19:01

## 2019-01-14 RX ADMIN — AMPICILLIN SODIUM 2 G: 1 INJECTION, POWDER, FOR SOLUTION INTRAMUSCULAR; INTRAVENOUS at 21:01

## 2019-01-14 RX ADMIN — AMPICILLIN SODIUM 2 G: 1 INJECTION, POWDER, FOR SOLUTION INTRAMUSCULAR; INTRAVENOUS at 09:23

## 2019-01-14 RX ADMIN — AMPICILLIN SODIUM 2 G: 1 INJECTION, POWDER, FOR SOLUTION INTRAMUSCULAR; INTRAVENOUS at 05:34

## 2019-01-14 RX ADMIN — OXYCODONE HYDROCHLORIDE 5 MG: 5 TABLET ORAL at 06:05

## 2019-01-14 RX ADMIN — INSULIN ASPART 3 UNITS: 100 INJECTION, SOLUTION INTRAVENOUS; SUBCUTANEOUS at 21:59

## 2019-01-14 RX ADMIN — INSULIN ASPART 5 UNITS: 100 INJECTION, SOLUTION INTRAVENOUS; SUBCUTANEOUS at 09:20

## 2019-01-14 RX ADMIN — SODIUM CHLORIDE: 9 INJECTION, SOLUTION INTRAVENOUS at 21:02

## 2019-01-14 RX ADMIN — OXYCODONE HYDROCHLORIDE 5 MG: 5 TABLET ORAL at 10:27

## 2019-01-14 RX ADMIN — AMPICILLIN SODIUM 2 G: 1 INJECTION, POWDER, FOR SOLUTION INTRAMUSCULAR; INTRAVENOUS at 17:36

## 2019-01-14 RX ADMIN — INSULIN ASPART 5 UNITS: 100 INJECTION, SOLUTION INTRAVENOUS; SUBCUTANEOUS at 13:37

## 2019-01-14 RX ADMIN — ACETAMINOPHEN 650 MG: 325 TABLET, FILM COATED ORAL at 10:26

## 2019-01-14 RX ADMIN — ACETAMINOPHEN 650 MG: 325 TABLET, FILM COATED ORAL at 23:12

## 2019-01-14 RX ADMIN — OXYCODONE HYDROCHLORIDE 5 MG: 5 TABLET ORAL at 19:01

## 2019-01-14 RX ADMIN — ENOXAPARIN SODIUM 40 MG: 100 INJECTION SUBCUTANEOUS at 17:36

## 2019-01-14 RX ADMIN — CLOPIDOGREL BISULFATE 75 MG: 75 TABLET, FILM COATED ORAL at 09:25

## 2019-01-14 RX ADMIN — OXYCODONE HYDROCHLORIDE 5 MG: 5 TABLET ORAL at 05:37

## 2019-01-14 RX ADMIN — SITAGLIPTIN 100 MG: 100 TABLET, FILM COATED ORAL at 09:25

## 2019-01-14 RX ADMIN — OXYCODONE HYDROCHLORIDE 5 MG: 5 TABLET ORAL at 23:12

## 2019-01-14 RX ADMIN — PANTOPRAZOLE SODIUM 20 MG: 20 TABLET, DELAYED RELEASE ORAL at 09:25

## 2019-01-14 RX ADMIN — ACETAMINOPHEN 650 MG: 325 TABLET, FILM COATED ORAL at 14:27

## 2019-01-14 ASSESSMENT — COGNITIVE AND FUNCTIONAL STATUS - GENERAL
TOILETING: 3 - A LITTLE
WALKING IN HOSPITAL ROOM: 4 - NONE
STANDING UP FROM CHAIR USING ARMS: 3 - A LITTLE
MOVING TO AND FROM BED TO CHAIR: 3 - A LITTLE
HELP NEEDED FOR PERSONAL GROOMING: 4 - NONE
HELP NEEDED FOR BATHING: 3 - A LITTLE
DRESSING REGULAR UPPER BODY CLOTHING: 4 - NONE
DRESSING REGULAR LOWER BODY CLOTHING: 3 - A LITTLE
CLIMB 3 TO 5 STEPS WITH RAILING: 2 - A LOT
EATING MEALS: 4 - NONE

## 2019-01-14 NOTE — PROGRESS NOTES
Had conversations with podiatry to discuss dc planning.  Pt will have 2 wound vacs and will need 6 weeks of home infusion.  Pt has been following patient.  Noted sister, Ariana requested a call to discuss dc planning for patient.  Called her and left a message.  Noted pt may have either vac vs hyperbaric therapy.  Noted if pt goes to snf, may not be able to do hyperbaric therapy from the snf due to billing.  Will continue to follow for needs.     pts sister returned my call to discuss dc planning.  She is concerned regarding pt going home upon discharge.  He has flight up and flight to go down with 2 wound vacs to go home, if he has to do hyperbaric therapy.  She is concerned regarding him tripping over the cords and being able to maintain safe NWB status as well.  I discussed the challenge with hyperbaric and will do some research to see if [facility] will pay for that and snf stay at the same time.  She was also questioning [facility] as well.  I told her pt will not be able to stay for the entire time of the infusion and she is aware of that and just wants a short term rehab.I will request a PM&R c/s to review case to see if pt would be a good candidate for acute rehab.

## 2019-01-14 NOTE — PROGRESS NOTES
Subjective:   Patient seen at bedside POD#3 s/p Versajet debridement with Integra/Primatrix graft placed and POD#6 s/p left lower extremity debridement with extensive soft tissue excision, bone biopsies.  States no pain at this time.. No events overnight, NAD. Denies any N/V/F/C/CP/SOB.     ROS:   Past Medical/Surgical history, Allergies, Meds reviewed in detail as charted  FH/SH reviewed in detail as charted  Review of Systems:  Head and Neck: No complaints  Chest : No complaints  Abdomen: No Complaints  Constitutional: Unremarkable       Objective:      Vitals:  Vitals:    01/14/19 0537   BP:    Pulse:    Resp:    Temp:    SpO2: 95%       Radiology: No new Radiology.    Labs:     CBC Results       01/14/19 01/13/19 01/12/19                    0519 0514 1537         WBC 10.57 (H) 13.12 (H) 16.74 (H)         RBC 2.98 (L) 2.51 (L) 2.87 (L)         HGB 8.4 (L) 7.2 (L) 8.4 (L)         HCT 27.0 (L) 21.7 (L) 24.8 (L)         MCV 90.6 86.5 86.4         MCH 28.2 28.7 29.3         MCHC 31.1 (L) 33.2 33.9          273 411 (H)                      BMP Results       01/14/19 01/13/19 01/12/19                    0519 0514 1537          (L) 133 (L) 134 (L)         K 4.1 3.5 (L) 3.7         Cl 106 100 101         CO2 21 (L) 23 23         Glucose 200 (H) 92 112 (H)         BUN 9 7 (L) 10         Creatinine 1.0 1.0 1.2         Calcium 6.8 (L) 6.6 (L) 6.6 (L)         Anion Gap 6 10 10         EGFR &gt;60.0 &gt;60.0 &gt;60.0                         Lower Extremity Physical Exam:     - Vasc: Capillary refill time is approximately 5 seconds to the digits left foot. Skin temperature of the digits is cool to the touch by noted to be slightly increased compared to previous exams. DP and PT pulses non-palpable.   - Ortho: + active df/pf all digits.   - Neuro: Light touch and epicritic sensation absent   - Derm: Primatrix graft is incorporating well, especially proximally. Surgical incisions with intact sutures on the anterior  and lateral left leg without signs of dehiscence. Large, full thickness wound secondary to surgical debridement about the dorsal and plantar foot and medial left ankle. No purulence noted with expression this morning. No notable drainage. Very mild bleeding noted with wound vac change.         Assessment and Plan:     POD#3 s/p Versajet debridement with Integra/Primatrix graft placed and POD#6 s/p left lower extremity debridement with extensive soft tissue excision, bone biopsies.    -Wound vac dressing changed this morning. Will change again on Thursday if still in house  -Will need home nursing for wound vac changes after d/c. CM aware and assisting with making arrangements.   -Considering outpatient HBO   -Instructed patient to keep the left leg as elevated as possible to control bleeding  -Continue Plavix   -Continue DVT prophylaxis with Lovenox  -OR bone cultures 1/7 growing Streptococcus anginosus and Enterococcus faecalis (final)   -OR bone pathology for cuboid and talus are both positive for acute osteomyelitis  -OR cx 1/4: Strep anginosus, enterococcus faecalis (final)   -The paperwork for wound vac has been placed in chart.   Contacted CM regarding need of patient to have two separate wound vacs given the extent of the wound.    -Nonweightbearing to the left lower extremity   -F/U with Dr. Tee in the [facility] wound care center 1 week after d/c   - Please contact on call podiatry resident with any questions or concerns. 0310 before 5 PM.      Richard Chavez DPM  Pager #[phone]

## 2019-01-14 NOTE — PROGRESS NOTES
Patient: Harvey Lucero Jr  Location: [facility] 3C 0371D  MRN: [id number]  Today's date: 1/14/2019    Pt received and left in chair on sheet and incontinence pad.  Needs in place and call bell in reach.  RN aware.          Therapy Pain/Vitals - 01/14/19 1100        Pain/Comfort/Sleep    Presence of Pain complains of pain/discomfort    Preferred Pain Scale word (verbal rating pain scale)    Pain Body Location - Side Left    Pain Body Location foot    Pain Rating: Rest 2 - mild pain    Pain Rating: Activity 4 - moderate pain    Pain Management Interventions position adjusted          Prior Living Environment  Lives With: spouse  Living Arrangements: house  Home Accessibility: stairs to enter home (6 steps)  Living Environment Comment: pt lives in 3 story home, 1 railing on each set of stairs, 0 TRAE home, no AD at home; lives with spouseNumber of Stairs, Main Entrance: noneEquipment Currently Used at Home: none       Prior Level of Function  Ambulation: assistive equipment  Transferring: independent  Toileting: independent  Bathing: independent  Dressing: independent  Eating: independent  Communication: understands/communicates without difficulty  Swallowing: swallows foods/liquids without difficulty  Equipment Currently Used at Home: none           OT Treatment Summary - 01/14/19 1100        Session Details    Document Type daily treatment    Mode of Treatment occupational therapy       Time Calculation    Start Time 1045    Stop Time 1100    Time Calculation (min) 15 min       General Information    Patient Profile Reviewed? yes    Onset of Illness/Injury or Date of Surgery 01/04/19    Referring Physician Dr. Tee    Pertinent History of Current Functional Problem 58 y.o. male with abscess and cellulltis of LLE, s/p L foot I&D     Existing Precautions/Restrictions weight bearing    Limitations/Impairments safety/cognitive       Weight Bearing Status    Left LE Weight Bearing Status non-weight bearing        Coping Strategies    Trust Relationship/Rapport care explained       Attention    Behavioral Observations consistent cueing for multi-step activity   labile emotions       Transfers    Maintains Weight Bearing Status (Transfers) cues to maintain weight bearing status       Sit to Stand Transfer    [place], Sit to Stand Transfer close supervision    Verbal Cues hand placement;safety;technique    Assistive Device walker, front-wheeled       Stand to Sit Transfer    [place], Stand to Sit Transfer close supervision    Verbal Cues hand placement;safety;technique    Assistive Device walker, front-wheeled       Stand Pivot/Stand Step Transfer    [place], Stand Pivot/Stand Step Transfer close supervision    Verbal Cues hand placement;safety;technique    Assistive Device walker, front-wheeled       Gait Training    [place], Gait close supervision;minimum assist (75% or more patient effort)    Assistive Device walker, front-wheeled    Comment pt completed hopping, increased agitation when physical assistance was provided by PT       Static Standing Balance    [place], Supported Standing close supervision    Assistive Device Utilized rolling walker       AM-PAC (TM) - ADL (Current Function)    Putting on and taking off regular lower body clothing? 3 - A Little    Bathing? 3 - A Little    Toileting? 3 - A Little    Putting on/taking off regular upper body clothing? 4 - None    How much help for taking care of personal grooming? 4 - None    Eating meals? 4 - None    AM-PAC (TM) ADL Score 21       OT Clinical Impression    Patient's Goals For Discharge return home    Plan For Care Reviewed: Occupational Therapy OT plan for care discussed with patient    Impairments Found (OT Eval) aerobic capacity/endurance;gait, locomotion, and balance;joint integrity and mobility;ergonomics and body mechanics    Functional Limitations in Following Categories self-care;home management    Rehab Potential/Prognosis:  Occupational Therapy good, to achieve stated therapy goals    OT Frequency of Treatment 3-5 times per week    Problem List: Occupational Therapy strength decreased;impaired cognition;impaired balance;pain    Activity Limitations Related to Problem List BADL activities not performed adequately or safely;IADLs not performed adequately or safely;functional mobility not performed adequately or safely for household activity    Anticipated Equipment Needs at Discharge bedside commode;dressing equipment;front wheeled walker;shower chair    Daily Outcome Statement Pt continues to be limited by decreased balance, pain, endurance, and cognition, impacting independence with ADLs and IADLs.  Cont to folllow acutely.  Currently rec SNF vs home with home OT when medically stable.                   Education provided this session. See the Patient Education summary report for full details.         OT Care Plan Goals      Most Recent Value   Bed Mobility Goal   Time to Achieve Goal: Bed Mobility  by discharge   Goal Activity: Bed Mobility  all bed mobility activities   Level of [place] Goal: Bed Mobility  modified independence   Goal Outcome: Bed Mobility  goal ongoing   Lower Body Dressing Goal   Time to Achieve Goal: Lower Body Dressing  5 - 7 days   Level of [place]  modified independence   Adaptive Equipment: Lower Body Dressing  dressing stick, shoe horn, long handled, sock-aid, reacher   Goal Outcome: Lower Body Dressing  goal ongoing   Toilet Transfer Goal   Time to Achieve Goal: Toilet Transfer Training  5 - 7 days   Assistive Device: Toilet Transfer Training  grab bars/safety frame, raised toilet seat, walker, front-wheeled   Goal Outcome: Toilet Transfer Training  goal ongoing   Transfer Goal   Time to Achieve Goal: Transfer Training  by discharge   Goal Activity: Transfer Training  all transfers   Level of [place] Goal: Transfer Training  modified independence   Assistive Device: Transfer Training  walker,  rolling   Goal Outcome: Transfer Training  goal ongoing

## 2019-01-14 NOTE — PROGRESS NOTES
Infectious Disease Progress Note    Patient Name: Harvey Lucero Jr  MR#: [id number]  : 1960  Admission Date: 2019  Date: 19   Time: 12:16 PM   Author: Severo Dias MD    OBJECTIVE:  Grafting last week, no nausea vomiting diarrhea fever chills, white count is back to normal      Anti-infectives     Start     Dose/Rate Route Frequency Ordered Stop    19 1045  ampicillin (OMNIPEN) 2 g in sodium chloride 0.9 % 100 mL IVPB      2 g  200 mL/hr over 30 Minutes intravenous Every 4 hours interval 19 1042            ROS  No vomiting diarrhea fevers chills all other systems are negative   vital Signs:    Temp:  [36.8 °C (98.3 °F)-37.2 °C (99 °F)] 37.2 °C (98.9 °F)  Heart Rate:  [78-83] 80  Resp:  [16-18] 18  BP: (124-150)/(70-74) 142/71    Temp (72hrs), Av.9 °C (98.4 °F), Min:36.1 °C (97 °F), Max:37.6 °C (99.7 °F)      Physical Exam    Gen: Aox3  HEENT: OP clear  Neck: Supple  LAD: No cervical LAD  Lungs: CTAB  CV: RRR no murmurs  Abd: Soft NTND +BS  Ext: Left foot dressing CDI        Lines, Drains, Airways, Wounds:  Peripheral IV 19 Right Arm (Active)   Number of days: 3       Peripheral IV 19 Left Hand (Active)   Number of days: 0       Closed/Suction Drain Left Foot (Active)   Number of days: 0       Surgical Incision Foot Left (Active)   Number of days: 3       Labs:    Lab Results   Component Value Date    WBC 10.57 (H) 2019    HGB 8.4 (L) 2019    HCT 27.0 (L) 2019    MCV 90.6 2019     2019     Lab Results   Component Value Date    GLUCOSE 200 (H) 2019    CALCIUM 6.8 (L) 2019     (L) 2019    K 4.1 2019    CO2 21 (L) 2019     2019    BUN 9 2019    CREATININE 1.0 2019     Lab Results   Component Value Date    ALT 16 2018    AST 11 2018    ALKPHOS 60 2018    BILITOT 0.6 2018         Patient Active Problem List   Diagnosis Code   • History of CVA  (cerebrovascular accident) Z86.73   • Type 2 diabetes mellitus (CMS/HCC) ([facility]) E11.9   • Lipid disorder E78.9   • Hypertension I10   • Acute hematogenous osteomyelitis of left foot (CMS/HCC) ([facility]) M86.072   • Anemia D64.9   • Leukocytosis D72.829   • Hyponatremia E87.1   • Elevated serum creatinine R79.89   • Bacteremia R78.81   • Gas gangrene (CMS/[facility]) A48.0   • Streptococcal sepsis (CMS/[facility]) ([facility]) A40.9   • Necrotizing fasciitis (CMS/[facility]) M72.6   • Open wound of ankle and foot S91.009A, S91.309A   • Hypokalemia E87.6   • Localized edema R60.0     Other streptococcal sepsis (CMS/[facility])   Assessment & Plan    Day 7 of 42 of ampicillin, will need PICC      Necrotizing fasciitis of ankle and foot (CMS/[facility])   Assessment & Plan            Leukocytosis   Assessment & Plan    Resolved                Severo Dias MD  1/14/201912:16 PM

## 2019-01-14 NOTE — PROGRESS NOTES
Patient: Harvey Lucero Jr  Location: [facility] 3C 0371D  MRN: [id number]  Today's date: 1/14/2019  Pt left seated in bedside chair. NAD. Call bell within reach.        Therapy Pain/Vitals - 01/14/19 1102        Pain/Comfort/Sleep    Pain Charting Type Pain Assessment    Presence of Pain complains of pain/discomfort    Preferred Pain Scale word (verbal rating pain scale)    Pain Body Location - Side Left    Pain Body Location foot    Pain Rating: Rest 2 - mild pain    Pain Rating: Activity 4 - moderate pain    Pain Management Interventions position adjusted          Prior Living Environment  Lives With: spouse  Living Arrangements: house  Home Accessibility: stairs to enter home (6 steps)  Living Environment Comment: pt lives in 3 story home, 1 railing on each set of stairs, 0 TRAE home, no AD at home; lives with spouseNumber of Stairs, Main Entrance: noneEquipment Currently Used at Home: none       Prior Level of Function  Ambulation: assistive equipment  Transferring: independent  Toileting: independent  Bathing: independent  Dressing: independent  Eating: independent  Communication: understands/communicates without difficulty  Swallowing: swallows foods/liquids without difficulty  Equipment Currently Used at Home: none           PT Treatment Summary - 01/14/19 1102        Session Details    Document Type daily treatment    Mode of Treatment individual therapy;physical therapy       Time Calculation    Start Time 1050    Stop Time 1102    Time Calculation (min) 12 min       General Information    Patient Profile Reviewed? yes    Existing Precautions/Restrictions weight bearing       Weight Bearing Status    Left LE Weight Bearing Status non-weight bearing       Sit to Stand Transfer    [place], Sit to Stand Transfer verbal cues;supervision    Verbal Cues hand placement;maintaining precautions;preparatory posture;proper use of assistive device;safety;technique    Assistive Device walker, front-wheeled        Stand to Sit Transfer    [place], Stand to Sit Transfer supervision;verbal cues    Verbal Cues hand placement;technique;safety;proper use of assistive device;preparatory posture;maintaining precautions    Assistive Device walker, front-wheeled       Gait Training    [place], Gait supervision;verbal cues    Assistive Device walker, front-wheeled    Distance in Feet 20 feet    Gait Pattern Utilized swing-to    Gait Deviations Identified decreased elizabeth;decreased gait speed    Maintains Weight Bearing Status cues to maintain weight bearing status    Comment in room ambulation only due to patients agitation. pt with mild instability with RW however pt becoming more agitated when contact guarding provided. cues for RW management, safety.        AM-PAC (TM) - Mobility (Current Function)    Turning from your back to your side while in a flat bed without using bedrails? 3 - A Little    Moving from lying on your back to sitting on the side of a flat bed without using bedrails? 3 - A Little    Moving to and from a bed to a chair? 3 - A Little    Standing up from a chair using your arms? 3 - A Little    To walk in a hospital room? 4 - None    Climbing 3-5 steps with a railing? 2 - A Lot    AM-PAC (TM) Mobility Score 18       PT Clinical Impression    Plan For Care Reviewed: Physical Therapy PT plan for care discussed with patient;patient voices agreement with PT plan for care    System Pathology/Pathophysiology Noted musculoskeletal    Impairments Found (PT Eval) gait, locomotion, and balance    Functional Limitations in Following Categories (PT Eval) self-care    Rehab Potential/Prognosis other (see comments)   poor motivation    PT Frequency of Treatment 3-5 times per week    Estimated Length of Stay 1 week    Problem List pain;impaired balance    Anticipated Equipment Needs at Discharge front wheeled walker    Expected Discharge Disposition home with home health    Daily Outcome Statement in room  ambulation only due to patients increased agitation. poor motivation, compliance with cueing.        PT Weekly Outcome Summary    Functional Goal Overall Progress progressing toward functional goals slower than expected          Pain Assessment/Intervention  Pain Charting Type: Pain Assessment             Education provided this session. See the Patient Education summary report for full details.    PT Care Plan Goals      Most Recent Value   Sta[place] Goal, PT   PT STG: Stairs  supervision required   PT STG: Number of Stairs  13   PT STG Assistive Device: Stairs  cane, straight, crutches, axillary   PT STG Duration: Stairs  3 days or less   PT STG Outcome: Stairs  goal ongoing      PT Care Plan Goals      Most Recent Value   Bed Mobility Goal   Time to Achieve Goal: Bed Mobility  by discharge   Goal Activity: Bed Mobility  all bed mobility activities   Level of [place] Goal: Bed Mobility  modified independence   Goal Outcome: Bed Mobility  goal ongoing   Gait Goal   Time to Achieve Goal: Gait Training  by discharge   Level of [place]  supervision required   Assistive Device: Gait Training  walker, rolling   Distance Goal: Gait Training (feet)  150 feet   Goal Outcome: Gait Training  goal ongoing   Transfer Goal   Time to Achieve Goal: Transfer Training  by discharge   Goal Activity: Transfer Training  all transfers   Level of [place] Goal: Transfer Training  modified independence   Assistive Device: Transfer Training  walker, rolling   Goal Outcome: Transfer Training  goal ongoing

## 2019-01-14 NOTE — PLAN OF CARE
Problem: Patient Care Overview  Goal: Plan of Care Review  Outcome: Ongoing (interventions implemented as appropriate)   01/14/19 1140   Coping/Psychosocial   Plan Of Care Reviewed With patient       Problem: Fall Risk (Adult)  Goal: Absence of Falls  Outcome: Ongoing (interventions implemented as appropriate)

## 2019-01-14 NOTE — PLAN OF CARE
Problem: Patient Care Overview  Goal: Plan of Care Review  Outcome: Ongoing (interventions implemented as appropriate)   01/14/19 1415   Coping/Psychosocial   Plan Of Care Reviewed With patient   Plan of Care Review   Progress progress toward functional goals as expected   Outcome Summary Patient states moderate pain relief following Oxycodone administration, VAC x2 to LLE        Problem: Infection, Risk/Actual (Adult)  Goal: Infection Prevention/Resolution  Outcome: Ongoing (interventions implemented as appropriate)

## 2019-01-14 NOTE — PLAN OF CARE
Problem: Patient Care Overview  Goal: Plan of Care Review  Outcome: Ongoing (interventions implemented as appropriate)   01/14/19 1114   Coping/Psychosocial   Plan Of Care Reviewed With patient   Plan of Care Review   Progress progress toward functional goals as expected   Outcome Summary Pt continues to be limited by decreased balance, pain, endurance, and cognition, impacting independence with ADLs and IADLs. Cont to folllow acutely. Currently rec SNF vs home with home OT when medically stable.

## 2019-01-14 NOTE — PROGRESS NOTES
Patient: Harvey Lucero Jr  Location: [facility] 3C 0371D  MRN: [id number]  Today's date: 1/14/2019    Attempted to see patient for therapy. Unable due to patient at test or procedure.   Pt getting wound vac changed by team.  Will continue to follow and reattempt when able.

## 2019-01-14 NOTE — PROGRESS NOTES
Hospital Medicine Service -  Daily Progress Note       SUBJECTIVE   Interval History: denies CP and SOB     OBJECTIVE      Vital signs in last 24 hours:  Temp:  [36.8 °C (98.3 °F)-37.2 °C (99 °F)] 37.2 °C (98.9 °F)  Heart Rate:  [78-83] 80  Resp:  [16-18] 18  BP: (124-150)/(70-74) 142/71    Intake/Output Summary (Last 24 hours) at 01/14/19 1019  Last data filed at 01/14/19 0700   Gross per 24 hour   Intake           2755.5 ml   Output                0 ml   Net           2755.5 ml       PHYSICAL EXAMINATION      GEN: well-developed and well-nourished; not in acute distress  HEENT: normocephalic; atraumatic  EYES: EOMI  CARDIO: regular rate and rhythm; no murmurs or rubs  RESP: clear to auscultation bilaterally; no rales, rhonchi, or wheezes  ABD: soft, non-distended, non-tender, normal bowel sounds  EXT: no cyanosis or edema  SKIN: left foot dressing intact, + wound vac  NEURO: alert and oriented x 3; nonfocal  BEHAVIOR/EMOTIONAL: appropriate; cooperative           LINES, CATHETERS, DRAINS, AIRWAYS, AND WOUNDS   Lines, Drains, Airways, Wounds:  Peripheral IV 01/12/19 Left Forearm (Active)   Number of days: 2       Closed/Suction Drain 1 Left Foot (Active)   Number of days: 3       Closed/Suction Drain 2 Left Foot (Active)   Number of days: 3       Surgical Incision Foot Left (Active)   Number of days: 10       Comments:      LABS / IMAGING / TELE      Labs    Results from last 7 days  Lab Units 01/14/19 0519 01/13/19 0514 01/12/19  1537   WBC K/uL 10.57* 13.12* 16.74*   HEMOGLOBIN g/dL 8.4* 7.2* 8.4*   HEMATOCRIT % 27.0* 21.7* 24.8*   PLATELETS K/uL 349 273 411*       Results from last 7 days  Lab Units 01/14/19 0519 01/13/19  0514 01/12/19  1537   SODIUM mEQ/L 133* 133* 134*   POTASSIUM mEQ/L 4.1 3.5* 3.7   CHLORIDE mEQ/L 106 100 101   CO2 mEQ/L 21* 23 23   BUN mg/dL 9 7* 10   CREATININE mg/dL 1.0 1.0 1.2   GLUCOSE mg/dL 200* 92 112*   CALCIUM mg/dL 6.8* 6.6* 6.6*         ASSESSMENT AND PLAN      * Acute  hematogenous osteomyelitis of left foot (CMS/[facility]) ([facility])   Assessment & Plan    Per podiatry and ID  Will need 6 weeks of antibiotics          Hypokalemia   Assessment & Plan    repleted    Follow         Necrotizing fasciitis (CMS/[facility])   Assessment & Plan    Underwent extensive debridement, he has a septic joint and osteomyelitis  Treatment as above  S/p OR 1/11        Streptococcal sepsis (CMS/[facility]) ([facility])   Assessment & Plan     he will need a minimum of 6 weeks of IV abx  ID following  ECHO with no vegetation        Elevated serum creatinine   Assessment & Plan    Cr in May was 1.1   Has underlying DM and HTN   stable              Hyponatremia   Assessment & Plan    Encourage oral hydration   trend        Leukocytosis   Assessment & Plan    Secondary to acute infection   trend        Anemia   Assessment & Plan    No recent hgb for comparion. Hgb 15.1 1 yr ago  Had beeding issues  after surgery  Wound vac removed  Received 6 units prbc  Hb stable    Follow Hb closely  S/p 1 unit PRBC 1/13  Lot of bleeding last night  Recheck H&H at 3 pm today        Hypertension   Assessment & Plan    BP stable         Lipid disorder   Assessment & Plan    Cont statin        Type 2 diabetes mellitus (CMS/[facility]) ([facility])   Assessment & Plan    A1C 10.8 in May 2018.   Better control   Cont otupt meds for now  Continue  lanutus, and meal time insulin   Diabetic diet  Aim for tighter control with active infection     Lab Results   Component Value Date    HGBA1C 10.2 (H) 01/04/2019               History of CVA (cerebrovascular accident)   Assessment & Plan    Cont statin and plavix             VTE Assessment: [place]    VTE Prophylaxis Plan: lovenox  Code Status: Full Code       Peggy Hinds MD  1/14/2019     Case discussed with patient

## 2019-01-15 LAB
CROSSMATCH: NORMAL
ERYTHROCYTE [DISTWIDTH] IN BLOOD BY AUTOMATED COUNT: 15.3 % (ref 11.6–14.4)
GLUCOSE BLD-MCNC: 134 MG/DL (ref 70–99)
GLUCOSE BLD-MCNC: 148 MG/DL (ref 70–99)
GLUCOSE BLD-MCNC: 206 MG/DL (ref 70–99)
GLUCOSE BLD-MCNC: 225 MG/DL (ref 70–99)
HCT VFR BLDCO AUTO: 25.2 %
HGB BLD-MCNC: 8.1 G/DL
ISBT CODE: 6200
MCH RBC QN AUTO: 28.1 PG (ref 28–33.2)
MCHC RBC AUTO-ENTMCNC: 32.1 G/DL (ref 32.2–36.5)
MCV RBC AUTO: 87.5 FL (ref 83–98)
PDW BLD AUTO: 10.3 FL (ref 9.4–12.4)
PLATELET # BLD AUTO: 311 K/UL
POCT TEST: ABNORMAL
PRODUCT CODE: NORMAL
PRODUCT STATUS: NORMAL
RBC # BLD AUTO: 2.88 M/UL (ref 4.5–5.8)
SPECIMEN EXP DATE BLD: NORMAL
UNIT ABO: NORMAL
UNIT ID: NORMAL
UNIT RH: POSITIVE
WBC # BLD AUTO: 9.23 K/UL

## 2019-01-15 PROCEDURE — 12000000 HC ROOM AND CARE MED/SURG

## 2019-01-15 PROCEDURE — 63600000 HC DRUGS/DETAIL CODE: Performed by: PODIATRIST

## 2019-01-15 PROCEDURE — 63600000 HC DRUGS/DETAIL CODE: Performed by: INTERNAL MEDICINE

## 2019-01-15 PROCEDURE — 63700000 HC SELF-ADMINISTRABLE DRUG: Performed by: PODIATRIST

## 2019-01-15 PROCEDURE — 85027 COMPLETE CBC AUTOMATED: CPT | Performed by: HOSPITALIST

## 2019-01-15 PROCEDURE — 97116 GAIT TRAINING THERAPY: CPT | Mod: GP

## 2019-01-15 PROCEDURE — 99232 SBSQ HOSP IP/OBS MODERATE 35: CPT | Performed by: HOSPITALIST

## 2019-01-15 PROCEDURE — 25800000 HC PHARMACY IV SOLUTIONS: Performed by: INTERNAL MEDICINE

## 2019-01-15 PROCEDURE — 36415 COLL VENOUS BLD VENIPUNCTURE: CPT | Performed by: HOSPITALIST

## 2019-01-15 RX ADMIN — OXYCODONE HYDROCHLORIDE 5 MG: 5 TABLET ORAL at 13:51

## 2019-01-15 RX ADMIN — ACETAMINOPHEN 650 MG: 325 TABLET, FILM COATED ORAL at 05:09

## 2019-01-15 RX ADMIN — ENOXAPARIN SODIUM 40 MG: 100 INJECTION SUBCUTANEOUS at 17:25

## 2019-01-15 RX ADMIN — AMPICILLIN SODIUM 2 G: 1 INJECTION, POWDER, FOR SOLUTION INTRAMUSCULAR; INTRAVENOUS at 09:02

## 2019-01-15 RX ADMIN — OXYCODONE HYDROCHLORIDE 5 MG: 5 TABLET ORAL at 09:01

## 2019-01-15 RX ADMIN — OXYCODONE HYDROCHLORIDE 5 MG: 5 TABLET ORAL at 21:55

## 2019-01-15 RX ADMIN — CLOPIDOGREL BISULFATE 75 MG: 75 TABLET, FILM COATED ORAL at 09:05

## 2019-01-15 RX ADMIN — INSULIN ASPART 5 UNITS: 100 INJECTION, SOLUTION INTRAVENOUS; SUBCUTANEOUS at 09:04

## 2019-01-15 RX ADMIN — AMPICILLIN SODIUM 2 G: 1 INJECTION, POWDER, FOR SOLUTION INTRAMUSCULAR; INTRAVENOUS at 05:09

## 2019-01-15 RX ADMIN — AMPICILLIN SODIUM 2 G: 1 INJECTION, POWDER, FOR SOLUTION INTRAMUSCULAR; INTRAVENOUS at 13:51

## 2019-01-15 RX ADMIN — SITAGLIPTIN 100 MG: 100 TABLET, FILM COATED ORAL at 09:06

## 2019-01-15 RX ADMIN — PANTOPRAZOLE SODIUM 20 MG: 20 TABLET, DELAYED RELEASE ORAL at 09:05

## 2019-01-15 RX ADMIN — INSULIN ASPART 3 UNITS: 100 INJECTION, SOLUTION INTRAVENOUS; SUBCUTANEOUS at 17:49

## 2019-01-15 RX ADMIN — INSULIN ASPART 3 UNITS: 100 INJECTION, SOLUTION INTRAVENOUS; SUBCUTANEOUS at 21:55

## 2019-01-15 RX ADMIN — INSULIN ASPART 5 UNITS: 100 INJECTION, SOLUTION INTRAVENOUS; SUBCUTANEOUS at 17:48

## 2019-01-15 RX ADMIN — ROSUVASTATIN CALCIUM 10 MG: 10 TABLET, FILM COATED ORAL at 09:06

## 2019-01-15 RX ADMIN — INSULIN ASPART 5 UNITS: 100 INJECTION, SOLUTION INTRAVENOUS; SUBCUTANEOUS at 12:39

## 2019-01-15 RX ADMIN — AMPICILLIN SODIUM 2 G: 1 INJECTION, POWDER, FOR SOLUTION INTRAMUSCULAR; INTRAVENOUS at 17:22

## 2019-01-15 RX ADMIN — ACETAMINOPHEN 650 MG: 325 TABLET, FILM COATED ORAL at 11:11

## 2019-01-15 RX ADMIN — AMPICILLIN SODIUM 2 G: 1 INJECTION, POWDER, FOR SOLUTION INTRAMUSCULAR; INTRAVENOUS at 21:54

## 2019-01-15 RX ADMIN — AMPICILLIN SODIUM 2 G: 1 INJECTION, POWDER, FOR SOLUTION INTRAMUSCULAR; INTRAVENOUS at 01:21

## 2019-01-15 RX ADMIN — OXYCODONE HYDROCHLORIDE 5 MG: 5 TABLET ORAL at 05:09

## 2019-01-15 RX ADMIN — OXYCODONE HYDROCHLORIDE 5 MG: 5 TABLET ORAL at 17:46

## 2019-01-15 RX ADMIN — INSULIN GLARGINE 25 UNITS: 100 INJECTION, SOLUTION SUBCUTANEOUS at 21:54

## 2019-01-15 ASSESSMENT — COGNITIVE AND FUNCTIONAL STATUS - GENERAL
WALKING IN HOSPITAL ROOM: 3 - A LITTLE
STANDING UP FROM CHAIR USING ARMS: 3 - A LITTLE
MOVING TO AND FROM BED TO CHAIR: 3 - A LITTLE
CLIMB 3 TO 5 STEPS WITH RAILING: 2 - A LOT

## 2019-01-15 NOTE — PROGRESS NOTES
Infectious Disease Progress Note    Patient Name: Harvey Lucero Jr  MR#: [id number]  : 1960  Admission Date: 2019  Date: 01/15/19   Time: 11:16 AM   Author: Severo Dias MD    OBJECTIVE:  Tolerating the antibiotics well without nausea vomiting diarrhea fevers or chills      Anti-infectives     Start     Dose/Rate Route Frequency Ordered Stop    19 1045  ampicillin (OMNIPEN) 2 g in sodium chloride 0.9 % 100 mL IVPB      2 g  200 mL/hr over 30 Minutes intravenous Every 4 hours interval 19 1042            ROS  No vomiting diarrhea fevers chills all other systems are negative   vital Signs:    Temp:  [36.6 °C (97.8 °F)-36.9 °C (98.4 °F)] 36.6 °C (97.8 °F)  Heart Rate:  [71-77] 71  Resp:  [18-20] 18  BP: (132-175)/(59-82) 175/82    Temp (72hrs), Av.1 °C (98.8 °F), Min:36.6 °C (97.8 °F), Max:37.6 °C (99.7 °F)      Physical Exam    Gen: Aox3  HEENT: OP clear  Neck: Supple  LAD: No cervical LAD  Lungs: CTAB  CV: RRR no murmurs  Abd: Soft NTND +BS  Ext: Left foot dressing CDI wound VAC in place        Lines, Drains, Airways, Wounds:  Peripheral IV 19 Right Arm (Active)   Number of days: 3       Peripheral IV 19 Left Hand (Active)   Number of days: 0       Closed/Suction Drain Left Foot (Active)   Number of days: 0       Surgical Incision Foot Left (Active)   Number of days: 3       Labs:    Lab Results   Component Value Date    WBC 10.57 (H) 2019    HGB 7.9 (L) 2019    HCT 24.4 (L) 2019    MCV 90.6 2019     2019     Lab Results   Component Value Date    GLUCOSE 200 (H) 2019    CALCIUM 6.8 (L) 2019     (L) 2019    K 4.1 2019    CO2 21 (L) 2019     2019    BUN 9 2019    CREATININE 1.0 2019     Lab Results   Component Value Date    ALT 16 2018    AST 11 2018    ALKPHOS 60 2018    BILITOT 0.6 2018         Patient Active Problem List   Diagnosis Code   • History  of CVA (cerebrovascular accident) Z86.73   • Type 2 diabetes mellitus (CMS/[facility]) ([facility]) E11.9   • Lipid disorder E78.9   • Hypertension I10   • Acute hematogenous osteomyelitis of left foot (CMS/[facility]) ([facility]) M86.072   • Anemia D64.9   • Leukocytosis D72.829   • Hyponatremia E87.1   • Elevated serum creatinine R79.89   • Bacteremia R78.81   • Gas gangrene (CMS/[facility]) A48.0   • Streptococcal sepsis (CMS/[facility]) ([facility]) A40.9   • Necrotizing fasciitis (CMS/[facility]) M72.6   • Open wound of ankle and foot S91.009A, S91.309A   • Hypokalemia E87.6   • Localized edema R60.0     Other streptococcal sepsis (CMS/[facility])   Assessment & Plan    Day 8 of 42 of ampicillinPICC      Necrotizing fasciitis of ankle and foot (CMS/[facility])   Assessment & Plan    As above        Leukocytosis   Assessment & Plan    Resolved                Severo Dias MD  1/15/080949:16 AM

## 2019-01-15 NOTE — PROGRESS NOTES
Patient: Harvey Lucero Jr  Location: [facility] 3C 0371D  MRN: [id number]  Today's date: 1/15/2019  Pt left seated in bedside chair. NAD. Call bell within reach.        Therapy Pain/Vitals - 01/15/19 0945        Pain/Comfort/Sleep    Pain Charting Type Pain Assessment    Presence of Pain complains of pain/discomfort    Preferred Pain Scale word (verbal rating pain scale)    Pain Body Location leg    Pain Rating: Rest 4 - moderate pain    Pain Rating: Activity 6 - moderate-severe pain    Pain Management Interventions position adjusted          Prior Living Environment  Lives With: spouse  Living Arrangements: house  Home Accessibility: stairs to enter home (6 steps)  Living Environment Comment: pt lives in 3 story home, 1 railing on each set of stairs, 0 TRAE home, no AD at home; lives with spouseNumber of Stairs, Main Entrance: noneEquipment Currently Used at Home: none       Prior Level of Function  Ambulation: assistive equipment  Transferring: independent  Toileting: independent  Bathing: independent  Dressing: independent  Eating: independent  Communication: understands/communicates without difficulty  Swallowing: swallows foods/liquids without difficulty  Equipment Currently Used at Home: none           PT Treatment Summary - 01/15/19 0945        Session Details    Document Type daily treatment    Mode of Treatment individual therapy;physical therapy       Time Calculation    Start Time 0928    Stop Time 0945    Time Calculation (min) 17 min       General Information    Patient Profile Reviewed? yes    Existing Precautions/Restrictions weight bearing;fall       Weight Bearing Status    Left LE Weight Bearing Status non-weight bearing       Sit to Stand Transfer    [place], Sit to Stand Transfer verbal cues;supervision    Verbal Cues hand placement;maintaining precautions;proper use of assistive device;preparatory posture;technique;safety    Assistive Device walker, front-wheeled       Stand to Sit  Transfer    [place], Stand to Sit Transfer supervision;verbal cues    Verbal Cues hand placement;technique;safety;proper use of assistive device;preparatory posture;maintaining precautions    Assistive Device walker, front-wheeled       Gait Training    [place], Gait supervision;verbal cues    Assistive Device walker, front-wheeled    Distance in Feet 30 feet    Gait Pattern Utilized swing-to    Gait Deviations Identified decreased elizabeth;decreased gait speed    Maintains Weight Bearing Status able to maintain weight bearing status    Comment pt with improving eccentric control as soft heel strike noted following cues. ambulation distance limited as patient requesting to return to chair due to increased pressure to LLE        AM-PAC (TM) - Mobility (Current Function)    Turning from your back to your side while in a flat bed without using bedrails? 3 - A Little    Moving from lying on your back to sitting on the side of a flat bed without using bedrails? 3 - A Little    Moving to and from a bed to a chair? 3 - A Little    Standing up from a chair using your arms? 3 - A Little    To walk in a hospital room? 3 - A Little    Climbing 3-5 steps with a railing? 2 - A Lot    AM-PAC (TM) Mobility Score 17       PT Clinical Impression    Plan For Care Reviewed: Physical Therapy PT plan for care discussed with patient;patient voices agreement with PT plan for care    System Pathology/Pathophysiology Noted musculoskeletal    Impairments Found (PT Eval) gait, locomotion, and balance    Functional Limitations in Following Categories (PT Eval) self-care    Rehab Potential/Prognosis adequate, monitor progress closely    PT Frequency of Treatment 3-5 times per week    Estimated Length of Stay 1 week    Problem List impaired balance;pain    Anticipated Equipment Needs at Discharge front wheeled walker    Expected Discharge Disposition home with home health    Daily Outcome Statement in room ambulation only due to pain.  no assistance needed with transfers, ambulation at this time       PT Weekly Outcome Summary    Functional Goal Overall Progress progressing toward functional goals slower than expected          Pain Assessment/Intervention  Pain Charting Type: Pain Assessment             Education provided this session. See the Patient Education summary report for full details.    PT Care Plan Goals      Most Recent Value   Stair Goal, PT   PT STG: Stairs  supervision required   PT STG: Number of Stairs  13   PT STG Assistive Device: Stairs  cane, straight, crutches, axillary   PT STG Duration: Stairs  3 days or less   PT STG Outcome: Stairs  goal ongoing      PT Care Plan Goals      Most Recent Value   Bed Mobility Goal   Time to Achieve Goal: Bed Mobility  by discharge   Goal Activity: Bed Mobility  all bed mobility activities   Level of [place] Goal: Bed Mobility  modified independence   Goal Outcome: Bed Mobility  goal ongoing   Gait Goal   Time to Achieve Goal: Gait Training  by discharge   Level of [place]  supervision required   Assistive Device: Gait Training  walker, rolling   Distance Goal: Gait Training (feet)  150 feet   Goal Outcome: Gait Training  goal ongoing   Transfer Goal   Time to Achieve Goal: Transfer Training  by discharge   Goal Activity: Transfer Training  all transfers   Level of [place] Goal: Transfer Training  modified independence   Assistive Device: Transfer Training  walker, rolling   Goal Outcome: Transfer Training  goal ongoing

## 2019-01-15 NOTE — PLAN OF CARE
Problem: Patient Care Overview  Goal: Plan of Care Review  Outcome: Ongoing (interventions implemented as appropriate)   01/15/19 1023   Coping/Psychosocial   Plan Of Care Reviewed With patient       Problem: Fall Risk (Adult)  Goal: Absence of Falls  Outcome: Ongoing (interventions implemented as appropriate)

## 2019-01-15 NOTE — CONSULTS
Physical Medicine and Rehabilitation Consult Note    Subjective     Harvey Lucero Jr is a 58 y.o. 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 Patient was referred by Dr Tee for evaluate rehab needs. Patient is a 58-year-old male with a history of type 2 diabetes, hypertension, hyperlipidemia admitted due to nonhealing wound on his left foot and ankle.  He is now status post extensive debridement of soft tissue with bone biopsy.  He is now has a wound VAC in place.  And he is on long-term IV antibiotics for the next 6 weeks.  He has minimal complaints of pain denies any complaint of headache or dizziness, chest pain or shortness of breath, nausea or vomiting.  He is seen by PT and OT needing supervision for bed mobility transfers and short distance ambulation with a walker up to 30 feet.  He is at a supervision level for his self-care.    Pertinent radiology results reviewed. Pertinent lab results reviewed..    Medical History:   Past Medical History:   Diagnosis Date   • GERD (gastroesophageal reflux disease)    • History of CVA (cerebrovascular accident)    • Hypertension    • Lipid disorder    • Type 2 diabetes mellitus (CMS/HCC) ([facility])        Surgical History: History reviewed. No pertinent surgical history.    Social History:   Social History   He lives with his wife in a multilevel home.  He reports prior to this hospitalization he was completely independent with mobility and self-care.  He did not utilize any assistive devices.  He was able to drive.  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  Social History Narrative   • No narrative on file     Lives with: Lives With: spouse  Prior Function Level: Function Level Prior  Ambulation: assistive equipment  Transferring: independent  Toileting: independent  Bathing: independent  Dressing: independent  Eating: independent  Communication:  "understands/communicates without difficulty  Swallowing: swallows foods/liquids without difficulty  Equipment Currently Used at Home: none  Family History: History reviewed. No pertinent family history.  History also provided by:   Allergies: No known allergies    [unfilled]     Medication List      CONTINUE taking these medications    BASAGLAR KWIKPEN U-100 INSULIN 100 unit/mL (3 mL) subcutaneous pen  Generic drug:  insulin glargine  26 units at bedtime     blood-glucose meter kit  for blood glucose monitoring     clopidogrel 75 mg tablet  Commonly known as:  PLAVIX  TAKE 1 TABLET BY MOUTH EVERY DAY     lancets misc  for blood glucose monitoring 3x day     * lisinopril 20 mg tablet  Commonly known as:  PRINIVIL  20 mg.     * lisinopril 40 mg tablet  Commonly known as:  PRINIVIL  Take 40 mg by mouth once daily.     metFORMIN 1,000 mg tablet  Commonly known as:  GLUCOPHAGE  take 1 tablet by oral route 2 times every day with morning and evening meals     omeprazole 20 mg capsule  Commonly known as:  PriLOSEC  Take 20 mg by mouth once daily.     ONETOUCH ULTRA TEST strip  Generic drug:  blood sugar diagnostic  for blood glucose monitoring 3-4x day     pen needle, diabetic 32 gauge x 1/4\" needle  Commonly known as:  NOVOFINE 32  For insulin administration once daily     rosuvastatin 10 mg tablet  Commonly known as:  CRESTOR  10 mg.     SITagliptin 100 mg tablet  Commonly known as:  JANUVIA  Take 1 tablet (100 mg total) by mouth daily.        * This list has 2 medication(s) that are the same as other medications prescribed for you. Read the directions carefully, and ask your doctor or other care provider to review them with you.              Review of Systems  Pertinent items are noted in HPI.    Objective   Labs  I have reviewed the patient's labs.  Significant abnormals are Hemoglobin is 7.9.    Imaging  Not applicable    Telemetry:   Not applicable    Physical Exam  BP (!) 175/82 (BP Location: Right upper arm, Patient " "Position: Lying)   Pulse 71   Temp 36.6 °C (97.8 °F) (Oral)   Resp 18   Ht 1.854 m (6' 1\")   Wt 97.5 kg (215 lb)   SpO2 98%   BMI 28.37 kg/m²   General appearance: well-developed, well-nourished and appears stated age  Neck: supple, no carotid bruit and no adenopathy  Lungs: clear to auscultation bilaterally  Heart: regular rate and rhythm, S1, S2 normal, no murmur, click, rub or gallop  Abdomen: soft, non-tender, bowel sounds normal and no masses/no organomegaly  Extremities: Moderate right lower extremity edema noted there was no open wounds noted on the right lower extremity.  The left lower extremity was dressed with wound vacs in place.  Neurologic: alert, oriented with intact speech and cognition  sensation intact to touch  motor:no focal weakness        Assessment   58 y.o. male being consulted for evaluate rehab needs  Plan of care was discussed with patient and            Plan     Mr. Lucero is a 58-year-old male status post extensive debridement of his left foot and ankle due to osteomyelitis.  From a functional perspective he is at a supervision to close supervision level for mobility including transfers, short distance ambulation as well as self-care.  Recommendation would be referral to home care for home care IV antibiotics as well as physical therapy versus if he is not able to return home then referral to skilled nursing facility.  "

## 2019-01-15 NOTE — PLAN OF CARE
Problem: Patient Care Overview  Goal: Plan of Care Review  Outcome: Ongoing (interventions implemented as appropriate)   01/15/19 7343   Coping/Psychosocial   Plan Of Care Reviewed With patient   Plan of Care Review   Progress improving   Outcome Summary Roxicodone effective for pain relief       Problem: Infection, Risk/Actual (Adult)  Goal: Infection Prevention/Resolution  Outcome: Ongoing (interventions implemented as appropriate)

## 2019-01-15 NOTE — PROGRESS NOTES
[facility] Medicine Service -  Daily Progress Note       SUBJECTIVE   Interval History: denies CP and SOB. Sitting in chair today     OBJECTIVE      Vital signs in last 24 hours:  Temp:  [36.6 °C (97.8 °F)-36.9 °C (98.4 °F)] 36.6 °C (97.8 °F)  Heart Rate:  [71-77] 71  Resp:  [18-20] 18  BP: (132-175)/(59-82) 175/82    Intake/Output Summary (Last 24 hours) at 01/15/19 1058  Last data filed at 01/15/19 0538   Gross per 24 hour   Intake          1633.33 ml   Output              700 ml   Net           933.33 ml       PHYSICAL EXAMINATION      GEN: well-developed and well-nourished; not in acute distress  HEENT: normocephalic; atraumatic  EYES: EOMI  CARDIO: regular rate and rhythm  RESP: clear to auscultation bilaterally; no rales, rhonchi, or wheezes  ABD: soft, non-distended, non-tender, normal bowel sounds  EXT: no cyanosis or edema  SKIN: dressing intact left leg  NEURO: alert and oriented x 3; nonfocal  BEHAVIOR/EMOTIONAL: appropriate; cooperative           LINES, CATHETERS, DRAINS, AIRWAYS, AND WOUNDS   Lines, Drains, Airways, Wounds:  Peripheral IV 01/12/19 Left Forearm (Active)   Number of days: 3       Closed/Suction Drain 1 Left Foot (Active)   Number of days: 4       Closed/Suction Drain 2 Left Foot (Active)   Number of days: 4       Surgical Incision Foot Left (Active)   Number of days: 11       Comments:      LABS / IMAGING / TELE      Labs    Results from last 7 days  Lab Units 01/14/19  1608 01/14/19  0519 01/13/19  0514 01/12/19  1537   WBC K/uL  --  10.57* 13.12* 16.74*   HEMOGLOBIN g/dL 7.9* 8.4* 7.2* 8.4*   HEMATOCRIT % 24.4* 27.0* 21.7* 24.8*   PLATELETS K/uL  --  349 273 411*       Results from last 7 days  Lab Units 01/14/19  0519 01/13/19  0514 01/12/19  1537   SODIUM mEQ/L 133* 133* 134*   POTASSIUM mEQ/L 4.1 3.5* 3.7   CHLORIDE mEQ/L 106 100 101   CO2 mEQ/L 21* 23 23   BUN mg/dL 9 7* 10   CREATININE mg/dL 1.0 1.0 1.2   GLUCOSE mg/dL 200* 92 112*   CALCIUM mg/dL 6.8* 6.6* 6.6*            ASSESSMENT AND PLAN      * Acute hematogenous osteomyelitis of left foot (CMS/[facility]) ([facility])   Assessment & Plan    Per podiatry and ID  Will need 6 weeks of antibiotics          Hypokalemia   Assessment & Plan    repleted    Follow         Necrotizing fasciitis (CMS/[facility])   Assessment & Plan    Underwent extensive debridement, he has a septic joint and osteomyelitis  Treatment as above  S/p OR 1/11        Streptococcal sepsis (CMS/[facility]) ([facility])   Assessment & Plan     he will need a minimum of 6 weeks of IV abx  ID following  ECHO with no vegetation        Elevated serum creatinine   Assessment & Plan    Cr in May was 1.1   Has underlying DM and HTN   stable              Hyponatremia   Assessment & Plan    Encourage oral hydration   trend        Leukocytosis   Assessment & Plan    Secondary to acute infection   trend        Anemia   Assessment & Plan    No recent hgb for comparion. Hgb 15.1 1 yr ago  Had beeding issues  after surgery  Wound vac removed  Received 6 units prbc  Hb stable    Follow Hb closely  S/p 1 unit PRBC 1/13  Hb 7.9 yesterday  Recheck CBC today and transfuse for Hb <7.5        Hypertension   Assessment & Plan    BP fluctuating  Elevated today  trend        Lipid disorder   Assessment & Plan    Cont statin        Type 2 diabetes mellitus (CMS/[facility]) ([facility])   Assessment & Plan    A1C 10.8 in May 2018.   Better control   Cont otupt meds for now  Continue  lanutus, and meal time insulin   Diabetic diet  Aim for tighter control with active infection     Lab Results   Component Value Date    HGBA1C 10.2 (H) 01/04/2019               History of CVA (cerebrovascular accident)   Assessment & Plan    Cont statin and plavix             VTE Assessment: [place]    VTE Prophylaxis Plan: Lovenox  Code Status: Full Code       Peggy Hinds MD  1/15/2019     Case discussed with patient and nurse

## 2019-01-15 NOTE — PROGRESS NOTES
Podiatry S/O Problem Oriented Progress Note      Interval History: Patient seen at bedside POD#4 s/p Versajet debridement with Integra/Primatrix graft placed and POD#7 s/p left lower extremity debridement with extensive soft tissue excision, bone biopsies.  States no pain at this time. No events overnight, NAD. Denies any N/V/F/C/CP/SOB.      Labs  reviewed    Imaging  reviewed    Vital signs in last 24 hours:  Temp:  [36.6 °C (97.8 °F)-36.9 °C (98.4 °F)] 36.6 °C (97.8 °F)  Heart Rate:  [71-77] 71  Resp:  [18-20] 18  BP: (132-175)/(59-82) 175/82      Intake/Output Summary (Last 24 hours) at 01/15/19 1044  Last data filed at 01/15/19 0538   Gross per 24 hour   Intake          1633.33 ml   Output              700 ml   Net           933.33 ml       Physical Exam:  LLE dressings clean, dry, and intact. Wound VACs functioning properly. Approximately 300mL of dark red drainage in wound VAC #1 canister.        Assessment & Plan  Pt is a 57 y/o male POD#4 s/p Versajet debridement with Integra/Primatrix graft placed and POD#7 s/p left lower extremity debridement with extensive soft tissue excision, bone biopsies.  -PM&R consulted, pending  -Wound vac/dressings left intact this morning. Plan to change tomorrow.   -Will need home nursing for wound vac changes after d/c. CM aware and assisting with making arrangements.   -Considering outpatient HBO, to follow-up with Dr. Tee regarding this.   -Instructed patient to keep the left leg as elevated as possible to control bleeding  -Continue Plavix   -Continue DVT prophylaxis with Lovenox  -OR bone cultures 1/7 growing Streptococcus anginosus and Enterococcus faecalis (final)   -OR bone pathology for cuboid and talus are both positive for acute osteomyelitis  -OR cx 1/4: Strep anginosus, enterococcus faecalis (final)   -The paperwork for wound vac has been placed in chart.  Our team previously contacted CM regarding need of patient to have two separate wound vacs given the  extent of the wound.    -Nonweightbearing to the left lower extremity   -F/U with Dr. Tee in the [facility] wound care center 1 week after d/c         Jennifer Sharma DPM

## 2019-01-15 NOTE — PLAN OF CARE
Problem: Patient Care Overview  Goal: Plan of Care Review  Outcome: Ongoing (interventions implemented as appropriate)   01/15/19 7235   Coping/Psychosocial   Plan Of Care Reviewed With patient   Plan of Care Review   Progress improving   Outcome Summary pain tolerable with oxycodone and tylenol, VAC x2, tolerating regular diet, voiding on commode       Problem: Infection, Risk/Actual (Adult)  Goal: Infection Prevention/Resolution  Outcome: Ongoing (interventions implemented as appropriate)      Problem: Fall Risk (Adult)  Goal: Absence of Falls  Outcome: Ongoing (interventions implemented as appropriate)      Problem: Pressure Ulcer (Adult)  Goal: Signs and Symptoms of Listed Potential Problems Will be Absent, Minimized or Managed (Pressure Ulcer)  Outcome: Ongoing (interventions implemented as appropriate)

## 2019-01-15 NOTE — PROGRESS NOTES
"Met with patient discussed  P.T. evaluation maintaining nwb  , patient did not do stairs today , pt does not anticipate that he will have any problems with stairs. Discussed recommendations of homecare at [place], pt . replied \"I don't know what the plan will be,Dr VALE wants hyperbaric treatment, I have to discuss with the Dr.\"  I did confirm the he will be using [facility] for  iv ampicillin  2 gms q4  now on day 8 of 42.   "

## 2019-01-16 LAB
ABO + RH BLD: NORMAL
ANION GAP SERPL CALC-SCNC: 7 MEQ/L (ref 3–15)
BLD GP AB SCN SERPL QL: NEGATIVE
BUN SERPL-MCNC: 10 MG/DL (ref 8–20)
CALCIUM SERPL-MCNC: 7.4 MG/DL (ref 8.9–10.3)
CHLORIDE SERPL-SCNC: 104 MEQ/L (ref 98–109)
CO2 SERPL-SCNC: 25 MEQ/L (ref 22–32)
CREAT SERPL-MCNC: 0.9 MG/DL
D AG BLD QL: POSITIVE
ERYTHROCYTE [DISTWIDTH] IN BLOOD BY AUTOMATED COUNT: 15.2 % (ref 11.6–14.4)
GFR SERPL CREATININE-BSD FRML MDRD: >60 ML/MIN/1.73M*2
GLUCOSE BLD-MCNC: 185 MG/DL (ref 70–99)
GLUCOSE BLD-MCNC: 191 MG/DL (ref 70–99)
GLUCOSE BLD-MCNC: 220 MG/DL (ref 70–99)
GLUCOSE BLD-MCNC: 99 MG/DL (ref 70–99)
GLUCOSE SERPL-MCNC: 94 MG/DL (ref 70–99)
HCT VFR BLDCO AUTO: 24.3 %
HGB BLD-MCNC: 7.8 G/DL
LABORATORY COMMENT REPORT: NORMAL
MCH RBC QN AUTO: 27.7 PG (ref 28–33.2)
MCHC RBC AUTO-ENTMCNC: 32.1 G/DL (ref 32.2–36.5)
MCV RBC AUTO: 86.2 FL (ref 83–98)
PDW BLD AUTO: 10.2 FL (ref 9.4–12.4)
PLATELET # BLD AUTO: 268 K/UL
POCT TEST: ABNORMAL
POCT TEST: NORMAL
POTASSIUM SERPL-SCNC: 4.1 MEQ/L (ref 3.6–5.1)
RBC # BLD AUTO: 2.82 M/UL (ref 4.5–5.8)
SODIUM SERPL-SCNC: 136 MEQ/L (ref 136–144)
WBC # BLD AUTO: 8.35 K/UL

## 2019-01-16 PROCEDURE — 63600000 HC DRUGS/DETAIL CODE: Performed by: PODIATRIST

## 2019-01-16 PROCEDURE — 63700000 HC SELF-ADMINISTRABLE DRUG: Performed by: PODIATRIST

## 2019-01-16 PROCEDURE — 36415 COLL VENOUS BLD VENIPUNCTURE: CPT | Performed by: PODIATRIST

## 2019-01-16 PROCEDURE — 25800000 HC PHARMACY IV SOLUTIONS: Performed by: INTERNAL MEDICINE

## 2019-01-16 PROCEDURE — 80048 BASIC METABOLIC PNL TOTAL CA: CPT | Performed by: PODIATRIST

## 2019-01-16 PROCEDURE — 99232 SBSQ HOSP IP/OBS MODERATE 35: CPT | Performed by: HOSPITALIST

## 2019-01-16 PROCEDURE — 36430 TRANSFUSION BLD/BLD COMPNT: CPT

## 2019-01-16 PROCEDURE — 12000000 HC ROOM AND CARE MED/SURG

## 2019-01-16 PROCEDURE — 63700000 HC SELF-ADMINISTRABLE DRUG: Performed by: HOSPITALIST

## 2019-01-16 PROCEDURE — P9016 RBC LEUKOCYTES REDUCED: HCPCS

## 2019-01-16 PROCEDURE — 86900 BLOOD TYPING SEROLOGIC ABO: CPT

## 2019-01-16 PROCEDURE — 63600000 HC DRUGS/DETAIL CODE: Performed by: INTERNAL MEDICINE

## 2019-01-16 PROCEDURE — 85027 COMPLETE CBC AUTOMATED: CPT | Performed by: HOSPITALIST

## 2019-01-16 RX ORDER — SODIUM CHLORIDE 9 MG/ML
5 INJECTION, SOLUTION INTRAVENOUS AS NEEDED
Status: ACTIVE | OUTPATIENT
Start: 2019-01-16 | End: 2019-01-17

## 2019-01-16 RX ORDER — LISINOPRIL 20 MG/1
20 TABLET ORAL DAILY
Status: DISCONTINUED | OUTPATIENT
Start: 2019-01-16 | End: 2019-01-19 | Stop reason: HOSPADM

## 2019-01-16 RX ADMIN — ENOXAPARIN SODIUM 40 MG: 100 INJECTION SUBCUTANEOUS at 18:26

## 2019-01-16 RX ADMIN — OXYCODONE HYDROCHLORIDE 5 MG: 5 TABLET ORAL at 09:32

## 2019-01-16 RX ADMIN — AMPICILLIN SODIUM 2 G: 1 INJECTION, POWDER, FOR SOLUTION INTRAMUSCULAR; INTRAVENOUS at 01:39

## 2019-01-16 RX ADMIN — AMPICILLIN SODIUM 2 G: 1 INJECTION, POWDER, FOR SOLUTION INTRAMUSCULAR; INTRAVENOUS at 09:26

## 2019-01-16 RX ADMIN — INSULIN ASPART 5 UNITS: 100 INJECTION, SOLUTION INTRAVENOUS; SUBCUTANEOUS at 18:23

## 2019-01-16 RX ADMIN — INSULIN ASPART 3 UNITS: 100 INJECTION, SOLUTION INTRAVENOUS; SUBCUTANEOUS at 12:58

## 2019-01-16 RX ADMIN — OXYCODONE HYDROCHLORIDE 5 MG: 5 TABLET ORAL at 22:22

## 2019-01-16 RX ADMIN — INSULIN ASPART 3 UNITS: 100 INJECTION, SOLUTION INTRAVENOUS; SUBCUTANEOUS at 22:31

## 2019-01-16 RX ADMIN — ACETAMINOPHEN 650 MG: 325 TABLET, FILM COATED ORAL at 13:39

## 2019-01-16 RX ADMIN — INSULIN GLARGINE 25 UNITS: 100 INJECTION, SOLUTION SUBCUTANEOUS at 22:32

## 2019-01-16 RX ADMIN — AMPICILLIN SODIUM 2 G: 1 INJECTION, POWDER, FOR SOLUTION INTRAMUSCULAR; INTRAVENOUS at 23:42

## 2019-01-16 RX ADMIN — CLOPIDOGREL BISULFATE 75 MG: 75 TABLET, FILM COATED ORAL at 09:28

## 2019-01-16 RX ADMIN — AMPICILLIN SODIUM 2 G: 1 INJECTION, POWDER, FOR SOLUTION INTRAMUSCULAR; INTRAVENOUS at 06:31

## 2019-01-16 RX ADMIN — PANTOPRAZOLE SODIUM 20 MG: 20 TABLET, DELAYED RELEASE ORAL at 09:29

## 2019-01-16 RX ADMIN — INSULIN ASPART 5 UNITS: 100 INJECTION, SOLUTION INTRAVENOUS; SUBCUTANEOUS at 12:58

## 2019-01-16 RX ADMIN — AMPICILLIN SODIUM 2 G: 1 INJECTION, POWDER, FOR SOLUTION INTRAMUSCULAR; INTRAVENOUS at 19:47

## 2019-01-16 RX ADMIN — INSULIN ASPART 5 UNITS: 100 INJECTION, SOLUTION INTRAVENOUS; SUBCUTANEOUS at 09:24

## 2019-01-16 RX ADMIN — ROSUVASTATIN CALCIUM 10 MG: 10 TABLET, FILM COATED ORAL at 09:29

## 2019-01-16 RX ADMIN — OXYCODONE HYDROCHLORIDE 5 MG: 5 TABLET ORAL at 18:21

## 2019-01-16 RX ADMIN — OXYCODONE HYDROCHLORIDE 5 MG: 5 TABLET ORAL at 01:42

## 2019-01-16 RX ADMIN — ACETAMINOPHEN 650 MG: 325 TABLET, FILM COATED ORAL at 09:33

## 2019-01-16 RX ADMIN — INSULIN ASPART 3 UNITS: 100 INJECTION, SOLUTION INTRAVENOUS; SUBCUTANEOUS at 18:24

## 2019-01-16 RX ADMIN — SITAGLIPTIN 100 MG: 100 TABLET, FILM COATED ORAL at 09:29

## 2019-01-16 RX ADMIN — AMPICILLIN SODIUM 2 G: 1 INJECTION, POWDER, FOR SOLUTION INTRAMUSCULAR; INTRAVENOUS at 13:04

## 2019-01-16 RX ADMIN — LISINOPRIL 20 MG: 20 TABLET ORAL at 11:40

## 2019-01-16 RX ADMIN — OXYCODONE HYDROCHLORIDE 5 MG: 5 TABLET ORAL at 13:39

## 2019-01-16 NOTE — PROGRESS NOTES
Hospital Medicine Service -  Daily Progress Note       SUBJECTIVE   Interval History: denies CP and SOB     OBJECTIVE      Vital signs in last 24 hours:  Temp:  [36.7 °C (98.1 °F)-37.2 °C (98.9 °F)] 36.7 °C (98.1 °F)  Heart Rate:  [73-84] 84  Resp:  [18] 18  BP: (156-175)/(74-85) 175/85    Intake/Output Summary (Last 24 hours) at 01/16/19 1042  Last data filed at 01/16/19 0830   Gross per 24 hour   Intake          1164.67 ml   Output             1075 ml   Net            89.67 ml       PHYSICAL EXAMINATION      GEN: well-developed and well-nourished; not in acute distress  HEENT: normocephalic; atraumatic  EYES: EOMI  CARDIO: regular rate and rhythm  RESP: clear to auscultation bilaterally; no rales, rhonchi, or wheezes  ABD: soft, non-distended, non-tender, normal bowel sounds  EXT: no cyanosis or edema  SKIN: dressing intact left foot  NEURO: alert and oriented x 3; nonfocal  BEHAVIOR/EMOTIONAL: appropriate; cooperative           LINES, CATHETERS, DRAINS, AIRWAYS, AND WOUNDS   Lines, Drains, Airways, Wounds:  Peripheral IV 01/15/19 Left Forearm (Active)   Number of days: 1       Closed/Suction Drain 1 Left Foot (Active)   Number of days: 5       Closed/Suction Drain 2 Left Foot (Active)   Number of days: 5       Surgical Incision Foot Left (Active)   Number of days: 12       Comments:      LABS / IMAGING / TELE      Labs    Results from last 7 days  Lab Units 01/16/19  0447 01/15/19  1213 01/14/19  1608 01/14/19  0519   WBC K/uL 8.35 9.23  --  10.57*   HEMOGLOBIN g/dL 7.8* 8.1* 7.9* 8.4*   HEMATOCRIT % 24.3* 25.2* 24.4* 27.0*   PLATELETS K/uL 268 311  --  349       Results from last 7 days  Lab Units 01/16/19  0447 01/14/19  0519 01/13/19  0514   SODIUM mEQ/L 136 133* 133*   POTASSIUM mEQ/L 4.1 4.1 3.5*   CHLORIDE mEQ/L 104 106 100   CO2 mEQ/L 25 21* 23   BUN mg/dL 10 9 7*   CREATININE mg/dL 0.9 1.0 1.0   GLUCOSE mg/dL 94 200* 92   CALCIUM mg/dL 7.4* 6.8* 6.6*         ASSESSMENT AND PLAN      * Acute  hematogenous osteomyelitis of left foot (CMS/[facility]) ([facility])   Assessment & Plan    Per podiatry and ID  Will need 6 weeks of antibiotics          Hypokalemia   Assessment & Plan    repleted    Follow         Necrotizing fasciitis (CMS/[facility])   Assessment & Plan    Underwent extensive debridement, he has a septic joint and osteomyelitis  Treatment as above  S/p OR 1/11        Streptococcal sepsis (CMS/[facility]) ([facility])   Assessment & Plan     he will need a minimum of 6 weeks of IV abx  ID following  ECHO with no vegetation        Elevated serum creatinine   Assessment & Plan    Cr in May was 1.1   Has underlying DM and HTN   stable              Hyponatremia   Assessment & Plan    Encourage oral hydration   trend        Leukocytosis   Assessment & Plan    Secondary to acute infection   trend        Anemia   Assessment & Plan    No recent hgb for comparion. Hgb 15.1 1 yr ago  Had beeding issues  after surgery  Wound vac removed  Received 6 units prbc  Hb stable    Follow Hb closely  S/p 1 unit PRBC 1/13  Hb 7.8 today  Plan to transfuse 1 more unit today        Hypertension   Assessment & Plan    BP elevated  Restart lisnopril  trend        Lipid disorder   Assessment & Plan    Cont statin        Type 2 diabetes mellitus (CMS/[facility]) ([facility])   Assessment & Plan    A1C 10.8 in May 2018.   Better control   Cont otupt meds for now  Continue  lanutus, and meal time insulin   Diabetic diet      Lab Results   Component Value Date    HGBA1C 10.2 (H) 01/04/2019               History of CVA (cerebrovascular accident)   Assessment & Plan    Cont statin and plavix             VTE Assessment: [place]    VTE Prophylaxis Plan: lovenox  Code Status: Full Code       Peggy Hinds MD  1/16/2019     Case discussed with patient, family and podiatry

## 2019-01-16 NOTE — PROGRESS NOTES
Per medical rounds, Podiatry recc. HBO 5days wkly, 2 wound vacs and infusion of IV anti bx Q6 hrs. Spoke with [facility]ia[facility] to initiate [facility] referral. Pt with [employer]. Which may be a barrier to this LOC. However, [facility] [employer]iaison is submitting clinical info. To [employer] to seek auth. If denied, there may be an opportunity to appeal denial. Left vm for pt sister to discuss, awaiting return call. sw to follow.

## 2019-01-16 NOTE — PROGRESS NOTES
Infectious Disease Progress Note    Patient Name: Harvey Lucero Jr  MR#: [id number]  : 1960  Admission Date: 2019  Date: 19   Time: 12:32 PM   Author: Severo Dias MD    OBJECTIVE:  No new issues, awaiting for placement, tolerating the antibiotics well, creatinine remains stable      Anti-infectives     Start     Dose/Rate Route Frequency Ordered Stop    19 1045  ampicillin (OMNIPEN) 2 g in sodium chloride 0.9 % 100 mL IVPB      2 g  200 mL/hr over 30 Minutes intravenous Every 4 hours interval 19 1042            ROS  No vomiting diarrhea fevers chills all other systems are negative   vital Signs:    Temp:  [36.7 °C (98.1 °F)-37.2 °C (98.9 °F)] 36.9 °C (98.4 °F)  Heart Rate:  [73-84] 80  Resp:  [18] 18  BP: (153-175)/(69-85) 153/69    Temp (72hrs), Av.9 °C (98.4 °F), Min:36.6 °C (97.8 °F), Max:37.2 °C (99 °F)      Physical Exam    Gen: Aox3  HEENT: OP clear  Neck: Supple  LAD: No cervical LAD  Lungs: CTAB  CV: RRR no murmurs  Abd: Soft NTND +BS  Ext: Left foot dressing CDI wound VAC in place        Lines, Drains, Airways, Wounds:  Peripheral IV 19 Right Arm (Active)   Number of days: 3       Peripheral IV 19 Left Hand (Active)   Number of days: 0       Closed/Suction Drain Left Foot (Active)   Number of days: 0       Surgical Incision Foot Left (Active)   Number of days: 3       Labs:    Lab Results   Component Value Date    WBC 8.35 2019    HGB 7.8 (L) 2019    HCT 24.3 (L) 2019    MCV 86.2 2019     2019     Lab Results   Component Value Date    GLUCOSE 94 2019    CALCIUM 7.4 (L) 2019     2019    K 4.1 2019    CO2 25 2019     2019    BUN 10 2019    CREATININE 0.9 2019     Lab Results   Component Value Date    ALT 16 2018    AST 11 2018    ALKPHOS 60 2018    BILITOT 0.6 2018         Patient Active Problem List   Diagnosis Code   • History of CVA  (cerebrovascular accident) Z86.73   • Type 2 diabetes mellitus (CMS/HCC) ([facility]) E11.9   • Lipid disorder E78.9   • Hypertension I10   • Acute hematogenous osteomyelitis of left foot (CMS/HCC) ([facility]) M86.072   • Anemia D64.9   • Leukocytosis D72.829   • Hyponatremia E87.1   • Elevated serum creatinine R79.89   • Bacteremia R78.81   • Gas gangrene (CMS/[facility]) A48.0   • Streptococcal sepsis (CMS/HCC) ([facility]) A40.9   • Necrotizing fasciitis (CMS/[facility]) M72.6   • Open wound of ankle and foot S91.009A, S91.309A   • Hypokalemia E87.6   • Localized edema R60.0     Other streptococcal sepsis (CMS/[facility])   Assessment & Plan    Day 9 of 42 of ampicillin will need PICC      Necrotizing fasciitis of ankle and foot (CMS/[facility])   Assessment & Plan    As above        Leukocytosis   Assessment & Plan    Resolved                Severo Dias MD  1/16/201912:32 PM

## 2019-01-16 NOTE — PROGRESS NOTES
Subjective:   Patient seen at bedside POD#5 s/p Versajet debridement with Integra/Primatrix graft placed and POD#9 s/p left lower extremity debridement with extensive soft tissue excision, bone biopsies. No events overnight, NAD. His pain is well controlled this morning. No complaints today. Denies any N/V/F/C/CP/SOB.     ROS:   Past Medical/Surgical history, Allergies, Meds reviewed in detail as charted  FH/SH reviewed in detail as charted  Review of Systems:  Head and Neck: No complaints  Chest : No complaints  Abdomen: No Complaints  Constitutional: Unremarkable       Objective:      Vitals:  Vitals:    01/16/19 1100   BP: (!) 153/69   Pulse: 80   Resp: 18   Temp: 36.9 °C (98.4 °F)   SpO2: 97%       Radiology: No new Radiology.    Labs:     CBC Results       01/16/19 01/15/19 01/14/19                    0447 1213 1608         WBC 8.35 9.23 -         RBC 2.82 (L) 2.88 (L) -         HGB 7.8 (L) 8.1 (L) 7.9 (L)         HCT 24.3 (L) 25.2 (L) 24.4 (L)         MCV 86.2 87.5 -         MCH 27.7 (L) 28.1 -         MCHC 32.1 (L) 32.1 (L) -          311 -                      BMP Results       01/16/19 01/14/19 01/13/19                    0447 0519 0514          133 (L) 133 (L)         K 4.1 4.1 3.5 (L)         Cl 104 106 100         CO2 25 21 (L) 23         Glucose 94 200 (H) 92         BUN 10 9 7 (L)         Creatinine 0.9 1.0 1.0         Calcium 7.4 (L) 6.8 (L) 6.6 (L)         Anion Gap 7 6 10         EGFR &gt;60.0 &gt;60.0 &gt;60.0                         Lower Extremity Physical Exam:     Wound vacs to the LLE left intact this morning    - Derm: Dressing is clean, dry, and intact without strikethrough. Both wound vac seals are appropriate at 125 mmHg. 400cc of blood in wound vac canister.         Assessment and Plan:     Pt is a 57 y/o male seen at bedside with Dr. Zaccaria POD#5 s/p Versajet debridement with Integra/Primatrix graft placed and POD#9 s/p left lower extremity debridement with extensive soft  tissue excision, bone biopsies.    -PM&R consulted, pending  -Wound vac/dressings left intact this morning. Plan to change tomorrow.   -Plan discussed in detail with the patient at bedside today. Patient will require 2 wound vacs to the left foot as an outpatient secondary to the amount of drainage. One 200cc canister will likely not be enough between wound vac dressing changes. Would like wound vacs changed Wednesday (after his appointment with Dr. Tee) and Saturday. He will require HBO oxygen therapy. This will likely be 4-5 days per week. Plan discussed with patient and CM/SW this morning. Plan is SNF vs home with home nursing pending approval. Likely to d/c Friday.   -Hgb 7.8 this morning. One unit PRBC ordered per Dr. Tee.   -Instructed patient to keep the left leg as elevated as possible to control bleeding  -Continue Plavix   -Continue DVT prophylaxis with Lovenox  -OR bone cultures 1/7 growing Streptococcus anginosus and Enterococcus faecalis (final)   -OR bone pathology for cuboid and talus are both positive for acute osteomyelitis  -OR cx 1/4: Strep anginosus, enterococcus faecalis (final)   -The paperwork for wound vac has been placed in chart.  Our team previously contacted CM regarding need of patient to have two separate wound vacs given the extent of the wound.    -Nonweightbearing to the left lower extremity   -F/U with Dr. Tee in the [facility] wound care center 1 week after d/c   - Please contact on call podiatry resident with any questions or concerns. [phone] before 5 PM.      Richard Chavez DPM  Pager #[phone]

## 2019-01-16 NOTE — PLAN OF CARE
Problem: Patient Care Overview  Goal: Plan of Care Review  Outcome: Ongoing (interventions implemented as appropriate)   01/15/19 7620   Coping/Psychosocial   Plan Of Care Reviewed With patient   Plan of Care Review   Progress improving   Outcome Summary Pt IV ABX cnt. Oxycodone for pain.        Problem: Infection, Risk/Actual (Adult)  Goal: Infection Prevention/Resolution  Outcome: Ongoing (interventions implemented as appropriate)      Problem: Fall Risk (Adult)  Goal: Absence of Falls  Outcome: Ongoing (interventions implemented as appropriate)      Problem: Pressure Ulcer (Adult)  Goal: Signs and Symptoms of Listed Potential Problems Will be Absent, Minimized or Managed (Pressure Ulcer)  Outcome: Ongoing (interventions implemented as appropriate)

## 2019-01-16 NOTE — PLAN OF CARE
Problem: Patient Care Overview  Goal: Plan of Care Review  Outcome: Ongoing (interventions implemented as appropriate)  Mild Nutrition Risk   01/16/19 1131   Coping/Psychosocial   Plan Of Care Reviewed With patient;spouse   Plan of Care Review   Progress improving   Outcome Summary Pt reported good appetite. Had Greek yogurt and an apple for breakfast. Had snacks from home at bedside. Refusing supplments. No new wt recorded. Blood glucose well controlled.    Goals: Pt will consume at least 75% of meals and protein w/ each meal.    Blood glucose will be  mg/dL pre-prandial.  Recommendations:  1. Agree w/ diet order.   2. Will order supplements if pt agrees.   3. Recommend daily MVI w/ minerals.

## 2019-01-17 ENCOUNTER — APPOINTMENT (INPATIENT)
Dept: RADIOLOGY | Facility: HOSPITAL | Age: 59
DRG: 853 | End: 2019-01-17
Attending: PODIATRIST
Payer: COMMERCIAL

## 2019-01-17 LAB
ANION GAP SERPL CALC-SCNC: 9 MEQ/L (ref 3–15)
BUN SERPL-MCNC: 10 MG/DL (ref 8–20)
CALCIUM SERPL-MCNC: 7.8 MG/DL (ref 8.9–10.3)
CHLORIDE SERPL-SCNC: 104 MEQ/L (ref 98–109)
CO2 SERPL-SCNC: 26 MEQ/L (ref 22–32)
CREAT SERPL-MCNC: 1 MG/DL
ERYTHROCYTE [DISTWIDTH] IN BLOOD BY AUTOMATED COUNT: 15 % (ref 11.6–14.4)
GFR SERPL CREATININE-BSD FRML MDRD: >60 ML/MIN/1.73M*2
GLUCOSE BLD-MCNC: 109 MG/DL (ref 70–99)
GLUCOSE BLD-MCNC: 128 MG/DL (ref 70–99)
GLUCOSE BLD-MCNC: 216 MG/DL (ref 70–99)
GLUCOSE BLD-MCNC: 258 MG/DL (ref 70–99)
GLUCOSE SERPL-MCNC: 103 MG/DL (ref 70–99)
HCT VFR BLDCO AUTO: 28.8 %
HGB BLD-MCNC: 9.4 G/DL
MCH RBC QN AUTO: 28.1 PG (ref 28–33.2)
MCHC RBC AUTO-ENTMCNC: 32.6 G/DL (ref 32.2–36.5)
MCV RBC AUTO: 86.2 FL (ref 83–98)
PDW BLD AUTO: 10.1 FL (ref 9.4–12.4)
PLATELET # BLD AUTO: 387 K/UL
POCT TEST: ABNORMAL
POTASSIUM SERPL-SCNC: 4.4 MEQ/L (ref 3.6–5.1)
RBC # BLD AUTO: 3.34 M/UL (ref 4.5–5.8)
SODIUM SERPL-SCNC: 139 MEQ/L (ref 136–144)
WBC # BLD AUTO: 8.31 K/UL

## 2019-01-17 PROCEDURE — 80048 BASIC METABOLIC PNL TOTAL CA: CPT | Performed by: PODIATRIST

## 2019-01-17 PROCEDURE — 99232 SBSQ HOSP IP/OBS MODERATE 35: CPT | Performed by: HOSPITALIST

## 2019-01-17 PROCEDURE — 63700000 HC SELF-ADMINISTRABLE DRUG: Performed by: PODIATRIST

## 2019-01-17 PROCEDURE — 71045 X-RAY EXAM CHEST 1 VIEW: CPT

## 2019-01-17 PROCEDURE — 12000000 HC ROOM AND CARE MED/SURG

## 2019-01-17 PROCEDURE — 63700000 HC SELF-ADMINISTRABLE DRUG: Performed by: HOSPITALIST

## 2019-01-17 PROCEDURE — 63600000 HC DRUGS/DETAIL CODE: Performed by: PODIATRIST

## 2019-01-17 PROCEDURE — 25800000 HC PHARMACY IV SOLUTIONS: Performed by: INTERNAL MEDICINE

## 2019-01-17 PROCEDURE — 97530 THERAPEUTIC ACTIVITIES: CPT | Mod: GO

## 2019-01-17 PROCEDURE — 73630 X-RAY EXAM OF FOOT: CPT | Mod: LT

## 2019-01-17 PROCEDURE — 63600000 HC DRUGS/DETAIL CODE: Performed by: INTERNAL MEDICINE

## 2019-01-17 PROCEDURE — 36415 COLL VENOUS BLD VENIPUNCTURE: CPT | Performed by: PODIATRIST

## 2019-01-17 PROCEDURE — 85027 COMPLETE CBC AUTOMATED: CPT | Performed by: HOSPITALIST

## 2019-01-17 RX ADMIN — AMPICILLIN SODIUM 2 G: 1 INJECTION, POWDER, FOR SOLUTION INTRAMUSCULAR; INTRAVENOUS at 08:52

## 2019-01-17 RX ADMIN — ACETAMINOPHEN 650 MG: 325 TABLET, FILM COATED ORAL at 19:27

## 2019-01-17 RX ADMIN — ACETAMINOPHEN 650 MG: 325 TABLET, FILM COATED ORAL at 08:57

## 2019-01-17 RX ADMIN — ENOXAPARIN SODIUM 40 MG: 100 INJECTION SUBCUTANEOUS at 17:33

## 2019-01-17 RX ADMIN — AMPICILLIN SODIUM 2 G: 1 INJECTION, POWDER, FOR SOLUTION INTRAMUSCULAR; INTRAVENOUS at 13:01

## 2019-01-17 RX ADMIN — OXYCODONE HYDROCHLORIDE 5 MG: 5 TABLET ORAL at 04:16

## 2019-01-17 RX ADMIN — OXYCODONE HYDROCHLORIDE 5 MG: 5 TABLET ORAL at 15:08

## 2019-01-17 RX ADMIN — AMPICILLIN SODIUM 2 G: 1 INJECTION, POWDER, FOR SOLUTION INTRAMUSCULAR; INTRAVENOUS at 17:25

## 2019-01-17 RX ADMIN — ACETAMINOPHEN 650 MG: 325 TABLET, FILM COATED ORAL at 15:07

## 2019-01-17 RX ADMIN — ACETAMINOPHEN 650 MG: 325 TABLET, FILM COATED ORAL at 23:12

## 2019-01-17 RX ADMIN — OXYCODONE HYDROCHLORIDE 5 MG: 5 TABLET ORAL at 19:27

## 2019-01-17 RX ADMIN — OXYCODONE HYDROCHLORIDE 5 MG: 5 TABLET ORAL at 09:00

## 2019-01-17 RX ADMIN — INSULIN ASPART 3 UNITS: 100 INJECTION, SOLUTION INTRAVENOUS; SUBCUTANEOUS at 17:36

## 2019-01-17 RX ADMIN — CLOPIDOGREL BISULFATE 75 MG: 75 TABLET, FILM COATED ORAL at 08:58

## 2019-01-17 RX ADMIN — OXYCODONE HYDROCHLORIDE 5 MG: 5 TABLET ORAL at 23:13

## 2019-01-17 RX ADMIN — PANTOPRAZOLE SODIUM 20 MG: 20 TABLET, DELAYED RELEASE ORAL at 09:00

## 2019-01-17 RX ADMIN — INSULIN ASPART 5 UNITS: 100 INJECTION, SOLUTION INTRAVENOUS; SUBCUTANEOUS at 17:35

## 2019-01-17 RX ADMIN — LISINOPRIL 20 MG: 20 TABLET ORAL at 08:59

## 2019-01-17 RX ADMIN — SITAGLIPTIN 100 MG: 100 TABLET, FILM COATED ORAL at 09:27

## 2019-01-17 RX ADMIN — AMPICILLIN SODIUM 2 G: 1 INJECTION, POWDER, FOR SOLUTION INTRAMUSCULAR; INTRAVENOUS at 03:29

## 2019-01-17 RX ADMIN — INSULIN ASPART 5 UNITS: 100 INJECTION, SOLUTION INTRAVENOUS; SUBCUTANEOUS at 09:26

## 2019-01-17 RX ADMIN — AMPICILLIN SODIUM 2 G: 1 INJECTION, POWDER, FOR SOLUTION INTRAMUSCULAR; INTRAVENOUS at 20:47

## 2019-01-17 RX ADMIN — INSULIN ASPART 5 UNITS: 100 INJECTION, SOLUTION INTRAVENOUS; SUBCUTANEOUS at 13:09

## 2019-01-17 RX ADMIN — INSULIN ASPART 3 UNITS: 100 INJECTION, SOLUTION INTRAVENOUS; SUBCUTANEOUS at 22:25

## 2019-01-17 RX ADMIN — INSULIN GLARGINE 25 UNITS: 100 INJECTION, SOLUTION SUBCUTANEOUS at 22:25

## 2019-01-17 RX ADMIN — ROSUVASTATIN CALCIUM 10 MG: 10 TABLET, FILM COATED ORAL at 09:00

## 2019-01-17 ASSESSMENT — COGNITIVE AND FUNCTIONAL STATUS - GENERAL
REMEMBERING TO TAKE MEDICATION: 4 - NONE
HELP NEEDED FOR PERSONAL GROOMING: 4 - NONE
EATING MEALS: 4 - NONE
REMEMBERING 5 ERRANDS WITH NO LIST: 4 - NONE
UNDERSTANDING 10 TO 15 MIN SPEECH: 4 - NONE
FOLLOWS FAMILIAR CONVERSATION: 4 - NONE
DRESSING REGULAR LOWER BODY CLOTHING: 3 - A LITTLE
REMEMBERING WHERE THINGS ARE: 4 - NONE
HELP NEEDED FOR BATHING: 3 - A LITTLE
TOILETING: 3 - A LITTLE
TAKING CARE OF COMPLICATED TASKS: 4 - NONE
DRESSING REGULAR UPPER BODY CLOTHING: 4 - NONE

## 2019-01-17 NOTE — PROGRESS NOTES
Spoke with [place] [employer] to req'st an update re. Pt insur. Per Heather the req'st for auth has been submitted, and is clinically being reviewed. She is hopefully they will have a determination today. If denied by insurance, there may be an opportunity for Dr to do a peer to peer review to appeal the denial. Will continue to follow.    Addm 15:40: Spoke with Heather in admissions for [facility]. Pt was denied for Ltac LOC by insur. Per Heather, MD can pursue a peer to peer appeal if desired. Paged Pod. Res. to inform, and req'sted best contact for MD if they are looking to appeal. Will also look to see PT/OT recc., as pt sister indicated she would be interested in Acute Rehab LOC if denied for Ltac. sw will continue to follow.    Addm 16:34: Spoke with pt sister to inform her of Ltac denial, as well as pt pref. For home. Sister expresses concern for pt return home and risk of re-injuring foot. Explained to sister that ultimately pt will need to direct plan and be agreeable to disposition, and will be happy to explore all options and assist as needed. Will meet with pt in am to discuss dc home vs. Rehab.

## 2019-01-17 NOTE — PLAN OF CARE
Problem: Patient Care Overview  Goal: Plan of Care Review  Outcome: Ongoing (interventions implemented as appropriate)   01/17/19 9286   Coping/Psychosocial   Plan Of Care Reviewed With patient;spouse   Plan of Care Review   Progress improving   Outcome Summary Patient reports pain control following Tylenol and Oxycodone       Problem: Infection, Risk/Actual (Adult)  Goal: Infection Prevention/Resolution  Outcome: Ongoing (interventions implemented as appropriate)

## 2019-01-17 NOTE — PLAN OF CARE
Problem: Patient Care Overview  Goal: Plan of Care Review  Outcome: Ongoing (interventions implemented as appropriate)      Problem: Self-Care Deficit (Adult,Obstetrics,Pediatric)  Goal: Identify Related Risk Factors and Signs and Symptoms  Outcome: Ongoing (interventions implemented as appropriate)

## 2019-01-17 NOTE — PROGRESS NOTES
Hospital Medicine Service -  Daily Progress Note       SUBJECTIVE   Interval History: pt seen and examined. Resting in bed. No chest pain/dyspnea.      OBJECTIVE      Vital signs in last 24 hours:  Temp:  [36.7 °C (98 °F)-36.9 °C (98.5 °F)] 36.8 °C (98.3 °F)  Heart Rate:  [72-80] 78  Resp:  [16-18] 18  BP: (142-171)/(67-88) 171/88    Intake/Output Summary (Last 24 hours) at 01/17/19 0809  Last data filed at 01/17/19 0659   Gross per 24 hour   Intake              575 ml   Output              455 ml   Net              120 ml       PHYSICAL EXAMINATION      Physical Exam   Constitutional: He is oriented to person, place, and time.   Eyes: EOM are normal.   Cardiovascular: Regular rhythm.  Exam reveals no gallop and no friction rub.    Pulmonary/Chest: Effort normal and breath sounds normal. No respiratory distress. He has no wheezes. He has no rales. He exhibits no tenderness.   Abdominal: Soft. Bowel sounds are normal. He exhibits no distension. There is no tenderness. There is no guarding.   Neurological: He is alert and oriented to person, place, and time.   Psychiatric: Judgment normal.      LINES, CATHETERS, DRAINS, AIRWAYS, AND WOUNDS   Lines, Drains, Airways, Wounds:  Peripheral IV 01/15/19 Left Forearm (Active)   Number of days: 2       Closed/Suction Drain 1 Left Foot (Active)   Number of days: 6       Closed/Suction Drain 2 Left Foot (Active)   Number of days: 6       Surgical Incision Foot Left (Active)   Number of days: 13       Comments:      LABS / IMAGING / TELE      Labs    Results from last 7 days  Lab Units 01/17/19  0610   WBC K/uL 8.31   HEMOGLOBIN g/dL 9.4*   HEMATOCRIT % 28.8*   PLATELETS K/uL 387*       Results from last 7 days  Lab Units 01/16/19  0447   SODIUM mEQ/L 136   POTASSIUM mEQ/L 4.1   CHLORIDE mEQ/L 104   CO2 mEQ/L 25   BUN mg/dL 10   CREATININE mg/dL 0.9   CALCIUM mg/dL 7.4*   GLUCOSE mg/dL 94       IMAGING  Ct Angiogram Lower Extremity Left W Wo Iv Contrast    Result Date:  1/8/2019  IMPRESSION: 1.  There is extensive soft tissue emphysema extending from proximal tibia to digits.  Decreased density/air density within tarsal bones and metatarsals raises concern for bony involvement by gas gangrene. 2. Patent three-vessel runoff seen to the level of the ankles. A preliminary report of these findings was interpreted by Dr. Abby Clemons, 1/7/2019 at 11:30 PM and Dr. NATHANAEL Waller 1/7/2019 at 21:35. I certify that I have personally reviewed this examination and agree with this report. Remy Dawkins MD    Mri Foot Left Without Contrast    Result Date: 1/5/2019  IMPRESSION: 1.  Marrow signal changes consistent with osteomyelitis in the base and remaining proximal shaft of the fifth metatarsal in the left foot.  Study severely degraded by motion artifact. 2.  Findings most suggestive of extensive cellulitis and myositis in the left foot, without discrete drainable collection. 3.  Markedly abnormal bone marrow signal in the left second, third, and fourth metatarsals and to a lesser extent in the cuneiforms and navicular which suggests sclerosis, new since 8/20/2014.  This should be correlated with any available recent radiographs.    Ultrasound Venous Arm Right    Result Date: 1/13/2019  IMPRESSION:   No evidence of DVT in the right upper extremity. I, Jodi Hernandez MD, certify that I have personally reviewed this examination and agree with this report.      ASSESSMENT AND PLAN      Hypokalemia   Assessment & Plan    repleted    Follow         Necrotizing fasciitis (CMS/[facility])   Assessment & Plan    Underwent extensive debridement, he has a septic joint and osteomyelitis  Further management per podiatry/ID        Streptococcal sepsis (CMS/HCC) ([facility])   Assessment & Plan    Further abx per ID        Elevated serum creatinine   Assessment & Plan    Cr in May was 1.1   Has underlying DM and HTN   Trend BMP              Hyponatremia   Assessment & Plan    Encourage oral hydration   trend         Leukocytosis   Assessment & Plan    Secondary to acute infection   trend        Anemia   Assessment & Plan    rec trend hgb + monitor.         Hypertension   Assessment & Plan    BP elevated  Restart lisnopril  trend        Lipid disorder   Assessment & Plan    Cont statin        Type 2 diabetes mellitus (CMS/HCC) ([facility])   Assessment & Plan    Cont w insulin and monitor accu checks.        History of CVA (cerebrovascular accident)   Assessment & Plan    Cont statin and plavix        * Acute hematogenous osteomyelitis of left foot (CMS/[facility]) ([facility])   Assessment & Plan    Further management per podiatry and ID                 VTE Prophylaxis Plan: lovenox for dvt proph.   Code Status: Full Code  Estimated Discharge Date: 1/12/2019     Alfonso Marcelo DO  1/17/2019

## 2019-01-17 NOTE — PLAN OF CARE
Problem: Patient Care Overview  Goal: Plan of Care Review  Outcome: Ongoing (interventions implemented as appropriate)   01/17/19 1654   Coping/Psychosocial   Plan Of Care Reviewed With patient   Plan of Care Review   Progress progress towards functional goals is fair   Outcome Summary Pt. is improving slowly. He is able to maintain NWB LLE for short distances & transfers.       Problem: Self-Care Deficit (Adult,Obstetrics,Pediatric)  Goal: Identify Related Risk Factors and Signs and Symptoms  Outcome: Ongoing (interventions implemented as appropriate)

## 2019-01-17 NOTE — PROGRESS NOTES
Infectious Disease Progress Note    Patient Name: Harvey Lucero Jr  MR#: [id number]  : 1960  Admission Date: 2019  Date: 19   Time: 10:48 AM   Author: Severo Dias MD    OBJECTIVE:  Continues to tolerate antibiotics well, no nausea vomiting diarrhea fevers or chills      Anti-infectives     Start     Dose/Rate Route Frequency Ordered Stop    19 1045  ampicillin (OMNIPEN) 2 g in sodium chloride 0.9 % 100 mL IVPB      2 g  200 mL/hr over 30 Minutes intravenous Every 4 hours interval 19 1042            ROS  No vomiting diarrhea fevers chills all other systems are negative   vital Signs:    Temp:  [36.7 °C (98 °F)-36.9 °C (98.5 °F)] 36.8 °C (98.3 °F)  Heart Rate:  [72-80] 78  Resp:  [16-18] 18  BP: (142-171)/(67-88) 171/88    Temp (72hrs), Av.8 °C (98.3 °F), Min:36.6 °C (97.8 °F), Max:37.2 °C (98.9 °F)      Physical Exam    Gen: Aox3  HEENT: OP clear  Neck: Supple  LAD: No cervical LAD  Lungs: CTAB  CV: RRR no murmurs  Abd: Soft NTND +BS  Ext: Left foot dressing CDI wound VAC in place        Lines, Drains, Airways, Wounds:  Peripheral IV 19 Right Arm (Active)   Number of days: 3       Peripheral IV 19 Left Hand (Active)   Number of days: 0       Closed/Suction Drain Left Foot (Active)   Number of days: 0       Surgical Incision Foot Left (Active)   Number of days: 3       Labs:    Lab Results   Component Value Date    WBC 8.31 2019    HGB 9.4 (L) 2019    HCT 28.8 (L) 2019    MCV 86.2 2019     (H) 2019     Lab Results   Component Value Date    GLUCOSE 103 (H) 2019    CALCIUM 7.8 (L) 2019     2019    K 4.4 2019    CO2 26 2019     2019    BUN 10 2019    CREATININE 1.0 2019     Lab Results   Component Value Date    ALT 16 2018    AST 11 2018    ALKPHOS 60 2018    BILITOT 0.6 2018         Patient Active Problem List   Diagnosis Code   • History of CVA  (cerebrovascular accident) Z86.73   • Type 2 diabetes mellitus (CMS/HCC) ([facility]) E11.9   • Lipid disorder E78.9   • Hypertension I10   • Acute hematogenous osteomyelitis of left foot (CMS/[facility]) ([facility]) M86.072   • Anemia D64.9   • Leukocytosis D72.829   • Hyponatremia E87.1   • Elevated serum creatinine R79.89   • Bacteremia R78.81   • Gas gangrene (CMS/[facility]) A48.0   • Streptococcal sepsis (CMS/[facility]) ([facility]) A40.9   • Necrotizing fasciitis (CMS/[facility]) M72.6   • Open wound of ankle and foot S91.009A, S91.309A   • Hypokalemia E87.6   • Localized edema R60.0     Other streptococcal sepsis (CMS/[facility])   Assessment & Plan    Day 10 of 42 of ampicillin       Necrotizing fasciitis of ankle and foot (CMS/[facility])   Assessment & Plan    As above        Leukocytosis   Assessment & Plan    Resolved                Severo Dias MD  1/17/201910:48 AM

## 2019-01-17 NOTE — PROGRESS NOTES
Patient: Harvey Lucero Jr  Location: [facility] 3C 0371D  MRN: [id number]  Today's date: 1/17/2019  Pt. Seated in chair, BLEs elevated on footrest, pillow under left calf, draw sheet, call bell & personal items within reach.        Therapy Pain/Vitals - 01/17/19 1541        Pain/Comfort/Sleep    Pain Charting Type Pain Assessment    Preferred Pain Scale word (verbal rating pain scale)    Pain Body Location foot    Pain Rating: Rest 2 - mild pain    Pain Rating: Activity 2 - mild pain       Vital Signs    BP (!)  192/82          Prior Living Environment  Lives With: spouse  Living Arrangements: house  Home Accessibility: stairs to enter home (6 steps)  Living Environment Comment: pt lives in 3 story home, 1 railing on each set of stairs, 0 TRAE home, no AD at home; lives with spouseNumber of Stairs, Main Entrance: noneEquipment Currently Used at Home: none       Prior Level of Function  Ambulation: assistive equipment  Transferring: independent  Toileting: independent  Bathing: independent  Dressing: independent  Eating: independent  Communication: understands/communicates without difficulty  Swallowing: swallows foods/liquids without difficulty  Equipment Currently Used at Home: none           OT Treatment Summary - 01/17/19 1541        Session Details    Document Type daily treatment    Mode of Treatment occupational therapy    Patient/Family Observations wife present       Time Calculation    Start Time 1531    Stop Time 1541    Time Calculation (min) 10 min       General Information    Patient Profile Reviewed? yes    Onset of Illness/Injury or Date of Surgery 01/04/19    Pertinent History of Current Functional Problem left foot I&D, 2 wound vacs.    Existing Precautions/Restrictions weight bearing    Limitations/Impairments safety/cognitive       Weight Bearing Status    Right UE Weight Bearing Status non-weight bearing       Cognition/Psychosocial    Safety Awareness impaired judgment demonstrated    began moving past the commode with wound vac attached.       Range of Motion (ROM)    General Range of Motion lower extremity range of motion deficits identified       General LE Assessment    Lower Extremity: Range of Motion ankle, left: LE ROM deficit    Comment: Lower Extremity ROM left foot & ankle splinted & wrapped.       Transfers    Maintains Weight Bearing Status (Transfers) cues to maintain weight bearing status       Sit to Stand Transfer    [place], Sit to Stand Transfer supervision;verbal cues;nonverbal cues (demo/gesture)    Verbal Cues hand placement;safety;technique;preparatory posture    Assistive Device walker, front-wheeled       Stand to Sit Transfer    [place], Stand to Sit Transfer supervision;verbal cues;nonverbal cues (demo/gesture)    Verbal Cues preparatory posture;technique;safety    Assistive Device walker, front-wheeled       Toilet Transfer    [place], Toilet Transfer close supervision;verbal cues;nonverbal cues (demo/gesture)    Verbal Cues hand placement;technique;safety;preparatory posture   cues for a controlled descent    Assistive Device commode chair;walker, front-wheeled       Lower Body Dressing    Lower Body Dressing [place] minimum assist (75% or more patient effort)       AM-PAC (TM) - ADL (Current Function)    Putting on and taking off regular lower body clothing? 3 - A Little    Bathing? 3 - A Little    Toileting? 3 - A Little    Putting on/taking off regular upper body clothing? 4 - None    How much help for taking care of personal grooming? 4 - None    Eating meals? 4 - None    AM-PAC (TM) ADL Score 21       AM-PAC (TM) - Cognition (Current Function)    Following/understanding a 10-15 minute speech or presentation? 4 - None    Understanding familiar people during ordinary conversations? 4 - None    Remembering to take medications at the appropriate time? 4 - None    Remembering where things were placed or put away? 4 - None    Remembering a list of  3 or 4 errands without writing it down? 4 - None    Taking care of complicated tasks? 4 - None    AM-PAC (TM) Cognition Score 24       OT Clinical Impression    Patient's Goals For Discharge return to all previous roles/activities    Plan For Care Reviewed: Occupational Therapy OT plan for care discussed with patient;OT plan for care discussed with family    Impairments Found (OT Eval) aerobic capacity/endurance;gait, locomotion, and balance;ROM (range of motion)    Rehab Potential/Prognosis: Occupational Therapy good, to achieve stated therapy goals    OT Frequency of Treatment 3-5 times per week    Problem List: Occupational Therapy impaired cognition;ROM decreased    Anticipated Equipment Needs at Discharge other (see comments)   TBD at SNF    Expected Discharge Disposition skilled nursing facility    Daily Outcome Statement Pt. was matthew but copperative during brief session today. Recommend SNF for training in safe performance of ADLs & functional mobility & environmental modifications.          Pain Assessment/Intervention  Pain Charting Type: Pain Assessment        Education provided this session. See the Patient Education summary report for full details.         OT Care Plan Goals      Most Recent Value   Bed Mobility Goal   Time to Achieve Goal: Bed Mobility  by discharge   Goal Activity: Bed Mobility  all bed mobility activities   Level of [place] Goal: Bed Mobility  modified independence   Goal Outcome: Bed Mobility  goal ongoing   Lower Body Dressing Goal   Time to Achieve Goal: Lower Body Dressing  5 - 7 days   Level of [place]  modified independence   Adaptive Equipment: Lower Body Dressing  dressing stick, shoe horn, long handled, sock-aid, reacher   Goal Outcome: Lower Body Dressing  goal ongoing   Toilet Transfer Goal   Time to Achieve Goal: Toilet Transfer Training  5 - 7 days   Assistive Device: Toilet Transfer Training  grab bars/safety frame, raised toilet seat, walker, front-wheeled    Goal Outcome: Toilet Transfer Training  goal ongoing   Transfer Goal   Time to Achieve Goal: Transfer Training  by discharge   Goal Activity: Transfer Training  all transfers   Level of [place] Goal: Transfer Training  modified independence   Assistive Device: Transfer Training  walker, rolling   Goal Outcome: Transfer Training  goal ongoing

## 2019-01-17 NOTE — PROGRESS NOTES
Subjective:   Patient seen at bedside  POD#6 s/p Versajet debridement with Integra/Primatrix graft placed and POD#10 s/p left lower extremity debridement with extensive soft tissue excision, bone biopsies. No events overnight, NAD. Pain is well controlled this morning. Denies any N/V/F/C/CP/SOB.     ROS:   Past Medical/Surgical history, Allergies, Meds reviewed in detail as charted  FH/SH reviewed in detail as charted  Review of Systems:  Head and Neck: No complaints  Chest : No complaints  Abdomen: No Complaints  Constitutional: Unremarkable       Objective:      Vitals:  Vitals:    01/17/19 0716   BP: (!) 171/88   Pulse: 78   Resp: 18   Temp: 36.8 °C (98.3 °F)   SpO2: 97%       Radiology: No new Radiology.    Labs:     CBC Results       01/17/19 01/16/19 01/15/19                    0610 0447 1213         WBC 8.31 8.35 9.23         RBC 3.34 (L) 2.82 (L) 2.88 (L)         HGB 9.4 (L) 7.8 (L) 8.1 (L)         HCT 28.8 (L) 24.3 (L) 25.2 (L)         MCV 86.2 86.2 87.5         MCH 28.1 27.7 (L) 28.1         MCHC 32.6 32.1 (L) 32.1 (L)          (H) 268 311                      BMP Results       01/17/19 01/16/19 01/14/19                    0610 0447 0519          136 133 (L)         K 4.4 4.1 4.1         Cl 104 104 106         CO2 26 25 21 (L)         Glucose 103 (H) 94 200 (H)         BUN 10 10 9         Creatinine 1.0 0.9 1.0         Calcium 7.8 (L) 7.4 (L) 6.8 (L)         Anion Gap 9 7 6         EGFR &gt;60.0 &gt;60.0 &gt;60.0                         Lower Extremity Physical Exam:     - Vasc: Capillary refill time is approximately 5 seconds to the digits left foot. Skin temperature of the digits is cool to the touch by noted to be slightly increased compared to previous exams. DP and PT pulses non-palpable.   - Ortho: + active df/pf all digits.   - Neuro: Light touch and epicritic sensation absent   - Derm: Primatrix graft is incorporating well, especially proximally. Surgical incisions with intact sutures  on the anterior and lateral left leg without signs of dehiscence. Large, full thickness wound secondary to surgical debridement about the dorsal and plantar foot and medial left ankle. No purulence noted with expression this morning. No notable drainage. Very mild bleeding noted with wound vac change.     The proximal dorsal aspect of the left foot and medial left foot a beginning to granulate in. Primatrix graft is well adhered and taking to the wound bed nicely. The forefoot is at the level of the MTPJ is very dry and is beginning to become gangrenous.         Assessment and Plan:     Pt is a 57 y/o male seen at bedside with Dr. Tee POD#6 s/p Versajet debridement with Integra/Primatrix graft placed and POD#10 s/p left lower extremity debridement with extensive soft tissue excision, bone biopsies. Still arranging SNF. Plan to d/c tomorrow if plans are arranged.      -Wound vac and dressing changed this morning.   -Plan discussed in detail with the patient at bedside yesterday and today. Patient will require 2 wound vacs to the left foot as an outpatient secondary to the amount of drainage. One 200cc canister will likely not be enough between wound vac dressing changes. Would like wound vacs changed Wednesday (after his appointment with Dr. Tee) and Saturday. He will require HBO oxygen therapy. This will likely be 4-5 days per week. Plan discussed with patient and CM/SW this morning. Plan is SNF vs home with home nursing pending approval. Likely to d/c Friday.   -Hgb 9.4 this morning.  -Instructed patient to keep the left leg as elevated as possible to control bleeding  -Continue Plavix   -Continue DVT prophylaxis with Lovenox  -OR bone cultures 1/7 growing Streptococcus anginosus and Enterococcus faecalis (final)   -OR bone pathology for cuboid and talus are both positive for acute osteomyelitis  -OR cx 1/4: Strep anginosus, enterococcus faecalis (final)   -The paperwork for wound vac has been placed in  chart.  Our team previously contacted CM regarding need of patient to have two separate wound vacs given the extent of the wound.    -Nonweightbearing to the left lower extremity   -Home wound vacs have been delivered to the patient's room.   -F/U with Dr. Tee in the [facility] wound care center 1 week after d/c   - Please contact on call podiatry resident with any questions or concerns. [phone] before 5 PM.      Richard Chavez DPM  Pager #[phone]

## 2019-01-18 LAB
ANION GAP SERPL CALC-SCNC: 7 MEQ/L (ref 3–15)
BUN SERPL-MCNC: 13 MG/DL (ref 8–20)
CALCIUM SERPL-MCNC: 7.7 MG/DL (ref 8.9–10.3)
CHLORIDE SERPL-SCNC: 101 MEQ/L (ref 98–109)
CO2 SERPL-SCNC: 25 MEQ/L (ref 22–32)
CREAT SERPL-MCNC: 0.9 MG/DL
CROSSMATCH: NORMAL
ERYTHROCYTE [DISTWIDTH] IN BLOOD BY AUTOMATED COUNT: 14.9 % (ref 11.6–14.4)
GFR SERPL CREATININE-BSD FRML MDRD: >60 ML/MIN/1.73M*2
GLUCOSE BLD-MCNC: 114 MG/DL (ref 70–99)
GLUCOSE BLD-MCNC: 114 MG/DL (ref 70–99)
GLUCOSE BLD-MCNC: 118 MG/DL (ref 70–99)
GLUCOSE BLD-MCNC: 191 MG/DL (ref 70–99)
GLUCOSE SERPL-MCNC: 109 MG/DL (ref 70–99)
HCT VFR BLDCO AUTO: 28 %
HGB BLD-MCNC: 8.9 G/DL
ISBT CODE: 6200
MCH RBC QN AUTO: 27.6 PG (ref 28–33.2)
MCHC RBC AUTO-ENTMCNC: 31.8 G/DL (ref 32.2–36.5)
MCV RBC AUTO: 86.7 FL (ref 83–98)
PDW BLD AUTO: 10.3 FL (ref 9.4–12.4)
PLATELET # BLD AUTO: 300 K/UL
POCT TEST: ABNORMAL
POTASSIUM SERPL-SCNC: 3.9 MEQ/L (ref 3.6–5.1)
PRODUCT CODE: NORMAL
PRODUCT STATUS: NORMAL
RBC # BLD AUTO: 3.23 M/UL (ref 4.5–5.8)
SODIUM SERPL-SCNC: 133 MEQ/L (ref 136–144)
SPECIMEN EXP DATE BLD: NORMAL
UNIT ABO: NORMAL
UNIT ID: NORMAL
UNIT RH: POSITIVE
WBC # BLD AUTO: 6.69 K/UL

## 2019-01-18 PROCEDURE — 63600000 HC DRUGS/DETAIL CODE: Performed by: PODIATRIST

## 2019-01-18 PROCEDURE — 36415 COLL VENOUS BLD VENIPUNCTURE: CPT | Performed by: PODIATRIST

## 2019-01-18 PROCEDURE — 97116 GAIT TRAINING THERAPY: CPT | Mod: GP

## 2019-01-18 PROCEDURE — 63600000 HC DRUGS/DETAIL CODE: Performed by: HOSPITALIST

## 2019-01-18 PROCEDURE — 63600000 HC DRUGS/DETAIL CODE: Performed by: INTERNAL MEDICINE

## 2019-01-18 PROCEDURE — 63700000 HC SELF-ADMINISTRABLE DRUG: Performed by: PODIATRIST

## 2019-01-18 PROCEDURE — 12000000 HC ROOM AND CARE MED/SURG

## 2019-01-18 PROCEDURE — 63700000 HC SELF-ADMINISTRABLE DRUG: Performed by: HOSPITALIST

## 2019-01-18 PROCEDURE — 80048 BASIC METABOLIC PNL TOTAL CA: CPT | Performed by: PODIATRIST

## 2019-01-18 PROCEDURE — 25800000 HC PHARMACY IV SOLUTIONS: Performed by: INTERNAL MEDICINE

## 2019-01-18 PROCEDURE — 99232 SBSQ HOSP IP/OBS MODERATE 35: CPT | Performed by: HOSPITALIST

## 2019-01-18 PROCEDURE — 85027 COMPLETE CBC AUTOMATED: CPT | Performed by: HOSPITALIST

## 2019-01-18 RX ORDER — ONDANSETRON 4 MG/1
4 TABLET, ORALLY DISINTEGRATING ORAL EVERY 8 HOURS PRN
Qty: 30 TABLET | Refills: 0 | Status: SHIPPED | OUTPATIENT
Start: 2019-01-18 | End: 2019-01-23

## 2019-01-18 RX ORDER — HYDROMORPHONE HYDROCHLORIDE 1 MG/ML
.25-.5 INJECTION, SOLUTION INTRAMUSCULAR; INTRAVENOUS; SUBCUTANEOUS
Status: DISCONTINUED | OUTPATIENT
Start: 2019-01-18 | End: 2019-01-19 | Stop reason: HOSPADM

## 2019-01-18 RX ORDER — SENNOSIDES 8.6 MG/1
1 TABLET ORAL 2 TIMES DAILY PRN
Qty: 30 TABLET | Refills: 0 | Status: SHIPPED | OUTPATIENT
Start: 2019-01-18 | End: 2019-02-17

## 2019-01-18 RX ORDER — OXYCODONE HYDROCHLORIDE 5 MG/1
5-10 TABLET ORAL EVERY 4 HOURS PRN
Status: DISCONTINUED | OUTPATIENT
Start: 2019-01-18 | End: 2019-01-19 | Stop reason: HOSPADM

## 2019-01-18 RX ORDER — ENOXAPARIN SODIUM 100 MG/ML
40 INJECTION SUBCUTANEOUS
Qty: 12 ML | Refills: 0 | Status: SHIPPED | OUTPATIENT
Start: 2019-01-18 | End: 2019-01-18

## 2019-01-18 RX ORDER — ENOXAPARIN SODIUM 100 MG/ML
40 INJECTION SUBCUTANEOUS
Qty: 12 ML | Refills: 0 | Status: SHIPPED | OUTPATIENT
Start: 2019-01-18 | End: 2019-02-09 | Stop reason: HOSPADM

## 2019-01-18 RX ORDER — OXYCODONE HYDROCHLORIDE 5 MG/1
5-10 TABLET ORAL EVERY 4 HOURS PRN
Qty: 35 TABLET | Refills: 0 | Status: SHIPPED | OUTPATIENT
Start: 2019-01-18 | End: 2019-01-23

## 2019-01-18 RX ADMIN — AMPICILLIN SODIUM 2 G: 1 INJECTION, POWDER, FOR SOLUTION INTRAMUSCULAR; INTRAVENOUS at 05:05

## 2019-01-18 RX ADMIN — SITAGLIPTIN 100 MG: 100 TABLET, FILM COATED ORAL at 08:35

## 2019-01-18 RX ADMIN — OXYCODONE HYDROCHLORIDE 5 MG: 5 TABLET ORAL at 10:43

## 2019-01-18 RX ADMIN — ROSUVASTATIN CALCIUM 10 MG: 10 TABLET, FILM COATED ORAL at 08:35

## 2019-01-18 RX ADMIN — INSULIN ASPART 3 UNITS: 100 INJECTION, SOLUTION INTRAVENOUS; SUBCUTANEOUS at 22:46

## 2019-01-18 RX ADMIN — ACETAMINOPHEN 650 MG: 325 TABLET, FILM COATED ORAL at 10:43

## 2019-01-18 RX ADMIN — LISINOPRIL 20 MG: 20 TABLET ORAL at 08:35

## 2019-01-18 RX ADMIN — OXYCODONE HYDROCHLORIDE 5 MG: 5 TABLET ORAL at 02:54

## 2019-01-18 RX ADMIN — AMPICILLIN SODIUM 2 G: 1 INJECTION, POWDER, FOR SOLUTION INTRAMUSCULAR; INTRAVENOUS at 00:58

## 2019-01-18 RX ADMIN — INSULIN GLARGINE 25 UNITS: 100 INJECTION, SOLUTION SUBCUTANEOUS at 22:45

## 2019-01-18 RX ADMIN — INSULIN ASPART 5 UNITS: 100 INJECTION, SOLUTION INTRAVENOUS; SUBCUTANEOUS at 13:43

## 2019-01-18 RX ADMIN — INSULIN ASPART 5 UNITS: 100 INJECTION, SOLUTION INTRAVENOUS; SUBCUTANEOUS at 10:38

## 2019-01-18 RX ADMIN — AMPICILLIN SODIUM 2 G: 1 INJECTION, POWDER, FOR SOLUTION INTRAMUSCULAR; INTRAVENOUS at 08:33

## 2019-01-18 RX ADMIN — OXYCODONE HYDROCHLORIDE 5 MG: 5 TABLET ORAL at 16:38

## 2019-01-18 RX ADMIN — PANTOPRAZOLE SODIUM 20 MG: 20 TABLET, DELAYED RELEASE ORAL at 08:35

## 2019-01-18 RX ADMIN — AMPICILLIN SODIUM 2 G: 1 INJECTION, POWDER, FOR SOLUTION INTRAMUSCULAR; INTRAVENOUS at 16:41

## 2019-01-18 RX ADMIN — OXYCODONE HYDROCHLORIDE 5 MG: 5 TABLET ORAL at 20:39

## 2019-01-18 RX ADMIN — CLOPIDOGREL BISULFATE 75 MG: 75 TABLET, FILM COATED ORAL at 08:35

## 2019-01-18 RX ADMIN — AMPICILLIN SODIUM 2 G: 1 INJECTION, POWDER, FOR SOLUTION INTRAMUSCULAR; INTRAVENOUS at 12:37

## 2019-01-18 RX ADMIN — ENOXAPARIN SODIUM 40 MG: 100 INJECTION SUBCUTANEOUS at 18:32

## 2019-01-18 RX ADMIN — INSULIN ASPART 5 UNITS: 100 INJECTION, SOLUTION INTRAVENOUS; SUBCUTANEOUS at 18:31

## 2019-01-18 RX ADMIN — ACETAMINOPHEN 650 MG: 325 TABLET, FILM COATED ORAL at 02:53

## 2019-01-18 RX ADMIN — AMPICILLIN SODIUM 2 G: 1 INJECTION, POWDER, FOR SOLUTION INTRAMUSCULAR; INTRAVENOUS at 20:39

## 2019-01-18 ASSESSMENT — COGNITIVE AND FUNCTIONAL STATUS - GENERAL
MOVING TO AND FROM BED TO CHAIR: 3 - A LITTLE
STANDING UP FROM CHAIR USING ARMS: 3 - A LITTLE
WALKING IN HOSPITAL ROOM: 3 - A LITTLE
CLIMB 3 TO 5 STEPS WITH RAILING: 3 - A LITTLE

## 2019-01-18 NOTE — ASSESSMENT & PLAN NOTE
BP elevated  Monitor on ace-I. Pt asymptomatic.   Will need better pain management.   Cont to monitor + will need close outpt f/u w his pcp.

## 2019-01-18 NOTE — PROGRESS NOTES
Subjective:   Patient seen at bedside  POD#7 s/p Versajet debridement with Integra/Primatrix graft placed and POD#11 s/p left lower extremity debridement with extensive soft tissue excision, bone biopsies. No events overnight, NAD. Pain is well controlled this morning. Denies any N/V/F/C/CP/SOB.     ROS:   Past Medical/Surgical history, Allergies, Meds reviewed in detail as charted  FH/SH reviewed in detail as charted  Review of Systems:  Head and Neck: No complaints  Chest : No complaints  Abdomen: No Complaints  Constitutional: Unremarkable       Objective:      Vitals:  Vitals:    01/18/19 1048   BP: (!) 189/81   Pulse:    Resp:    Temp:    SpO2:        Radiology: No new Radiology.    Labs:     CBC Results       01/17/19 01/16/19 01/15/19                    0610 0447 1213         WBC 8.31 8.35 9.23         RBC 3.34 (L) 2.82 (L) 2.88 (L)         HGB 9.4 (L) 7.8 (L) 8.1 (L)         HCT 28.8 (L) 24.3 (L) 25.2 (L)         MCV 86.2 86.2 87.5         MCH 28.1 27.7 (L) 28.1         MCHC 32.6 32.1 (L) 32.1 (L)          (H) 268 311                      BMP Results       01/17/19 01/16/19 01/14/19                    0610 0447 0519          136 133 (L)         K 4.4 4.1 4.1         Cl 104 104 106         CO2 26 25 21 (L)         Glucose 103 (H) 94 200 (H)         BUN 10 10 9         Creatinine 1.0 0.9 1.0         Calcium 7.8 (L) 7.4 (L) 6.8 (L)         Anion Gap 9 7 6         EGFR &gt;60.0 &gt;60.0 &gt;60.0                         Lower Extremity Physical Exam:   LE Exam  Vascular: Dressings remained intact.  No strike through noted.          Capillary refill time <3seconds B/L.  MSK: +DP/PF of digits intact B/L.    No pain on palpation to the posterior legs B/L. No signs of clinical DVT B/L.   Derm: Dressings C/D/I. No strike through.  Neuro: Light touch and protective sensation is intact.         Assessment and Plan:     Pt is a 59 y/o male seen at bedside with Dr. Tee POD#7 s/p Versajet debridement with  Integra/Primatrix graft placed and POD#11 s/p left lower extremity debridement with extensive soft tissue excision, bone biopsies.      -Wound vac left in place this morning  -Plan discussed in detail with the patient at bedside yesterday and today. Patient will require 2 wound vacs to the left foot as an outpatient secondary to the amount of drainage. One 200cc canister will likely not be enough between wound vac dressing changes. Would like wound vacs changed Monday and Thursdays. He will require HBO oxygen therapy. This will likely be 4-5 days per week. Plan discussed with patient and CM/SW this morning. Plan is to go home tomorrow once the infusion team sees him.   -Script for hospital bed in chart.   -Hgb 9.4 this morning.  -Instructed patient to keep the left leg as elevated as possible to control bleeding  -Continue Plavix   -Continue DVT prophylaxis with Lovenox  -OR bone cultures 1/7 growing Streptococcus anginosus and Enterococcus faecalis (final)   -OR bone pathology for cuboid and talus are both positive for acute osteomyelitis  -OR cx 1/4: Strep anginosus, enterococcus faecalis (final)   -The paperwork for wound vac has been placed in chart.  Our team previously contacted CM regarding need of patient to have two separate wound vacs given the extent of the wound.    -Nonweightbearing to the left lower extremity   -Home wound vacs have been delivered to the patient's room.   -F/U with Dr. Tee in the [facility] wound care center 1 week after d/c   - Please contact on call podiatry resident with any questions or concerns.    Dr. Suad Payne DPM, PGY1  Pager# [phone]

## 2019-01-18 NOTE — PLAN OF CARE
Problem: Patient Care Overview  Goal: Plan of Care Review  Outcome: Ongoing (interventions implemented as appropriate)   01/18/19 0430   Coping/Psychosocial   Plan Of Care Reviewed With patient   Plan of Care Review   Progress improving   Outcome Summary pt pain controlled with Tylenol and oxycodone, Wound vac did not drain anything ON, ampicillin q4 hours, using commode for urination and BM       Problem: Pressure Ulcer Risk (Enmanuel Scale) (Adult,Obstetrics,Pediatric)  Goal: Identify Related Risk Factors and Signs and Symptoms  Outcome: Ongoing (interventions implemented as appropriate)      Problem: Infection, Risk/Actual (Adult)  Goal: Infection Prevention/Resolution  Outcome: Ongoing (interventions implemented as appropriate)      Problem: Fall Risk (Adult)  Goal: Absence of Falls  Outcome: Outcome(s) Achieved Date Met: 01/18/19      Problem: Pressure Ulcer (Adult)  Goal: Signs and Symptoms of Listed Potential Problems Will be Absent, Minimized or Managed (Pressure Ulcer)  Outcome: Ongoing (interventions implemented as appropriate)      Problem: Self-Care Deficit (Adult,Obstetrics,Pediatric)  Goal: Identify Related Risk Factors and Signs and Symptoms  Outcome: Ongoing (interventions implemented as appropriate)

## 2019-01-18 NOTE — PLAN OF CARE
Problem: Acute Therapy Services Goal & Intervention Plan  Goal: Bed Mobility Goal  Outcome: Ongoing (interventions implemented as appropriate)   01/18/19 1549   Bed Mobility Goal   Goal Activity: Bed Mobility all bed mobility activities   Level of [place] Goal: Bed Mobility modified independence   Goal Outcome: Bed Mobility goal partially met

## 2019-01-18 NOTE — PLAN OF CARE
Problem: Fall Risk (Adult)  Goal: Absence of Falls  Outcome: Ongoing (interventions implemented as appropriate)   01/18/19 1548   Fall Risk (Adult)   Absence of Falls making progress toward outcome

## 2019-01-18 NOTE — ASSESSMENT & PLAN NOTE
Further management per podiatry and ID. Plan for discharge tomorrow once the infusion team sees pt.   Will sign off.   Please notify hms if any medical questions or issues arise.

## 2019-01-18 NOTE — PLAN OF CARE
Problem: Patient Care Overview  Goal: Plan of Care Review  Outcome: Ongoing (interventions implemented as appropriate)   01/18/19 0958   Coping/Psychosocial   Plan Of Care Reviewed With patient       Problem: Fall Risk (Adult)  Goal: Absence of Falls  Outcome: Ongoing (interventions implemented as appropriate)

## 2019-01-18 NOTE — PROGRESS NOTES
Infectious Disease Progress Note    Patient Name: Harvey Lucero Jr  MR#: [id number]  : 1960  Admission Date: 2019  Date: 19   Time: 10:33 AM   Author: Severo Dias MD    OBJECTIVE:  No nausea vomiting diarrhea fevers chills    Anti-infectives     Start     Dose/Rate Route Frequency Ordered Stop    19 1045  ampicillin (OMNIPEN) 2 g in sodium chloride 0.9 % 100 mL IVPB      2 g  200 mL/hr over 30 Minutes intravenous Every 4 hours interval 19 1042            ROS  Other systems are negative  vital Signs:    Temp:  [36.6 °C (97.8 °F)-36.7 °C (98 °F)] 36.7 °C (98 °F)  Heart Rate:  [74-78] 78  Resp:  [16-18] 16  BP: (100-192)/(80-98) 100/98    Temp (72hrs), Av.8 °C (98.2 °F), Min:36.6 °C (97.8 °F), Max:37.2 °C (98.9 °F)      Physical Exam    Gen: Aox3  HEENT: OP clear  Neck: Supple  LAD: No cervical LAD  Lungs: CTAB  CV: RRR no murmurs  Abd: Soft NTND +BS  Ext: Left foot dressing CDI wound VAC in place        Lines, Drains, Airways, Wounds:  Peripheral IV 19 Right Arm (Active)   Number of days: 3       Peripheral IV 19 Left Hand (Active)   Number of days: 0       Closed/Suction Drain Left Foot (Active)   Number of days: 0       Surgical Incision Foot Left (Active)   Number of days: 3       Labs:    Lab Results   Component Value Date    WBC 6.69 2019    HGB 8.9 (L) 2019    HCT 28.0 (L) 2019    MCV 86.7 2019     2019     Lab Results   Component Value Date    GLUCOSE 109 (H) 2019    CALCIUM 7.7 (L) 2019     (L) 2019    K 3.9 2019    CO2 25 2019     2019    BUN 13 2019    CREATININE 0.9 2019     Lab Results   Component Value Date    ALT 16 2018    AST 11 2018    ALKPHOS 60 2018    BILITOT 0.6 2018         Patient Active Problem List   Diagnosis Code   • History of CVA (cerebrovascular accident) Z86.73   • Type 2 diabetes mellitus (CMS/HCC) (HCC) E11.9   •  Lipid disorder E78.9   • Hypertension I10   • Acute hematogenous osteomyelitis of left foot (CMS/HCC) ([facility]) M86.072   • Anemia D64.9   • Leukocytosis D72.829   • Hyponatremia E87.1   • Elevated serum creatinine R79.89   • Bacteremia R78.81   • Gas gangrene (CMS/HCC) A48.0   • Streptococcal sepsis (CMS/HCC) ([facility]) A40.9   • Necrotizing fasciitis (CMS/[facility]) M72.6   • Open wound of ankle and foot S91.009A, S91.309A   • Hypokalemia E87.6   • Localized edema R60.0     Other streptococcal sepsis (CMS/[facility])   Assessment & Plan    Continue with ampicillin via PICC line day 11 out of 42  Follow-up with me in 2-3 weeks as an outpatient  He will need weekly CBC CMP sed rate and CRP      Necrotizing fasciitis of ankle and foot (CMS/HCC)   Assessment & Plan    As above             Severo Dias MD  1/18/201910:33 AM

## 2019-01-18 NOTE — PLAN OF CARE
Problem: Patient Care Overview  Goal: Plan of Care Review  Outcome: Ongoing (interventions implemented as appropriate)   01/18/19 8336   Coping/Psychosocial   Plan Of Care Reviewed With patient   Plan of Care Review   Progress progress toward functional goals as expected

## 2019-01-18 NOTE — PLAN OF CARE
Problem: Pressure Ulcer (Adult)  Goal: Signs and Symptoms of Listed Potential Problems Will be Absent, Minimized or Managed (Pressure Ulcer)  Outcome: Ongoing (interventions implemented as appropriate)   01/18/19 1548   Pressure Ulcer   Problems Assessed (Pressure Ulcer) all   Problems Present (Pressure Ulcer) none

## 2019-01-18 NOTE — PLAN OF CARE
Problem: Infection, Risk/Actual (Adult)  Goal: Infection Prevention/Resolution  Outcome: Ongoing (interventions implemented as appropriate)   01/18/19 1549   Infection, Risk/Actual (Adult)   Infection Prevention/Resolution making progress toward outcome

## 2019-01-18 NOTE — PROGRESS NOTES
Hospital Medicine Service -  Daily Progress Note       SUBJECTIVE   Interval History: pt seen and examined. Resting comfortably in chair. No chest pain/dyspnea. No HA/vision change. No dizzy/lighhteaded. No n/v/abd pain.      OBJECTIVE      Vital signs in last 24 hours:  Temp:  [36.6 °C (97.8 °F)-36.7 °C (98 °F)] 36.7 °C (98 °F)  Heart Rate:  [74-78] 78  Resp:  [16-18] 16  BP: (100-192)/(80-98) 189/81    Intake/Output Summary (Last 24 hours) at 01/18/19 1148  Last data filed at 01/18/19 0612   Gross per 24 hour   Intake              400 ml   Output              950 ml   Net             -550 ml       PHYSICAL EXAMINATION      Physical Exam   Constitutional: He is oriented to person, place, and time.   Eyes: EOM are normal.   Cardiovascular: Regular rhythm.  Exam reveals no gallop and no friction rub.    Pulmonary/Chest: Effort normal and breath sounds normal. No respiratory distress. He has no wheezes. He has no rales. He exhibits no tenderness.   Abdominal: Soft. Bowel sounds are normal. He exhibits no distension. There is no tenderness. There is no guarding.   Neurological: He is alert and oriented to person, place, and time.   Psychiatric: He has a normal mood and affect.      LINES, CATHETERS, DRAINS, AIRWAYS, AND WOUNDS   Lines, Drains, Airways, Wounds:  Peripheral IV 01/15/19 Left Forearm (Active)   Number of days: 3       Closed/Suction Drain 1 Left Foot (Active)   Number of days: 7       Closed/Suction Drain 2 Left Foot (Active)   Number of days: 7       Surgical Incision Foot Left (Active)   Number of days: 14       Comments:      LABS / IMAGING / TELE      Labs    Results from last 7 days  Lab Units 01/18/19  0550   WBC K/uL 6.69   HEMOGLOBIN g/dL 8.9*   HEMATOCRIT % 28.0*   PLATELETS K/uL 300       Results from last 7 days  Lab Units 01/18/19  0550   SODIUM mEQ/L 133*   POTASSIUM mEQ/L 3.9   CHLORIDE mEQ/L 101   CO2 mEQ/L 25   BUN mg/dL 13   CREATININE mg/dL 0.9   CALCIUM mg/dL 7.7*   GLUCOSE mg/dL 109*        IMAGING  X-ray Foot Left 3+ Views    Result Date: 1/17/2019  IMPRESSION: Postsurgical changes in the left foot as described.    Ct Angiogram Lower Extremity Left W Wo Iv Contrast    Result Date: 1/8/2019  IMPRESSION: 1.  There is extensive soft tissue emphysema extending from proximal tibia to digits.  Decreased density/air density within tarsal bones and metatarsals raises concern for bony involvement by gas gangrene. 2. Patent three-vessel runoff seen to the level of the ankles. A preliminary report of these findings was interpreted by Dr. Abby Clemons, 1/7/2019 at 11:30 PM and Dr. NATHANAEL Waller 1/7/2019 at 21:35. I certify that I have personally reviewed this examination and agree with this report. Remy Dawkins MD    X-ray Chest 1 View    Result Date: 1/17/2019  IMPRESSION: Mild central pulmonary vascular congestion with small pleural effusions and bibasilar airspace opacities.    Mri Foot Left Without Contrast    Result Date: 1/5/2019  IMPRESSION: 1.  Marrow signal changes consistent with osteomyelitis in the base and remaining proximal shaft of the fifth metatarsal in the left foot.  Study severely degraded by motion artifact. 2.  Findings most suggestive of extensive cellulitis and myositis in the left foot, without discrete drainable collection. 3.  Markedly abnormal bone marrow signal in the left second, third, and fourth metatarsals and to a lesser extent in the cuneiforms and navicular which suggests sclerosis, new since 8/20/2014.  This should be correlated with any available recent radiographs.    Ultrasound Venous Arm Right    Result Date: 1/13/2019  IMPRESSION:   No evidence of DVT in the right upper extremity. IJodi MD, certify that I have personally reviewed this examination and agree with this report.      ASSESSMENT AND PLAN      Anemia   Assessment & Plan    rec trend hgb + monitor. Notify hms if worsening anemia or signs of active bleeding.         Hypertension   Assessment &  Plan    BP elevated  Monitor on ace-I. Pt asymptomatic.   Will need better pain management.   Cont to monitor + will need close outpt f/u w his pcp.        Type 2 diabetes mellitus (CMS/[facility]) ([facility])   Assessment & Plan    Cont w insulin and monitor accu checks.        * Acute hematogenous osteomyelitis of left foot (CMS/[facility]) ([facility])   Assessment & Plan    Further management per podiatry and ID. Plan for discharge tomorrow once the infusion team sees pt.   Will sign off.   Please notify hms if any medical questions or issues arise.                  VTE Prophylaxis Plan: lovenox for dvt proph.   Code Status: Full Code  Estimated Discharge Date: 1/19/2019     Alfonso Marcelo DO  1/18/2019

## 2019-01-18 NOTE — DISCHARGE SUMMARY
Inpatient Discharge Summary    BRIEF OVERVIEW  Admitting Provider:  H&P Notes 12/19/2018 to 1/19/2019     Date of Service Author Author Type Status Note Type File Time    01/04/19 1231 Suad Payne DPM Resident Signed H&P 01/04/19 1421          Attending Provider: Fabricio Tee DPM Attending phys phone: [phone]  Primary Care Physician at Discharge: Dianne Castro [phone]    Admission Date: 1/4/2019     Discharge Date: 1/18/2019    Primary Discharge Diagnosis  Acute hematogenous osteomyelitis of left foot (CMS/[facility]) ([facility])    Secondary Discharge Diagnosis  Active Hospital Problems    Diagnosis Date Noted   • Localized edema 01/13/2019   • Hypokalemia 01/10/2019   • Necrotizing fasciitis (CMS/HCC) 01/08/2019   • Streptococcal sepsis (CMS/HCC) ([facility]) 01/07/2019   • Hyponatremia 01/05/2019   • Elevated serum creatinine 01/05/2019   • Acute hematogenous osteomyelitis of left foot (CMS/HCC) ([facility]) 01/04/2019   • Anemia 01/04/2019   • Leukocytosis 01/04/2019   • Gas gangrene (CMS/HCC) 01/04/2019   • Open wound of ankle and foot 01/04/2019   • History of CVA (cerebrovascular accident)    • Type 2 diabetes mellitus (CMS/HCC) ([facility])    • Lipid disorder    • Hypertension       Resolved Hospital Problems    Diagnosis Date Noted Date Resolved   • Preop cardiovascular exam 01/07/2019 01/08/2019   • Gas gangrene (CMS/HCC) 01/04/2019 01/05/2019   • Cellulitis and abscess of toe of left foot 01/04/2019 01/05/2019   • 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 Tee available before 12pm.    Consults:   Consult Notes 12/19/2018 to 1/18/2019     Date of Service Author Author Type Status Note Type File Time    01/15/19 1141 Rory Price DO Physician Signed Consults 01/15/19 1147    01/06/19 1003 Saul Barragan MD Physician Signed Consults  01/06/19 1008    01/04/19 1523 Severo Dias MD Physician Signed Consults 01/04/19 1529    01/04/19 1354 Vasu Whaley MD Physician Signed Consults 01/04/19 1402          Consult Orders During Admission:  IP CONSULT TO HOSPITALIST  IP CONSULT TO CASE MANAGEMENT  IP CONSULT TO SOCIAL WORK  IP CONSULT TO INFECTIOUS DISEASE  IP CONSULT TO CARDIOLOGY  IP CONSULT TO WOUND HEALING CENTER  IP CONSULT TO NUTRITION SERVICES  IP CONSULT TO CASE MANAGEMENT  IP CONSULT TO SOCIAL WORK  IP CONSULT TO PHYSICAL MEDICINE REHAB     Procedures: On 1/4/2019 the patient had an extensive debridement of his left foot.  On 1/7/2019 he had a left lower extremity incision and debridement with washout and removal of nonviable tissue and bone application of wound VAC there was bone biopsy done of the cuboid and the talus.    Imaging  X-ray Foot Left 3+ Views    Result Date: 1/17/2019  IMPRESSION: Postsurgical changes in the left foot as described.    Ct Angiogram Lower Extremity Left W Wo Iv Contrast    Result Date: 1/8/2019  IMPRESSION: 1.  There is extensive soft tissue emphysema extending from proximal tibia to digits.  Decreased density/air density within tarsal bones and metatarsals raises concern for bony involvement by gas gangrene. 2. Patent three-vessel runoff seen to the level of the ankles. A preliminary report of these findings was interpreted by Dr. Abby Clemons, 1/7/2019 at 11:30 PM and Dr. NATHANAEL Waller 1/7/2019 at 21:35. I certify that I have personally reviewed this examination and agree with this report. Remy Dawkins MD    X-ray Chest 1 View    Result Date: 1/17/2019  IMPRESSION: Mild central pulmonary vascular congestion with small pleural effusions and bibasilar airspace opacities.    Mri Foot Left Without Contrast    Result Date: 1/5/2019  IMPRESSION: 1.  Marrow signal changes consistent with osteomyelitis in the base and remaining proximal shaft of the fifth metatarsal in the left foot.  Study severely  degraded by motion artifact. 2.  Findings most suggestive of extensive cellulitis and myositis in the left foot, without discrete drainable collection. 3.  Markedly abnormal bone marrow signal in the left second, third, and fourth metatarsals and to a lesser extent in the cuneiforms and navicular which suggests sclerosis, new since 8/20/2014.  This should be correlated with any available recent radiographs.    Ultrasound Venous Arm Right    Result Date: 1/13/2019  IMPRESSION:   No evidence of DVT in the right upper extremity. Jodi GARVIN MD, certify that I have personally reviewed this examination and agree with this report.        Presenting Problem/History of Present Illness  Gas gangrene (CMS/HCC)         Exam on Day of Discharge  Patient seen and examined on day of discharge.  Lower Extremity Physical Exam:   LE Exam  Vascular: Dressings remained intact.  No strike through noted.                     Capillary refill time <3seconds B/L.  MSK: +DP/PF of digits intact B/L.               No pain on palpation to the posterior legs B/L. No signs of clinical DVT B/L.   Derm: Dressings C/D/I. No strike through.  Neuro: Light touch and protective sensation is intact.      Hospital Course  On 1/4/2019 the patient had an extensive debridement of his left foot due to extensive soft tissue emphysema.  On 1/7/2019 he had a left lower extremity incision and debridement with washout and removal of nonviable tissue and bone application of wound VAC there was bone biopsy done of the cuboid and the talus.  He was left in house to stay on IV antibiotics as well as to trend his lab values IV antibiotics to his house    Discharge Orders     Medication List      CONTINUE taking these medications    BASAGLAR KWIKPEN U-100 INSULIN 100 unit/mL (3 mL) subcutaneous pen  Generic drug:  insulin glargine  26 units at bedtime     blood-glucose meter kit  for blood glucose monitoring     clopidogrel 75 mg tablet  Commonly known as:   "PLAVIX  TAKE 1 TABLET BY MOUTH EVERY DAY     lancets misc  for blood glucose monitoring 3x day     * lisinopril 20 mg tablet  Commonly known as:  PRINIVIL  20 mg.     * lisinopril 40 mg tablet  Commonly known as:  PRINIVIL  Take 40 mg by mouth once daily.     metFORMIN 1,000 mg tablet  Commonly known as:  GLUCOPHAGE  take 1 tablet by oral route 2 times every day with morning and evening meals     omeprazole 20 mg capsule  Commonly known as:  PriLOSEC  Take 20 mg by mouth once daily.     ONETOUCH ULTRA TEST strip  Generic drug:  blood sugar diagnostic  for blood glucose monitoring 3-4x day     pen needle, diabetic 32 gauge x 1/4\" needle  Commonly known as:  NOVOFINE 32  For insulin administration once daily     rosuvastatin 10 mg tablet  Commonly known as:  CRESTOR  10 mg.     SITagliptin 100 mg tablet  Commonly known as:  JANUVIA  Take 1 tablet (100 mg total) by mouth daily.        * This list has 2 medication(s) that are the same as other medications prescribed for you. Read the directions carefully, and ask your doctor or other care provider to review them with you.                    Outpatient Follow-Ups  Encounter Information     You do not currently have any appointments scheduled.        Referrals:  No orders of the defined types were placed in this encounter.      Active Issues Requiring Follow-up  Follow-up with Dr. Singh in the office    Test Results Pending at Discharge  Unresulted Labs     Start     Ordered    01/16/19 0600  Basic metabolic panel  Daily     Start Status   01/19/19 0600 Scheduled   01/20/19 0600 Scheduled   01/21/19 0600 Scheduled   01/22/19 0600 Scheduled       01/15/19 1326    01/15/19 1055  CBC  Daily     Start Status   01/19/19 0600 Scheduled   01/20/19 0600 Scheduled   01/21/19 0600 Scheduled       01/15/19 1054    01/15/19 0600  CBC  Morning draw      01/14/19 1545    01/12/19 0441  Prepare RBC: 1 Units Transfusion indications: Hgb <8 g/dL and symptomatic anemia  (Adult Blood " Administration - Red Blood Cells)  Blood - Once     Question Answer Comment   Transfusion indications Hgb <8 g/dL and symptomatic anemia    Has consent been obtained? Yes        01/12/19 0441    01/07/19 1648  Fungal culture / smear     Comments:  Specimen B: Pre-op diagnosis:  Gas gangrene (CMS/HCC) [A48.0]     Start Status   01/07/19 1648 Preliminary result       01/07/19 1648    01/07/19 1646  Fungal culture / smear     Comments:  Specimen A: Pre-op diagnosis:  Gas gangrene (CMS/HCC) [A48.0]     Start Status   01/07/19 1646 Preliminary result       01/07/19 1646    01/04/19 1247  Blood Culture  Once      01/04/19 1248    01/04/19 1247  Blood Culture  PROCEDURE ONCE      01/04/19 1249            Discharge Disposition  Home   Code Status at Discharge: Full Code  Physician Order for Life-Sustaining Treatment Document Status      No documents found

## 2019-01-18 NOTE — PLAN OF CARE
Problem: Pressure Ulcer Risk (Enmanuel Scale) (Adult,Obstetrics,Pediatric)  Goal: Skin Integrity  Outcome: Ongoing (interventions implemented as appropriate)   01/18/19 3842   Pressure Ulcer Risk (Enmanuel Scale) (Adult,Obstetrics,Pediatric)   Skin Integrity making progress toward outcome

## 2019-01-18 NOTE — PROGRESS NOTES
Met with pt and spouse to discuss dc planning.  Pt wants to go home and was seen by PT and spouse feels comfortable for pt to go home.  Called Anabel [facility] To see if infusion is able to coordinate infusion for today.  They are unable, pt will have an infusion pump and RN from [facility] can come out to hospital tomorrow to place the pump.  Both home wound vacs are in pts room and ready to go home with him.  Spouse was questioning a hospital bed.  She stated he will still have 6 steps to enter into first floor where hospital bed will be.  She was concerned regarding the vac tubing and carrying the vacs down the steps with pt.  I opended the home vacs and showed her to size and the portable bags, she felt much more comfortable looking at the size of them.  She felt able to handle them.  I paged podiatry to discuss dc for a script for a hospital bed and to discuss dc plan.  Awaiting return call.  I paged Anabel Hernandez, RN to alert her of the hospital bed as well.  PT is to provide pt with walker and crutches for home.

## 2019-01-18 NOTE — PROGRESS NOTES
Rec'd call back earlier from podiatry.  Pt is good to go on their end.  Tomorrow discharge.  [facility] to come and apply pump at bedside.  Discussed with RN and will leave hand off for the weekend CM.  Both wound vacs are at bedside and approved by podiatry office.  Anabel Hernandez, WADE alerted me the bed is being delivered tonight at his home for tomorrow.     Addendum:  Also noted pt will be going home on Lovenox 40mg daily.  Script escribed and I call [facility].  Pts Lovenox is is stock, filled and the copay is $8.00.  RN knows pt/spouse will need instruction on giving Lovenox daily.

## 2019-01-18 NOTE — PLAN OF CARE
Problem: Patient Care Overview  Goal: Plan of Care Review  Outcome: Ongoing (interventions implemented as appropriate)   01/17/19 6652   Coping/Psychosocial   Plan Of Care Reviewed With patient   Plan of Care Review   Progress progress toward functional goals as expected   Outcome Summary Pt pain controlled with Oxycodone and Tylenol. B/L wound Vac. Positive BM today.

## 2019-01-18 NOTE — PROGRESS NOTES
[facility] Spoke with Mrs Lucero to explain services [facility] will set up IV antibiotics most like at Hospital on 01 19 and placed a pump for the ampicillin [facility] sandra start on 01 20 2019 at the home to place home vacs on Home Vacs currently in the room Called [facility] and faxed documentation to obtained a hospital bed for first floor set u for home Mrs Lucero wants all calls to go thru her cell phone at  Both [facility] and [facility] were notified Anabel Mckeon RN [phone]

## 2019-01-18 NOTE — PLAN OF CARE
Problem: Pressure Ulcer Risk (Enmanuel Scale) (Adult,Obstetrics,Pediatric)  Goal: Identify Related Risk Factors and Signs and Symptoms  Outcome: Ongoing (interventions implemented as appropriate)

## 2019-01-18 NOTE — PROGRESS NOTES
Patient: Harvey Lucero Jr  Location: [facility] 3C [id number]  MRN: [id number]  Today's date: 1/18/2019  Pt left seated in bedside chair. NAD. Call bell within reach.        Therapy Pain/Vitals - 01/18/19 0906        Pain/Comfort/Sleep    Pain Charting Type Pain Assessment    Presence of Pain complains of pain/discomfort    Preferred Pain Scale FACES (Castellano-Hannah FACES Pain Rating Scale)    FACES Pain Rating: Rest 0-->no hurt    FACES Pain Rating: Activity 4-->hurts little more    Pain Management Interventions pillow support provided;position adjusted          Prior Living Environment      Most Recent Value   Lives With  spouse   Living Arrangements  house   Home Accessibility  stairs to enter home [6 steps]   Living Environment Comment  pt lives in 3 story home, 1 railing on each set of stairs, 0 TRAE home, no AD at home,  lives with spouse   Number of Stairs, Main Entrance  none   Equipment Currently Used at Home  none          Prior Level of Function      Most Recent Value   Ambulation  assistive equipment   Transferring  independent   Toileting  independent   Bathing  independent   Dressing  independent   Eating  independent   Communication  understands/communicates without difficulty   Swallowing  swallows foods/liquids without difficulty   Equipment Currently Used at Home  none                PT Treatment Summary - 01/18/19 0906        Session Details    Document Type daily treatment    Mode of Treatment individual therapy;physical therapy       Time Calculation    Start Time 0843    Stop Time 0906    Time Calculation (min) 23 min       General Information    Patient Profile Reviewed? yes    Existing Precautions/Restrictions weight bearing;fall       Weight Bearing Status    Left LE Weight Bearing Status non-weight bearing       Bed Mobility    Mar[place], Supine to Sit distant supervision;verbal cues    Verbal Cues (Supine to Sit) hand placement;technique;safety;preparatory posture    Assistive Device  (Bed Mobility) bed rails;head of bed elevated       Sit to Stand Transfer    Dundy, Sit to Stand Transfer supervision;verbal cues    Verbal Cues hand placement;technique;safety;proper use of assistive device;preparatory posture    Assistive Device walker, front-wheeled    Comment continually limited by impulsivity       Stand to Sit Transfer    Dundy, Stand to Sit Transfer supervision;verbal cues    Verbal Cues hand placement;technique;safety;proper use of assistive device;preparatory posture;maintaining precautions    Assistive Device walker, front-wheeled       Gait Training    Dundy, Gait supervision;verbal cues    Assistive Device walker, front-wheeled    Distance in Feet 40 feet    Gait Pattern Utilized swing-to    Gait Deviations Identified decreased elizabeth;decreased gait speed    Maintains Weight Bearing Status able to maintain weight bearing status    Comment adequate eccentric control as soft heel strike noted following cues. distance limited by fatigue       Stairs Training    Dundy, Stairs close supervision;verbal cues    Assistive Device crutches, axillary;railing    Handrail Location left side (ascending);right side (descending)    Number of Stairs 4    Ascending Stairs Technique step-to-step    Descending Stairs Technique step-to-step    Comment cues/demonstration for proper technique, Axillary crutch management, reverse bump method (if needed)       AM-PAC (TM) - Mobility (Current Function)    Turning from your back to your side while in a flat bed without using bedrails? 4 - None    Moving from lying on your back to sitting on the side of a flat bed without using bedrails? 3 - A Little    Moving to and from a bed to a chair? 3 - A Little    Standing up from a chair using your arms? 3 - A Little    To walk in a hospital room? 3 - A Little    Climbing 3-5 steps with a railing? 3 - A Little    AM-PAC (TM) Mobility Score 19       PT Clinical Impression    Plan For Care  Reviewed: Physical Therapy PT plan for care discussed with patient;patient voices agreement with PT plan for care    System Pathology/Pathophysiology Noted musculoskeletal    Impairments Found (PT Eval) gait, locomotion, and balance    Functional Limitations in Following Categories (PT Eval) self-care    Rehab Potential/Prognosis good, to achieve stated therapy goals    PT Frequency of Treatment 3-5 times per week    Estimated Length of Stay 1 week    Problem List impaired balance;pain    Anticipated Equipment Needs at Discharge front wheeled walker;crutches    Expected Discharge Disposition home with home health    Daily Outcome Statement pt completing stairclimbing, ambulating in ortho gym without need of assistance       PT Weekly Outcome Summary    Functional Goal Overall Progress progressing toward functional goals as expected          Pain Assessment/Intervention  Pain Charting Type: Pain Assessment             Education provided this session. See the Patient Education summary report for full details.    PT Care Plan Goals      Most Recent Value   Stair Goal, PT   PT STG: Stairs  supervision required   PT STG: Number of Stairs  13   PT STG Assistive Device: Stairs  cane, straight, crutches, axillary   PT STG Duration: Stairs  3 days or less   PT STG Outcome: Stairs  goal ongoing      PT Care Plan Goals      Most Recent Value   Bed Mobility Goal   Time to Achieve Goal: Bed Mobility  by discharge   Goal Activity: Bed Mobility  all bed mobility activities   Level of Williams Goal: Bed Mobility  modified independence   Goal Outcome: Bed Mobility  goal ongoing   Gait Goal   Time to Achieve Goal: Gait Training  by discharge   Level of Williams  supervision required   Assistive Device: Gait Training  walker, rolling   Distance Goal: Gait Training (feet)  150 feet   Goal Outcome: Gait Training  goal ongoing   Transfer Goal   Time to Achieve Goal: Transfer Training  by discharge   Goal Activity: Transfer  Training  all transfers   Level of Frontier Goal: Transfer Training  modified independence   Assistive Device: Transfer Training  walker, rolling   Goal Outcome: Transfer Training  goal ongoing

## 2019-01-18 NOTE — DISCHARGE INSTRUCTIONS
[facility]  [address], 3rd [address]  [place], [place]  [phone]    Dr. Fabricio Tee, DPM    Post Operative Instructions    Keep foot elevated as much as possible above the level of the heart.    Keep bandage clean and dry.  Do not remove unless instructed to do so by your Physician.  If bandage becomes soiled or wet call the office immediately.    You will require home nursing for wound vac changes every Monday and Thursday. Apply Adaptic to the entire wound bed, then apply the vac. They will likely need 2 vac canister dressing and 3 large black foam sponge dressings.       Take mediation as prescribed:  You had a PICC line placed in your arm before D/C. You will be receiving Ampicillin through your IV for 6 weeks.     Oxycodone 5mg take 1-2 tablets by mouth every 4 hours as needed for moderate pain.    Senokot 8.6mg two times a day as needed for constipation    Zofran 4mg every 8 hours as needed for nausea    Lovenox daily injection for blood clot prevention     Bear weight on the area only as advised:  No weight bearing to the LLE    Should you have excessive bleeding (through the bandage) or excessive discomfort, please call the office immediately.    Please call the office number above: to set up your 1st follow up appointment.

## 2019-01-18 NOTE — PLAN OF CARE
Problem: Self-Care Deficit (Adult,Obstetrics,Pediatric)  Goal: Identify Related Risk Factors and Signs and Symptoms  Outcome: Ongoing (interventions implemented as appropriate)   01/18/19 9062   Self-Care Deficit   Related Risk Factors (Self-Care Deficit) immobility   Signs and Symptoms (Self-Care Deficit) decreased balance

## 2019-01-19 ENCOUNTER — APPOINTMENT (INPATIENT)
Dept: RADIOLOGY | Facility: HOSPITAL | Age: 59
DRG: 853 | End: 2019-01-19
Attending: PODIATRIST
Payer: COMMERCIAL

## 2019-01-19 VITALS
HEIGHT: 73 IN | DIASTOLIC BLOOD PRESSURE: 78 MMHG | OXYGEN SATURATION: 98 % | WEIGHT: 215 LBS | BODY MASS INDEX: 28.49 KG/M2 | RESPIRATION RATE: 18 BRPM | HEART RATE: 73 BPM | TEMPERATURE: 98 F | SYSTOLIC BLOOD PRESSURE: 170 MMHG

## 2019-01-19 LAB
ANION GAP SERPL CALC-SCNC: 7 MEQ/L (ref 3–15)
BUN SERPL-MCNC: 12 MG/DL (ref 8–20)
CALCIUM SERPL-MCNC: 7.8 MG/DL (ref 8.9–10.3)
CHLORIDE SERPL-SCNC: 102 MEQ/L (ref 98–109)
CO2 SERPL-SCNC: 25 MEQ/L (ref 22–32)
CREAT SERPL-MCNC: 0.9 MG/DL
ERYTHROCYTE [DISTWIDTH] IN BLOOD BY AUTOMATED COUNT: 14.8 % (ref 11.6–14.4)
GFR SERPL CREATININE-BSD FRML MDRD: >60 ML/MIN/1.73M*2
GLUCOSE BLD-MCNC: 133 MG/DL (ref 70–99)
GLUCOSE BLD-MCNC: 200 MG/DL (ref 70–99)
GLUCOSE SERPL-MCNC: 175 MG/DL (ref 70–99)
HCT VFR BLDCO AUTO: 26.5 %
HGB BLD-MCNC: 8.6 G/DL
MCH RBC QN AUTO: 27.4 PG (ref 28–33.2)
MCHC RBC AUTO-ENTMCNC: 32.5 G/DL (ref 32.2–36.5)
MCV RBC AUTO: 84.4 FL (ref 83–98)
PDW BLD AUTO: 9.6 FL (ref 9.4–12.4)
PLATELET # BLD AUTO: 312 K/UL
POCT TEST: ABNORMAL
POCT TEST: ABNORMAL
POTASSIUM SERPL-SCNC: 4.1 MEQ/L (ref 3.6–5.1)
RBC # BLD AUTO: 3.14 M/UL (ref 4.5–5.8)
SODIUM SERPL-SCNC: 134 MEQ/L (ref 136–144)
WBC # BLD AUTO: 7.59 K/UL

## 2019-01-19 PROCEDURE — 85027 COMPLETE CBC AUTOMATED: CPT | Performed by: HOSPITALIST

## 2019-01-19 PROCEDURE — 71045 X-RAY EXAM CHEST 1 VIEW: CPT

## 2019-01-19 PROCEDURE — 63700000 HC SELF-ADMINISTRABLE DRUG: Performed by: PODIATRIST

## 2019-01-19 PROCEDURE — 63600000 HC DRUGS/DETAIL CODE: Performed by: INTERNAL MEDICINE

## 2019-01-19 PROCEDURE — 25800000 HC PHARMACY IV SOLUTIONS: Performed by: INTERNAL MEDICINE

## 2019-01-19 PROCEDURE — 80048 BASIC METABOLIC PNL TOTAL CA: CPT | Performed by: PODIATRIST

## 2019-01-19 PROCEDURE — C1751 CATH, INF, PER/CENT/MIDLINE: HCPCS

## 2019-01-19 PROCEDURE — 36415 COLL VENOUS BLD VENIPUNCTURE: CPT | Performed by: PODIATRIST

## 2019-01-19 PROCEDURE — 63600000 HC DRUGS/DETAIL CODE: Performed by: PODIATRIST

## 2019-01-19 PROCEDURE — 36569 INSJ PICC 5 YR+ W/O IMAGING: CPT

## 2019-01-19 PROCEDURE — 63700000 HC SELF-ADMINISTRABLE DRUG: Performed by: HOSPITALIST

## 2019-01-19 PROCEDURE — 02HV33Z INSERTION OF INFUSION DEVICE INTO SUPERIOR VENA CAVA, PERCUTANEOUS APPROACH: ICD-10-PCS | Performed by: PODIATRIST

## 2019-01-19 RX ORDER — SODIUM CHLORIDE 0.9 % (FLUSH) 0.9 %
10 SYRINGE (ML) INJECTION AS NEEDED
Status: DISCONTINUED | OUTPATIENT
Start: 2019-01-19 | End: 2019-01-19 | Stop reason: HOSPADM

## 2019-01-19 RX ORDER — SODIUM CHLORIDE 0.9 % (FLUSH) 0.9 %
10 SYRINGE (ML) INJECTION
Status: DISCONTINUED | OUTPATIENT
Start: 2019-01-19 | End: 2019-01-19 | Stop reason: HOSPADM

## 2019-01-19 RX ADMIN — OXYCODONE HYDROCHLORIDE 5 MG: 5 TABLET ORAL at 14:10

## 2019-01-19 RX ADMIN — ENOXAPARIN SODIUM 40 MG: 100 INJECTION SUBCUTANEOUS at 16:02

## 2019-01-19 RX ADMIN — LISINOPRIL 20 MG: 20 TABLET ORAL at 08:04

## 2019-01-19 RX ADMIN — ACETAMINOPHEN 650 MG: 325 TABLET, FILM COATED ORAL at 07:59

## 2019-01-19 RX ADMIN — OXYCODONE HYDROCHLORIDE 5 MG: 5 TABLET ORAL at 07:59

## 2019-01-19 RX ADMIN — PANTOPRAZOLE SODIUM 20 MG: 20 TABLET, DELAYED RELEASE ORAL at 07:59

## 2019-01-19 RX ADMIN — ACETAMINOPHEN 650 MG: 325 TABLET, FILM COATED ORAL at 14:10

## 2019-01-19 RX ADMIN — OXYCODONE HYDROCHLORIDE 5 MG: 5 TABLET ORAL at 00:17

## 2019-01-19 RX ADMIN — AMPICILLIN SODIUM 2 G: 1 INJECTION, POWDER, FOR SOLUTION INTRAMUSCULAR; INTRAVENOUS at 09:30

## 2019-01-19 RX ADMIN — AMPICILLIN SODIUM 2 G: 1 INJECTION, POWDER, FOR SOLUTION INTRAMUSCULAR; INTRAVENOUS at 00:18

## 2019-01-19 RX ADMIN — CLOPIDOGREL BISULFATE 75 MG: 75 TABLET, FILM COATED ORAL at 08:04

## 2019-01-19 RX ADMIN — INSULIN ASPART 3 UNITS: 100 INJECTION, SOLUTION INTRAVENOUS; SUBCUTANEOUS at 08:30

## 2019-01-19 RX ADMIN — AMPICILLIN SODIUM 2 G: 1 INJECTION, POWDER, FOR SOLUTION INTRAMUSCULAR; INTRAVENOUS at 05:49

## 2019-01-19 RX ADMIN — AMPICILLIN SODIUM 2 G: 1 INJECTION, POWDER, FOR SOLUTION INTRAMUSCULAR; INTRAVENOUS at 13:09

## 2019-01-19 RX ADMIN — INSULIN ASPART 5 UNITS: 100 INJECTION, SOLUTION INTRAVENOUS; SUBCUTANEOUS at 08:31

## 2019-01-19 RX ADMIN — INSULIN ASPART 5 UNITS: 100 INJECTION, SOLUTION INTRAVENOUS; SUBCUTANEOUS at 14:31

## 2019-01-19 RX ADMIN — SITAGLIPTIN 100 MG: 100 TABLET, FILM COATED ORAL at 08:31

## 2019-01-19 RX ADMIN — ROSUVASTATIN CALCIUM 10 MG: 10 TABLET, FILM COATED ORAL at 08:04

## 2019-01-19 NOTE — PLAN OF CARE
Problem: Patient Care Overview  Goal: Plan of Care Review  Outcome: Ongoing (interventions implemented as appropriate)   01/19/19 3243   Coping/Psychosocial   Plan Of Care Reviewed With patient;spouse   Plan of Care Review   Progress improving   Outcome Summary Patient appropriate for discharge with home VAC and abd infusion devise for antibx tx       Problem: Pressure Ulcer Risk (Enmanuel Scale) (Adult,Obstetrics,Pediatric)  Goal: Identify Related Risk Factors and Signs and Symptoms  Outcome: Ongoing (interventions implemented as appropriate)

## 2019-01-19 NOTE — PROGRESS NOTES
Spoke with [facility] miguel ángel Helm, [facility] is on a 72 hour hold, requested visit asap, chai Godwin who will inform pt/wife.

## 2019-01-19 NOTE — PLAN OF CARE
Problem: Patient Care Overview  Goal: Plan of Care Review  Outcome: Ongoing (interventions implemented as appropriate)   01/19/19 0535   Coping/Psychosocial   Plan Of Care Reviewed With patient   Plan of Care Review   Progress progress toward functional goals as expected   Outcome Summary Oxycodone given for pain control, LLE elevated, should be d/c'd home today       Problem: Pressure Ulcer Risk (Enmanuel Scale) (Adult,Obstetrics,Pediatric)  Goal: Skin Integrity  Outcome: Ongoing (interventions implemented as appropriate)      Problem: Infection, Risk/Actual (Adult)  Goal: Infection Prevention/Resolution  Outcome: Ongoing (interventions implemented as appropriate)      Problem: Fall Risk (Adult)  Goal: Absence of Falls  Outcome: Ongoing (interventions implemented as appropriate)      Problem: Pressure Ulcer (Adult)  Goal: Signs and Symptoms of Listed Potential Problems Will be Absent, Minimized or Managed (Pressure Ulcer)  Outcome: Ongoing (interventions implemented as appropriate)      Problem: Self-Care Deficit (Adult,Obstetrics,Pediatric)  Goal: Identify Related Risk Factors and Signs and Symptoms  Outcome: Ongoing (interventions implemented as appropriate)

## 2019-01-19 NOTE — PROGRESS NOTES
Infectious Disease Progress Note    Patient Name: Harvey Lucero Jr  MR#: [id number]  : 1960  Admission Date: 2019  Date: 19   Time: 1:53 PM   Author: Severo Dias MD    OBJECTIVE:  PICC line in place, no nausea vomiting tolerating the everything seems stable and ready    Anti-infectives     Start     Dose/Rate Route Frequency Ordered Stop    19 1045  ampicillin (OMNIPEN) 2 g in sodium chloride 0.9 % 100 mL IVPB      2 g  200 mL/hr over 30 Minutes intravenous Every 4 hours interval 19 1042            ROS  Other systems are negative  vital Signs:    Temp:  [36.8 °C (98.3 °F)-37.1 °C (98.8 °F)] 36.9 °C (98.4 °F)  Heart Rate:  [78-82] 82  Resp:  [16-18] 18  BP: (169-195)/(79-95) 195/95    Temp (72hrs), Av.8 °C (98.2 °F), Min:36.6 °C (97.8 °F), Max:37.1 °C (98.8 °F)      Physical Exam    Gen: Aox3  HEENT: OP clear  Neck: Supple  LAD: No cervical LAD  Lungs: CTAB  CV: RRR no murmurs  Abd: Soft NTND +BS  Ext: Left foot dressing CDI wound VAC in place        Lines, Drains, Airways, Wounds:  Peripheral IV 19 Right Arm (Active)   Number of days: 3       Peripheral IV 19 Left Hand (Active)   Number of days: 0       Closed/Suction Drain Left Foot (Active)   Number of days: 0       Surgical Incision Foot Left (Active)   Number of days: 3       Labs:    Lab Results   Component Value Date    WBC 7.59 2019    HGB 8.6 (L) 2019    HCT 26.5 (L) 2019    MCV 84.4 2019     2019     Lab Results   Component Value Date    GLUCOSE 175 (H) 2019    CALCIUM 7.8 (L) 2019     (L) 2019    K 4.1 2019    CO2 25 2019     2019    BUN 12 2019    CREATININE 0.9 2019     Lab Results   Component Value Date    ALT 16 2018    AST 11 2018    ALKPHOS 60 2018    BILITOT 0.6 2018         Patient Active Problem List   Diagnosis Code   • History of CVA (cerebrovascular accident) Z86.73   •  Type 2 diabetes mellitus (CMS/HCC) ([facility]) E11.9   • Lipid disorder E78.9   • Hypertension I10   • Acute hematogenous osteomyelitis of left foot (CMS/HCC) ([facility]) M86.072   • Anemia D64.9   • Leukocytosis D72.829   • Hyponatremia E87.1   • Elevated serum creatinine R79.89   • Bacteremia R78.81   • Gas gangrene (CMS/[facility]) A48.0   • Streptococcal sepsis (CMS/HCC) ([facility]) A40.9   • Necrotizing fasciitis (CMS/[facility]) M72.6   • Open wound of ankle and foot S91.009A, S91.309A   • Hypokalemia E87.6   • Localized edema R60.0     Other streptococcal sepsis (CMS/[facility])   Assessment & Plan    Continue with ampicillin via PICC line day 12 out of 42  I will follow him in the office      Necrotizing fasciitis of ankle and foot (CMS/[facility])   Assessment & Plan    As above             Severo Dias MD  1/19/20191:53 PM

## 2019-01-19 NOTE — PROGRESS NOTES
Subjective:   Patient seen at bedside  POD#8 s/p Versajet debridement with Integra/Primatrix graft placed and POD#12 s/p left lower extremity debridement with extensive soft tissue excision, bone biopsies. No events overnight, NAD. Pain is well controlled this morning. Denies any N/V/F/C/CP/SOB.     ROS:   Past Medical/Surgical history, Allergies, Meds reviewed in detail as charted  FH/SH reviewed in detail as charted  Review of Systems:  Head and Neck: No complaints  Chest : No complaints  Abdomen: No Complaints  Constitutional: Unremarkable       Objective:      Vitals:  Vitals:    01/18/19 2200   BP: (!) 172/81   Pulse: 79   Resp: 18   Temp: 37.1 °C (98.8 °F)   SpO2: 99%       Radiology: No new Radiology.    Labs:     CBC Results       01/19/19 01/18/19 01/17/19                    0545 0550 0610         WBC 7.59 6.69 8.31         RBC 3.14 (L) 3.23 (L) 3.34 (L)         HGB 8.6 (L) 8.9 (L) 9.4 (L)         HCT 26.5 (L) 28.0 (L) 28.8 (L)         MCV 84.4 86.7 86.2         MCH 27.4 (L) 27.6 (L) 28.1         MCHC 32.5 31.8 (L) 32.6          300 387 (H)                     BMP Results       01/19/19 01/18/19 01/17/19                    0545 0550 0610          (L) 133 (L) 139         K 4.1 3.9 4.4         Cl 102 101 104         CO2 25 25 26         Glucose 175 (H) 109 (H) 103 (H)         BUN 12 13 10         Creatinine 0.9 0.9 1.0         Calcium 7.8 (L) 7.7 (L) 7.8 (L)         Anion Gap 7 7 9         EGFR &gt;60.0 &gt;60.0 &gt;60.0                         Lower Extremity Physical Exam:   LE Exam  Vascular: Dressings remained intact.  No strike through noted.          Capillary refill time <3seconds B/L.  MSK: +DP/PF of digits intact B/L.    No pain on palpation to the posterior legs B/L. No signs of clinical DVT B/L.   Derm: Dressings C/D/I. No strike through.  Neuro: Light touch and protective sensation is intact.         Assessment and Plan:     Pt is a 57 y/o male seen at bedside with Dr. Tee POD#8  s/p Versajet debridement with Integra/Primatrix graft placed and POD#12 s/p left lower extremity debridement with extensive soft tissue excision, bone biopsies.      -Wound vac left in place this morning  -Plan discussed in detail with the patient at bedside yesterday and today. Patient will require 2 wound vacs to the left foot as an outpatient secondary to the amount of drainage. One 200cc canister will likely not be enough between wound vac dressing changes. Would like wound vacs changed Monday and Thursdays. He will require HBO oxygen therapy. This will likely be 4-5 days per week. Plan discussed with patient and CM/SW this morning. Plan is to go home today after PICC line is placed. Discharge order in.   -Vac tubes can be simply reattached to the home vacs   -Script for hospital bed in chart.   -Instructed patient to keep the left leg as elevated as possible to control bleeding  -Continue Plavix   -Continue DVT prophylaxis with Lovenox  -OR bone cultures 1/7 growing Streptococcus anginosus and Enterococcus faecalis (final)   -OR bone pathology for cuboid and talus are both positive for acute osteomyelitis  -OR cx 1/4: Strep anginosus, enterococcus faecalis (final)   -The paperwork for wound vac has been placed in chart.  Our team previously contacted CM regarding need of patient to have two separate wound vacs given the extent of the wound.    -Nonweightbearing to the left lower extremity   -Home wound vacs have been delivered to the patient's room.   -F/U with Dr. Tee in the [facility] wound care center 1 week after d/c   - Please contact on call podiatry resident with any questions or concerns.    Dr. Suad Payne DPM, PGY1  Pager# [phone]

## 2019-01-19 NOTE — PROGRESS NOTES
Plan dc home today, spoke with Jalil Godoy RN to arrive to hospital between 11:30-12, pump to arrive prior to that.  Spoke with miguel ángel Clark at [facility], completed dc info faxed. RN to apply [facility] wound vacs, [facility] to f/u with visit.Patient/ wife are in agreement with plan.

## 2019-01-19 NOTE — NURSING NOTE
Right sided PICC line placed for pt. discharge to home.  Initial placement found PICC malpositioned , attempted unsuccessfully  to power flush down, PICC re-wired, now tip resides in the SVC.    Tarsha Zacarias RN, [facility]-BC

## 2019-01-22 ENCOUNTER — PATIENT OUTREACH (OUTPATIENT)
Dept: CASE MANAGEMENT | Facility: CLINIC | Age: 59
End: 2019-01-22

## 2019-01-22 NOTE — PROGRESS NOTES
CC outreach call to pt and spoke to his wife Marianne. Pt is no longer pt of Dr. Castro. Pt has new PCP, Dr. ALLISON Avitia. TCM not completed and will update PCP office.

## 2019-01-23 ENCOUNTER — TRANSCRIBE ORDERS (OUTPATIENT)
Dept: WOUND CARE | Facility: HOSPITAL | Age: 59
End: 2019-01-23

## 2019-01-23 ENCOUNTER — OFFICE VISIT (OUTPATIENT)
Dept: WOUND CARE | Facility: HOSPITAL | Age: 59
End: 2019-01-23
Attending: PODIATRIST
Payer: COMMERCIAL

## 2019-01-23 ENCOUNTER — HOSPITAL ENCOUNTER (OUTPATIENT)
Dept: RADIOLOGY | Facility: HOSPITAL | Age: 59
Discharge: HOME | End: 2019-01-23
Attending: PLASTIC SURGERY
Payer: COMMERCIAL

## 2019-01-23 ENCOUNTER — OFFICE VISIT (OUTPATIENT)
Dept: WOUND CARE | Facility: HOSPITAL | Age: 59
End: 2019-01-23
Attending: PLASTIC SURGERY
Payer: COMMERCIAL

## 2019-01-23 VITALS
DIASTOLIC BLOOD PRESSURE: 73 MMHG | HEART RATE: 85 BPM | SYSTOLIC BLOOD PRESSURE: 144 MMHG | RESPIRATION RATE: 18 BRPM | TEMPERATURE: 98.4 F

## 2019-01-23 VITALS
SYSTOLIC BLOOD PRESSURE: 144 MMHG | TEMPERATURE: 98.4 F | HEART RATE: 85 BPM | DIASTOLIC BLOOD PRESSURE: 73 MMHG | RESPIRATION RATE: 18 BRPM

## 2019-01-23 DIAGNOSIS — Z01.818 ENCOUNTER FOR OTHER PREPROCEDURAL EXAMINATION: ICD-10-CM

## 2019-01-23 DIAGNOSIS — S91.009A OPEN WOUND OF ANKLE AND FOOT: ICD-10-CM

## 2019-01-23 DIAGNOSIS — M86.372 CHRONIC MULTIFOCAL OSTEOMYELITIS, LEFT ANKLE AND FOOT (CMS/HCC): ICD-10-CM

## 2019-01-23 DIAGNOSIS — M86.372 CHRONIC MULTIFOCAL OSTEOMYELITIS, LEFT ANKLE AND FOOT (CMS/HCC): Primary | ICD-10-CM

## 2019-01-23 DIAGNOSIS — Z01.818 ENCOUNTER FOR OTHER PREPROCEDURAL EXAMINATION: Primary | ICD-10-CM

## 2019-01-23 DIAGNOSIS — S91.309A OPEN WOUND OF ANKLE AND FOOT: ICD-10-CM

## 2019-01-23 PROBLEM — E11.621 TYPE 2 DIABETES MELLITUS WITH FOOT ULCER, WITH LONG-TERM CURRENT USE OF INSULIN (CMS/HCC): Status: ACTIVE | Noted: 2019-01-23

## 2019-01-23 PROBLEM — E78.9 LIPID DISORDER: Status: ACTIVE | Noted: 2019-01-23

## 2019-01-23 PROBLEM — M86.672: Status: ACTIVE | Noted: 2019-01-23

## 2019-01-23 PROBLEM — Z79.4 TYPE 2 DIABETES MELLITUS WITH DIABETIC PERIPHERAL ANGIOPATHY AND GANGRENE, WITH LONG-TERM CURRENT USE OF INSULIN (CMS/HCC): Status: ACTIVE | Noted: 2019-01-23

## 2019-01-23 PROBLEM — Z79.4 TYPE 2 DIABETES MELLITUS WITH FOOT ULCER, WITH LONG-TERM CURRENT USE OF INSULIN (CMS/HCC): Status: ACTIVE | Noted: 2019-01-23

## 2019-01-23 PROBLEM — E11.52 TYPE 2 DIABETES MELLITUS WITH DIABETIC PERIPHERAL ANGIOPATHY AND GANGRENE, WITH LONG-TERM CURRENT USE OF INSULIN (CMS/HCC): Status: ACTIVE | Noted: 2019-01-23

## 2019-01-23 PROBLEM — I10 ESSENTIAL HYPERTENSION: Status: ACTIVE | Noted: 2019-01-23

## 2019-01-23 PROBLEM — Z87.39 HISTORY OF NECROTIZING FASCIITIS: Status: ACTIVE | Noted: 2019-01-23

## 2019-01-23 PROBLEM — L97.509 TYPE 2 DIABETES MELLITUS WITH FOOT ULCER, WITH LONG-TERM CURRENT USE OF INSULIN (CMS/HCC): Status: ACTIVE | Noted: 2019-01-23

## 2019-01-23 PROBLEM — Z86.73 HISTORY OF CVA (CEREBROVASCULAR ACCIDENT): Status: ACTIVE | Noted: 2019-01-23

## 2019-01-23 PROCEDURE — 99204 OFFICE O/P NEW MOD 45 MIN: CPT | Performed by: PLASTIC SURGERY

## 2019-01-23 PROCEDURE — 71046 X-RAY EXAM CHEST 2 VIEWS: CPT

## 2019-01-23 ASSESSMENT — ENCOUNTER SYMPTOMS
PHOTOPHOBIA: 0
HEMATOLOGIC/LYMPHATIC NEGATIVE: 1
COUGH: 0
GASTROINTESTINAL NEGATIVE: 1
TROUBLE SWALLOWING: 0
PALPITATIONS: 0
FATIGUE: 1
PSYCHIATRIC NEGATIVE: 1
SINUS PRESSURE: 0
FEVER: 0
SHORTNESS OF BREATH: 0
WHEEZING: 0
DIAPHORESIS: 0
CHILLS: 0
ALLERGIC/IMMUNOLOGIC NEGATIVE: 1
CHEST TIGHTNESS: 0

## 2019-01-23 NOTE — PATIENT INSTRUCTIONS
Wound Healing Center Instructions    MEDICATIONS     Medication Note  Continue present medications as prescribed by the Wound Healing Center or other physicians you see. To avoid any problems keep the Wound Healing Center informed each visit of any medications changes that occur.     WOUND CARE     Clean Wound with: Saline Solution   Treatment 1   Location: left foot & ankle    Dressing: continue NPWT @ 125 mmHG  Dressing Care Frequency: 3x per Week  Care Provider: Visiting Nurse       Basic Principles      • Wash your hands thoroughly with soap and water and after each dressing change. If someone other than the patient changes the dressing, it’s best to wear disposable gloves.   • Do not get the wound or dressing wet.   • To shower: remove the dressing, shower with soap and water (including washing the wound - do not use a washcloth), air dry, then redress the wound.  • Do not take a tub bath  • Keep all your dressings in a clean, covered container at home to avoid dust and contamination.  • Discard used dressings in a plastic bag or covered trash container.  • Check your wound and the surrounding skin at each dressing change for redness, warmth, swelling, increased pain, foul odor, fever, pus or abnormal drainage or discharge.  • Notify Wound Healing Center if any of these changes occur - Dept: [phone].        Nutrition  • Eat a well, balanced diet with adequate protein to support wound healing.   • Take a multivitamin every day. Adequate nutrition supports healing and new tissue growth.  • All diabetic patients should strive to keep blood sugars within a normal, practical range.   • Elevated blood sugars can delay your wound healing.        ACTIVITY     Normal activity then rest and elevation  May excercise  Limit activities    MOBILITY     Continue non weight bearing

## 2019-01-23 NOTE — ASSESSMENT & PLAN NOTE
The assessment demonstrates a distally ischemic left foot status post multiple extensive debridement procedures the last of which involved skin substitute application for necrotizing fasciitis and multifocal osteomyelitis.  Our plan in agreement with recommendations by Dr. Tee and salvage of his foot for a high transmetatarsal amputation, and if ultimately not feasible move ahead with a below the knee amputation.  The patient would like every other option explored and utilized prior to coming to this point.  I believe hyperbaric oxygen therapy would offer him a suitable modality to assist in the treatment of his chronic osteomyelitis and enhance wound healing with his planned more proximal amputation.  He has had multiple debridements and bone resections with concomitant IV antibiotic therapy and I believe qualifies for a course of hyperbaric oxygen therapy.  I have reviewed with him the technique of our therapy program including the anticipated benefits, risks, and complications including barotrauma, visual changes, risk of lung collapse, risk of seizures from either elevated oxygen exposure or risk fluctuations in blood glucose acceleration of cataracts.  Possibility that this treatment regimen would not be ultimately helpful in preventing him from requiring a below the knee amputation was specifically reviewed and stressed.  He wants to move ahead with hyperbaric oxygen treatment.  His inpatient laboratory data and imaging studies have been reviewed and he will require an outpatient chest film and AP and lateral projection to complete his workup.  Will be continued on his current home care wound management program and follow-up periodically with Dr. Tee he will also dictate the timing of his more proximal planned transmetatarsal amputation.    Our diagnosis for the hyperbaric oxygen treatment is chronic multifocal osteomyelitis of the left ankle and foot, M86.372.  We would plan a course of 40  treatments at 2.4 YADI for 90 minutes each with air breaks.

## 2019-01-23 NOTE — PROGRESS NOTES
Patient ID: Harvey Lcuero Jr.                              : 1960  MRN: [id number]                                            Visit Date: 2019  Encounter Provider: Cesario Smith Jr  Referring Provider: No ref. provider found    Subjective      HPI  Harvey Lucero Jr. is a 58 y.o. old male with a chief compliant of HBOT EVAL (left foot)   presenting today for initial evaluation of complex gangrenous and ischemic dorsal and plantar foot wounds of the left foot status post development of necrotizing fasciitis and in-hospital surgical treatment.  He is seen with his wife and sister.  He was seen in follow-up evaluation of his complex wound area by Dr. Tee is currently receiving q. 6-hour IV ampicillin antibiotic therapy for multifocal osteomyelitis of the left foot and is a gram of negative pressure wound therapy with treatment by [facility].  He is nonambulatory at this time and is seen in his wheelchair in exam table with Dr Tee, his podiatric surgeon.  After evaluation of the wound status, I am in agreement with Dr Tee he has irreversible necrotic/gangrenous changes of the toes and distal metatarsal area of the left foot which will require to the best scenario a high transmetatarsal amputation to attempt to salvage his foot.  He is status post 4 complex debridement and drainage procedures for his left foot there were performed during his most recent hospitalization.  During that time, CT angiography demonstrated intact trifurcation of the vessels of the left leg and he was felt to have acceptable arterial inflow for healing.  At the latest procedure A bone biopsy from multiple sites was obtained and was positive with bacteria on bone culture as well as permanent pathology consistent with osteomyelitis.  He had MRI studies consistent with multifocal osteomyelitis most prominently at the talus and cuboid bone areas.  I am in agreement that hyperbaric oxygen therapy would be  appropriate to treat the osteomyelitis which is incompletely responded to greater than a month of multiple surgical treatment and IV antibiotic therapy as well as enhance the probability of attaining a healed wound at the time of his higher amputation which is planned most likely in the next week or 2.  If this would occur after final demarcation.  I believe the hyperbaric oxygen therapy will help with the demarcation as well and enhance the healing of the residual skin flaps.  Qamar feels the same way and believes that he benefit from a course of hyperbaric oxygen treatment.    Mr. Lucero once everything that could possibly be done to be tried prior to considering a below the knee amputation.  He understands that hyperbaric oxygen therapy would primarily be effective in assisting with the treatment of his multifocal osteomyelitis of the left foot and also should enhance wound healing.  He understands that I can give him no guarantee that it will prevent him from getting a below the knee the patient eventually.  Both his wife and sister also understand this after an extensive discussion that we had about the rationale and benefit of hyperbaric oxygen therapy in his situation.  He wants to move ahead with the therapy.    The following have been reviewed and updated as appropriate in this visit:  Meds       Past Medical History:  has a past medical history of GERD (gastroesophageal reflux disease); History of CVA (cerebrovascular accident); Hypertension; Lipid disorder; and Type 2 diabetes mellitus (CMS/HCC) ([facility]).  Past Surgical History:  has no past surgical history on file.  Social History:  reports that he has quit smoking. He has never used smokeless tobacco. He reports that he does not drink alcohol or use drugs.  Family History: family history is not on file.  Medications:   Current Outpatient Prescriptions:   •  blood sugar diagnostic (ONETOUCH ULTRA TEST) strip, for blood glucose monitoring 3-4x day,  "Disp: , Rfl:   •  blood-glucose meter kit, for blood glucose monitoring, Disp: , Rfl:   •  clopidogrel (PLAVIX) 75 mg tablet, TAKE 1 TABLET BY MOUTH EVERY DAY, Disp: 90 tablet, Rfl: 0  •  enoxaparin (LOVENOX) 40 mg/0.4 mL syringe, Inject 0.4 mL (40 mg total) under the skin daily., Disp: 12 mL, Rfl: 0  •  insulin glargine (BASAGLAR KWIKPEN U-100 INSULIN) 100 unit/mL (3 mL) subcutaneous pen, 26 units at bedtime, Disp: , Rfl:   •  lancets misc, for blood glucose monitoring 3x day, Disp: , Rfl:   •  lisinopril (PRINIVIL) 40 mg tablet, Take 40 mg by mouth once daily., Disp: , Rfl: 3  •  metFORMIN (GLUCOPHAGE) 1,000 mg tablet, take 1 tablet by oral route 2 times every day with morning and evening meals, Disp: , Rfl:   •  oxyCODONE (ROXICODONE) 5 mg immediate release tablet, Take 1-2 tablets (5-10 mg total) by mouth every 4 (four) hours as needed for moderate pain for up to 5 days., Disp: 35 tablet, Rfl: 0  •  pen needle, diabetic (NOVOFINE 32) 32 gauge x 1/4\" needle, For insulin administration once daily, Disp: 300 each, Rfl: 3  •  rosuvastatin (CRESTOR) 10 mg tablet, 10 mg., Disp: , Rfl:   •  senna (SENOKOT) 8.6 mg tablet, Take 1 tablet by mouth 2 (two) times a day as needed for constipation. (Patient not taking: Reported on 1/23/2019 ), Disp: 30 tablet, Rfl: 0  •  SITagliptin (JANUVIA) 100 mg tablet, Take 1 tablet (100 mg total) by mouth daily., Disp: 90 tablet, Rfl: 2    Allergies: is allergic to no known allergies.     Review of Systems   Constitutional: Positive for fatigue. Negative for chills, diaphoresis and fever.   HENT: Negative.  Negative for congestion, dental problem, ear discharge, ear pain, postnasal drip, sinus pressure, tinnitus and trouble swallowing.    Eyes: Negative for photophobia.        He wears corrective lenses   Respiratory: Negative for cough, chest tightness, shortness of breath and wheezing.    Cardiovascular: Negative for chest pain, palpitations and leg swelling.        Placement of a " subcutaneous implanted cardiac monitoring loop recorder placed in the left chest area at 5 years ago at [facility] and does not recall being informed that he had any significant arrhythmia   Gastrointestinal: Negative.    Endocrine:        Has long-standing history of type 2 diabetes and is on insulin supplement   Genitourinary: Negative.    Musculoskeletal: Positive for gait problem.   Skin:        Complex left foot wound with gangrene of toes   Allergic/Immunologic: Negative.    Hematological: Negative.    Psychiatric/Behavioral: Negative.      Objective   BP (!) 144/73   Pulse 85   Temp 36.9 °C (98.4 °F) (Tympanic)   Resp 18     Physical Exam   Constitutional: He is oriented to person, place, and time. He appears well-developed and well-nourished.   HENT:   Head: Normocephalic and atraumatic.   Mouth/Throat: Oropharynx is clear and moist. No oropharyngeal exudate.   Otoscopic exam demonstrates normal-appearing bilateral tympanic membranes   Eyes: Conjunctivae and EOM are normal.   Neck: Neck supple.   Cardiovascular: Normal rate and regular rhythm.    No murmur heard.  Has implanted loop recorder in the left medial pectoral subcutaneous area without evidence of reaction or infection   Pulmonary/Chest: Effort normal and breath sounds normal. No respiratory distress. He has no wheezes. He has no rales.   Abdominal: Soft. Bowel sounds are normal. He exhibits no distension and no mass.   Musculoskeletal: He exhibits edema.   Minimal left lower extremity edema   Lymphadenopathy:     He has no cervical adenopathy.   Neurological: He is alert and oriented to person, place, and time.   Skin: Skin is warm and dry. There is erythema.   Complex dorsal and plantar wound areas of the left foot after extensive debridement and skeletonization of the distal metatarsals with absent viable skin to the level of the medial ankle and distal tibia extending down onto the dorsum of the foot.  The toes appear necrotic.  The  temporary acellular grafting over the distal dorsal and plantar wound areas appears necrotic.  The plantar heel pad demonstrates intact and viable plantar skin.  The medial lower tibial wound is sutured and healing.  The left calf compartments are soft without signs of compartment syndrome.  Wound areas extend over where palpable or Doppler pulses could be obtained.  Please see digital photographs   Psychiatric: He has a normal mood and affect. His behavior is normal. Judgment and thought content normal.   Vitals reviewed.        Left foot plantar wound area                      Left foot dorsal wound area  Assessment/Plan    Diagnosis Plan   1. Chronic multifocal osteomyelitis, left ankle and foot (CMS/HCC) ([facility])       Problem List Items Addressed This Visit     Chronic multifocal osteomyelitis, left ankle and foot (CMS/HCC) ([facility]) - Primary     The assessment demonstrates a distally ischemic left foot status post multiple extensive debridement procedures the last of which involved skin substitute application for necrotizing fasciitis and multifocal osteomyelitis.  Our plan in agreement with recommendations by Dr. Tee and salvage of his foot for a high transmetatarsal amputation, and if ultimately not feasible move ahead with a below the knee amputation.  The patient would like every other option explored and utilized prior to coming to this point.  I believe hyperbaric oxygen therapy would offer him a suitable modality to assist in the treatment of his chronic osteomyelitis and enhance wound healing with his planned more proximal amputation.  He has had multiple debridements and bone resections with concomitant IV antibiotic therapy and I believe qualifies for a course of hyperbaric oxygen therapy.  I have reviewed with him the technique of our therapy program including the anticipated benefits, risks, and complications including barotrauma, visual changes, risk of lung collapse, risk of seizures from either  elevated oxygen exposure or risk fluctuations in blood glucose acceleration of cataracts.  Possibility that this treatment regimen would not be ultimately helpful in preventing him from requiring a below the knee amputation was specifically reviewed and stressed.  He wants to move ahead with hyperbaric oxygen treatment.  His inpatient laboratory data and imaging studies have been reviewed and he will require an outpatient chest film and AP and lateral projection to complete his workup.  Will be continued on his current home care wound management program and follow-up periodically with Dr. Tee he will also dictate the timing of his more proximal planned transmetatarsal amputation.    Our diagnosis for the hyperbaric oxygen treatment is chronic multifocal osteomyelitis of the left ankle and foot, M86.372.  We would plan a course of 40 treatments at 2.4 YADI for 90 minutes each with air breaks.               Return for @ initiation of HBOT.     Cesario Smith Jr, MD

## 2019-01-24 ENCOUNTER — DOCUMENTATION (OUTPATIENT)
Dept: WOUND CARE | Facility: HOSPITAL | Age: 59
End: 2019-01-24

## 2019-01-24 NOTE — PROGRESS NOTES
"Called [employer] ([phone]) and spoke with representative \"Leanna\" to request preauthorization for 40 outpatient HBOT DOS 1/28/19 to 4/30/19 for dx code M86.272 CPT code  and 52812. Authorization is pending medical review.  Case # [id number].  Clinical info faxed to [employer] @ [phone].    Spoke with representative \"Quinn \" in benefits who reported out of pocket is $50/day for physician supervision ( 77713), $40/visit per HBOT (), pt has no deductible, no coinsurance & $ 2000 out of pocket.  "

## 2019-01-25 ENCOUNTER — DOCUMENTATION (OUTPATIENT)
Dept: WOUND CARE | Facility: HOSPITAL | Age: 59
End: 2019-01-25

## 2019-01-25 NOTE — PROGRESS NOTES
"Received call from \"Martha\" RN @ [employer] for 40 HBOT  From 1/28/19 to 4/30/19.  Authorization # [id number].  Contacted Mr & Mrs Lucero to report benefit information we were given from [employer].  Mr Lucero will contact [employer] directly to discuss their financial responsibility for HBOT.  "

## 2019-01-25 NOTE — PROGRESS NOTES
January 25, 2019: I have reviewed the outpatient chest x-ray report on Mr. Lucero performed on January 23.  There is a small left pleural effusion and area of atelectasis with otherwise unremarkable findings.  There is no evidence of pneumothorax.  The loop recorder is visualized.  Based on the examination report, it would be safe for us to proceed with hyperbaric oxygen therapy treatment.    Cesario Smith Jr., MD January 25, 2019

## 2019-01-25 NOTE — PROGRESS NOTES
January 25, 2019: I have reviewed the outpatient chest x-ray examination report performed on January 23.  It demonstrates a small left pleural effusion with associated atelectasis.  There is stable cardiomegaly reported with a implanted cardiac loop recorder noted.  Based on the findings of the chest x-ray report, there are no findings demonstrating any contraindication to hyperbaric oxygen therapy.    Cesario Smith Jr., MD

## 2019-01-25 NOTE — PROGRESS NOTES
"Spoke with representative \"Chrsitiana\" from the technical department at [employer] regarding Harvey Lucero Jr 's loop recorder -Reveal Linq Model LNQII.  According to Christiana, recorder is able to withstand pressures up to 4 atmospheres  Requested that \"Christiana\" fax us documentation to support this.  "

## 2019-01-28 NOTE — OP NOTE
Wound debridement with application of allograft, Dr. Tee available before 12pm. (L) Procedure Note     Procedure:    Wound debridement with application of allograft, Dr. Tee available before 12pm.  CPT(R) Code:  33539 - TN MAYRA SKN SUB GRFT T/A/L AREA/<100SCM /<1ST 25 SCM    Indications:        Pre-op Diagnosis     * Open wound of ankle and foot [S91.009A, S91.309A]    Post-op Diagnosis     * Open wound of ankle and foot [S91.009A, S91.309A]    Surgeon:  Fabricio Tee, KAMIM    Assistants: Osorio Lr D.P.MShaylee, Albin Jane, PGY 3, D.P.M.    Anesthesia: General endotracheal anesthesia    ASA Class: 3      Procedure Details   Patient was brought to the operating room and placed on the operating table in supine position.  Once anesthesia was achieved the left lower extremity was prepped and draped in the usual aseptic technique and a timeout was performed for the beginning of the procedure.    Attention was drawn to the left foot where using a #15 blade along with curettes all necrotic nonviable tissue over the dorsal, plantar medial and lateral ankle were debrided.  Nonviable tendon including the extensor hallucis longus appeared to be necrosis at the distal portion.  Healthy granular bleeding was noted at the upper ankle and proximal midfoot.  The dorsal and plantar first interspace were dissected out due to nonviable tissue which was removed leaving a gap between the dorsal and plantar surface.  Exposed first metatarsal, second and third metatarsal bones appear to be white and hard, no signs of bogginess or duskiness except at the distal portions.  Healthy bleeding was not observed distally over the digits upon debridement.  The wound was measured finally at 625 cm².  The entire wound was irrigated copiously using 3 L of sterile saline mixed with gentamicin.  A prime matrix AG graft was then applied over the wound site and stapled in place.  A wound VAC was then applied over the graft.    The foot was  dressed with dry sterile dressing.  The patient tolerated the procedure well with all vital signs stable neurovascular status intact.  Once aroused of anesthesia patient was transferred from the operating room to PACU    Findings:  Consistent with pre-operative diagnosis    Hemostasis: Anatomic dissection    Estimated Blood Loss:  300 mL           Drains: None           Total IV Fluids: 800 ml           Specimens: * No specimens in log *           Implants:   Implant Name Type Inv. Item Serial No.  Lot No. LRB No. Used   primatrix ag meshed antimicrobial dermal repair scaffold Implant Tissue   [id number] [employer] [facility] [id number] Left 2              Complications: None           Disposition: PACU - hemodynamically stable.           Condition: stable    Fabricio Tee DPM  Phone Number: [phone]

## 2019-01-30 ENCOUNTER — OFFICE VISIT (OUTPATIENT)
Dept: WOUND CARE | Facility: HOSPITAL | Age: 59
End: 2019-01-30
Attending: PODIATRIST
Payer: COMMERCIAL

## 2019-01-30 ENCOUNTER — ANESTHESIA EVENT (OUTPATIENT)
Dept: SURGERY | Facility: HOSPITAL | Age: 59
Setting detail: HOSPITAL OUTPATIENT SURGERY
End: 2019-01-30
Payer: COMMERCIAL

## 2019-01-30 VITALS — TEMPERATURE: 98.8 F

## 2019-01-30 DIAGNOSIS — S91.009A OPEN WOUND OF ANKLE AND FOOT: Primary | ICD-10-CM

## 2019-01-30 DIAGNOSIS — M86.072 ACUTE HEMATOGENOUS OSTEOMYELITIS OF LEFT FOOT (CMS/HCC): ICD-10-CM

## 2019-01-30 DIAGNOSIS — M86.372 CHRONIC MULTIFOCAL OSTEOMYELITIS, LEFT ANKLE AND FOOT (CMS/HCC): ICD-10-CM

## 2019-01-30 DIAGNOSIS — S91.309A OPEN WOUND OF ANKLE AND FOOT: Primary | ICD-10-CM

## 2019-01-30 DIAGNOSIS — M72.6 NECROTIZING FASCIITIS (CMS/HCC): ICD-10-CM

## 2019-01-30 DIAGNOSIS — A48.0 GAS GANGRENE (CMS/HCC): ICD-10-CM

## 2019-01-30 PROCEDURE — 27200105 HC AQUA CELL 4X4

## 2019-01-30 NOTE — PROGRESS NOTES
Subjective      Patient ID: Harvey Lucero Jr. is a 58 y.o. male.    HPI patient seen today status post severe strep infection to his left foot.  The wound VAC in place with post cancer treatment spell with bloody exudate.  Patient denies any current fever chills or night sweats he is tolerating antibiotics very very well.  Has been seen Monday Wednesday Friday by nursing we have been changing the wound VAC in the area.    The following have been reviewed and updated as appropriate in this visit:         Past Medical History: He  has a past medical history of GERD (gastroesophageal reflux disease); History of CVA (cerebrovascular accident); Hypertension; Lipid disorder; and Type 2 diabetes mellitus (CMS/HCC) ([facility]).  Past Surgical History: He  has no past surgical history on file.  Medication: He has a current medication list which includes the following prescription(s): blood sugar diagnostic, blood-glucose meter, clopidogrel, enoxaparin, insulin glargine, lancets, lisinopril, metformin, pen needle, diabetic, rosuvastatin, senna, and sitagliptin.  Allergies: He is allergic to no known allergies.  Social History: He  reports that he has quit smoking. He has never used smokeless tobacco. He reports that he does not drink alcohol or use drugs.    Review of Systems:  Review of Systems      Objective Desiccated forefoot upon to transmit for out this will be dryness in the arch also to the dorsal aspect of the foot there is noted to be non-viability toes toes are cyanotic in nature there is negative Feel the area that there is no signs of acute lymphangitis or cellulitis leg is decreased size decreased depth there is no signs of lymphangitis or cellulitis noted in the area of the left lower extremity.  Sutures remain intact left medial ankle.    Foot Exam    Assessment/Plan Status post necrotizing fasciitis/severe strep infection of his left foot.  Uncontrolled diabetes mellitus with severe peripheral neuropathy #3 abscess  resolved to the left foot    Plan limb loss is still very likely #2 we will recommend a mostly proximal transmetatarsal amputation with grafting which we done tomorrow 3 we will continue with ampicillin every 6 hours to fight the strep infection again spoke to sister also wife at length again healing does remain very very tender if we are looking at the point where patient seem to be in good spirits I want to continue with good appetite had a good patient disability he will continue to take glycemic control will keep patient updated has been going for  Problem List Items Addressed This Visit     None          No Follow-up on file.    Fabricio Tee DPM

## 2019-01-30 NOTE — PATIENT INSTRUCTIONS
[facility] Instructions    MEDICATIONS     Medication Note  Continue present medications as prescribed by the [facility]aling Center or other physicians you see. To avoid any problems keep the [facility]aling Center informed each visit of any medications changes that occur.     WOUND CARE     Clean Wound with: KEEP DRESSING CLEAN< DRY & INTACT   Treatment 1   Location: left foot  Dressing: KEEP DRESSING DRY & INTACT until seen in hospital 1/31/19  Dressing Care Frequency: as above  Care Provider: Self       Basic Principles      • Wash your hands thoroughly with soap and water and after each dressing change. If someone other than the patient changes the dressing, it’s best to wear disposable gloves.   • Do not get the wound or dressing wet.   • To shower: remove the dressing, shower with soap and water (including washing the wound - do not use a washcloth), air dry, then redress the wound.  • Do not take a tub bath  • Keep all your dressings in a clean, covered container at home to avoid dust and contamination.  • Discard used dressings in a plastic bag or covered trash container.  • Check your wound and the surrounding skin at each dressing change for redness, warmth, swelling, increased pain, foul odor, fever, pus or abnormal drainage or discharge.  • Notify Wound Healing Center if any of these changes occur - Dept: [phone].        Nutrition  • Eat a well, balanced diet with adequate protein to support wound healing.   • Take a multivitamin every day. Adequate nutrition supports healing and new tissue growth.  • All diabetic patients should strive to keep blood sugars within a normal, practical range.   • Elevated blood sugars can delay your wound healing.        ACTIVITY     Elevate legs above level of heart   Limit activities    MOBILITY     Wheelchair

## 2019-01-31 ENCOUNTER — DOCUMENTATION (OUTPATIENT)
Dept: WOUND CARE | Facility: HOSPITAL | Age: 59
End: 2019-01-31

## 2019-01-31 ENCOUNTER — ANESTHESIA (OUTPATIENT)
Dept: SURGERY | Facility: HOSPITAL | Age: 59
Setting detail: HOSPITAL OUTPATIENT SURGERY
End: 2019-01-31
Payer: COMMERCIAL

## 2019-01-31 ENCOUNTER — HOSPITAL ENCOUNTER (OUTPATIENT)
Facility: HOSPITAL | Age: 59
Setting detail: HOSPITAL OUTPATIENT SURGERY
Discharge: HOME | End: 2019-01-31
Attending: PODIATRIST | Admitting: PODIATRIST
Payer: COMMERCIAL

## 2019-01-31 VITALS
DIASTOLIC BLOOD PRESSURE: 70 MMHG | TEMPERATURE: 97 F | OXYGEN SATURATION: 98 % | SYSTOLIC BLOOD PRESSURE: 148 MMHG | HEART RATE: 88 BPM | RESPIRATION RATE: 16 BRPM

## 2019-01-31 DIAGNOSIS — M86.272 SUBACUTE OSTEOMYELITIS OF LEFT FOOT (CMS/HCC): ICD-10-CM

## 2019-01-31 DIAGNOSIS — I96 GANGRENE OF FOOT (CMS/HCC): ICD-10-CM

## 2019-01-31 LAB
GLUCOSE BLD-MCNC: 86 MG/DL (ref 70–99)
GLUCOSE BLD-MCNC: 93 MG/DL (ref 70–99)
POCT TEST: NORMAL
POCT TEST: NORMAL

## 2019-01-31 PROCEDURE — 0Y6N0ZB DETACHMENT AT LEFT FOOT, PARTIAL 2ND RAY, OPEN APPROACH: ICD-10-PCS | Performed by: PODIATRIST

## 2019-01-31 PROCEDURE — 27200000 HC STERILE SUPPLY: Performed by: PODIATRIST

## 2019-01-31 PROCEDURE — 27800000 HC SUPPLY/IMPLANTS: Performed by: PODIATRIST

## 2019-01-31 PROCEDURE — 25800000 HC PHARMACY IV SOLUTIONS: Performed by: ANESTHESIOLOGY

## 2019-01-31 PROCEDURE — 0Y6N0ZF DETACHMENT AT LEFT FOOT, PARTIAL 5TH RAY, OPEN APPROACH: ICD-10-PCS | Performed by: PODIATRIST

## 2019-01-31 PROCEDURE — 37000001 HC ANESTHESIA GENERAL: Performed by: PODIATRIST

## 2019-01-31 PROCEDURE — 63600000 HC DRUGS/DETAIL CODE: Performed by: ANESTHESIOLOGY

## 2019-01-31 PROCEDURE — 71000002 HC PACU PHASE 2 INITIAL 30MIN: Performed by: PODIATRIST

## 2019-01-31 PROCEDURE — 0Y6N0ZD DETACHMENT AT LEFT FOOT, PARTIAL 4TH RAY, OPEN APPROACH: ICD-10-PCS | Performed by: PODIATRIST

## 2019-01-31 PROCEDURE — 71000001 HC PACU PHASE 1 INITIAL 30MIN: Performed by: PODIATRIST

## 2019-01-31 PROCEDURE — V2790 AMNIOTIC MEMBRANE: HCPCS | Performed by: PODIATRIST

## 2019-01-31 PROCEDURE — 25000000 HC PHARMACY GENERAL: Performed by: ANESTHESIOLOGY

## 2019-01-31 PROCEDURE — 71000011 HC PACU PHASE 1 EA ADDL MIN: Performed by: PODIATRIST

## 2019-01-31 PROCEDURE — 36000003 HC OR LEVEL 3 INITIAL 30MIN: Performed by: PODIATRIST

## 2019-01-31 PROCEDURE — 71000012 HC PACU PHASE 2 EA ADDL MIN: Performed by: PODIATRIST

## 2019-01-31 PROCEDURE — 88307 TISSUE EXAM BY PATHOLOGIST: CPT | Performed by: PODIATRIST

## 2019-01-31 PROCEDURE — 0Y6N0ZC DETACHMENT AT LEFT FOOT, PARTIAL 3RD RAY, OPEN APPROACH: ICD-10-PCS | Performed by: PODIATRIST

## 2019-01-31 PROCEDURE — 25000000 HC PHARMACY GENERAL: Performed by: PODIATRIST

## 2019-01-31 PROCEDURE — 0Y6N0Z9 DETACHMENT AT LEFT FOOT, PARTIAL 1ST RAY, OPEN APPROACH: ICD-10-PCS | Performed by: PODIATRIST

## 2019-01-31 PROCEDURE — 36000013 HC OR LEVEL 3 EA ADDL MIN: Performed by: PODIATRIST

## 2019-01-31 DEVICE — INJECTABLE 160MG AMNIOTIC TISSUE AMNIOFIX: Type: IMPLANTABLE DEVICE | Site: FOOT | Status: FUNCTIONAL

## 2019-01-31 DEVICE — TISSUEMEND MATRIX 6CM X 10CM: Type: IMPLANTABLE DEVICE | Site: FOOT | Status: FUNCTIONAL

## 2019-01-31 DEVICE — TISSUEMEND MATRIX 5 X 6: Type: IMPLANTABLE DEVICE | Site: FOOT | Status: FUNCTIONAL

## 2019-01-31 RX ORDER — OXYCODONE AND ACETAMINOPHEN 5; 325 MG/1; MG/1
1 TABLET ORAL EVERY 4 HOURS PRN
Status: CANCELLED | OUTPATIENT
Start: 2019-02-08

## 2019-01-31 RX ORDER — SODIUM CHLORIDE 9 MG/ML
INJECTION, SOLUTION INTRAVENOUS CONTINUOUS
Status: DISCONTINUED | OUTPATIENT
Start: 2019-01-31 | End: 2019-01-31 | Stop reason: HOSPADM

## 2019-01-31 RX ORDER — PROMETHAZINE HYDROCHLORIDE 25 MG/ML
6.25 INJECTION, SOLUTION INTRAMUSCULAR; INTRAVENOUS ONCE AS NEEDED
Status: CANCELLED | OUTPATIENT
Start: 2019-01-31

## 2019-01-31 RX ORDER — SODIUM CHLORIDE 9 MG/ML
INJECTION, SOLUTION INTRAVENOUS CONTINUOUS
Status: DISCONTINUED | OUTPATIENT
Start: 2019-01-31 | End: 2019-01-31 | Stop reason: SDUPTHER

## 2019-01-31 RX ORDER — ONDANSETRON HYDROCHLORIDE 2 MG/ML
4 INJECTION, SOLUTION INTRAVENOUS
Status: CANCELLED | OUTPATIENT
Start: 2019-01-31

## 2019-01-31 RX ORDER — HYDROMORPHONE HYDROCHLORIDE 1 MG/ML
0.5 INJECTION, SOLUTION INTRAMUSCULAR; INTRAVENOUS; SUBCUTANEOUS
Status: CANCELLED | OUTPATIENT
Start: 2019-01-31

## 2019-01-31 RX ORDER — SODIUM CHLORIDE, SODIUM GLUCONATE, SODIUM ACETATE, POTASSIUM CHLORIDE AND MAGNESIUM CHLORIDE 30; 37; 368; 526; 502 MG/100ML; MG/100ML; MG/100ML; MG/100ML; MG/100ML
40 INJECTION, SOLUTION INTRAVENOUS CONTINUOUS
Status: CANCELLED | OUTPATIENT
Start: 2019-01-31

## 2019-01-31 RX ORDER — DIPHENHYDRAMINE HCL 50 MG/ML
12.5 VIAL (ML) INJECTION
Status: CANCELLED | OUTPATIENT
Start: 2019-01-31

## 2019-01-31 RX ORDER — SODIUM CHLORIDE, SODIUM GLUCONATE, SODIUM ACETATE, POTASSIUM CHLORIDE AND MAGNESIUM CHLORIDE 30; 37; 368; 526; 502 MG/100ML; MG/100ML; MG/100ML; MG/100ML; MG/100ML
INJECTION, SOLUTION INTRAVENOUS CONTINUOUS PRN
Status: DISCONTINUED | OUTPATIENT
Start: 2019-01-31 | End: 2019-01-31 | Stop reason: SURG

## 2019-01-31 RX ORDER — MEPERIDINE HYDROCHLORIDE 50 MG/ML
12.5 INJECTION INTRAMUSCULAR; INTRAVENOUS; SUBCUTANEOUS EVERY 10 MIN PRN
Status: CANCELLED | OUTPATIENT
Start: 2019-01-31

## 2019-01-31 RX ORDER — ONDANSETRON HYDROCHLORIDE 2 MG/ML
INJECTION, SOLUTION INTRAVENOUS AS NEEDED
Status: DISCONTINUED | OUTPATIENT
Start: 2019-01-31 | End: 2019-01-31 | Stop reason: SURG

## 2019-01-31 RX ORDER — MIDAZOLAM HYDROCHLORIDE 2 MG/2ML
INJECTION, SOLUTION INTRAMUSCULAR; INTRAVENOUS AS NEEDED
Status: DISCONTINUED | OUTPATIENT
Start: 2019-01-31 | End: 2019-01-31 | Stop reason: SURG

## 2019-01-31 RX ORDER — BUPIVACAINE HYDROCHLORIDE 5 MG/ML
INJECTION, SOLUTION EPIDURAL; INTRACAUDAL AS NEEDED
Status: DISCONTINUED | OUTPATIENT
Start: 2019-01-31 | End: 2019-01-31 | Stop reason: HOSPADM

## 2019-01-31 RX ORDER — FENTANYL CITRATE 50 UG/ML
50 INJECTION, SOLUTION INTRAMUSCULAR; INTRAVENOUS
Status: CANCELLED | OUTPATIENT
Start: 2019-01-31

## 2019-01-31 RX ORDER — TRAMADOL HYDROCHLORIDE 50 MG/1
50 TABLET ORAL EVERY 6 HOURS PRN
Qty: 40 TABLET | Refills: 0 | Status: SHIPPED | OUTPATIENT
Start: 2019-01-31 | End: 2019-02-05

## 2019-01-31 RX ORDER — FENTANYL CITRATE 50 UG/ML
INJECTION, SOLUTION INTRAMUSCULAR; INTRAVENOUS AS NEEDED
Status: DISCONTINUED | OUTPATIENT
Start: 2019-01-31 | End: 2019-01-31 | Stop reason: SURG

## 2019-01-31 RX ORDER — LIDOCAINE HYDROCHLORIDE 10 MG/ML
INJECTION, SOLUTION EPIDURAL; INFILTRATION; INTRACAUDAL; PERINEURAL AS NEEDED
Status: DISCONTINUED | OUTPATIENT
Start: 2019-01-31 | End: 2019-01-31 | Stop reason: SURG

## 2019-01-31 RX ORDER — PROPOFOL 10 MG/ML
INJECTION, EMULSION INTRAVENOUS AS NEEDED
Status: DISCONTINUED | OUTPATIENT
Start: 2019-01-31 | End: 2019-01-31 | Stop reason: SURG

## 2019-01-31 RX ADMIN — LIDOCAINE HYDROCHLORIDE 2 ML: 10 INJECTION, SOLUTION EPIDURAL; INFILTRATION; INTRACAUDAL; PERINEURAL at 10:58

## 2019-01-31 RX ADMIN — FENTANYL CITRATE 50 MCG: 50 INJECTION INTRAMUSCULAR; INTRAVENOUS at 10:55

## 2019-01-31 RX ADMIN — FENTANYL CITRATE 50 MCG: 50 INJECTION INTRAMUSCULAR; INTRAVENOUS at 11:20

## 2019-01-31 RX ADMIN — FENTANYL CITRATE 50 MCG: 50 INJECTION INTRAMUSCULAR; INTRAVENOUS at 11:48

## 2019-01-31 RX ADMIN — SODIUM CHLORIDE, SODIUM GLUCONATE, SODIUM ACETATE, POTASSIUM CHLORIDE AND MAGNESIUM CHLORIDE: 526; 502; 368; 37; 30 INJECTION, SOLUTION INTRAVENOUS at 12:44

## 2019-01-31 RX ADMIN — PROPOFOL 200 MG: 10 INJECTION, EMULSION INTRAVENOUS at 10:58

## 2019-01-31 RX ADMIN — FENTANYL CITRATE 25 MCG: 50 INJECTION INTRAMUSCULAR; INTRAVENOUS at 12:16

## 2019-01-31 RX ADMIN — ONDANSETRON 4 MG: 2 INJECTION INTRAMUSCULAR; INTRAVENOUS at 12:16

## 2019-01-31 RX ADMIN — MIDAZOLAM HYDROCHLORIDE 2 MG: 1 INJECTION, SOLUTION INTRAMUSCULAR; INTRAVENOUS at 10:55

## 2019-01-31 RX ADMIN — SODIUM CHLORIDE: 900 INJECTION, SOLUTION INTRAVENOUS at 10:56

## 2019-01-31 ASSESSMENT — PAIN - FUNCTIONAL ASSESSMENT: PAIN_FUNCTIONAL_ASSESSMENT: NO/DENIES PAIN

## 2019-01-31 NOTE — PROGRESS NOTES
Contacted Dr Ayala's office ([phone]) to obtain cardiac clearance for HBOT for Mr. Lucero. Spoke with Jennifer who stated that she would fax documentation.

## 2019-01-31 NOTE — DISCHARGE SUMMARY
-Patient presented to hospital for outpatient podiatric procedure. All pre operative testing and clearance was obtained. All risks and benefits of procedure explained to patient and consent signed.  -After successful podiatric procedure the patient was discharge to home per PACU protocol with discharge instructions and post op pain medication.  -Patient is to follow up in 7-14 days

## 2019-01-31 NOTE — DISCHARGE INSTRUCTIONS
[facility]  [place], [place]  [phone]    Dr. Fabricio Tee      Post Operative Instructions    Keep foot elevated as much as possible above the level of the heart.    Keep bandage clean and dry.  Do not remove unless instructed to do so by your Physician.  If bandage becomes soiled or wet call the office immediately.      Take mediation as prescribed:    Percocet 5/325mg, take 1-2 tabs every 4-6 hours as needed for pain.    Zofran 4mg, take 1 tab every 8 hours as needed for nausea/vomiting    Aspirin (Enteric coated) 325mg, take 1 tab twice a day for 4 weeks to prevent a blood clot.     Bear weight on the area only as advised:  ___full weight bearing  _X__limited weight bearing with use of surgical shoe and___walker/crutches_____.         place weight on the ____heel________ only for transfers and pivots.  ___NO weight bearing to area with use of___________________.    Should you have excessive bleeding (through the bandage) or excessive discomfort, please call the office immediately.    Please call the office number above: to set up your 1st follow up appointment.

## 2019-01-31 NOTE — ANESTHESIA PREPROCEDURE EVALUATION
Anesthesia ROS/MED HX    Anesthesia History - neg  Neuro/Psych    CVA   Anxiety  Cardiovascular   dyslipidemia   hypertension   ECG reviewed and cardiac clearance reviewed   Normal ECG  Comments: Reported intact per pt  Hematological    anemia  GI/Hepatic   GERD    Control: well controlled  Renal Disease   chronic renal insufficiency  Endo/Other   Diabetes and patient Insulin dependent  Body Habitus: Overweight  ROS/MED HX Comments:    Endo: Gas gangrene  Necrotizing fascitis   Musculoskeletal: Osteomyelitis right foot      Past Surgical History:   Procedure Laterality Date   • INCISION AND DRAINAGE OF WOUND Left    • OTHER SURGICAL HISTORY      Loop recorder   • WISDOM TOOTH EXTRACTION     • WOUND DEBRIDEMENT Left        Physical Exam    Airway   Mallampati: II   TM distance: >3 FB   Neck ROM: full  Cardiovascular - normal   Rhythm: regular   Rate: normal  Pulmonary - normal   clear to auscultation  Other Findings   Reported intact per pt        Anesthesia Plan    Plan: general    Technique: general LMA     Lines and Monitors: PIV     Airway: oral intubation       patient did not smoke on day of surgery  ASA 3  Blood Products:   Use of Blood Products Discussed: No   Anesthetic plan and risks discussed with: patient, sibling and spouse  Induction:    intravenous   Postop Plan:   Patient Disposition: phase II then home   Pain Management: IV analgesics  Pt's wife and sister present during discussion. Pt reports no other changes, reviewed past admission. Reviewed cardiac evaluation, normal EF on echo. Reviewed labs and EKG, blood glucose 87. Reviewed possible sore throat. Orin-operative anti-emetics. All questions answered.

## 2019-01-31 NOTE — ANESTHESIA POSTPROCEDURE EVALUATION
Patient: Harvey Lucero Jr.    Procedure Summary     Date:  01/31/19 [place]  [facility]    Anesthesia Start:  1055 Anesthesia Stop:  1301    Procedure:  Transmetatarsal Amputation, Application of Graft and VAC (Left Foot) Diagnosis:       Gangrene of foot (CMS/HCC) (HCC)      Subacute osteomyelitis of left foot (CMS/HCC)      (Left Gangrene, Left Subacute Oteomyelitis)    Surgeon:  Fabricio Tee DPM Responsible Provider:  Ruthann Hernandez MD    Anesthesia Type:  general ASA Status:  3          Anesthesia Type: general  PACU Vitals  1/31/2019 1256 - 1/31/2019 1356      1/31/2019 1259 1/31/2019 1308 1/31/2019 1320 1/31/2019 1330    BP: 120/64 126/69 138/76 139/74    Temp: 36.5 °C (97.7 °F) - - -    Pulse: 82 84 92 90    Resp: 19 20 16 20    SpO2: 99 % 98 % 99 % 100 %              1/31/2019 1340             BP: (!)  142/67       Temp: -       Pulse: 88       Resp: 20       SpO2: 100 %               Anesthesia Post Evaluation    Pain management: adequate  Patient participation: complete - patient participated  Level of consciousness: awake and alert  Cardiovascular status: acceptable  Airway Patency: adequate  Respiratory status: acceptable  Hydration status: acceptable  Anesthetic complications: no

## 2019-01-31 NOTE — ANESTHESIA PROCEDURE NOTES
Airway  Urgency: elective    Start Time: 1/31/2019 11:01 AM  Airway not difficult    General Information and Staff    Patient location during procedure: OR  Anesthesiologist: LETICIA PAEZ    Indications and Patient Condition  Indications for airway management: anesthesia  Sedation level: deep  Preoxygenated: yes  Patient position: sniffing  Mask difficulty assessment: 1 - vent by mask    Final Airway Details  Final airway type: supraglottic airway (LMA)      Successful airway: Supraglottic airway: Aura ambu.  Size 5  Airway Seal Pressure (cm H2O): 8    Number of attempts at approach: 1  Atraumatic airway insertion

## 2019-02-03 NOTE — OP NOTE
REPORT TYPE:  Operative Note    DATE OF OPERATION:  01/31/2019      WOUND CLASS:  Class 4.    PREOPERATIVE DIAGNOSIS:  Left foot gangrene/osteomyelitis.    POSTOPERATIVE DIAGNOSIS:  Left foot gangrene/osteomyelitis.    PROCEDURE:  Left foot transmetatarsal amputation with allograft application.    SURGEON:  Fabricio Tee DPM.    ASSISTANT:  Sami Fraga, PGY-3.    ANESTHESIA:  MAC plus 20 mL of a 1:1 ratio of lidocaine 1% plain and 0.5% Marcaine plain.    HEMOSTASIS:  250 mmHg pneumatic ankle tourniquet for 80 minutes.    INJECTABLES:  160 mg of AmnioFix allograft.    MATERIALS:  2-0 Vicryl, 2-0 Prolene, staples, TissueMend allograft x3.    SPECIMENS REMOVED:  Forefoot for path.    PACKING:  None.    DESCRIPTION OF PROCEDURE:  The patient arrived to [facility] and was interviewed by nursing, anesthesia, Dr. Tee and myself, and was deemed fit for surgery.  The patient was transported to the operating room and placed on the   operating room table in a supine position.  The patient was then anesthetized using MAC anesthesia followed by local anesthetic block consisting of a 1:1 ratio of lidocaine 1% plain and 0.5% Marcaine plain in an ankle block fashion.    Pneumatic ankle tourniquet was placed to the left ankle at this time, but not inflated.  The foot was then prepped and draped per aseptic technique.  Following a proper timeout, transection of metatarsals 1 through 5 at the base of each phalanx was   achieved with a sagittal saw and the dry gangrenous forefoot was removed from the surgical site.  Attention was placed towards all soft tissue necrotic elements and these areas were removed with 15 blade and rongeur.  It was then at this time that for   adequate soft tissue coverage all metatarsals were disarticulated at their mid tarsal attachments.  Bases of metatarsals 1 through 5 were removed from the surgical site with a 15 blade and forceps.  Use of 3-0 Vicryl was then used to rotate  plantar   muscle flap over exposed midtarsal/osseous exposure and was fixated with simple sutures.  It was then at this time that all exposed tendons within the anterior compartment were transected at their most proximal seen origin.  Surgical site was then   flushed with a bacitracin saline mix, approximately 3 liters with pulse lavage.  A fenestrated TissueMend graft x3 was placed over all exposed wounds/soft tissue and fixated with 2-0 Prolene and staples in an every other technique.  An AmnioFix 160 mg   injection was placed towards the distal surgical site without complication.  Surgical site was dressed with Xeroform, 4 x 4 gauze, rolled gauze, Webril, and Ace.  The pneumatic ankle tourniquet was then deflated at this time.  The patient was revived   from anesthesia and transported back to PACU for stable vital signs.      DYANA BUNDY DPM    Dictated by: KATELYNN COTE DPM        CC:     DD: 02/03/2019 11:30  DT: 02/03/2019 13:16  Voice ID: [id number]/Report ID: [id number]  [facility]

## 2019-02-04 LAB
FUNGUS SPEC CULT: NORMAL
FUNGUS SPEC CULT: NORMAL

## 2019-02-05 ENCOUNTER — OFFICE VISIT (OUTPATIENT)
Dept: WOUND CARE | Facility: HOSPITAL | Age: 59
End: 2019-02-05
Attending: PLASTIC SURGERY
Payer: COMMERCIAL

## 2019-02-05 VITALS
HEART RATE: 86 BPM | TEMPERATURE: 97.2 F | DIASTOLIC BLOOD PRESSURE: 66 MMHG | RESPIRATION RATE: 18 BRPM | SYSTOLIC BLOOD PRESSURE: 136 MMHG

## 2019-02-05 DIAGNOSIS — M86.372 CHRONIC MULTIFOCAL OSTEOMYELITIS, LEFT ANKLE AND FOOT (CMS/HCC): Primary | ICD-10-CM

## 2019-02-05 PROBLEM — F40.240 CLAUSTROPHOBIA: Status: ACTIVE | Noted: 2019-02-05

## 2019-02-05 LAB
GLUCOSE BLD-MCNC: 109 MG/DL (ref 70–99)
GLUCOSE BLD-MCNC: 117 MG/DL (ref 70–99)
GLUCOSE BLD-MCNC: 121 MG/DL (ref 70–99)
GLUCOSE BLD-MCNC: 140 MG/DL (ref 70–99)
POCT TEST: ABNORMAL

## 2019-02-05 PROCEDURE — 99183 HYPERBARIC OXYGEN THERAPY: CPT | Performed by: PLASTIC SURGERY

## 2019-02-05 PROCEDURE — G0277 HBOT, FULL BODY CHAMBER, 30M: HCPCS

## 2019-02-05 PROCEDURE — 82948 REAGENT STRIP/BLOOD GLUCOSE: CPT

## 2019-02-05 RX ORDER — LORAZEPAM 0.5 MG/1
0.5 TABLET ORAL DAILY PRN
COMMUNITY
End: 2019-10-02

## 2019-02-05 NOTE — PROGRESS NOTES
Hyperbaric Oxygen Therapy Treatment Summary    Patient Name: Harvey Lucero Jr.             : 1960  Date of Visit: 2019    Indication for Treatment:   Chronic Refractory Osteomyelitis (Unresponsive to Conventional Medical and Surgical Managrment): Laterality: Left  Site: foot/ankle (foot/anklr)    Comments: This was the first of planned 30 hyperbaric oxygen therapy treatments.  Tolerated the treatment well with posttreatment blood glucose at 140.  He will check with his endocrinologist again this afternoon to ascertain as to whether further decreased insulin amount adjustment for his p.m. insulin would be recommended to allow him to report to the Center for his treatment with a slightly higher blood glucose.  We will continue his home negative pressure wound therapy and we planned that he will be evaluated by Dr. Tee for his left foot wound check at our planned hyperbaric oxygen therapy session tomorrow morning.      Treatment Summary:   Continue present Hyperbaric Oxygen Treatment Plan: Treatment 2.0 YADI x 90 minutes with 5 minute airbreak x 2, Treatment 2.4 YADI x 90 minutes with 5 minute airbreak x 2      Response To Treatment:  Physician was present during total Hyperbaric Oxygen Treatment including the ascent and decent portions.  Post-Treatment: The patient tolerated today's HBO Treatment without complication

## 2019-02-06 ENCOUNTER — OFFICE VISIT (OUTPATIENT)
Dept: WOUND CARE | Facility: HOSPITAL | Age: 59
End: 2019-02-06
Payer: COMMERCIAL

## 2019-02-06 ENCOUNTER — OFFICE VISIT (OUTPATIENT)
Dept: WOUND CARE | Facility: HOSPITAL | Age: 59
End: 2019-02-06
Attending: PLASTIC SURGERY
Payer: COMMERCIAL

## 2019-02-06 VITALS
HEART RATE: 87 BPM | SYSTOLIC BLOOD PRESSURE: 153 MMHG | RESPIRATION RATE: 18 BRPM | TEMPERATURE: 96.4 F | DIASTOLIC BLOOD PRESSURE: 72 MMHG

## 2019-02-06 VITALS — TEMPERATURE: 96.3 F

## 2019-02-06 DIAGNOSIS — M86.672 OSTEOMYELITIS, CHRONIC, ANKLE OR FOOT, LEFT (CMS/HCC): ICD-10-CM

## 2019-02-06 DIAGNOSIS — S91.009A OPEN WOUND OF ANKLE AND FOOT: ICD-10-CM

## 2019-02-06 DIAGNOSIS — E11.52 TYPE 2 DIABETES MELLITUS WITH DIABETIC PERIPHERAL ANGIOPATHY AND GANGRENE, WITH LONG-TERM CURRENT USE OF INSULIN (CMS/HCC): ICD-10-CM

## 2019-02-06 DIAGNOSIS — M86.072 ACUTE HEMATOGENOUS OSTEOMYELITIS OF LEFT FOOT (CMS/HCC): Primary | ICD-10-CM

## 2019-02-06 DIAGNOSIS — A48.0 GAS GANGRENE (CMS/HCC): ICD-10-CM

## 2019-02-06 DIAGNOSIS — M86.372 CHRONIC MULTIFOCAL OSTEOMYELITIS, LEFT ANKLE AND FOOT (CMS/HCC): Primary | ICD-10-CM

## 2019-02-06 DIAGNOSIS — Z79.4 TYPE 2 DIABETES MELLITUS WITH DIABETIC PERIPHERAL ANGIOPATHY AND GANGRENE, WITH LONG-TERM CURRENT USE OF INSULIN (CMS/HCC): ICD-10-CM

## 2019-02-06 DIAGNOSIS — S91.309A OPEN WOUND OF ANKLE AND FOOT: ICD-10-CM

## 2019-02-06 DIAGNOSIS — M86.372 CHRONIC MULTIFOCAL OSTEOMYELITIS, LEFT ANKLE AND FOOT (CMS/HCC): ICD-10-CM

## 2019-02-06 LAB
CASE RPRT: NORMAL
CLINICAL INFO: NORMAL
GLUCOSE BLD-MCNC: 104 MG/DL (ref 70–99)
GLUCOSE BLD-MCNC: 127 MG/DL (ref 70–99)
GLUCOSE BLD-MCNC: 159 MG/DL (ref 70–99)
PATH REPORT.FINAL DX SPEC: NORMAL
PATH REPORT.GROSS SPEC: NORMAL
POCT TEST: ABNORMAL

## 2019-02-06 PROCEDURE — G0277 HBOT, FULL BODY CHAMBER, 30M: HCPCS

## 2019-02-06 PROCEDURE — 82948 REAGENT STRIP/BLOOD GLUCOSE: CPT

## 2019-02-06 PROCEDURE — 99183 HYPERBARIC OXYGEN THERAPY: CPT | Performed by: PLASTIC SURGERY

## 2019-02-06 NOTE — PROGRESS NOTES
Hyperbaric Oxygen Therapy Treatment Summary    Patient Name: Harvey Lucero Jr.             : 1960  Date of Visit: 2019    Indication for Treatment:   Chronic Refractory Osteomyelitis (Unresponsive to Conventional Medical and Surgical Managrment): Laterality: Left  Site: Foot and ankle    Comments: The left foot wound area was examined by both Dr. Tee myself.  The tissue looked pink and healthy menstruating early formation of granulation tissue with exposure of the lower calf/ankle tendon area.  The plantar heel flap looked pink and healthy our plan is to continue the hyperbaric oxygen therapy to treat the residual osteomyelitis in combination with previous debridement and his course of IV antibiotic therapy.  Dr. Tee would plan to eventually refer him for skin graft closure of the complex wound area when ready and I believe the hyperbaric oxygen therapy would also enhance progression of the healing in this area.      Treatment Summary:   Continue present Hyperbaric Oxygen Treatment Plan: Treatment 2.4 YADI x 90 minutes with 5 minute airbreak x 2      Response To Treatment:  Physician was present during total Hyperbaric Oxygen Treatment including the ascent and decent portions.  Post-Treatment: The patient tolerated today's HBO Treatment without complication

## 2019-02-06 NOTE — PROGRESS NOTES
Subjective      Patient ID: Harvey Lcuero Jr. is a 58 y.o. male.    HPI patient seen today chief complaint status post transmetatarsal resection and also resection of medial cuneiform.  There is non-viability patient presents with wet-to-dry dressings wound VAC failed last evening the arm was set patient Mariama denies any fever chills night sweats and feels the best she is felt in several months.    The following have been reviewed and updated as appropriate in this visit:         Past Medical History: He  has a past medical history of Anxiety; GERD (gastroesophageal reflux disease); History of CVA (cerebrovascular accident); Hypertension; Lipid disorder; Stroke (CMS/[facility]) ([facility]); and Type 2 diabetes mellitus (CMS/[facility]) ([facility]). He also has no past medical history of Depression.  Past Surgical History: He  has a past surgical history that includes [place] tooth extraction; Other surgical history; Incision and drainage of wound (Left); and Wound debridement (Left).  Medication: He has a current medication list which includes the following prescription(s): acetaminophen, blood sugar diagnostic, blood-glucose meter, clopidogrel, enoxaparin, insulin glargine, lancets, lisinopril, lorazepam, metformin, oxycodone, pen needle, diabetic, pen needle, diabetic, rosuvastatin, senna, and sitagliptin.  Allergies: He is allergic to no known allergies.  Social History: He  reports that he has quit smoking. He has never used smokeless tobacco. He reports that he does not drink alcohol or use drugs.    Review of Systems:  Review of Systems      Objective Graft sites are well incorporated there is still some presence of the posterior tibial tendon however is granulating in its moist and sweats final motion muscle flaps were also taken to the plantar medial plantar aspect of foot through a small area area open anteriorly but has a fibrous polyp was a granular base is noted positive active bleeding there is noted to be no signs of  desiccation exposure no other irregularities noted at this time.    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  Status post tendon transfer  3.  Tissue and grafting with the second time around    Plan  1.  Is still very very probably patient may still lose limb #2 patient continue on ampicillin every 4 hours for the residual osteomyelitis #3 patient with HBO to try to reduce the amount of a bone infection to improve healing can also increase oxygenation #4 we will continue with wound vacs at this time was dressed with nursing Xeroform dry sterile dressing will talk to Dr. Domingo Martinez for possible split-thickness skin graft harvested from the thighs bilaterally.  Will follow closely and then be reevaluated wound healing center 1 week  Problem List Items Addressed This Visit     None          No Follow-up on file.    Fabricio Tee DPM

## 2019-02-06 NOTE — PATIENT INSTRUCTIONS
Wound Healing Center Instructions    MEDICATIONS     Medication Note  Continue present medications as prescribed by the Wound Healing Center or other physicians you see. To avoid any problems keep the Wound Healing Center informed each visit of any medications changes that occur.     WOUND CARE     Clean Wound with: KEEP DRESSING CLEAN< DRY & INTACT   Treatment 1   Location: left foot  Dressing: continue NPWT @ 125mmHg  Dressing Care Frequency: Three times weekly  are Provider: visiting nurse       Basic Principles      • Wash your hands thoroughly with soap and water and after each dressing change. If someone other than the patient changes the dressing, it’s best to wear disposable gloves.   • Do not get the wound or dressing wet.   • To shower: remove the dressing, shower with soap and water (including washing the wound - do not use a washcloth), air dry, then redress the wound.  • Do not take a tub bath  • Keep all your dressings in a clean, covered container at home to avoid dust and contamination.  • Discard used dressings in a plastic bag or covered trash container.  • Check your wound and the surrounding skin at each dressing change for redness, warmth, swelling, increased pain, foul odor, fever, pus or abnormal drainage or discharge.  • Notify Wound Healing Center if any of these changes occur - Dept: [phone].        Nutrition  • Eat a well, balanced diet with adequate protein to support wound healing.   • Take a multivitamin every day. Adequate nutrition supports healing and new tissue growth.  • All diabetic patients should strive to keep blood sugars within a normal, practical range.   • Elevated blood sugars can delay your wound healing.        ACTIVITY     Elevate legs above level of heart   Limit activities    MOBILITY     Wheelchair

## 2019-02-07 ENCOUNTER — OFFICE VISIT (OUTPATIENT)
Dept: WOUND CARE | Facility: HOSPITAL | Age: 59
End: 2019-02-07
Attending: PLASTIC SURGERY
Payer: COMMERCIAL

## 2019-02-07 VITALS
RESPIRATION RATE: 18 BRPM | DIASTOLIC BLOOD PRESSURE: 64 MMHG | SYSTOLIC BLOOD PRESSURE: 143 MMHG | HEART RATE: 93 BPM | TEMPERATURE: 97.4 F

## 2019-02-07 DIAGNOSIS — M86.372 CHRONIC MULTIFOCAL OSTEOMYELITIS, LEFT ANKLE AND FOOT (CMS/HCC): Primary | ICD-10-CM

## 2019-02-07 LAB
GLUCOSE BLD-MCNC: 130 MG/DL (ref 70–99)
GLUCOSE BLD-MCNC: 190 MG/DL (ref 70–99)
POCT TEST: ABNORMAL
POCT TEST: ABNORMAL

## 2019-02-07 PROCEDURE — 82948 REAGENT STRIP/BLOOD GLUCOSE: CPT

## 2019-02-07 PROCEDURE — G0277 HBOT, FULL BODY CHAMBER, 30M: HCPCS

## 2019-02-07 PROCEDURE — 99183 HYPERBARIC OXYGEN THERAPY: CPT | Performed by: PLASTIC SURGERY

## 2019-02-07 ASSESSMENT — PAIN SCALES - GENERAL: PAINLEVEL: 0-NO PAIN

## 2019-02-07 NOTE — PROGRESS NOTES
Hyperbaric Oxygen Therapy Treatment Summary    Patient Name: Harvey Lucero Jr.             : 1960  Date of Visit: 2019    Indication for Treatment:   Chronic Refractory Osteomyelitis (Unresponsive to Conventional Medical and Surgical Managrment): Laterality: Left  Site: Foot/ankle    Comments: This was #3 of 30 planned hyperbaric oxygen therapy treatments for Ruben.  Exam of the left foot high transmetatarsal wound area demonstrates continued development of granulation tissue at the open wound areas and healthy pink plantar heel flap area.  He will be continued on his home negative pressure wound therapy regimen and was dressed with an occlusive saline gauze dressing with planned home care application of the wound VAC dressing at about 2 PM this afternoon.  He is tolerating the hyperbaric oxygen therapy well.      Treatment Summary:   Continue present Hyperbaric Oxygen Treatment Plan: Treatment 2.4 YADI x 90 minutes with 5 minute airbreak x 2      Response To Treatment:  Physician was present during total Hyperbaric Oxygen Treatment including the ascent and decent portions.  Post-Treatment: The patient tolerated today's HBO Treatment without complication

## 2019-02-08 ENCOUNTER — OFFICE VISIT (OUTPATIENT)
Dept: WOUND CARE | Facility: HOSPITAL | Age: 59
End: 2019-02-08
Attending: SURGERY
Payer: COMMERCIAL

## 2019-02-08 ENCOUNTER — HOSPITAL ENCOUNTER (OUTPATIENT)
Facility: HOSPITAL | Age: 59
Setting detail: OBSERVATION
Discharge: HOME | End: 2019-02-09
Attending: PODIATRIST | Admitting: PODIATRIST
Payer: COMMERCIAL

## 2019-02-08 VITALS
DIASTOLIC BLOOD PRESSURE: 68 MMHG | HEART RATE: 90 BPM | SYSTOLIC BLOOD PRESSURE: 135 MMHG | TEMPERATURE: 98.2 F | RESPIRATION RATE: 16 BRPM

## 2019-02-08 DIAGNOSIS — M86.672 OSTEOMYELITIS, CHRONIC, ANKLE OR FOOT, LEFT (CMS/HCC): Primary | ICD-10-CM

## 2019-02-08 LAB
ABO + RH BLD: NORMAL
ANION GAP SERPL CALC-SCNC: 10 MEQ/L (ref 3–15)
APTT PPP: 41 SEC (ref 23–35)
BLD GP AB SCN SERPL QL: NEGATIVE
BUN SERPL-MCNC: 8 MG/DL (ref 8–20)
CALCIUM SERPL-MCNC: 8.2 MG/DL (ref 8.9–10.3)
CHLORIDE SERPL-SCNC: 101 MEQ/L (ref 98–109)
CO2 SERPL-SCNC: 24 MEQ/L (ref 22–32)
CREAT SERPL-MCNC: 0.8 MG/DL
D AG BLD QL: POSITIVE
ERYTHROCYTE [DISTWIDTH] IN BLOOD BY AUTOMATED COUNT: 17.1 % (ref 11.6–14.4)
GFR SERPL CREATININE-BSD FRML MDRD: >60 ML/MIN/1.73M*2
GLUCOSE BLD-MCNC: 124 MG/DL (ref 70–99)
GLUCOSE BLD-MCNC: 127 MG/DL (ref 70–99)
GLUCOSE BLD-MCNC: 156 MG/DL (ref 70–99)
GLUCOSE BLD-MCNC: 93 MG/DL (ref 70–99)
GLUCOSE SERPL-MCNC: 149 MG/DL (ref 70–99)
HCT VFR BLDCO AUTO: 19.3 %
HGB BLD-MCNC: 5.8 G/DL
INR PPP: 1.4 INR
LABORATORY COMMENT REPORT: NORMAL
MCH RBC QN AUTO: 23.7 PG (ref 28–33.2)
MCHC RBC AUTO-ENTMCNC: 30.1 G/DL (ref 32.2–36.5)
MCV RBC AUTO: 78.8 FL (ref 83–98)
PDW BLD AUTO: 9.6 FL (ref 9.4–12.4)
PLATELET # BLD AUTO: 394 K/UL
POCT TEST: ABNORMAL
POCT TEST: NORMAL
POTASSIUM SERPL-SCNC: 4.1 MEQ/L (ref 3.6–5.1)
PROTHROMBIN TIME: 16.2 SEC (ref 12.2–14.5)
RBC # BLD AUTO: 2.45 M/UL (ref 4.5–5.8)
SODIUM SERPL-SCNC: 135 MEQ/L (ref 136–144)
WBC # BLD AUTO: 7.86 K/UL

## 2019-02-08 PROCEDURE — 36415 COLL VENOUS BLD VENIPUNCTURE: CPT | Performed by: STUDENT IN AN ORGANIZED HEALTH CARE EDUCATION/TRAINING PROGRAM

## 2019-02-08 PROCEDURE — 63700000 HC SELF-ADMINISTRABLE DRUG: Performed by: PODIATRIST

## 2019-02-08 PROCEDURE — 3E013VG INTRODUCTION OF INSULIN INTO SUBCUTANEOUS TISSUE, PERCUTANEOUS APPROACH: ICD-10-PCS | Performed by: PODIATRIST

## 2019-02-08 PROCEDURE — 85610 PROTHROMBIN TIME: CPT | Performed by: STUDENT IN AN ORGANIZED HEALTH CARE EDUCATION/TRAINING PROGRAM

## 2019-02-08 PROCEDURE — 63700000 HC SELF-ADMINISTRABLE DRUG: Performed by: STUDENT IN AN ORGANIZED HEALTH CARE EDUCATION/TRAINING PROGRAM

## 2019-02-08 PROCEDURE — 86850 RBC ANTIBODY SCREEN: CPT

## 2019-02-08 PROCEDURE — 86920 COMPATIBILITY TEST SPIN: CPT | Mod: 91

## 2019-02-08 PROCEDURE — 3E03329 INTRODUCTION OF OTHER ANTI-INFECTIVE INTO PERIPHERAL VEIN, PERCUTANEOUS APPROACH: ICD-10-PCS | Performed by: PODIATRIST

## 2019-02-08 PROCEDURE — 99183 HYPERBARIC OXYGEN THERAPY: CPT | Performed by: SURGERY

## 2019-02-08 PROCEDURE — G0277 HBOT, FULL BODY CHAMBER, 30M: HCPCS

## 2019-02-08 PROCEDURE — 80048 BASIC METABOLIC PNL TOTAL CA: CPT | Performed by: STUDENT IN AN ORGANIZED HEALTH CARE EDUCATION/TRAINING PROGRAM

## 2019-02-08 PROCEDURE — 82948 REAGENT STRIP/BLOOD GLUCOSE: CPT

## 2019-02-08 PROCEDURE — 85730 THROMBOPLASTIN TIME PARTIAL: CPT | Performed by: STUDENT IN AN ORGANIZED HEALTH CARE EDUCATION/TRAINING PROGRAM

## 2019-02-08 PROCEDURE — G0378 HOSPITAL OBSERVATION PER HR: HCPCS

## 2019-02-08 PROCEDURE — 3E033GC INTRODUCTION OF OTHER THERAPEUTIC SUBSTANCE INTO PERIPHERAL VEIN, PERCUTANEOUS APPROACH: ICD-10-PCS | Performed by: PODIATRIST

## 2019-02-08 PROCEDURE — 85027 COMPLETE CBC AUTOMATED: CPT | Performed by: STUDENT IN AN ORGANIZED HEALTH CARE EDUCATION/TRAINING PROGRAM

## 2019-02-08 PROCEDURE — 63600000 HC DRUGS/DETAIL CODE: Performed by: STUDENT IN AN ORGANIZED HEALTH CARE EDUCATION/TRAINING PROGRAM

## 2019-02-08 RX ORDER — ROSUVASTATIN CALCIUM 20 MG/1
20 TABLET, COATED ORAL
Status: DISCONTINUED | OUTPATIENT
Start: 2019-02-08 | End: 2019-02-09 | Stop reason: HOSPADM

## 2019-02-08 RX ORDER — DIPHENHYDRAMINE HCL 25 MG
25 CAPSULE ORAL EVERY 6 HOURS PRN
Status: DISCONTINUED | OUTPATIENT
Start: 2019-02-08 | End: 2019-02-09 | Stop reason: HOSPADM

## 2019-02-08 RX ORDER — DIPHENHYDRAMINE HCL 50 MG/ML
25 VIAL (ML) INJECTION EVERY 6 HOURS PRN
Status: DISCONTINUED | OUTPATIENT
Start: 2019-02-08 | End: 2019-02-09 | Stop reason: HOSPADM

## 2019-02-08 RX ORDER — INSULIN GLARGINE 100 [IU]/ML
20 INJECTION, SOLUTION SUBCUTANEOUS NIGHTLY
Status: DISCONTINUED | OUTPATIENT
Start: 2019-02-08 | End: 2019-02-09 | Stop reason: HOSPADM

## 2019-02-08 RX ORDER — DEXTROSE 50 % IN WATER (D50W) INTRAVENOUS SYRINGE
25 AS NEEDED
Status: DISCONTINUED | OUTPATIENT
Start: 2019-02-08 | End: 2019-02-08 | Stop reason: SDUPTHER

## 2019-02-08 RX ORDER — DEXTROSE 50 % IN WATER (D50W) INTRAVENOUS SYRINGE
25 AS NEEDED
Status: DISCONTINUED | OUTPATIENT
Start: 2019-02-08 | End: 2019-02-09 | Stop reason: HOSPADM

## 2019-02-08 RX ORDER — INSULIN ASPART 100 [IU]/ML
6-10 INJECTION, SOLUTION INTRAVENOUS; SUBCUTANEOUS
Status: DISCONTINUED | OUTPATIENT
Start: 2019-02-08 | End: 2019-02-09 | Stop reason: HOSPADM

## 2019-02-08 RX ORDER — ONDANSETRON 4 MG/1
4 TABLET, ORALLY DISINTEGRATING ORAL EVERY 8 HOURS PRN
Status: DISCONTINUED | OUTPATIENT
Start: 2019-02-08 | End: 2019-02-09 | Stop reason: HOSPADM

## 2019-02-08 RX ORDER — DEXTROSE 40 %
15-30 GEL (GRAM) ORAL AS NEEDED
Status: DISCONTINUED | OUTPATIENT
Start: 2019-02-08 | End: 2019-02-08 | Stop reason: SDUPTHER

## 2019-02-08 RX ORDER — CLOPIDOGREL BISULFATE 75 MG/1
75 TABLET ORAL
Status: DISCONTINUED | OUTPATIENT
Start: 2019-02-08 | End: 2019-02-09

## 2019-02-08 RX ORDER — IBUPROFEN 200 MG
16-32 TABLET ORAL AS NEEDED
Status: DISCONTINUED | OUTPATIENT
Start: 2019-02-08 | End: 2019-02-09 | Stop reason: HOSPADM

## 2019-02-08 RX ORDER — METFORMIN HYDROCHLORIDE 500 MG/1
1000 TABLET ORAL 2 TIMES DAILY WITH MEALS
Status: DISCONTINUED | OUTPATIENT
Start: 2019-02-08 | End: 2019-02-09 | Stop reason: HOSPADM

## 2019-02-08 RX ORDER — EPINEPHRINE 1 MG/ML
0.3 INJECTION, SOLUTION INTRAMUSCULAR; SUBCUTANEOUS ONCE AS NEEDED
Status: DISCONTINUED | OUTPATIENT
Start: 2019-02-08 | End: 2019-02-09 | Stop reason: HOSPADM

## 2019-02-08 RX ORDER — INSULIN GLARGINE 100 [IU]/ML
26 INJECTION, SOLUTION SUBCUTANEOUS NIGHTLY
Status: DISCONTINUED | OUTPATIENT
Start: 2019-02-08 | End: 2019-02-08

## 2019-02-08 RX ORDER — IBUPROFEN 200 MG
16-32 TABLET ORAL AS NEEDED
Status: DISCONTINUED | OUTPATIENT
Start: 2019-02-08 | End: 2019-02-08 | Stop reason: SDUPTHER

## 2019-02-08 RX ORDER — LISINOPRIL 20 MG/1
40 TABLET ORAL
Status: DISCONTINUED | OUTPATIENT
Start: 2019-02-08 | End: 2019-02-09 | Stop reason: HOSPADM

## 2019-02-08 RX ORDER — SENNOSIDES 8.6 MG/1
1 TABLET ORAL 2 TIMES DAILY PRN
Status: DISCONTINUED | OUTPATIENT
Start: 2019-02-08 | End: 2019-02-09 | Stop reason: HOSPADM

## 2019-02-08 RX ORDER — ENOXAPARIN SODIUM 100 MG/ML
40 INJECTION SUBCUTANEOUS
Status: DISCONTINUED | OUTPATIENT
Start: 2019-02-08 | End: 2019-02-09

## 2019-02-08 RX ORDER — ONDANSETRON HYDROCHLORIDE 2 MG/ML
4 INJECTION, SOLUTION INTRAVENOUS EVERY 8 HOURS PRN
Status: DISCONTINUED | OUTPATIENT
Start: 2019-02-08 | End: 2019-02-09 | Stop reason: HOSPADM

## 2019-02-08 RX ORDER — ACETAMINOPHEN 325 MG/1
650 TABLET ORAL EVERY 4 HOURS PRN
Status: DISCONTINUED | OUTPATIENT
Start: 2019-02-08 | End: 2019-02-09 | Stop reason: HOSPADM

## 2019-02-08 RX ORDER — METFORMIN HYDROCHLORIDE 500 MG/1
500 TABLET ORAL 2 TIMES DAILY WITH MEALS
Status: DISCONTINUED | OUTPATIENT
Start: 2019-02-08 | End: 2019-02-08

## 2019-02-08 RX ORDER — DEXTROSE 40 %
15-30 GEL (GRAM) ORAL AS NEEDED
Status: DISCONTINUED | OUTPATIENT
Start: 2019-02-08 | End: 2019-02-09 | Stop reason: HOSPADM

## 2019-02-08 RX ORDER — LORAZEPAM 0.5 MG/1
0.5 TABLET ORAL DAILY PRN
Status: DISCONTINUED | OUTPATIENT
Start: 2019-02-08 | End: 2019-02-09 | Stop reason: HOSPADM

## 2019-02-08 RX ORDER — TRAMADOL HYDROCHLORIDE 50 MG/1
50 TABLET ORAL EVERY 6 HOURS PRN
Status: DISCONTINUED | OUTPATIENT
Start: 2019-02-08 | End: 2019-02-09 | Stop reason: HOSPADM

## 2019-02-08 RX ORDER — SODIUM CHLORIDE 9 MG/ML
5 INJECTION, SOLUTION INTRAVENOUS AS NEEDED
Status: DISCONTINUED | OUTPATIENT
Start: 2019-02-08 | End: 2019-02-09

## 2019-02-08 RX ORDER — ALUMINUM HYDROXIDE, MAGNESIUM HYDROXIDE, AND SIMETHICONE 1200; 120; 1200 MG/30ML; MG/30ML; MG/30ML
30 SUSPENSION ORAL EVERY 4 HOURS PRN
Status: DISCONTINUED | OUTPATIENT
Start: 2019-02-08 | End: 2019-02-09 | Stop reason: HOSPADM

## 2019-02-08 RX ADMIN — TRAMADOL HYDROCHLORIDE 50 MG: 50 TABLET, COATED ORAL at 19:00

## 2019-02-08 RX ADMIN — ENOXAPARIN SODIUM 40 MG: 100 INJECTION SUBCUTANEOUS at 18:31

## 2019-02-08 RX ADMIN — AMPICILLIN SODIUM 2 G: 2 INJECTION, POWDER, FOR SOLUTION INTRAMUSCULAR; INTRAVENOUS at 20:15

## 2019-02-08 RX ADMIN — METFORMIN HYDROCHLORIDE 1000 MG: 500 TABLET ORAL at 17:29

## 2019-02-08 RX ADMIN — INSULIN GLARGINE 20 UNITS: 100 INJECTION, SOLUTION SUBCUTANEOUS at 22:01

## 2019-02-08 ASSESSMENT — PAIN SCALES - GENERAL: PAINLEVEL: 0-NO PAIN

## 2019-02-08 NOTE — PLAN OF CARE
Problem: Patient Care Overview  Goal: Discharge Needs Assessment  Outcome: Ongoing (interventions implemented as appropriate)   02/08/19 5695   DC Needs Assessment   Concerns To Be Addressed discharge planning concerns   Concerns Comments from home on services with [facility] and [facility] for wound care ( wound vacs) and home ivabx services.   Provider Choice List(s) Given yes   Anticipated Discharge Disposition home with home health services   Type of Home Care Services nursing;infusion therapy   Equipment Needed After Discharge wound care supplies  (bilateral wound vacs)   Discharge Planning   Patient/Family Concerns Related to Expected Discharge Disposition home with [facility] HC services and [facility] for ivabx   Activity/Self Care ROS   Equipment Currently Used at Home wound care supplies  (bilateral wound vacs.)       Pt known to [facility] and infusion services with [facility].  Notified agencies of admission.

## 2019-02-08 NOTE — PROGRESS NOTES
[facility]  Received notification that plan is for pt to be in the hospital less than 24 hours  Nader at [facility] ([phone]) was notified as was Fela Schwarz RN at [facility] ([phone])  Update note entered into home care chart  If pt is in less than 24 hours and update note is all that is required and his services will continue  If he is in the hospital longer than 24 hours, a new referral will need to be called into [facility] central access ([phone])     Tameka Garza RN pager [phone]

## 2019-02-08 NOTE — PROGRESS NOTES
Hyperbaric Oxygen Therapy Treatment Summary    Patient Name: Harvey Lucero Jr.             : 1960  Date of Visit: 2019    Indication for Treatment: Chronic Refractory Osteomyelitis(unresponsive to Conventional Therapy) left foot       Comments:Patient to have low Hb corrected.      Treatment Summary:   Continue present Hyperbaric Oxygen Treatment Plan: Treatment 2.4 YADI x 90 minutes with 5 minute airbreak x 2      Response To Treatment:  Physician was present during total Hyperbaric Oxygen Treatment including the ascent and decent portions.

## 2019-02-08 NOTE — PROGRESS NOTES
Patient can dc tomorrow after repeat H&H, if Hemoglobin improved and after [facility] sees the patient and clears for discharge.   CM to set up dc.   Continue treated prior to admission

## 2019-02-08 NOTE — H&P
Podiatry Consult Note    Subjective     Harvey Lucero Jr. is a 58 y.o. male who was admitted to [facility] under the service of Dr. Fabricio Tee for Anemia [D64.9]. Patient was seen in the [facility] for HBO therapy, completed without complication. Hemoglobin reported to be 6.2. ED was unable to have patient come in for transfusion or to be seen in observation unit. Previous hospitalization for extensive lower extremity infection with multiple surgical debridements and most recent TMA. Patient was seen by Dr. Tee in [facility] 2/6/19 at which time the left lower extremity was healing well. Patient currently in NAD. Denies n/v/f/c/sob.    Outside records reviewed..    Medical History:   Past Medical History:   Diagnosis Date   • Anxiety    • GERD (gastroesophageal reflux disease)    • History of CVA (cerebrovascular accident)    • Hypertension    • Lipid disorder    • Stroke (CMS/[facility]) ([facility])     2013   • Type 2 diabetes mellitus (CMS/[facility]) ([facility])        Surgical History:   Past Surgical History:   Procedure Laterality Date   • INCISION AND DRAINAGE OF WOUND Left    • OTHER SURGICAL HISTORY      Loop recorder   • WISDOM TOOTH EXTRACTION     • WOUND DEBRIDEMENT Left        Social History:   Social History     Social History Narrative   • No narrative on file       Family History: No family history on file.    Allergies: No known allergies    Home Medications:  •  ampicillin, Infuse 2 g into a venous catheter every 4 (four) hours.  •  clopidogrel, TAKE 1 TABLET BY MOUTH EVERY DAY  •  enoxaparin, Inject 0.4 mL (40 mg total) under the skin daily. (Patient taking differently: Inject 40 mg under the skin daily. Wife to check with Dr. Tee about Wed evening dose )  •  lisinopril, Take 40 mg by mouth once daily.  •  LORazepam, Take 0.5 mg by mouth daily as needed for anxiety.  •  metFORMIN, take 1 tablet by oral route 2 times every day with morning and evening meals  •  rosuvastatin, 20 mg.    •  SITagliptin, Take 1  tablet (100 mg total) by mouth daily.  •  acetaminophen, Take 1,000 mg by mouth every 6 (six) hours as needed for mild pain.  •  blood sugar diagnostic, for blood glucose monitoring 3-4x day  •  blood-glucose meter, for blood glucose monitoring  •  insulin glargine, 26 units at bedtime  •  lancets, for blood glucose monitoring 3x day  •  oxyCODONE, Take 5 mg by mouth 2 (two) times a day as needed. Ordered by Dr. Tee  •  pen needle, diabetic, For blood sugar monitoring 3x day Dx:  E11.9  •  pen needle, diabetic, For insulin administration once daily  •  senna, Take 1 tablet by mouth 2 (two) times a day as needed for constipation.    Current Medications:  •  acetaminophen, 650 mg, oral, q4h PRN  •  alum-mag hydroxide-simeth, 30 mL, oral, q4h PRN  •  ampicillin, 2 g, intravenous, q4h INT  •  clopidogrel, 75 mg, oral, Daily (6a)  •  glucose, 16-32 g of dextrose, oral, PRN **OR** dextrose, 15-30 g of dextrose, oral, PRN **OR** glucagon, 1 mg, intramuscular, PRN **OR** dextrose in water, 25 mL, intravenous, PRN  •  diphenhydrAMINE, 25 mg, oral, q6h PRN **OR** diphenhydrAMINE, 25 mg, intravenous, q6h PRN  •  enoxaparin, 40 mg, subcutaneous, Daily (6p)  •  EPINEPHrine, 0.3 mg, intramuscular, Once PRN  •  insulin aspart U-100, 6-10 Units, subcutaneous, With meals & nightly  •  insulin glargine, 26 Units, subcutaneous, Nightly  •  lisinopril, 40 mg, oral, Daily (6a)  •  LORazepam, 0.5 mg, oral, Daily PRN  •  metFORMIN, 500 mg, oral, BID with meals  •  ondansetron ODT, 4 mg, oral, q8h PRN **OR** ondansetron, 4 mg, intravenous, q8h PRN  •  rosuvastatin, 20 mg, oral, Daily (6p)  •  senna, 1 tablet, oral, 2x daily PRN  •  SITagliptin, 100 mg, oral, Daily  •  sodium chloride 0.9 %, 5 mL/hr, intravenous, PRN  •  traMADol, 50 mg, oral, q6h PRN    Labs  Labs pending, will follow    Imaging  Not applicable    Review of Systems  Pertinent items are noted in HPI.    Objective     Physicial Exam  /70 (BP Location: Left upper  "arm, Patient Position: Lying)   Pulse 82   Temp 37.1 °C (98.8 °F) (Oral)   Resp 16   Ht 1.854 m (6' 1\")   Wt 93.4 kg (206 lb)   SpO2 98%   BMI 27.18 kg/m²       General appearance: AAOx3, appears stated age,cooperative  Head: normocephalic, without obvious abnormality  Eyes: conjunctivae/corneas clear. PERRL, EOM's intact. Fundi benign.  Ears: normal TM's and external ear canals both ears  Back: symmetric, no curvature. ROM normal. No CVA tenderness.  Lungs: clear to auscultation bilaterally  Heart:: regular rate and rhythm  Abdomen: soft, non-tender; bowel sounds normal; no masses, no organomegaly      LE FOCUSED PHYSICAL EXAM  Dressing to the LLE left intact - previous TMA with dressing change 2/6/19.  Vascular : RLE DP and PT pulses palpable. CRT < 3 sec. Skin temp is warm to warm proximal to distal. No pain to the posterior proximal calf - no clinical signs of DVT  Neurovascular: Gross sensation intact. Light touch and protective sensation diminished.  Orthopedic: Previous LLE TMA. DF/PF of digits to RLE present. No pain to the LLE.  Dermatologic : Dressing to the LLE with wound vac left intact.        A/P : Mr. Harvey Lucero is a 58 year old male admitted to [facility] under the service of Dr. Fabricio Tee for acute anemia, need for transfusion.  - Patient seen, evaluated, and treated with all questions and concerns answered and addressed.  - Was seen in [facility] this morning for HBO.  - Outpatient blood work with patient hgb 6.2. CBC ordered for official eval. Likely will need 2 units PRBC.  - Will obtain consent for PRBC   - [facility] consult for assistance with medical management while patient inhouse. Patient with multiple comorbidities  - Adult diet, diabetic  - Continue home meds  - Wound vac therapy will be continued at home upon D/C. No changes to wound care treatment.  - NWB LLE  - Full code  - Thank you to all consulted services for caring for our patient while he is inhouse  - Please contact " podiatry resident on call with any questions or concerns.    Jen Urban DPM PGY-2  Pager [phone]

## 2019-02-09 ENCOUNTER — APPOINTMENT (OUTPATIENT)
Dept: RADIOLOGY | Facility: HOSPITAL | Age: 59
Setting detail: OBSERVATION
End: 2019-02-09
Attending: PODIATRIST
Payer: COMMERCIAL

## 2019-02-09 VITALS
RESPIRATION RATE: 16 BRPM | OXYGEN SATURATION: 97 % | WEIGHT: 206 LBS | HEIGHT: 73 IN | TEMPERATURE: 99 F | HEART RATE: 82 BPM | BODY MASS INDEX: 27.3 KG/M2 | SYSTOLIC BLOOD PRESSURE: 179 MMHG | DIASTOLIC BLOOD PRESSURE: 86 MMHG

## 2019-02-09 PROBLEM — K21.9 GERD (GASTROESOPHAGEAL REFLUX DISEASE): Status: ACTIVE | Noted: 2019-02-09

## 2019-02-09 PROBLEM — E78.5 DYSLIPIDEMIA: Status: ACTIVE | Noted: 2019-02-09

## 2019-02-09 PROBLEM — I63.9 CVA (CEREBRAL VASCULAR ACCIDENT) (CMS/HCC): Status: ACTIVE | Noted: 2019-02-09

## 2019-02-09 PROBLEM — M86.9 OSTEOMYELITIS OF LEFT FOOT (CMS/HCC): Status: ACTIVE | Noted: 2019-02-09

## 2019-02-09 PROBLEM — E61.1 IRON DEFICIENCY: Status: ACTIVE | Noted: 2019-02-09

## 2019-02-09 PROBLEM — Z79.4 TYPE 2 DIABETES MELLITUS WITH SKIN COMPLICATION, WITH LONG-TERM CURRENT USE OF INSULIN (CMS/HCC): Status: ACTIVE | Noted: 2019-02-09

## 2019-02-09 PROBLEM — E11.628 TYPE 2 DIABETES MELLITUS WITH SKIN COMPLICATION, WITH LONG-TERM CURRENT USE OF INSULIN (CMS/HCC): Status: ACTIVE | Noted: 2019-02-09

## 2019-02-09 LAB
ANION GAP SERPL CALC-SCNC: 9 MEQ/L (ref 3–15)
ANISOCYTOSIS BLD QL SMEAR: ABNORMAL
BASOPHILS # BLD: 0.07 K/UL (ref 0.01–0.1)
BASOPHILS NFR BLD: 0.8 %
BUN SERPL-MCNC: 8 MG/DL (ref 8–20)
CALCIUM SERPL-MCNC: 8.1 MG/DL (ref 8.9–10.3)
CHLORIDE SERPL-SCNC: 103 MEQ/L (ref 98–109)
CO2 SERPL-SCNC: 24 MEQ/L (ref 22–32)
CREAT SERPL-MCNC: 0.8 MG/DL
DIFFERENTIAL METHOD BLD: ABNORMAL
EOSINOPHIL # BLD: 0.37 K/UL (ref 0.04–0.54)
EOSINOPHIL NFR BLD: 4.2 %
ERYTHROCYTE [DISTWIDTH] IN BLOOD BY AUTOMATED COUNT: 16.3 % (ref 11.6–14.4)
FERRITIN SERPL-MCNC: 169 NG/ML (ref 24–250)
GFR SERPL CREATININE-BSD FRML MDRD: >60 ML/MIN/1.73M*2
GLUCOSE BLD-MCNC: 113 MG/DL (ref 70–99)
GLUCOSE BLD-MCNC: 155 MG/DL (ref 70–99)
GLUCOSE SERPL-MCNC: 120 MG/DL (ref 70–99)
HCT VFR BLDCO AUTO: 23.4 %
HCT VFR BLDCO AUTO: 27.8 %
HGB BLD-MCNC: 7.3 G/DL
HGB BLD-MCNC: 8.7 G/DL
HYPOCHROMIA BLD QL SMEAR: ABNORMAL
IMM GRANULOCYTES # BLD AUTO: 0.08 K/UL (ref 0–0.08)
IMM GRANULOCYTES NFR BLD AUTO: 0.9 %
IRON SATN MFR SERPL: 10 % (ref 15–45)
IRON SERPL-MCNC: 20 UG/DL (ref 35–150)
LYMPHOCYTES # BLD: 1.11 K/UL (ref 1.2–3.5)
LYMPHOCYTES NFR BLD: 12.5 %
MCH RBC QN AUTO: 25.1 PG (ref 28–33.2)
MCHC RBC AUTO-ENTMCNC: 31.2 G/DL (ref 32.2–36.5)
MCV RBC AUTO: 80.4 FL (ref 83–98)
MONOCYTES # BLD: 0.79 K/UL (ref 0.3–1)
MONOCYTES NFR BLD: 8.9 %
NEUTROPHILS # BLD: 6.45 K/UL (ref 1.7–7)
NEUTS SEG NFR BLD: 72.7 %
NRBC BLD-RTO: 0.1 %
PDW BLD AUTO: 9.8 FL (ref 9.4–12.4)
PLAT MORPH BLD: NORMAL
PLATELET # BLD AUTO: 393 K/UL
PLATELET # BLD EST: ABNORMAL 10*3/UL
POCT TEST: ABNORMAL
POCT TEST: ABNORMAL
POLYCHROMASIA BLD QL SMEAR: ABNORMAL
POTASSIUM SERPL-SCNC: 4.3 MEQ/L (ref 3.6–5.1)
RBC # BLD AUTO: 2.91 M/UL (ref 4.5–5.8)
RETICS #: 0.11 M/UL (ref 0–0.12)
RETICS/RBC NFR: 3.76 % (ref 0.6–2.8)
SODIUM SERPL-SCNC: 136 MEQ/L (ref 136–144)
TIBC SERPL-MCNC: 207 UG/DL (ref 270–460)
UIBC SERPL-MCNC: 187 UG/DL (ref 180–360)
WBC # BLD AUTO: 8.87 K/UL

## 2019-02-09 PROCEDURE — 85018 HEMOGLOBIN: CPT | Performed by: PODIATRIST

## 2019-02-09 PROCEDURE — 85045 AUTOMATED RETICULOCYTE COUNT: CPT | Performed by: HOSPITALIST

## 2019-02-09 PROCEDURE — 71045 X-RAY EXAM CHEST 1 VIEW: CPT

## 2019-02-09 PROCEDURE — G0378 HOSPITAL OBSERVATION PER HR: HCPCS

## 2019-02-09 PROCEDURE — 80048 BASIC METABOLIC PNL TOTAL CA: CPT | Performed by: PODIATRIST

## 2019-02-09 PROCEDURE — 96375 TX/PRO/DX INJ NEW DRUG ADDON: CPT

## 2019-02-09 PROCEDURE — 96366 THER/PROPH/DIAG IV INF ADDON: CPT

## 2019-02-09 PROCEDURE — 96365 THER/PROPH/DIAG IV INF INIT: CPT

## 2019-02-09 PROCEDURE — 200200 PR NO CHARGE: Performed by: INTERNAL MEDICINE

## 2019-02-09 PROCEDURE — 82728 ASSAY OF FERRITIN: CPT | Performed by: HOSPITALIST

## 2019-02-09 PROCEDURE — 63700000 HC SELF-ADMINISTRABLE DRUG: Performed by: HOSPITALIST

## 2019-02-09 PROCEDURE — 36430 TRANSFUSION BLD/BLD COMPNT: CPT

## 2019-02-09 PROCEDURE — 25000000 HC PHARMACY GENERAL: Performed by: HOSPITALIST

## 2019-02-09 PROCEDURE — 63700000 HC SELF-ADMINISTRABLE DRUG: Performed by: PODIATRIST

## 2019-02-09 PROCEDURE — 96374 THER/PROPH/DIAG INJ IV PUSH: CPT

## 2019-02-09 PROCEDURE — 63700000 HC SELF-ADMINISTRABLE DRUG: Performed by: STUDENT IN AN ORGANIZED HEALTH CARE EDUCATION/TRAINING PROGRAM

## 2019-02-09 PROCEDURE — 85027 COMPLETE CBC AUTOMATED: CPT | Performed by: HOSPITALIST

## 2019-02-09 PROCEDURE — 83550 IRON BINDING TEST: CPT | Performed by: HOSPITALIST

## 2019-02-09 PROCEDURE — 36415 COLL VENOUS BLD VENIPUNCTURE: CPT | Performed by: HOSPITALIST

## 2019-02-09 PROCEDURE — 99244 OFF/OP CNSLTJ NEW/EST MOD 40: CPT | Performed by: HOSPITALIST

## 2019-02-09 PROCEDURE — P9016 RBC LEUKOCYTES REDUCED: HCPCS

## 2019-02-09 PROCEDURE — 96372 THER/PROPH/DIAG INJ SC/IM: CPT | Mod: 59

## 2019-02-09 PROCEDURE — 63600000 HC DRUGS/DETAIL CODE: Performed by: STUDENT IN AN ORGANIZED HEALTH CARE EDUCATION/TRAINING PROGRAM

## 2019-02-09 RX ORDER — SODIUM CHLORIDE 0.9 % (FLUSH) 0.9 %
10 SYRINGE (ML) INJECTION AS NEEDED
Status: DISCONTINUED | OUTPATIENT
Start: 2019-02-09 | End: 2019-02-09 | Stop reason: HOSPADM

## 2019-02-09 RX ORDER — SODIUM CHLORIDE 0.9 % (FLUSH) 0.9 %
10 SYRINGE (ML) INJECTION
Status: DISCONTINUED | OUTPATIENT
Start: 2019-02-09 | End: 2019-02-09 | Stop reason: HOSPADM

## 2019-02-09 RX ORDER — PANTOPRAZOLE SODIUM 40 MG/10ML
40 INJECTION, POWDER, LYOPHILIZED, FOR SOLUTION INTRAVENOUS EVERY 12 HOURS
Status: DISCONTINUED | OUTPATIENT
Start: 2019-02-09 | End: 2019-02-09 | Stop reason: HOSPADM

## 2019-02-09 RX ORDER — SODIUM CHLORIDE 9 MG/ML
5 INJECTION, SOLUTION INTRAVENOUS AS NEEDED
Status: DISCONTINUED | OUTPATIENT
Start: 2019-02-09 | End: 2019-02-09 | Stop reason: HOSPADM

## 2019-02-09 RX ADMIN — METFORMIN HYDROCHLORIDE 1000 MG: 500 TABLET ORAL at 08:15

## 2019-02-09 RX ADMIN — AMPICILLIN SODIUM 2 G: 2 INJECTION, POWDER, FOR SOLUTION INTRAMUSCULAR; INTRAVENOUS at 00:13

## 2019-02-09 RX ADMIN — AMPICILLIN SODIUM 2 G: 2 INJECTION, POWDER, FOR SOLUTION INTRAMUSCULAR; INTRAVENOUS at 04:12

## 2019-02-09 RX ADMIN — Medication 10 ML: at 12:12

## 2019-02-09 RX ADMIN — PANTOPRAZOLE SODIUM 40 MG: 40 INJECTION, POWDER, FOR SOLUTION INTRAVENOUS at 08:28

## 2019-02-09 RX ADMIN — AMPICILLIN SODIUM 2 G: 2 INJECTION, POWDER, FOR SOLUTION INTRAMUSCULAR; INTRAVENOUS at 08:29

## 2019-02-09 RX ADMIN — AMPICILLIN SODIUM 2 G: 2 INJECTION, POWDER, FOR SOLUTION INTRAMUSCULAR; INTRAVENOUS at 12:12

## 2019-02-09 RX ADMIN — LISINOPRIL 40 MG: 20 TABLET ORAL at 08:30

## 2019-02-09 RX ADMIN — SITAGLIPTIN 100 MG: 100 TABLET, FILM COATED ORAL at 08:15

## 2019-02-09 RX ADMIN — PROBIOTIC PRODUCT - TAB 250 MG: TAB at 08:30

## 2019-02-09 NOTE — PLAN OF CARE
Pt very unhappy about the stya . He wants to leave , and had a deal with podiatry resident .  He is good to go after prbc if hb is > 8 gms .   No other issues .    willing to  F/u. With  Haematology oncology  & gi as out pt .

## 2019-02-09 NOTE — PLAN OF CARE
Problem: Patient Care Overview  Goal: Plan of Care Review  Outcome: Ongoing (interventions implemented as appropriate)   02/09/19 1049   Coping/Psychosocial   Plan Of Care Reviewed With patient   Plan of Care Review   Progress improving   Outcome Summary Hgb-7.3, ordered for 1 unit transfusion, will check Hgb afterwards, possible discharge, hematology and GI consulted

## 2019-02-09 NOTE — ASSESSMENT & PLAN NOTE
Recommend evaluation for reason for significant microcytic anemia - ?chronic blood loss ... Consider GI and Hematology consult.  I would recommend checking iron studies - ferritin, TIBC, serum iron, reticulocyte count  Recommend hemoccult evaluation as patient is not willing to consent to rectal exam  Recommend start of Protonix 40 mg q12 hours.  Stop Enoxaparin  Hold Plavix.  Please transfuse to maintain Hb > = 8.0 g/dL    My recommendations were d/w Dr. Suad Payne, podiatrist on-call covering for Dr. Tee.

## 2019-02-09 NOTE — ASSESSMENT & PLAN NOTE
Recommend continuing insulin therapy with Lantus, SSI  Monitor Accucheks  Continue Metformin and Januvia  NCS diet.

## 2019-02-09 NOTE — ASSESSMENT & PLAN NOTE
Recommend continuing wound care and Abx as suggested by ID - Ampicillin.  Patient should be started on Probiotics while on Ampicillin to prevent infectious diarrhea

## 2019-02-09 NOTE — NURSING NOTE
Patient's wound VACs were both alarming. The canister was filled in the one and the battery was running out in the other. The patient does not have his extra canisters or batteries with him so the wound VACs were shut off and the dressings were removed.  Per podiatry orders Xeroform was applied to wounds and then Aquacel.  4x4 were placed on top and then wrapped with Webril before wrapping the leg with ACE bandages.  Patient tolerated the dressing change well. Will continue to monitor

## 2019-02-09 NOTE — ASSESSMENT & PLAN NOTE
58 y M w/ hx of CVA on PLAVIX, HLD, DM2, GERD, recent transmetatarsal amputation of L foot presenting for anemia; no heme occult performed yet -- pt refused rectal; hgb trending -- normal as of 12/2017 --> +chronic osteomyelitis/gangrene, multiple debridements in Jan 2019/amputation performed 2/3 --> anemia issues + before this; no UGI/LGI sxs to report, no blood in stool, no active bleeding; no NSAID / ASA / etoh use per patient; hgb 10 --> as low as 6.8 mid-Jan, s/p 1 transfusion (I can see Jan 12), hgb 1/19 was 8.6, yesterday at facility hgb checked and 6.2 --> sent in for evaluation; s/p 2 units pRBCs and hgb only up to 7.3 -- ok response; hematology has been consulted; pt denying desire for work up -- gave option if hgb stable can f/u as an outpatient, last dose PLAVIX yesterday; recommend inpatient EGD / colonoscopy Monday -- pt denying this; mild Fe deficiency per labs     - Recommend pantoprazole 40 mg BID until endoscopic evaluations decided   - T/d inpatient vs. Outpatient workup -- needs EGD / colonoscopy   - Hold PLAVIX until final decision made   - Recommend 1 more unit pRBC to get hgb >8 -- in light of possible endoscopic evaluation this Monday   - No emergent endoscopic evals warranted given no active bleeding   - If hgb proves to be stable s/p next transfusion can f/u as an outpatient for EGD / colonoscopy

## 2019-02-09 NOTE — DISCHARGE INSTRUCTIONS
[facility]   [address], 3rd Floor  [place] [address]  [phone]     Dr. Fabricio Tee, DPM       Podiatry Discharge Instructions     Keep left lower extremity elevated as much as possible above the level of the heart.   Keep bandage clean and dry. Do not remove unless instructed to do so by your Physician. If bandage becomes soiled or wet call the office immediately.     Continue wound vac therapy as previously instructed before this hospital admission.    Continue medications as previously prescribed. Continue Ampicillin as previously directed.     Bear weight on the area only as advised:   No weight to the left lower extremity    Should you have excessive bleeding (through the bandage) or excessive discomfort, please call the office immediately.     Please call the [facility] to confirm your next follow up appointment for HBO therapy as well as wound care visit.      NEED TO FOLLOW UP WITH A GASTROENTEROLOGIST   NEED TO FOLLOW UP WITH A HEMATOLOGY    ASPIRIN 81mg daily for history of stroke instead of Plavix

## 2019-02-09 NOTE — CONSULTS
Hospital Medicine Service -  Inpatient Consultation         Requesting Physician: Dr. Fbaricio Tee, podiatry service    Reason for Consultation: Evaluation of anemia and assistance with Medical management     HISTORY OF PRESENT ILLNESS        This is a 58 y.o. male GERD, Hx CVA - on Plavix, Type 2 DM with recent transmetatarsal amputation of the left foot. Patient was admitted to the Podiatry service from the hyperbaric oxygen wound care center for concerns of abnormal labwork as an outpatient noting microcytic anemia with Hb 6.2.    [facility] is consulted by Podiatry for evaluation and recommendations for anemia. Patient seen on the floor and evaluated. Patient reports he had recent transmetatarsal amputation last week. He has been following with Dr. Tee for HBO therapy and has had a chronic Left foot osteomyelitis infection also followed by Dr. Dias for which patient has been prescribed 6 weeks of Ampicillin intravenously. He is on week number 4.     The patient reports that he was surprised that he was admitted to the podiatry service for evaluation of his anemia.  He tells me that he is not had abdominal pain, melena or bright red blood per rectum.  He has not been experiencing symptoms of fatigue, weakness, shortness of breath, chest pain or palpitations.  He also denies chronic consumption of alcohol.  He tells me that he has never had a history of ulcer disease, coronary artery disease.  He does report that he has had a stroke in the past and has been on Plavix.  He does not take chronic NSAIDs or iron pills. The patient denies presence of hemorrhoids or irritated hemorrhoids. He has never had a EGD or colonoscopy.    He tells me that he has not had any hematuria.  He has no hemoptysis.  He has not noted any bleeding from his left foot wound.    He had routine blood work as an outpatient noting a hemoglobin of 6.2 yesterday and was referred after his hyperbaric oxygen treatment to the hospital for  a transfusion.  He has been consented for transfusion by the podiatry service.  Patient has been initiated on a 2 unit packed red blood cell transfusion.    Review of systems:    No rhinorrhea, sore throat, otalgia, postnasal drip.  No visual changes, headaches, dizziness, focal weakness, speech disturbances.  No dysphagia.  No cough or shortness of breath or dyspnea on exertion.  No chest pain, palpitations, leg edema, orthopnea, paroxysmal nocturnal dyspnea. No dysuria or hematuria.  No myalgias or joint pain.  No fever, chills, rigors.  No rashes. No depression or anxiety. No suicidal thoughts or homicidal thoughts.     Other than what has been mentioned, the remainder of the review of systems otherwise negative.    PAST MEDICAL AND SURGICAL HISTORY        Past Medical History:   Diagnosis Date   • Anxiety    • Chronic osteomyelitis (CMS/HCC) (HCC)    • Dyslipidemia    • GERD (gastroesophageal reflux disease)    • History of CVA (cerebrovascular accident)    • Hypertension    • Type 2 diabetes mellitus (CMS/HCC) (HCC)        Past Surgical History:   Procedure Laterality Date   • FOOT AMPUTATION THROUGH METATARSAL Left    • INCISION AND DRAINAGE OF WOUND Left    • OTHER SURGICAL HISTORY      Loop recorder   • WISDOM TOOTH EXTRACTION     • WOUND DEBRIDEMENT Left        ALLISON Avitia, DO    MEDICATIONS        Home Medications:  Prescriptions Prior to Admission   Medication Sig Dispense Refill Last Dose   • ampicillin 2 g/100 mL IVPB Infuse 2 g into a venous catheter every 4 (four) hours.   2/8/2019 at Unknown time   • clopidogrel (PLAVIX) 75 mg tablet TAKE 1 TABLET BY MOUTH EVERY DAY 90 tablet 0 2/8/2019 at Unknown time   • enoxaparin (LOVENOX) 40 mg/0.4 mL syringe Inject 0.4 mL (40 mg total) under the skin daily. (Patient taking differently: Inject 40 mg under the skin daily. Wife to check with Dr. Tee about Wed evening dose ) 12 mL 0 2/7/2019 at Unknown time   • lisinopril (PRINIVIL) 40 mg tablet Take 40 mg  "by mouth once daily.  3 2/8/2019 at Unknown time   • LORazepam (ATIVAN) 0.5 mg tablet Take 0.5 mg by mouth daily as needed for anxiety.   2/8/2019 at Unknown time   • metFORMIN (GLUCOPHAGE) 1,000 mg tablet take 1 tablet by oral route 2 times every day with morning and evening meals   2/8/2019 at Unknown time   • rosuvastatin (CRESTOR) 20 mg tablet 20 mg.     2/8/2019 at Unknown time   • SITagliptin (JANUVIA) 100 mg tablet Take 1 tablet (100 mg total) by mouth daily. 90 tablet 2 2/8/2019 at Unknown time   • acetaminophen (TYLENOL) 500 mg tablet Take 1,000 mg by mouth every 6 (six) hours as needed for mild pain.   Taking   • blood sugar diagnostic (ONETOUCH ULTRA TEST) strip for blood glucose monitoring 3-4x day   Taking   • blood-glucose meter kit for blood glucose monitoring   Taking   • insulin glargine (BASAGLAR KWIKPEN U-100 INSULIN) 100 unit/mL (3 mL) subcutaneous pen 26 units at bedtime   Taking   • lancets misc for blood glucose monitoring 3x day   Taking   • oxyCODONE (ROXICODONE) 5 mg immediate release tablet Take 5 mg by mouth 2 (two) times a day as needed. Ordered by Dr. Tee   Taking   • pen needle, diabetic (BD ULTRA-FINE MICRO PEN NEEDLE) 32 gauge x 1/4\" needle For blood sugar monitoring 3x day  Dx:  E11.9 300 each 3 Taking   • pen needle, diabetic (NOVOFINE 32) 32 gauge x 1/4\" needle For insulin administration once daily 300 each 3 Taking   • senna (SENOKOT) 8.6 mg tablet Take 1 tablet by mouth 2 (two) times a day as needed for constipation. 30 tablet 0 Taking       Current inpatient medications were personally reviewed.    ALLERGIES        No known allergies    FAMILY HISTORY        History reviewed. No pertinent family history.    SOCIAL HISTORY        Social History     Social History   • Marital status:      Spouse name: N/A   • Number of children: 3   • Years of education: N/A     Occupational History   • Business Owner - [employer]      Social History Main Topics   • Smoking status: " "Former Smoker     Types: Cigarettes   • Smokeless tobacco: Never Used      Comment: 35 years ago; Ex-smoker   • Alcohol use No   • Drug use: No   • Sexual activity: Not Asked     Other Topics Concern   • None     Social History Narrative    Patient is  and had 3 children - 2 living and 1 dead    Patient utilizes a walker to remain steady.       REVIEW OF SYSTEMS        All other systems reviewed and negative except as noted in HPI    PHYSICAL EXAMINATION        BP (!) 153/70 (BP Location: Left upper arm, Patient Position: Lying)   Pulse 91   Temp 36.9 °C (98.5 °F) (Oral)   Resp 18   Ht 1.854 m (6' 1\")   Wt 93.4 kg (206 lb)   SpO2 98%   BMI 27.18 kg/m²   Body mass index is 27.18 kg/m².    Intake/Output Summary (Last 24 hours) at 02/09/19 0444  Last data filed at 02/09/19 0045   Gross per 24 hour   Intake           603.17 ml   Output                0 ml   Net           603.17 ml       Physical Exam       General: Alert and oriented ×3, not in acute distress.  Patient is pale appearing.  HEENT: Normocephalic atraumatic.  Pupils equal round reactive to light.  Extraocular movements are intact.  Sclera anicteric.  TMs are clear bilaterally.  Nares are patent.  Oropharynx is clear without exudates.  There is no sinus tenderness.  Cardiovascular: S1, S2 without S3 or S4.  There is no murmurs, rubs, or gallops.  Pulses are 2+.  Neck: No jugular venous distention, no carotid bruits, no thyromegaly.  Pulmonary: Clear to auscultation bilaterally.  There is no wheezing, rhonchi, or crackles.  Abdomen: Soft, bowel sounds are present, nontender, nondistended.  No rebound or guarding.  Extremities: The patient has absent left toes.  He has no cyanosis or lymphadenopathy.  Neurologic examination: Cranial nerves II through XII are grossly intact.  Nonfocal neurologic exam.  Skin: Patient has a left foot wound.  It is currently dressed in the large bandage for which the patient did not want me to remove and evaluate.  " Otherwise, skin is warm, dry, well perfused.  She has multiple tattoos.  Musculoskeletal: Full range of motion in all extremities without joint effusions or noted tenderness.  No CVA tenderness.  Psychiatric: Appropriate mood and affect.  Normal insight and cognition.  No hallucinations, delusions, or suicidal thoughts.  Rectal examination: The patient declined a rectal examination    LABS / EKG        Labs:  Sodium 135.  Potassium 4.1.  Chloride 101.  Bicarb 24.  Anion gap 10.  Glucose 149.  BUN 8.  Creatinine 0.8.  Calcium 8.2.    White cell count 7.86.  Red cell count 2.45.    Hemoglobin 5.8.  Hematocrit 19.3.  MCV 78.8.  MCH 23.7.  MCHC 30.1.  RDW 17.1.  Platelet count 394.  MPV 9.6.    Pro time 16.2.  INR 1.4.  PTT 41.    Type and screen - A positive antibody negative    ASSESSMENT AND RECOMMENDATIONS           * Anemia   Assessment & Plan    Recommend evaluation for reason for significant microcytic anemia - ?chronic blood loss ... Consider GI and Hematology consult.  I would recommend checking iron studies - ferritin, TIBC, serum iron, reticulocyte count  Recommend hemoccult evaluation as patient is not willing to consent to rectal exam  Recommend start of Protonix 40 mg q12 hours.  Stop Enoxaparin  Hold Plavix.  Please transfuse to maintain Hb > = 8.0 g/dL    My recommendations were d/w Dr. Suad Payne, podiatrist on-call covering for Dr. Tee.             Dyslipidemia   Assessment & Plan    Check LFTs at baseline  Continue patient on statin        Type 2 diabetes mellitus with skin complication, with long-term current use of insulin (CMS/[facility])   Assessment & Plan    Recommend continuing insulin therapy with Lantus, SSI  Monitor Accucheks  Continue Metformin and Januvia  NCS diet.         CVA (cerebral vascular accident) (CMS/[facility]) ([facility])   Assessment & Plan    Recommend monitoring with neurochecks  Recommend evaluation of anemia and holding Plavix        Osteomyelitis of left foot (CMS/[facility]) ([facility])    Assessment & Plan    Recommend continuing wound care and Abx as suggested by ID - Ampicillin.  Patient should be started on Probiotics while on Ampicillin to prevent infectious diarrhea        GERD (gastroesophageal reflux disease)   Assessment & Plan    Start Protonix                  Luis Alberto Alaniz MD  2/9/2019

## 2019-02-09 NOTE — DISCHARGE SUMMARY
Inpatient Discharge Summary    BRIEF OVERVIEW  Admitting Provider:  H&P Notes 1/10/2019 to 2/9/2019     Date of Service Author Author Type Status Note Type File Time    02/08/19 1307 Jen Urban DPM Resident Cosign Needed H&P 02/08/19 1324    02/01/19 1502 Delmy Carr MD  Signed H&P 02/01/19 1502          Attending Provider: Fabricio Tee DPM Attending phys phone: [phone]  Primary Care Physician at Discharge: ALLISON Avitia [phone]    Admission Date: 2/8/2019     Discharge Date: 2/9/2019    Primary Discharge Diagnosis  Anemia    Secondary Discharge Diagnosis  Active Hospital Problems    Diagnosis Date Noted   • Osteomyelitis of left foot (CMS/[facility]) ([facility]) 02/09/2019   • CVA (cerebral vascular accident) (CMS/[facility]) ([facility]) 02/09/2019   • Type 2 diabetes mellitus with skin complication, with long-term current use of insulin (CMS/[facility]) 02/09/2019   • Dyslipidemia 02/09/2019   • GERD (gastroesophageal reflux disease) 02/09/2019   • Iron deficiency 02/09/2019   • Anemia 01/04/2019      Resolved Hospital Problems    Diagnosis Date Noted Date Resolved   No resolved problems to display.       DETAILS OF HOSPITAL STAY    Operative Procedures Performed      Consults:   Consult Notes 1/10/2019 to 2/9/2019     Date of Service Author Author Type Status Note Type File Time    02/09/19 0728 Pilar Alvares PA C Physician Assistant Attested Consults 02/09/19 1127    02/09/19 0327 Luis Alberto Alaniz MD Physician Signed Consults 02/09/19 0448    01/15/19 1141 Rory Price DO Physician Signed Consults 01/15/19 1147          Consult Orders During Admission:  IP CONSULT TO CASE MANAGEMENT  IP CONSULT TO SOCIAL WORK  IP CONSULT TO HOSPITALIST  IP CONSULT TO GASTROENTEROLOGY  IP CONSULT TO HEMATOLOGY     Procedures: transfusion PRBC 3 units  Pertinent Test Results: labs: H&H and repeat H&H cross type and screen    Imaging  X-ray Chest 2 Views    Result Date: 1/23/2019  IMPRESSION: Small left pleural effusion with  associated atelectasis and/or infiltrate. I certify that I have reviewed this examination and agree with this report. Tricia Capone MD    X-ray Foot Left 3+ Views    Result Date: 1/17/2019  IMPRESSION: Postsurgical changes in the left foot as described.    X-ray Chest 1 View    Result Date: 2/9/2019  IMPRESSION: A right upper extremity PICC is present with tip in the distal SVC.     X-ray Chest 1 View    Result Date: 1/19/2019  IMPRESSION: The right PICC line tip is now residing in the mid superior vena cava.    X-ray Chest 1 View    Result Date: 1/19/2019  IMPRESSION: The right-sided PICC line remains abnormally positioned. Results were discussed with Tarsha from the PICC line team at 2:30 PM on 01/19/2019    X-ray Chest 1 View    Result Date: 1/19/2019  IMPRESSION: The right-sided PICC line is abnormally positioned residing in the right side of the neck. Finding:    Malpositioned vascular Line   Acuity: Critical  Status:  CLOSED Critical read back was performed and results were read back by Tarsha from the PICC line team on 01/19/2019 at 2:30 PM     X-ray Chest 1 View    Result Date: 1/17/2019  IMPRESSION: Mild central pulmonary vascular congestion with small pleural effusions and bibasilar airspace opacities.    Ultrasound Venous Arm Right    Result Date: 1/13/2019  IMPRESSION:   No evidence of DVT in the right upper extremity. IJodi MD, certify that I have personally reviewed this examination and agree with this report.          Presenting Problem/History of Present Illness  Anemia         Exam on Day of Discharge  Patient seen and examined on day of discharge.  See progress note  Given 3 units prbc hgb incr to 8.7  Consult to GI says can follow up outpt  HMS seen and evaluated  Consult to Heme pending but can be done outpt    Hospital Course  transfusion type and screen with consent     Discharge Orders     Medication List      CHANGE how you take these medications    oxyCODONE 5 mg immediate release  "tablet  Commonly known as:  ROXICODONE  Take 5 mg by mouth 2 (two) times a day as needed. Ordered by Dr. Tee  What changed:  Another medication with the same name was removed. Continue taking this medication, and follow the directions you see here.        CONTINUE taking these medications    acetaminophen 500 mg tablet  Commonly known as:  TYLENOL  Take 1,000 mg by mouth every 6 (six) hours as needed for mild pain.     ampicillin 2 g/100 mL IVPB  Infuse 2 g into a venous catheter every 4 (four) hours.     BASAGLAR KWIKPEN U-100 INSULIN 100 unit/mL (3 mL) subcutaneous pen  Generic drug:  insulin glargine  26 units at bedtime     blood-glucose meter kit  for blood glucose monitoring     clopidogrel 75 mg tablet  Commonly known as:  PLAVIX  TAKE 1 TABLET BY MOUTH EVERY DAY     lancets misc  for blood glucose monitoring 3x day     lisinopril 40 mg tablet  Commonly known as:  PRINIVIL  Take 40 mg by mouth once daily.     LORazepam 0.5 mg tablet  Commonly known as:  ATIVAN  Take 0.5 mg by mouth daily as needed for anxiety.     metFORMIN 1,000 mg tablet  Commonly known as:  GLUCOPHAGE  take 1 tablet by oral route 2 times every day with morning and evening meals     ONETOUCH ULTRA TEST strip  Generic drug:  blood sugar diagnostic  for blood glucose monitoring 3-4x day     * pen needle, diabetic 32 gauge x 1/4\" needle  Commonly known as:  NOVOFINE 32  For insulin administration once daily     * pen needle, diabetic 32 gauge x 1/4\" needle  Commonly known as:  BD ULTRA-FINE MICRO PEN NEEDLE  For blood sugar monitoring 3x day Dx:  E11.9     rosuvastatin 20 mg tablet  Commonly known as:  CRESTOR  20 mg.     senna 8.6 mg tablet  Commonly known as:  SENOKOT  Take 1 tablet by mouth 2 (two) times a day as needed for constipation.     SITagliptin 100 mg tablet  Commonly known as:  JANUVIA  Take 1 tablet (100 mg total) by mouth daily.        * This list has 2 medication(s) that are the same as other medications prescribed for " you. Read the directions carefully, and ask your doctor or other care provider to review them with you.            STOP taking these medications    enoxaparin 40 mg/0.4 mL syringe  Commonly known as:  LOVENOX        ASK your doctor about these medications    traMADol 50 mg tablet  Commonly known as:  ULTRAM  Take 1 tablet (50 mg total) by mouth every 6 (six) hours as needed for moderate pain for up to 5 days.  Ask about: Should I take this medication?                  Outpatient Follow-Ups            In 2 days [facility]        Referrals:  No orders of the defined types were placed in this encounter.      Active Issues Requiring Follow-up  Issue: anemia and wound care  What is Needed: wound vac nurse will apply dressing Monday  Follow up with GI outpt and Hematology outpt       Test Results Pending at Discharge  Unresulted Labs     Start     Ordered    02/09/19 0600  Occult blood x 3, stool  Daily     Start Status   02/09/19 0600 Needs to be Collected   02/10/19 0600 Scheduled   02/11/19 0600 Scheduled       02/09/19 0412    02/09/19 0600  Basic metabolic panel  Daily     Start Status   02/10/19 0600 Scheduled   02/11/19 0600 Scheduled   02/12/19 0600 Scheduled       02/09/19 0429    02/09/19 0600  CBC  Daily     Start Status   02/09/19 0600 Needs to be Collected   02/10/19 0600 Scheduled   02/11/19 0600 Scheduled   02/12/19 0600 Scheduled       02/09/19 0429    02/09/19 0520  Basic metabolic panel  Once      02/09/19 0521            Discharge Disposition  Home   Code Status at Discharge: Full Code  Physician Order for Life-Sustaining Treatment Document Status      No documents found

## 2019-02-09 NOTE — CONSULTS
GI Consult Note       SUBJECTIVE   Subjective   Harvey Lucero Jr. is a 58 y.o. male with a history of CVA on Plavix, DM2, GERD, recent transmetatarsal amputation of L foot, HLD presenting for anemia; pt was admitted for Anemia [D64.9]. Harvey was seen in consultation at the request of Fabricio Tee DPM for management recommendations related to Anemia.  Harvey presented to [facility] for abnormal blood work.Admitted to podiatry service from [facility] for concerns for abnormal labs --> anemia noted on outpatient labs, hgb 6.2. [facility] for recommendations for anemia. +Chronic L foot osteomyelitis -- followed by Dr. Dias, on 6 wks AMPICILLIN IV. Now s/p 4 wks tx. 2 units pRBCs ordered --> hgb went from 5.8 to 7.3. No report of melena, hematochezia, fatigue, SOB, abd pain, n/v. No prior hx of PUD, no etoh use. Not takings NSAIDs. No prior hx of EGD or colonoscopy. Iron studies -- iron 20, iron sat 10, ferritin normal, TIBC 207. Mild Fe def.   Labs on admission -- hgb 5.8, normal WBC, normal BUN. Hgb re-check this AM 7.3 -- s/p 2 units. INR 1.4 on admission. Pt reported refused heme checks. Upon my discussion with patient he denies any GI sxs -- no report of blood in stool, change in bowel habits, abd pain, nausea, vomiting, weight loss. He has been at hyperbaric facility for wound --> scheduled this Monday. He reportedly had a BM this AM, devoid of blood. I explained to pt I am seeing him for low blood counts possibly 2/2 to GI blood losses -- he immediately stated he would not stay for colonoscopy/endoscopy.     EPIC Hgb trending: Hgb 12/14/2017 15.1, no values until Jan of 2019 -- Hgb 1/4/2019 10.7, 1/6 Hgb 9.1, 1/7 Hgb 6.8, 1/11 9.8, 1/17 Hgb 9.4, 1/19 8.6, yesterday hgb 5.8, today hgb 7.3  Date of foot amputation -- 2/3 --> hgb issues present prior to this; can see he had a transfusion 1/12 --> hgb <8 at this time. Had another surgery 1/12 --> wound debridement w/  application of allograft. 1/7 underwent LLE incision + debridement w/ washout + removal of nonviable soft tissue / bone application of wound VAC, bone bx --> no major amputation at this time.     Pt has had multiple surgeries -- mostly debridements, most major operation was 1/31 --> hx of anemia prior to this procedure. Prior labs normal as of 12/2017 --> anemia starting Jan 2019, now with worsening of anemia. Pt is being very non-compliant in room. Saying he is not willing to stay. He certainly can have low blood counts from recent surgeries but anemia present prior to most significant surgery in late January 2019. Occult blood ordered but not sent. Certainly if patient was amenable would pursue EGD / colonoscopy but patient not eager. Would be reasonable if counts stable for DC w/ outpatient f/u. No active bleeding.     Outside records reviewed..    Review of Systems  All other systems were reviewed and are negative oither than as stated in the HPI    Past Medical History:   Diagnosis Date   • Anxiety    • Chronic osteomyelitis (CMS/HCC) (HCC)    • Dyslipidemia    • GERD (gastroesophageal reflux disease)    • History of CVA (cerebrovascular accident)    • Hypertension    • Type 2 diabetes mellitus (CMS/HCC) (HCC)      Past Surgical History:   Procedure Laterality Date   • FOOT AMPUTATION THROUGH METATARSAL Left    • INCISION AND DRAINAGE OF WOUND Left    • OTHER SURGICAL HISTORY      Loop recorder   • WISDOM TOOTH EXTRACTION     • WOUND DEBRIDEMENT Left      Social History     Social History   • Marital status:      Spouse name: N/A   • Number of children: 3   • Years of education: N/A     Occupational History   • Business Owner - cigar shop      Social History Main Topics   • Smoking status: Former Smoker     Types: Cigarettes   • Smokeless tobacco: Never Used      Comment: 35 years ago; Ex-smoker   • Alcohol use No   • Drug use: No   • Sexual activity: Not on file     Other Topics Concern   • Not on file      Social History Narrative    Patient is  and had 3 children - 2 living and 1 dead    Patient utilizes a walker to remain steady.     History reviewed. No pertinent family history.  Allergies   Allergen Reactions   • No Known Allergies        Home Medications:  •  ampicillin, Infuse 2 g into a venous catheter every 4 (four) hours.  •  clopidogrel, TAKE 1 TABLET BY MOUTH EVERY DAY  •  enoxaparin, Inject 0.4 mL (40 mg total) under the skin daily. (Patient taking differently: Inject 40 mg under the skin daily. Wife to check with Dr. Tee about Wed evening dose )  •  lisinopril, Take 40 mg by mouth once daily.  •  LORazepam, Take 0.5 mg by mouth daily as needed for anxiety.  •  metFORMIN, take 1 tablet by oral route 2 times every day with morning and evening meals  •  rosuvastatin, 20 mg.    •  SITagliptin, Take 1 tablet (100 mg total) by mouth daily.  •  acetaminophen, Take 1,000 mg by mouth every 6 (six) hours as needed for mild pain.  •  blood sugar diagnostic, for blood glucose monitoring 3-4x day  •  blood-glucose meter, for blood glucose monitoring  •  insulin glargine, 26 units at bedtime  •  lancets, for blood glucose monitoring 3x day  •  oxyCODONE, Take 5 mg by mouth 2 (two) times a day as needed. Ordered by Dr. Tee  •  pen needle, diabetic, For blood sugar monitoring 3x day Dx:  E11.9  •  pen needle, diabetic, For insulin administration once daily  •  senna, Take 1 tablet by mouth 2 (two) times a day as needed for constipation.  Current Medications:  •  acetaminophen, 650 mg, oral, q4h PRN  •  alum-mag hydroxide-simeth, 30 mL, oral, q4h PRN  •  ampicillin, 2 g, intravenous, q4h INT  •  glucose, 16-32 g of dextrose, oral, PRN **OR** dextrose, 15-30 g of dextrose, oral, PRN **OR** glucagon, 1 mg, intramuscular, PRN **OR** dextrose in water, 25 mL, intravenous, PRN  •  diphenhydrAMINE, 25 mg, oral, q6h PRN **OR** diphenhydrAMINE, 25 mg, intravenous, q6h PRN  •  EPINEPHrine, 0.3 mg,  "intramuscular, Once PRN  •  insulin aspart U-100, 6-10 Units, subcutaneous, With meals & nightly  •  insulin glargine, 20 Units, subcutaneous, Nightly  •  lactobacillus acidophilus complex, 250 mg, oral, BID  •  lisinopril, 40 mg, oral, Daily (6a)  •  LORazepam, 0.5 mg, oral, Daily PRN  •  metFORMIN, 1,000 mg, oral, BID with meals  •  ondansetron ODT, 4 mg, oral, q8h PRN **OR** ondansetron, 4 mg, intravenous, q8h PRN  •  pantoprazole, 40 mg, intravenous, q12h BI  •  rosuvastatin, 20 mg, oral, Daily (6p)  •  senna, 1 tablet, oral, 2x daily PRN  •  SITagliptin, 100 mg, oral, Daily  •  sodium chloride 0.9 %, 5 mL/hr, intravenous, PRN  •  traMADol, 50 mg, oral, q6h PRN     OBJECTIVE   Physical Exam  BP (!) 153/70 (BP Location: Left upper arm, Patient Position: Lying)   Pulse 91   Temp 36.9 °C (98.5 °F) (Oral)   Resp 18   Ht 1.854 m (6' 1\")   Wt 93.4 kg (206 lb)   SpO2 98%   BMI 27.18 kg/m²     General appearance: alert, appears stated age and cooperative  HEENT: normocephalic, without obvious abnormality, atraumatic  Lungs: clear to auscultation bilaterally  Heart: regular rate and rhythm, S1, S2 normal, no murmur, click, rub or gallop  Abdomen: soft, non-tender; bowel sounds normal; no masses, no organomegaly  Rectal: N/A  Extremities: LLE amputation -- covered w/ LE dressing  Skin: Skin color, texture, turgor normal. No rashes or lesions  Neurologic: Grossly normal     LABS & IMAGING   Labs:  I have reviewed the patient's labs.  Significant abnormals are Hgb 7.3, normal WBC, iron sat 10, iron 20.    Imaging: I have reviewed the Imaging from the last 24 hrs.  No orders to display        ASSESSMENT & PLAN     Iron deficiency   Assessment & Plan    See anemia section.        * Anemia   Assessment & Plan    58 y M w/ hx of CVA on PLAVIX, HLD, DM2, GERD, recent transmetatarsal amputation of L foot presenting for anemia; no heme occult performed yet -- pt refused rectal; hgb trending -- normal as of 12/2017 --> " +chronic osteomyelitis/gangrene, multiple debridements in Jan 2019/amputation performed 2/3 --> anemia issues + before this; no UGI/LGI sxs to report, no blood in stool, no active bleeding; no NSAID / ASA / etoh use per patient; hgb 10 --> as low as 6.8 mid-Jan, s/p 1 transfusion (I can see Jan 12), hgb 1/19 was 8.6, yesterday at facility hgb checked and 6.2 --> sent in for evaluation; s/p 2 units pRBCs and hgb only up to 7.3 -- ok response; hematology has been consulted; pt denying desire for work up -- gave option if hgb stable can f/u as an outpatient, last dose PLAVIX yesterday; recommend inpatient EGD / colonoscopy Monday -- pt denying this; mild Fe deficiency per labs     - Recommend pantoprazole 40 mg BID until endoscopic evaluations decided   - T/d inpatient vs. Outpatient workup -- needs EGD / colonoscopy   - Hold PLAVIX until final decision made   - Recommend 1 more unit pRBC to get hgb >8 -- in light of possible endoscopic evaluation this Monday   - No emergent endoscopic evals warranted given no active bleeding   - If hgb proves to be stable s/p next transfusion can f/u as an outpatient for EGD / colonoscopy              DANIEL Madsen  2/9/2019

## 2019-02-09 NOTE — NURSING NOTE
Patient said he is taking tramadol, not oxycodone, informed podiatry, patient has meds at home, given all discharge instructions, assisted in the wheel chair

## 2019-02-10 LAB
CROSSMATCH: NORMAL
CROSSMATCH: NORMAL
ISBT CODE: 6200
ISBT CODE: 6200
PRODUCT CODE: NORMAL
PRODUCT CODE: NORMAL
PRODUCT STATUS: NORMAL
PRODUCT STATUS: NORMAL
SPECIMEN EXP DATE BLD: NORMAL
SPECIMEN EXP DATE BLD: NORMAL
UNIT ABO: NORMAL
UNIT ABO: NORMAL
UNIT ID: NORMAL
UNIT ID: NORMAL
UNIT RH: POSITIVE
UNIT RH: POSITIVE

## 2019-02-11 ENCOUNTER — OFFICE VISIT (OUTPATIENT)
Dept: WOUND CARE | Facility: HOSPITAL | Age: 59
End: 2019-02-11
Attending: SURGERY
Payer: COMMERCIAL

## 2019-02-11 VITALS
RESPIRATION RATE: 18 BRPM | TEMPERATURE: 96.9 F | DIASTOLIC BLOOD PRESSURE: 76 MMHG | SYSTOLIC BLOOD PRESSURE: 145 MMHG | HEART RATE: 90 BPM

## 2019-02-11 DIAGNOSIS — M86.372 CHRONIC MULTIFOCAL OSTEOMYELITIS, LEFT ANKLE AND FOOT (CMS/HCC): ICD-10-CM

## 2019-02-11 DIAGNOSIS — M86.672 OSTEOMYELITIS, CHRONIC, ANKLE OR FOOT, LEFT (CMS/HCC): Primary | ICD-10-CM

## 2019-02-11 LAB
CROSSMATCH: NORMAL
GLUCOSE BLD-MCNC: 164 MG/DL (ref 70–99)
GLUCOSE BLD-MCNC: 227 MG/DL (ref 70–99)
ISBT CODE: 6200
POCT TEST: ABNORMAL
POCT TEST: ABNORMAL
PRODUCT CODE: NORMAL
PRODUCT STATUS: NORMAL
SPECIMEN EXP DATE BLD: NORMAL
UNIT ABO: NORMAL
UNIT ID: NORMAL
UNIT RH: POSITIVE

## 2019-02-11 PROCEDURE — 99183 HYPERBARIC OXYGEN THERAPY: CPT | Performed by: SURGERY

## 2019-02-11 PROCEDURE — 82948 REAGENT STRIP/BLOOD GLUCOSE: CPT

## 2019-02-11 PROCEDURE — G0277 HBOT, FULL BODY CHAMBER, 30M: HCPCS

## 2019-02-11 NOTE — PROGRESS NOTES
Hyperbaric Oxygen Therapy Treatment Summary    Patient Name: Harvey Lucero Jr.             : 1960  Date of Visit: 2019    Indication for Treatment:   Chronic Refractory Osteomyelitis (Unresponsive to Conventional Medical and Surgical Managrment): Laterality: Left  Site: Foot/Ankle    Comments:      Treatment Summary:   Continue present Hyperbaric Oxygen Treatment Plan: Treatment 2.4 YADI x 90 minutes with 5 minute airbreak x 2      Response To Treatment:  Physician was present during total Hyperbaric Oxygen Treatment including the ascent and decent portions.  Post-Treatment: The patient tolerated today's HBO Treatment without complication

## 2019-02-11 NOTE — PROGRESS NOTES
11/20/2019: This is an addendum note: Ruben had been hospitalized for 23-hour overnight stay so that he could receive 3 units of packed RBCs for hemoglobin of 6, raising his hemoglobin to 8.7 posttransfusion.  He was also evaluated by gastroenterology with the opinion that despite his postsurgical state, this profound a hemoglobin drop was not totally explained by that.  The plan is to give him a full GI workup including colonoscopy as an outpatient.  He was also seen by hematology/oncology and the further workup was agreed upon.  He resumed hyperbaric oxygen therapy today and tolerated the procedure well per the report of Nereida Bateman.  I have reviewed Dr. Leyva's note.  Our plan is to continue hyperbaric oxygen therapy and he is scheduled to undergo another treatment tomorrow.    Cesario Smith Jr. MD

## 2019-02-13 ENCOUNTER — OFFICE VISIT (OUTPATIENT)
Dept: WOUND CARE | Facility: HOSPITAL | Age: 59
End: 2019-02-13
Attending: PLASTIC SURGERY
Payer: COMMERCIAL

## 2019-02-13 ENCOUNTER — OFFICE VISIT (OUTPATIENT)
Dept: WOUND CARE | Facility: HOSPITAL | Age: 59
End: 2019-02-13
Attending: PODIATRIST
Payer: COMMERCIAL

## 2019-02-13 VITALS
DIASTOLIC BLOOD PRESSURE: 75 MMHG | TEMPERATURE: 97.4 F | HEART RATE: 89 BPM | RESPIRATION RATE: 18 BRPM | SYSTOLIC BLOOD PRESSURE: 151 MMHG

## 2019-02-13 VITALS
RESPIRATION RATE: 18 BRPM | HEART RATE: 89 BPM | DIASTOLIC BLOOD PRESSURE: 75 MMHG | TEMPERATURE: 97.4 F | SYSTOLIC BLOOD PRESSURE: 151 MMHG

## 2019-02-13 DIAGNOSIS — M86.372 CHRONIC MULTIFOCAL OSTEOMYELITIS, LEFT ANKLE AND FOOT (CMS/HCC): ICD-10-CM

## 2019-02-13 DIAGNOSIS — E11.621 TYPE 2 DIABETES MELLITUS WITH FOOT ULCER, WITH LONG-TERM CURRENT USE OF INSULIN (CMS/HCC): ICD-10-CM

## 2019-02-13 DIAGNOSIS — L97.509 TYPE 2 DIABETES MELLITUS WITH FOOT ULCER, WITH LONG-TERM CURRENT USE OF INSULIN (CMS/HCC): ICD-10-CM

## 2019-02-13 DIAGNOSIS — S91.309A OPEN WOUND OF ANKLE AND FOOT: ICD-10-CM

## 2019-02-13 DIAGNOSIS — M86.372 CHRONIC MULTIFOCAL OSTEOMYELITIS, LEFT ANKLE AND FOOT (CMS/HCC): Primary | ICD-10-CM

## 2019-02-13 DIAGNOSIS — E11.52 TYPE 2 DIABETES MELLITUS WITH DIABETIC PERIPHERAL ANGIOPATHY AND GANGRENE, WITH LONG-TERM CURRENT USE OF INSULIN (CMS/HCC): Primary | ICD-10-CM

## 2019-02-13 DIAGNOSIS — Z79.4 TYPE 2 DIABETES MELLITUS WITH FOOT ULCER, WITH LONG-TERM CURRENT USE OF INSULIN (CMS/HCC): ICD-10-CM

## 2019-02-13 DIAGNOSIS — Z79.4 TYPE 2 DIABETES MELLITUS WITH DIABETIC PERIPHERAL ANGIOPATHY AND GANGRENE, WITH LONG-TERM CURRENT USE OF INSULIN (CMS/HCC): Primary | ICD-10-CM

## 2019-02-13 DIAGNOSIS — S91.009A OPEN WOUND OF ANKLE AND FOOT: ICD-10-CM

## 2019-02-13 LAB
GLUCOSE BLD-MCNC: 131 MG/DL (ref 70–99)
GLUCOSE BLD-MCNC: 181 MG/DL (ref 70–99)
POCT TEST: ABNORMAL
POCT TEST: ABNORMAL

## 2019-02-13 PROCEDURE — 82948 REAGENT STRIP/BLOOD GLUCOSE: CPT

## 2019-02-13 PROCEDURE — 99183 HYPERBARIC OXYGEN THERAPY: CPT | Performed by: PLASTIC SURGERY

## 2019-02-13 PROCEDURE — G0277 HBOT, FULL BODY CHAMBER, 30M: HCPCS

## 2019-02-13 NOTE — PROGRESS NOTES
Subjective      Patient ID: Harvey Lucero Jr. is a 58 y.o. male.    HPI patient seen today chief complaint status post transmetatarsal resection and also resection of medial cuneiform.  There is non-viability patient presents with wet-to-dry dressings wound VAC failed last evening the arm was set patient Mariama denies any fever chills night sweats and feels the best she is felt in several months.    The following have been reviewed and updated as appropriate in this visit:         Past Medical History: He  has a past medical history of Anxiety; Chronic osteomyelitis (CMS/HCC) ([facility]); Dyslipidemia; GERD (gastroesophageal reflux disease); History of CVA (cerebrovascular accident); Hypertension; and Type 2 diabetes mellitus (CMS/HCC) ([facility]). He also has no past medical history of Depression.  Past Surgical History: He  has a past surgical history that includes [place] tooth extraction; Other surgical history; Incision and drainage of wound (Left); Wound debridement (Left); and Foot amputation through metatarsal (Left).  Medication: He has a current medication list which includes the following prescription(s): acetaminophen, ampicillin, blood sugar diagnostic, blood-glucose meter, clopidogrel, insulin glargine, lancets, lisinopril, lorazepam, metformin, oxycodone, pen needle, diabetic, pen needle, diabetic, rosuvastatin, senna, and sitagliptin.  Allergies: He is allergic to no known allergies.  Social History: He  reports that he has quit smoking. His smoking use included Cigarettes. He has never used smokeless tobacco. He reports that he does not drink alcohol or use drugs.    Review of Systems:  Review of Systems      Objective Graft sites are well incorporated there is still some presence of the posterior tibial tendon however is granulating in its moist and sweats final motion muscle flaps were also taken to the plantar medial plantar aspect of foot through a small area area open anteriorly but has a fibrous polyp  was a granular base is noted positive active bleeding there is noted to be no signs of desiccation exposure no other irregularities noted at this time.    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  Status post tendon transfer  3.  Tissue and grafting with the second time around    Plan  1.  Is still very very probably patient may still lose limb #2 patient continue on ampicillin every 4 hours for the residual osteomyelitis #3 patient with HBO to try to reduce the amount of a bone infection to improve healing can also increase oxygenation #4 we will continue with wound vacs at this time was dressed with nursing Xeroform dry sterile dressing will talk to Dr. Domingo Martinez for possible split-thickness skin graft harvested from the thighs bilaterally.  Will follow closely and then be reevaluated wound healing center 1 week  Will likely need some bony debrident with future grafting noted to be done moving forward  Problem List Items Addressed This Visit     None          No Follow-up on file.    Fabricio Tee DPM

## 2019-02-13 NOTE — PROGRESS NOTES
Hyperbaric Oxygen Therapy Treatment Summary    Patient Name: Harvey Lucero Jr.             : 1960  Date of Visit: 2019    Indication for Treatment:   Chronic Refractory Osteomyelitis (Unresponsive to Conventional Medical and Surgical Managrment): Laterality: Left  Site: Foot/ankle    Comments: Ruben tolerated the hyperbaric treatment well. His wound was examined again by Dr. Tee and myself with finding of progression of granulation tissue and further healing over the tibialis tendon. Dr. Tee is contacting plastic surgery for consultation and requirement eventual skin graft wound closure. The IV antibiotic therapy and NPWT program will be continued.      Treatment Summary:   Continue present Hyperbaric Oxygen Treatment Plan: Treatment 2.4 YADI x 90 minutes with 5 minute airbreak x 2      Response To Treatment:  Physician was present during total Hyperbaric Oxygen Treatment including the ascent and decent portions.  Post-Treatment: The patient tolerated today's HBO Treatment without complication

## 2019-02-14 ENCOUNTER — OFFICE VISIT (OUTPATIENT)
Dept: WOUND CARE | Facility: HOSPITAL | Age: 59
End: 2019-02-14
Attending: PLASTIC SURGERY
Payer: COMMERCIAL

## 2019-02-14 VITALS
TEMPERATURE: 98.7 F | DIASTOLIC BLOOD PRESSURE: 76 MMHG | HEART RATE: 86 BPM | RESPIRATION RATE: 18 BRPM | SYSTOLIC BLOOD PRESSURE: 131 MMHG

## 2019-02-14 DIAGNOSIS — M86.372 CHRONIC MULTIFOCAL OSTEOMYELITIS, LEFT ANKLE AND FOOT (CMS/HCC): Primary | ICD-10-CM

## 2019-02-14 LAB
GLUCOSE BLD-MCNC: 148 MG/DL (ref 70–99)
GLUCOSE BLD-MCNC: 166 MG/DL (ref 70–99)
POCT TEST: ABNORMAL
POCT TEST: ABNORMAL

## 2019-02-14 PROCEDURE — 99183 HYPERBARIC OXYGEN THERAPY: CPT | Performed by: PLASTIC SURGERY

## 2019-02-14 PROCEDURE — 82948 REAGENT STRIP/BLOOD GLUCOSE: CPT

## 2019-02-14 PROCEDURE — G0277 HBOT, FULL BODY CHAMBER, 30M: HCPCS

## 2019-02-14 NOTE — PROGRESS NOTES
Hyperbaric Oxygen Therapy Treatment Summary    Patient Name: Harvey Lucero Jr.             : 1960  Date of Visit: 2019    Indication for Treatment:   Chronic Refractory Osteomyelitis (Unresponsive to Conventional Medical and Surgical Managrment): Laterality: Left  Site: Foot/ankle    Comments: The wound evaluation prior to hyperbaric oxygen therapy today demonstrates progression of granulation tissue overall open wound areas.  The area of the exposed tibialis tendon is unchanged.  There does not appear to be any evidence of obvious soft tissue infection.  Ruben tolerated the hyperbaric oxygen therapy today well without any problems.  His wife, Marianne, notified me that he is scheduled for a formal infectious disease follow-up consult this coming week.      Treatment Summary:   Continue present Hyperbaric Oxygen Treatment Plan: Treatment 2.4 YADI x 90 minutes with 5 minute airbreak x 2      Response To Treatment:  Physician was present during total Hyperbaric Oxygen Treatment including the ascent and decent portions.  Post-Treatment: The patient tolerated today's HBO Treatment without complication

## 2019-02-15 ENCOUNTER — OFFICE VISIT (OUTPATIENT)
Dept: WOUND CARE | Facility: HOSPITAL | Age: 59
End: 2019-02-15
Attending: PLASTIC SURGERY
Payer: COMMERCIAL

## 2019-02-15 VITALS
SYSTOLIC BLOOD PRESSURE: 150 MMHG | HEART RATE: 95 BPM | TEMPERATURE: 97 F | RESPIRATION RATE: 18 BRPM | DIASTOLIC BLOOD PRESSURE: 78 MMHG

## 2019-02-15 DIAGNOSIS — M86.372 CHRONIC MULTIFOCAL OSTEOMYELITIS, LEFT ANKLE AND FOOT (CMS/HCC): Primary | ICD-10-CM

## 2019-02-15 DIAGNOSIS — M86.372 CHRONIC MULTIFOCAL OSTEOMYELITIS OF LEFT FOOT (CMS/HCC): ICD-10-CM

## 2019-02-15 LAB
GLUCOSE BLD-MCNC: 131 MG/DL (ref 70–99)
GLUCOSE BLD-MCNC: 165 MG/DL (ref 70–99)
POCT TEST: ABNORMAL
POCT TEST: ABNORMAL

## 2019-02-15 PROCEDURE — G0277 HBOT, FULL BODY CHAMBER, 30M: HCPCS

## 2019-02-15 PROCEDURE — 82948 REAGENT STRIP/BLOOD GLUCOSE: CPT

## 2019-02-15 NOTE — PROGRESS NOTES
Hyperbaric Oxygen Therapy Treatment Summary    Patient Name: Harvey Lucero Jr.             : 1960  Date of Visit: 2/15/2019    Indication for Treatment:   Chronic Refractory Osteomyelitis (Unresponsive to Conventional Medical and Surgical Managrment): Laterality: Left    Comments:      Treatment Summary:   Continue present Hyperbaric Oxygen Treatment Plan: Treatment 2.4 YADI x 90 minutes with 5 minute airbreak x 2      Response To Treatment:  Physician was present during total Hyperbaric Oxygen Treatment including the ascent and decent portions.  Post-Treatment: The patient tolerated today's HBO Treatment without complication

## 2019-02-18 ENCOUNTER — OFFICE VISIT (OUTPATIENT)
Dept: WOUND CARE | Facility: HOSPITAL | Age: 59
End: 2019-02-18
Attending: SURGERY
Payer: COMMERCIAL

## 2019-02-18 VITALS
HEART RATE: 84 BPM | DIASTOLIC BLOOD PRESSURE: 77 MMHG | RESPIRATION RATE: 18 BRPM | TEMPERATURE: 97.1 F | SYSTOLIC BLOOD PRESSURE: 149 MMHG

## 2019-02-18 DIAGNOSIS — M86.372 CHRONIC MULTIFOCAL OSTEOMYELITIS, LEFT ANKLE AND FOOT (CMS/HCC): Primary | ICD-10-CM

## 2019-02-18 LAB
GLUCOSE BLD-MCNC: 141 MG/DL (ref 70–99)
GLUCOSE BLD-MCNC: 201 MG/DL (ref 70–99)
POCT TEST: ABNORMAL
POCT TEST: ABNORMAL

## 2019-02-18 PROCEDURE — 82948 REAGENT STRIP/BLOOD GLUCOSE: CPT

## 2019-02-18 PROCEDURE — G0277 HBOT, FULL BODY CHAMBER, 30M: HCPCS

## 2019-02-18 NOTE — PROGRESS NOTES
Hyperbaric Oxygen Therapy Treatment Summary    Patient Name: Harvey Lucero Jr.             : 1960  Date of Visit: 2019    Indication for Treatment:   Chronic Refractory Osteomyelitis (Unresponsive to Conventional Medical and Surgical Managrment): Laterality: Left    Comments:      Treatment Summary:   Continue present Hyperbaric Oxygen Treatment Plan: Treatment 2.4 YADI x 90 minutes with 5 minute airbreak x 2      Response To Treatment:  Physician was present during total Hyperbaric Oxygen Treatment including the ascent and decent portions.  Post-Treatment: The patient tolerated today's HBO Treatment without complication

## 2019-02-19 ENCOUNTER — OFFICE VISIT (OUTPATIENT)
Dept: WOUND CARE | Facility: HOSPITAL | Age: 59
End: 2019-02-19
Attending: PLASTIC SURGERY
Payer: COMMERCIAL

## 2019-02-19 VITALS
SYSTOLIC BLOOD PRESSURE: 154 MMHG | HEART RATE: 90 BPM | RESPIRATION RATE: 16 BRPM | DIASTOLIC BLOOD PRESSURE: 83 MMHG | TEMPERATURE: 96.9 F

## 2019-02-19 DIAGNOSIS — M86.372 CHRONIC MULTIFOCAL OSTEOMYELITIS, LEFT ANKLE AND FOOT (CMS/HCC): Primary | ICD-10-CM

## 2019-02-19 LAB
GLUCOSE BLD-MCNC: 142 MG/DL (ref 70–99)
GLUCOSE BLD-MCNC: 220 MG/DL (ref 70–99)
POCT TEST: ABNORMAL
POCT TEST: ABNORMAL

## 2019-02-19 PROCEDURE — 99183 HYPERBARIC OXYGEN THERAPY: CPT | Performed by: PLASTIC SURGERY

## 2019-02-19 PROCEDURE — G0277 HBOT, FULL BODY CHAMBER, 30M: HCPCS

## 2019-02-19 PROCEDURE — 82948 REAGENT STRIP/BLOOD GLUCOSE: CPT

## 2019-02-19 NOTE — PROGRESS NOTES
Hyperbaric Oxygen Therapy Treatment Summary    Patient Name: Harvey Lucero Jr.             : 1960  Date of Visit: 2019    Indication for Treatment:   Chronic Refractory Osteomyelitis (Unresponsive to Conventional Medical and Surgical Managrment): Laterality: Left  Site: foot/ankle    Comments:Wound exam demonstrates further development of clean granulation tissue and good color to plantar flap area.      Treatment Summary:   Continue present Hyperbaric Oxygen Treatment Plan: Treatment 2.4 YADI x 90 minutes with 5 minute airbreak x 2      Response To Treatment:  Physician was present during total Hyperbaric Oxygen Treatment including the ascent and decent portions.  Post-Treatment: The patient tolerated today's HBO Treatment without complication

## 2019-02-19 NOTE — PROGRESS NOTES
Patient ID: Harvey Lucero Jr.                              : 1960  MRN: [id number]                                            Visit Date: 2019  Encounter Provider: [facility] HBO CHAMBER  Referring Provider: No ref. provider found    Subjective      HPI  Harvey Lucero Jr. is a 58 y.o. old male with a chief compliant of HBOT Treatment (treatment #10)   presenting today for error  Not correct note.      The following have been reviewed and updated as appropriate in this visit:  Meds       Past Medical History:  has a past medical history of Anxiety; Chronic osteomyelitis (CMS/HCC) ([facility]); Dyslipidemia; GERD (gastroesophageal reflux disease); History of CVA (cerebrovascular accident); Hypertension; and Type 2 diabetes mellitus (CMS/HCC) ([facility]). He also has no past medical history of Depression.  Past Surgical History:  has a past surgical history that includes [place] tooth extraction; Other surgical history; Incision and drainage of wound (Left); Wound debridement (Left); and Foot amputation through metatarsal (Left).  Social History:  reports that he has quit smoking. His smoking use included Cigarettes. He has never used smokeless tobacco. He reports that he does not drink alcohol or use drugs.  Family History: family history is not on file.  Medications:   Current Outpatient Prescriptions:   •  acetaminophen (TYLENOL) 500 mg tablet, Take 1,000 mg by mouth every 6 (six) hours as needed for mild pain., Disp: , Rfl:   •  ampicillin 2 g/100 mL IVPB, Infuse 2 g into a venous catheter every 4 (four) hours., Disp: , Rfl:   •  blood sugar diagnostic (ONETOUCH ULTRA TEST) strip, for blood glucose monitoring 3-4x day, Disp: , Rfl:   •  blood-glucose meter kit, for blood glucose monitoring, Disp: , Rfl:   •  clopidogrel (PLAVIX) 75 mg tablet, TAKE 1 TABLET BY MOUTH EVERY DAY, Disp: 90 tablet, Rfl: 0  •  insulin glargine (BASAGLAR KWIKPEN U-100 INSULIN) 100 unit/mL (3 mL) subcutaneous pen, 26 units at bedtime, Disp:  ", Rfl:   •  lancets misc, for blood glucose monitoring 3x day, Disp: , Rfl:   •  lisinopril (PRINIVIL) 40 mg tablet, Take 40 mg by mouth once daily., Disp: , Rfl: 3  •  LORazepam (ATIVAN) 0.5 mg tablet, Take 0.5 mg by mouth daily as needed for anxiety., Disp: , Rfl:   •  metFORMIN (GLUCOPHAGE) 1,000 mg tablet, take 1 tablet by oral route 2 times every day with morning and evening meals, Disp: , Rfl:   •  oxyCODONE (ROXICODONE) 5 mg immediate release tablet, Take 5 mg by mouth 2 (two) times a day as needed. Ordered by Dr. Tee, Disp: , Rfl:   •  pen needle, diabetic (BD ULTRA-FINE MICRO PEN NEEDLE) 32 gauge x 1/4\" needle, For blood sugar monitoring 3x day Dx:  E11.9, Disp: 300 each, Rfl: 3  •  pen needle, diabetic (NOVOFINE 32) 32 gauge x 1/4\" needle, For insulin administration once daily, Disp: 300 each, Rfl: 3  •  rosuvastatin (CRESTOR) 20 mg tablet, 20 mg.  , Disp: , Rfl:   •  SITagliptin (JANUVIA) 100 mg tablet, Take 1 tablet (100 mg total) by mouth daily., Disp: 90 tablet, Rfl: 2    Allergies: is allergic to no known allergies.     Review of Systems error  Not correct note  Objective   BP (!) 154/83 (BP Location: Left upper arm, Patient Position: Sitting)   Pulse 90   Temp (!) 36.1 °C (96.9 °F) (Tympanic)   Resp 16     Physical Examerror  Not correct note    Assessment/Plan    Diagnosis Plan   1. Chronic multifocal osteomyelitis, left ankle and foot (CMS/HCC) (HCC)       Problem List Items Addressed This Visit     Chronic multifocal osteomyelitis, left ankle and foot (CMS/HCC) (HCC) - Primary        This note was created in error. Please disregard this note  Return in about 1 day (around 2/20/2019).     Cesario Smith Jr, MD          "

## 2019-02-20 ENCOUNTER — OFFICE VISIT (OUTPATIENT)
Dept: WOUND CARE | Facility: HOSPITAL | Age: 59
End: 2019-02-20
Attending: PODIATRIST
Payer: COMMERCIAL

## 2019-02-20 ENCOUNTER — OFFICE VISIT (OUTPATIENT)
Dept: WOUND CARE | Facility: HOSPITAL | Age: 59
End: 2019-02-20
Attending: PLASTIC SURGERY
Payer: COMMERCIAL

## 2019-02-20 VITALS
TEMPERATURE: 98 F | HEART RATE: 93 BPM | SYSTOLIC BLOOD PRESSURE: 128 MMHG | DIASTOLIC BLOOD PRESSURE: 89 MMHG | RESPIRATION RATE: 18 BRPM

## 2019-02-20 VITALS
DIASTOLIC BLOOD PRESSURE: 89 MMHG | HEART RATE: 93 BPM | RESPIRATION RATE: 18 BRPM | TEMPERATURE: 98 F | SYSTOLIC BLOOD PRESSURE: 128 MMHG

## 2019-02-20 DIAGNOSIS — M86.372 CHRONIC MULTIFOCAL OSTEOMYELITIS, LEFT ANKLE AND FOOT (CMS/HCC): Primary | ICD-10-CM

## 2019-02-20 DIAGNOSIS — E11.52 TYPE 2 DIABETES MELLITUS WITH DIABETIC PERIPHERAL ANGIOPATHY AND GANGRENE, WITH LONG-TERM CURRENT USE OF INSULIN (CMS/HCC): Primary | ICD-10-CM

## 2019-02-20 DIAGNOSIS — M86.372 CHRONIC MULTIFOCAL OSTEOMYELITIS, LEFT ANKLE AND FOOT (CMS/HCC): ICD-10-CM

## 2019-02-20 DIAGNOSIS — L97.509 TYPE 2 DIABETES MELLITUS WITH FOOT ULCER, WITH LONG-TERM CURRENT USE OF INSULIN (CMS/HCC): ICD-10-CM

## 2019-02-20 DIAGNOSIS — M86.372 CHRONIC MULTIFOCAL OSTEOMYELITIS OF LEFT FOOT (CMS/HCC): ICD-10-CM

## 2019-02-20 DIAGNOSIS — E11.621 TYPE 2 DIABETES MELLITUS WITH FOOT ULCER, WITH LONG-TERM CURRENT USE OF INSULIN (CMS/HCC): ICD-10-CM

## 2019-02-20 DIAGNOSIS — Z79.4 TYPE 2 DIABETES MELLITUS WITH DIABETIC PERIPHERAL ANGIOPATHY AND GANGRENE, WITH LONG-TERM CURRENT USE OF INSULIN (CMS/HCC): Primary | ICD-10-CM

## 2019-02-20 DIAGNOSIS — Z79.4 TYPE 2 DIABETES MELLITUS WITH FOOT ULCER, WITH LONG-TERM CURRENT USE OF INSULIN (CMS/HCC): ICD-10-CM

## 2019-02-20 DIAGNOSIS — M86.672 OSTEOMYELITIS, CHRONIC, ANKLE OR FOOT, LEFT (CMS/HCC): ICD-10-CM

## 2019-02-20 LAB
GLUCOSE BLD-MCNC: 110 MG/DL (ref 70–99)
GLUCOSE BLD-MCNC: 166 MG/DL (ref 70–99)
POCT TEST: ABNORMAL
POCT TEST: ABNORMAL

## 2019-02-20 PROCEDURE — G0277 HBOT, FULL BODY CHAMBER, 30M: HCPCS

## 2019-02-20 PROCEDURE — 82948 REAGENT STRIP/BLOOD GLUCOSE: CPT

## 2019-02-20 PROCEDURE — 99183 HYPERBARIC OXYGEN THERAPY: CPT | Performed by: PLASTIC SURGERY

## 2019-02-20 PROCEDURE — 27200107 HC XEROFORM DRESSING 5X9

## 2019-02-20 NOTE — PATIENT INSTRUCTIONS
Wound Healing Center Instructions    MEDICATIONS     Medication Note3  Continue present medications as prescribed by the Wound Healing Center or other physicians you see. To avoid any problems keep the Wound Healing Center informed each visit of any medications changes that occur.     WOUND CARE     Clean Wound with: KEEP DRESSING CLEAN< DRY & INTACT   Treatment 1   Location: left foot  Dressing: continue NPWT @ 125mmHg  Dressing Care Frequency: Three times weekly  are Provider: visiting nurse       Basic Principles      • Wash your hands thoroughly with soap and water and after each dressing change. If someone other than the patient changes the dressing, it’s best to wear disposable gloves.   • Do not get the wound or dressing wet.   • To shower: remove the dressing, shower with soap and water (including washing the wound - do not use a washcloth), air dry, then redress the wound.  • Do not take a tub bath  • Keep all your dressings in a clean, covered container at home to avoid dust and contamination.  • Discard used dressings in a plastic bag or covered trash container.  • Check your wound and the surrounding skin at each dressing change for redness, warmth, swelling, increased pain, foul odor, fever, pus or abnormal drainage or discharge.  • Notify Wound Healing Center if any of these changes occur - Dept: [phone].        Nutrition  • Eat a well, balanced diet with adequate protein to support wound healing.   • Take a multivitamin every day. Adequate nutrition supports healing and new tissue growth.  • All diabetic patients should strive to keep blood sugars within a normal, practical range.   • Elevated blood sugars can delay your wound healing.        ACTIVITY     Elevate legs above level of heart   Limit activities    MOBILITY     Wheelchair

## 2019-02-20 NOTE — PROGRESS NOTES
Hyperbaric Oxygen Therapy Treatment Summary    Patient Name: Harvey Lucero Jr.             : 1960  Date of Visit: 2019    Indication for Treatment:   Chronic Refractory Osteomyelitis (Unresponsive to Conventional Medical and Surgical Managrment): Laterality: Left  Site: foot/ankle    Comments:The wound area was reassessed by Dr. Tee.  Wound status reported to continue to improve and NPWT will be continued.  Ruben tolerated today's hyperbaric O2 treatment well.      Treatment Summary:   Continue present Hyperbaric Oxygen Treatment Plan: Treatment 2.4 YADI x 90 minutes with 5 minute airbreak x 2      Response To Treatment:  Physician was present during total Hyperbaric Oxygen Treatment including the ascent and decent portions.  Post-Treatment: The patient tolerated today's HBO Treatment without complication

## 2019-02-20 NOTE — PROGRESS NOTES
Subjective      Patient ID: Harvey Lucero Jr. is a 58 y.o. male.    HPI patient seen today chief complaint status post transmetatarsal resection and also resection of medial cuneiform.  There is non-viability patient presents with wet-to-dry dressings wound VAC failed last evening the arm was set patient Mariama denies any fever chills night sweats and feels the best she is felt in several months.    The following have been reviewed and updated as appropriate in this visit:         Past Medical History: He  has a past medical history of Anxiety; Chronic osteomyelitis (CMS/HCC) ([facility]); Dyslipidemia; GERD (gastroesophageal reflux disease); History of CVA (cerebrovascular accident); Hypertension; and Type 2 diabetes mellitus (CMS/HCC) ([facility]). He also has no past medical history of Depression.  Past Surgical History: He  has a past surgical history that includes [place] tooth extraction; Other surgical history; Incision and drainage of wound (Left); Wound debridement (Left); and Foot amputation through metatarsal (Left).  Medication: He has a current medication list which includes the following prescription(s): acetaminophen, ampicillin, blood sugar diagnostic, blood-glucose meter, clopidogrel, insulin glargine, lancets, lisinopril, lorazepam, metformin, oxycodone, pen needle, diabetic, pen needle, diabetic, rosuvastatin, and sitagliptin.  Allergies: He is allergic to no known allergies.  Social History: He  reports that he has quit smoking. His smoking use included Cigarettes. He has never used smokeless tobacco. He reports that he does not drink alcohol or use drugs.    Review of Systems:  Review of Systems      Objective Graft sites are well incorporated there is still some presence of the posterior tibial tendon however is granulating in its moist and sweats final motion muscle flaps were also taken to the plantar medial plantar aspect of foot through a small area area open anteriorly but has a fibrous polyp was a  granular base is noted positive active bleeding there is noted to be no signs of desiccation exposure no other irregularities noted at this time.    Foot Exam    Assessment/Plan Status post proximal transmitting ampuatation with excision of medial cuneiform  2.  Status post tendon transfer   3.  Tissue and grafting with the second time around    Plan  1.  Is still very very probably patient may still lose limb  2 patient continue on ampicillin every 4 hours for the residual osteomyelitis #  3 patient with HBO to try to reduce the amount of a bone infection to improve healing can also increase oxygenation   4 we will continue with wound vacs at this time was dressed with nursing Xeroform dry sterile dressing will talk to Dr. Domingo Martinez for possible split-thickness skin graft harvested from the thighs bilaterally.  Will follow closely and then be reevaluated wound healing center 1 week  Will likely need some bony debrident with future grafting noted to be done moving forward  Problem List Items Addressed This Visit     None          No Follow-up on file.    Fabricio Tee DPM

## 2019-02-21 ENCOUNTER — OFFICE VISIT (OUTPATIENT)
Dept: WOUND CARE | Facility: HOSPITAL | Age: 59
End: 2019-02-21
Attending: PLASTIC SURGERY
Payer: COMMERCIAL

## 2019-02-21 VITALS
DIASTOLIC BLOOD PRESSURE: 72 MMHG | RESPIRATION RATE: 18 BRPM | TEMPERATURE: 97.3 F | HEART RATE: 91 BPM | SYSTOLIC BLOOD PRESSURE: 153 MMHG

## 2019-02-21 DIAGNOSIS — M86.372 CHRONIC MULTIFOCAL OSTEOMYELITIS, LEFT ANKLE AND FOOT (CMS/HCC): Primary | ICD-10-CM

## 2019-02-21 LAB
GLUCOSE BLD-MCNC: 158 MG/DL (ref 70–99)
GLUCOSE BLD-MCNC: 96 MG/DL (ref 70–99)
POCT TEST: ABNORMAL
POCT TEST: NORMAL

## 2019-02-21 PROCEDURE — G0277 HBOT, FULL BODY CHAMBER, 30M: HCPCS

## 2019-02-21 PROCEDURE — 82948 REAGENT STRIP/BLOOD GLUCOSE: CPT

## 2019-02-21 PROCEDURE — 99183 HYPERBARIC OXYGEN THERAPY: CPT | Performed by: PLASTIC SURGERY

## 2019-02-21 NOTE — PROGRESS NOTES
Hyperbaric Oxygen Therapy Treatment Summary    Patient Name: Harvey Lucero Jr.             : 1960  Date of Visit: 2019    Indication for Treatment:   Chronic Refractory Osteomyelitis (Unresponsive to Conventional Medical and Surgical Managrment): Laterality: Left  Site: Foot/ankle    Comments: Ruben is completed 12 out of the planned 30 hyperbaric oxygen therapy treatments today.  The wound examination demonstrates increased healthy-appearing granulation tissue and healthy pink appearance to the plantar skin flap again noted today.  He will be continued with his negative pressure wound therapy regimen and is discharged from the center in stable condition, having tolerated today's hyperbaric therapy treatment very well.      Treatment Summary:   Continue present Hyperbaric Oxygen Treatment Plan: Treatment 2.4 YADI x 90 minutes with 5 minute airbreak x 2      Response To Treatment:  Physician was present during total Hyperbaric Oxygen Treatment including the ascent and decent portions.  Post-Treatment: The patient tolerated today's HBO Treatment without complication

## 2019-02-22 ENCOUNTER — OFFICE VISIT (OUTPATIENT)
Dept: WOUND CARE | Facility: HOSPITAL | Age: 59
End: 2019-02-22
Attending: SURGERY
Payer: COMMERCIAL

## 2019-02-22 VITALS
HEART RATE: 86 BPM | SYSTOLIC BLOOD PRESSURE: 126 MMHG | DIASTOLIC BLOOD PRESSURE: 64 MMHG | RESPIRATION RATE: 18 BRPM | TEMPERATURE: 97.6 F

## 2019-02-22 DIAGNOSIS — M86.372 CHRONIC MULTIFOCAL OSTEOMYELITIS, LEFT ANKLE AND FOOT (CMS/HCC): Primary | ICD-10-CM

## 2019-02-22 LAB
GLUCOSE BLD-MCNC: 111 MG/DL (ref 70–99)
GLUCOSE BLD-MCNC: 171 MG/DL (ref 70–99)
POCT TEST: ABNORMAL
POCT TEST: ABNORMAL

## 2019-02-22 PROCEDURE — 82948 REAGENT STRIP/BLOOD GLUCOSE: CPT

## 2019-02-22 PROCEDURE — G0277 HBOT, FULL BODY CHAMBER, 30M: HCPCS

## 2019-02-22 NOTE — PROGRESS NOTES
Hyperbaric Oxygen Therapy Treatment Summary    Patient Name: Harvey Lucero Jr.             : 1960  Date of Visit: 2019    Indication for Treatment:   Chronic Refractory Osteomyelitis (Unresponsive to Conventional Medical and Surgical Managrment): Laterality: Left  Diabetic Wound(s) of Lower Extremity failed to Improve After 30 Days of Conservative Management*(DWLE): Site  Site: MidFoot    Comments:      Treatment Summary:   Continue present Hyperbaric Oxygen Treatment Plan: Treatment 2.4 YADI x 90 minutes with 5 minute airbreak x 2      Response To Treatment:  Physician was present during total Hyperbaric Oxygen Treatment including the ascent and decent portions.  Post-Treatment: The patient tolerated today's HBO Treatment without complication

## 2019-02-25 ENCOUNTER — OFFICE VISIT (OUTPATIENT)
Dept: WOUND CARE | Facility: HOSPITAL | Age: 59
End: 2019-02-25
Attending: PLASTIC SURGERY
Payer: COMMERCIAL

## 2019-02-25 VITALS
HEART RATE: 87 BPM | RESPIRATION RATE: 16 BRPM | TEMPERATURE: 98.1 F | DIASTOLIC BLOOD PRESSURE: 69 MMHG | SYSTOLIC BLOOD PRESSURE: 140 MMHG

## 2019-02-25 DIAGNOSIS — M86.372 CHRONIC MULTIFOCAL OSTEOMYELITIS, LEFT ANKLE AND FOOT (CMS/HCC): Primary | ICD-10-CM

## 2019-02-25 PROBLEM — M86.9 OSTEOMYELITIS OF LEFT FOOT (CMS/HCC): Status: RESOLVED | Noted: 2019-02-09 | Resolved: 2019-02-25

## 2019-02-25 PROBLEM — M86.672: Status: RESOLVED | Noted: 2019-01-23 | Resolved: 2019-02-25

## 2019-02-25 LAB
GLUCOSE BLD-MCNC: 117 MG/DL (ref 70–99)
GLUCOSE BLD-MCNC: 155 MG/DL (ref 70–99)
POCT TEST: ABNORMAL
POCT TEST: ABNORMAL

## 2019-02-25 PROCEDURE — 27200107 HC XEROFORM DRESSING 5X9

## 2019-02-25 PROCEDURE — 99183 HYPERBARIC OXYGEN THERAPY: CPT | Performed by: PLASTIC SURGERY

## 2019-02-25 PROCEDURE — G0277 HBOT, FULL BODY CHAMBER, 30M: HCPCS

## 2019-02-25 PROCEDURE — 82948 REAGENT STRIP/BLOOD GLUCOSE: CPT

## 2019-02-25 NOTE — PROGRESS NOTES
Hyperbaric Oxygen Therapy Treatment Summary    Patient Name: Harvey Lucero Jr.             : 1960  Date of Visit: 2019    Indication for Treatment:   Chronic Refractory Osteomyelitis (Unresponsive to Conventional Medical and Surgical Managrment): Laterality: Left  Site: Foot/Ankle    Comments: Skin flaps look good.  There is some necrotic tissue present and also sutures present.  Dr. Pillai will see him Wednesday      Treatment Summary:   Continue present Hyperbaric Oxygen Treatment Plan: Treatment 2.4 YADI x 90 minutes with 5 minute airbreak x 2      Response To Treatment:  Physician was present during total Hyperbaric Oxygen Treatment including the ascent and decent portions.  Post-Treatment: The patient tolerated today's HBO Treatment without complication

## 2019-02-26 ENCOUNTER — OFFICE VISIT (OUTPATIENT)
Dept: WOUND CARE | Facility: HOSPITAL | Age: 59
End: 2019-02-26
Attending: PLASTIC SURGERY
Payer: COMMERCIAL

## 2019-02-26 VITALS
SYSTOLIC BLOOD PRESSURE: 159 MMHG | TEMPERATURE: 97.5 F | RESPIRATION RATE: 18 BRPM | DIASTOLIC BLOOD PRESSURE: 84 MMHG | HEART RATE: 86 BPM

## 2019-02-26 DIAGNOSIS — M86.372 CHRONIC MULTIFOCAL OSTEOMYELITIS, LEFT ANKLE AND FOOT (CMS/HCC): Primary | ICD-10-CM

## 2019-02-26 LAB
GLUCOSE BLD-MCNC: 125 MG/DL (ref 70–99)
GLUCOSE BLD-MCNC: 166 MG/DL (ref 70–99)
POCT TEST: ABNORMAL
POCT TEST: ABNORMAL

## 2019-02-26 PROCEDURE — 82948 REAGENT STRIP/BLOOD GLUCOSE: CPT

## 2019-02-26 PROCEDURE — 99183 HYPERBARIC OXYGEN THERAPY: CPT | Performed by: PLASTIC SURGERY

## 2019-02-26 PROCEDURE — G0277 HBOT, FULL BODY CHAMBER, 30M: HCPCS

## 2019-02-26 NOTE — PROGRESS NOTES
Hyperbaric Oxygen Therapy Treatment Summary    Patient Name: Harvey Lucero Jr.             : 1960  Date of Visit: 2019    Indication for Treatment:   Chronic Refractory Osteomyelitis (Unresponsive to Conventional Medical and Surgical Managrment): Laterality: Left  Site: foot?ankle    Comments:treatment performed with wound vac sponge dressing in place without any difficulties.      Treatment Summary:   Continue present Hyperbaric Oxygen Treatment Plan: Treatment 2.4 YADI x 90 minutes with 5 minute airbreak x 2      Response To Treatment:  Physician was present during total Hyperbaric Oxygen Treatment including the ascent and decent portions.  Post-Treatment: The patient tolerated today's HBO Treatment without complication

## 2019-02-27 ENCOUNTER — OFFICE VISIT (OUTPATIENT)
Dept: WOUND CARE | Facility: HOSPITAL | Age: 59
End: 2019-02-27
Attending: PODIATRIST
Payer: COMMERCIAL

## 2019-02-27 ENCOUNTER — OFFICE VISIT (OUTPATIENT)
Dept: WOUND CARE | Facility: HOSPITAL | Age: 59
End: 2019-02-27
Payer: COMMERCIAL

## 2019-02-27 VITALS — HEART RATE: 90 BPM | RESPIRATION RATE: 18 BRPM | SYSTOLIC BLOOD PRESSURE: 166 MMHG | DIASTOLIC BLOOD PRESSURE: 74 MMHG

## 2019-02-27 VITALS
DIASTOLIC BLOOD PRESSURE: 74 MMHG | SYSTOLIC BLOOD PRESSURE: 166 MMHG | HEART RATE: 90 BPM | RESPIRATION RATE: 18 BRPM | TEMPERATURE: 97.2 F

## 2019-02-27 DIAGNOSIS — S91.309A OPEN WOUND OF ANKLE AND FOOT: ICD-10-CM

## 2019-02-27 DIAGNOSIS — E11.621 TYPE 2 DIABETES MELLITUS WITH FOOT ULCER, WITH LONG-TERM CURRENT USE OF INSULIN (CMS/HCC): ICD-10-CM

## 2019-02-27 DIAGNOSIS — M86.072 ACUTE HEMATOGENOUS OSTEOMYELITIS OF LEFT FOOT (CMS/HCC): ICD-10-CM

## 2019-02-27 DIAGNOSIS — L97.509 TYPE 2 DIABETES MELLITUS WITH FOOT ULCER, WITH LONG-TERM CURRENT USE OF INSULIN (CMS/HCC): ICD-10-CM

## 2019-02-27 DIAGNOSIS — M72.6 NECROTIZING FASCIITIS (CMS/HCC): ICD-10-CM

## 2019-02-27 DIAGNOSIS — M86.372 CHRONIC MULTIFOCAL OSTEOMYELITIS, LEFT ANKLE AND FOOT (CMS/HCC): Primary | ICD-10-CM

## 2019-02-27 DIAGNOSIS — S91.009A OPEN WOUND OF ANKLE AND FOOT: ICD-10-CM

## 2019-02-27 DIAGNOSIS — A48.0 GAS GANGRENE (CMS/HCC): ICD-10-CM

## 2019-02-27 DIAGNOSIS — Z79.4 TYPE 2 DIABETES MELLITUS WITH FOOT ULCER, WITH LONG-TERM CURRENT USE OF INSULIN (CMS/HCC): ICD-10-CM

## 2019-02-27 DIAGNOSIS — I10 ESSENTIAL HYPERTENSION: Primary | ICD-10-CM

## 2019-02-27 LAB
GLUCOSE BLD-MCNC: 136 MG/DL (ref 70–99)
GLUCOSE BLD-MCNC: 173 MG/DL (ref 70–99)
POCT TEST: ABNORMAL
POCT TEST: ABNORMAL

## 2019-02-27 PROCEDURE — 99183 HYPERBARIC OXYGEN THERAPY: CPT | Performed by: PLASTIC SURGERY

## 2019-02-27 PROCEDURE — G0277 HBOT, FULL BODY CHAMBER, 30M: HCPCS

## 2019-02-27 PROCEDURE — 82948 REAGENT STRIP/BLOOD GLUCOSE: CPT

## 2019-02-27 NOTE — PROGRESS NOTES
Subjective      Patient ID: Harvey Lucero Jr. is a 58 y.o. male.    HPI patient seen today chief complaint status post transmetatarsal resection and also resection of medial cuneiform.  There is non-viability patient presents with wet-to-dry dressings wound VAC failed last evening the arm was set patient Mariama denies any fever chills night sweats and feels the best she is felt in several months.    The following have been reviewed and updated as appropriate in this visit:         Past Medical History: He  has a past medical history of Anxiety; Chronic osteomyelitis (CMS/HCC) ([facility]); Dyslipidemia; GERD (gastroesophageal reflux disease); History of CVA (cerebrovascular accident); Hypertension; and Type 2 diabetes mellitus (CMS/HCC) ([facility]). He also has no past medical history of Depression.  Past Surgical History: He  has a past surgical history that includes [place] tooth extraction; Other surgical history; Incision and drainage of wound (Left); Wound debridement (Left); and Foot amputation through metatarsal (Left).  Medication: He has a current medication list which includes the following prescription(s): acetaminophen, ampicillin, blood sugar diagnostic, blood-glucose meter, clopidogrel, insulin glargine, lancets, lisinopril, lorazepam, metformin, oxycodone, pen needle, diabetic, pen needle, diabetic, rosuvastatin, and sitagliptin.  Allergies: He is allergic to no known allergies.  Social History: He  reports that he has quit smoking. His smoking use included Cigarettes. He has never used smokeless tobacco. He reports that he does not drink alcohol or use drugs.    Review of Systems:  Review of Systems      Objective Graft sites are well incorporated there is still some presence of the posterior tibial tendon however is granulating in its moist and sweats final motion muscle flaps were also taken to the plantar medial plantar aspect of foot through a small area area open anteriorly but has a fibrous polyp was a  granular base is noted positive active bleeding there is noted to be no signs of desiccation exposure no other irregularities noted at this time.    Foot Exam    Assessment/Plan Status post proximal transmitting ampuatation with excision of medial cuneiform  2.  Status post tendon transfer   3.  Tissue and grafting with the second time around    Plan  1.  Is still very very probably patient may still lose limb  2 patient continue on ampicillin every 4 hours for the residual osteomyelitis #  3 patient with HBO to try to reduce the amount of a bone infection to improve healing can also increase oxygenation   4 we will continue with wound vacs at this time was dressed with nursing Xeroform dry sterile dressing will talk to Dr. Domingo Martinez for possible split-thickness skin graft harvested from the thighs bilaterally.  Will follow closely and then be reevaluated wound healing center 1 week  Will likely need some bony debrident with future grafting noted to be done moving forward  Problem List Items Addressed This Visit     None          No Follow-up on file.    Fabricio Tee DPM

## 2019-02-28 ENCOUNTER — OFFICE VISIT (OUTPATIENT)
Dept: WOUND CARE | Facility: HOSPITAL | Age: 59
End: 2019-02-28
Attending: PLASTIC SURGERY
Payer: COMMERCIAL

## 2019-02-28 VITALS
SYSTOLIC BLOOD PRESSURE: 151 MMHG | HEART RATE: 84 BPM | TEMPERATURE: 98.2 F | DIASTOLIC BLOOD PRESSURE: 79 MMHG | RESPIRATION RATE: 18 BRPM

## 2019-02-28 DIAGNOSIS — M86.372 CHRONIC MULTIFOCAL OSTEOMYELITIS, LEFT ANKLE AND FOOT (CMS/HCC): Primary | ICD-10-CM

## 2019-02-28 LAB
GLUCOSE BLD-MCNC: 126 MG/DL (ref 70–99)
GLUCOSE BLD-MCNC: 181 MG/DL (ref 70–99)
POCT TEST: ABNORMAL
POCT TEST: ABNORMAL

## 2019-02-28 PROCEDURE — G0277 HBOT, FULL BODY CHAMBER, 30M: HCPCS

## 2019-02-28 PROCEDURE — 99183 HYPERBARIC OXYGEN THERAPY: CPT | Performed by: PLASTIC SURGERY

## 2019-02-28 PROCEDURE — 82948 REAGENT STRIP/BLOOD GLUCOSE: CPT

## 2019-03-01 ENCOUNTER — OFFICE VISIT (OUTPATIENT)
Dept: WOUND CARE | Facility: HOSPITAL | Age: 59
End: 2019-03-01
Attending: SURGERY
Payer: COMMERCIAL

## 2019-03-01 VITALS
RESPIRATION RATE: 16 BRPM | HEART RATE: 90 BPM | SYSTOLIC BLOOD PRESSURE: 165 MMHG | DIASTOLIC BLOOD PRESSURE: 79 MMHG | TEMPERATURE: 98.1 F

## 2019-03-01 DIAGNOSIS — M86.372 CHRONIC MULTIFOCAL OSTEOMYELITIS, LEFT ANKLE AND FOOT (CMS/HCC): Primary | ICD-10-CM

## 2019-03-01 LAB
GLUCOSE BLD-MCNC: 131 MG/DL (ref 70–99)
GLUCOSE BLD-MCNC: 138 MG/DL (ref 70–99)
POCT TEST: ABNORMAL
POCT TEST: ABNORMAL

## 2019-03-01 PROCEDURE — G0277 HBOT, FULL BODY CHAMBER, 30M: HCPCS

## 2019-03-01 PROCEDURE — 82948 REAGENT STRIP/BLOOD GLUCOSE: CPT

## 2019-03-01 ASSESSMENT — PAIN SCALES - GENERAL: PAINLEVEL: 0-NO PAIN

## 2019-03-01 NOTE — PROGRESS NOTES
Hyperbaric Oxygen Therapy Treatment Summary    Patient Name: Harvey Lucero Jr.             : 1960  Date of Visit: 3/1/2019    Indication for Treatment:   Chronic Refractory Osteomyelitis (Unresponsive to Conventional Medical and Surgical Managrment): Laterality: Left    Comments:      Treatment Summary:   Continue present Hyperbaric Oxygen Treatment Plan: Treatment 2.4 YADI x 90 minutes with 5 minute airbreak x 2      Response To Treatment:  Physician was present during total Hyperbaric Oxygen Treatment including the ascent and decent portions.  Post-Treatment: The patient tolerated today's HBO Treatment without complication

## 2019-03-04 ENCOUNTER — OFFICE VISIT (OUTPATIENT)
Dept: WOUND CARE | Facility: HOSPITAL | Age: 59
End: 2019-03-04
Attending: SURGERY
Payer: COMMERCIAL

## 2019-03-04 VITALS
RESPIRATION RATE: 18 BRPM | SYSTOLIC BLOOD PRESSURE: 136 MMHG | HEART RATE: 86 BPM | TEMPERATURE: 98.2 F | DIASTOLIC BLOOD PRESSURE: 72 MMHG

## 2019-03-04 DIAGNOSIS — M86.372 CHRONIC MULTIFOCAL OSTEOMYELITIS, LEFT ANKLE AND FOOT (CMS/HCC): Primary | ICD-10-CM

## 2019-03-04 LAB
GLUCOSE BLD-MCNC: 113 MG/DL (ref 70–99)
GLUCOSE BLD-MCNC: 160 MG/DL (ref 70–99)
POCT TEST: ABNORMAL
POCT TEST: ABNORMAL

## 2019-03-04 PROCEDURE — 82948 REAGENT STRIP/BLOOD GLUCOSE: CPT

## 2019-03-04 PROCEDURE — G0277 HBOT, FULL BODY CHAMBER, 30M: HCPCS

## 2019-03-04 NOTE — PROGRESS NOTES
Hyperbaric Oxygen Therapy Treatment Summary    Patient Name: Harvey Lucero Jr.             : 1960  Date of Visit: 3/4/2019    Indication for Treatment:   Chronic Refractory Osteomyelitis (Unresponsive to Conventional Medical and Surgical Managrment): Laterality: Left    Comments:      Treatment Summary:   Continue present Hyperbaric Oxygen Treatment Plan: Treatment 2.4 YADI x 90 minutes with 5 minute airbreak x 2      Response To Treatment:  Physician was present during total Hyperbaric Oxygen Treatment including the ascent and decent portions.  Post-Treatment: The patient tolerated today's HBO Treatment without complication

## 2019-03-05 ENCOUNTER — OFFICE VISIT (OUTPATIENT)
Dept: WOUND CARE | Facility: HOSPITAL | Age: 59
End: 2019-03-05
Attending: PLASTIC SURGERY
Payer: COMMERCIAL

## 2019-03-05 VITALS
HEART RATE: 89 BPM | DIASTOLIC BLOOD PRESSURE: 78 MMHG | TEMPERATURE: 97.8 F | RESPIRATION RATE: 18 BRPM | SYSTOLIC BLOOD PRESSURE: 149 MMHG

## 2019-03-05 DIAGNOSIS — M86.372 CHRONIC MULTIFOCAL OSTEOMYELITIS, LEFT ANKLE AND FOOT (CMS/HCC): Primary | ICD-10-CM

## 2019-03-05 LAB
GLUCOSE BLD-MCNC: 121 MG/DL (ref 70–99)
GLUCOSE BLD-MCNC: 157 MG/DL (ref 70–99)
POCT TEST: ABNORMAL
POCT TEST: ABNORMAL

## 2019-03-05 PROCEDURE — 99183 HYPERBARIC OXYGEN THERAPY: CPT | Performed by: PLASTIC SURGERY

## 2019-03-05 PROCEDURE — 82948 REAGENT STRIP/BLOOD GLUCOSE: CPT

## 2019-03-05 PROCEDURE — G0277 HBOT, FULL BODY CHAMBER, 30M: HCPCS

## 2019-03-05 NOTE — PROGRESS NOTES
Hyperbaric Oxygen Therapy Treatment Summary    Patient Name: Harvey Lucero Jr.             : 1960  Date of Visit: 3/5/2019    Indication for Treatment:   Chronic Refractory Osteomyelitis (Unresponsive to Conventional Medical and Surgical Managrment): Laterality: Left  Site: foot/ankle    Comments:      Treatment Summary:   Continue present Hyperbaric Oxygen Treatment Plan: Treatment 2.4 YADI x 90 minutes with 5 minute airbreak x 2      Response To Treatment:  Physician was present during total Hyperbaric Oxygen Treatment including the ascent and decent portions.  Post-Treatment: The patient tolerated today's HBO Treatment without complication

## 2019-03-06 ENCOUNTER — OFFICE VISIT (OUTPATIENT)
Dept: WOUND CARE | Facility: HOSPITAL | Age: 59
End: 2019-03-06
Attending: PLASTIC SURGERY
Payer: COMMERCIAL

## 2019-03-06 ENCOUNTER — DOCUMENTATION (OUTPATIENT)
Dept: WOUND CARE | Facility: HOSPITAL | Age: 59
End: 2019-03-06

## 2019-03-06 VITALS — TEMPERATURE: 98.8 F | RESPIRATION RATE: 18 BRPM

## 2019-03-06 VITALS
RESPIRATION RATE: 18 BRPM | TEMPERATURE: 98.8 F | DIASTOLIC BLOOD PRESSURE: 72 MMHG | HEART RATE: 86 BPM | SYSTOLIC BLOOD PRESSURE: 140 MMHG

## 2019-03-06 DIAGNOSIS — Z79.4 TYPE 2 DIABETES MELLITUS WITH DIABETIC PERIPHERAL ANGIOPATHY AND GANGRENE, WITH LONG-TERM CURRENT USE OF INSULIN (CMS/HCC): Primary | ICD-10-CM

## 2019-03-06 DIAGNOSIS — E11.52 TYPE 2 DIABETES MELLITUS WITH DIABETIC PERIPHERAL ANGIOPATHY AND GANGRENE, WITH LONG-TERM CURRENT USE OF INSULIN (CMS/HCC): Primary | ICD-10-CM

## 2019-03-06 DIAGNOSIS — M86.372 CHRONIC MULTIFOCAL OSTEOMYELITIS, LEFT ANKLE AND FOOT (CMS/HCC): Primary | ICD-10-CM

## 2019-03-06 DIAGNOSIS — M72.6 NECROTIZING FASCIITIS (CMS/HCC): ICD-10-CM

## 2019-03-06 DIAGNOSIS — S91.309A OPEN WOUND OF ANKLE AND FOOT: ICD-10-CM

## 2019-03-06 DIAGNOSIS — S91.009A OPEN WOUND OF ANKLE AND FOOT: ICD-10-CM

## 2019-03-06 DIAGNOSIS — A48.0 GAS GANGRENE (CMS/HCC): ICD-10-CM

## 2019-03-06 DIAGNOSIS — M86.072 ACUTE HEMATOGENOUS OSTEOMYELITIS OF LEFT FOOT (CMS/HCC): ICD-10-CM

## 2019-03-06 DIAGNOSIS — M86.372 CHRONIC MULTIFOCAL OSTEOMYELITIS, LEFT ANKLE AND FOOT (CMS/HCC): ICD-10-CM

## 2019-03-06 LAB
GLUCOSE BLD-MCNC: 118 MG/DL (ref 70–99)
GLUCOSE BLD-MCNC: 183 MG/DL (ref 70–99)
POCT TEST: ABNORMAL
POCT TEST: ABNORMAL

## 2019-03-06 PROCEDURE — 99183 HYPERBARIC OXYGEN THERAPY: CPT | Performed by: PLASTIC SURGERY

## 2019-03-06 PROCEDURE — G0277 HBOT, FULL BODY CHAMBER, 30M: HCPCS

## 2019-03-06 PROCEDURE — 82948 REAGENT STRIP/BLOOD GLUCOSE: CPT

## 2019-03-06 NOTE — PATIENT INSTRUCTIONS
Wound Healing Center Instructions    MEDICATIONS     Medication Note3  Continue present medications as prescribed by the Wound Healing Center or other physicians you see. To avoid any problems keep the Wound Healing Center informed each visit of any medications changes that occur.     WOUND CARE     Clean Wound with: KEEP DRESSING CLEAN< DRY & INTACT   Treatment 1   Location: left foot  Dressing: continue NPWT @ 125mmHg  Dressing Care Frequency: Three times weekly  are Provider: visiting nurse       Basic Principles      • Wash your hands thoroughly with soap and water and after each dressing change. If someone other than the patient changes the dressing, it’s best to wear disposable gloves.   • Do not get the wound or dressing wet.   • To shower: remove the dressing, shower with soap and water (including washing the wound - do not use a washcloth), air dry, then redress the wound.  • Do not take a tub bath  • Keep all your dressings in a clean, covered container at home to avoid dust and contamination.  • Discard used dressings in a plastic bag or covered trash container.  • Check your wound and the surrounding skin at each dressing change for redness, warmth, swelling, increased pain, foul odor, fever, pus or abnormal drainage or discharge.  • Notify Wound Healing Center if any of these changes occur - Dept: [phone].        Nutrition  • Eat a well, balanced diet with adequate protein to support wound healing.   • Take a multivitamin every day. Adequate nutrition supports healing and new tissue growth.  • All diabetic patients should strive to keep blood sugars within a normal, practical range.   • Elevated blood sugars can delay your wound healing.        ACTIVITY     Elevate legs above level of heart   Limit activities    MOBILITY     Wheelchair

## 2019-03-06 NOTE — PROGRESS NOTES
Hyperbaric Oxygen Therapy Treatment Summary    Patient Name: Harvey Lucero Jr.             : 1960  Date of Visit: 3/6/2019    Indication for Treatment:   Chronic Refractory Osteomyelitis (Unresponsive to Conventional Medical and Surgical Managrment): Laterality: Left  Site: Foot/ankle    Comments: The right foot wound was examined by Dr.. Tee myself prior to Ruben's hyperbaric treatment today.  The tissues look healthy with further development of clean granulating tissue.  The plantar heel flap looks good. Dr. Tee's and is to move ahead in 5 days with follow-up wound debridement and application of split-thickness skin grafts.  The area after, we would wait the remaining part of the week to allow initial healing of the grafted areas and resume the hyperbaric oxygen therapy treatment 1 week postop, assuming there are no other postsurgical complicating factors.  Ruben accepts and understands our further plan of treatment.  We will otherwise continue hyperbaric oxygen therapy treatment through the end of this week for the next 2 days.  He did very well with his treatment today.      Treatment Summary:   Continue present Hyperbaric Oxygen Treatment Plan: Treatment 2.4 YADI x 90 minutes with 5 minute airbreak x 2      Response To Treatment:  Physician was present during total Hyperbaric Oxygen Treatment including the ascent and decent portions.  Post-Treatment: The patient tolerated today's HBO Treatment without complication

## 2019-03-06 NOTE — PATIENT INSTRUCTIONS
Wound Healing Center Instructions    MEDICATIONS     Medication Note3  Continue present medications as prescribed by the Wound Healing Center or other physicians you see. To avoid any problems keep the Wound Healing Center informed each visit of any medications changes that occur.     WOUND CARE     Clean Wound with: KEEP DRESSING CLEAN< DRY & INTACT   Treatment 1   Location: left foot  Dressing: continue NPWT @ 125mmHg  Dressing Care Frequency: Three times weekly  are Provider: visiting nurse       Basic Principles      • Wash your hands thoroughly with soap and water and after each dressing change. If someone other than the patient changes the dressing, it’s best to wear disposable gloves.   • Do not get the wound or dressing wet.   • To shower: remove the dressing, shower with soap and water (including washing the wound - do not use a washcloth), air dry, then redress the wound.  • Do not take a tub bath  • Keep all your dressings in a clean, covered container at home to avoid dust and contamination.  • Discard used dressings in a plastic bag or covered trash container.  • Check your wound and the surrounding skin at each dressing change for redness, warmth, swelling, increased pain, foul odor, fever, pus or abnormal drainage or discharge.  • Notify Wound Healing Center if any of these changes occur - Dept: [phone].        Nutrition  • Eat a well, balanced diet with adequate protein to support wound healing.   • Take a multivitamin every day. Adequate nutrition supports healing and new tissue growth.  • All diabetic patients should strive to keep blood sugars within a normal, practical range.   • Elevated blood sugars can delay your wound healing.        ACTIVITY     Elevate legs above level of heart   Limit activities    MOBILITY     Wheelchair

## 2019-03-06 NOTE — PROGRESS NOTES
Subjective      Patient ID: Harvey Lucero Jr. is a 58 y.o. male.    HPI   patient seen today chief complaint status post transmetatarsal resection and also resection of medial cuneiform.  There is non-viability patient presents with wet-to-dry dressings wound VAC failed last evening the arm was set patient Mariama denies any fever chills night sweats and feels the best she is felt in several months.    The following have been reviewed and updated as appropriate in this visit:         Past Medical History: He  has a past medical history of Anxiety; Chronic osteomyelitis (CMS/HCC) ([facility]); Dyslipidemia; GERD (gastroesophageal reflux disease); History of CVA (cerebrovascular accident); Hypertension; and Type 2 diabetes mellitus (CMS/HCC) ([facility]). He also has no past medical history of Depression.  Past Surgical History: He  has a past surgical history that includes [place] tooth extraction; Other surgical history; Incision and drainage of wound (Left); Wound debridement (Left); and Foot amputation through metatarsal (Left).  Medication: He has a current medication list which includes the following prescription(s): acetaminophen, ampicillin, blood sugar diagnostic, blood-glucose meter, clopidogrel, insulin glargine, lancets, lisinopril, lorazepam, metformin, oxycodone, pen needle, diabetic, pen needle, diabetic, rosuvastatin, and sitagliptin.  Allergies: He is allergic to no known allergies.  Social History: He  reports that he has quit smoking. His smoking use included Cigarettes. He has never used smokeless tobacco. He reports that he does not drink alcohol or use drugs.    Review of Systems:  Review of Systems        Objective Graft sites are well incorporated there is still some presence of the posterior tibial tendon however is granulating in its moist and sweats final motion muscle flaps were also taken to the plantar medial plantar aspect of foot through a small area area open anteriorly but has a fibrous polyp was  a granular base is noted positive active bleeding there is noted to be no signs of desiccation exposure no other irregularities noted at this time.    Foot Exam      Assessment/Plan Status post proximal transmitting ampuatation with excision of medial cuneiform  2.  Status post tendon transfer   3.  Tissue and grafting with the second time around    Plan  1.  Is still very very probably patient may still lose limb  2 patient continue on ampicillin every 4 hours for the residual osteomyelitis #  3 patient with HBO to try to reduce the amount of a bone infection to improve healing can also increase oxygenation   4 we will continue with wound vacs at this time was dressed with nursing Xeroform dry sterile dressing will talk to Dr. Domingo Martinez for possible split-thickness skin graft harvested from the thighs bilaterally.  Will follow closely and then be reevaluated wound healing center 1 week  Will likely need some bony debrident with future grafting noted to be done moving forward  Problem List Items Addressed This Visit     None          Return in about 1 day (around 3/7/2019).    Fabricio Tee DPM

## 2019-03-07 ENCOUNTER — OFFICE VISIT (OUTPATIENT)
Dept: WOUND CARE | Facility: HOSPITAL | Age: 59
End: 2019-03-07
Attending: PLASTIC SURGERY
Payer: COMMERCIAL

## 2019-03-07 VITALS
RESPIRATION RATE: 18 BRPM | HEART RATE: 85 BPM | TEMPERATURE: 97.2 F | DIASTOLIC BLOOD PRESSURE: 79 MMHG | SYSTOLIC BLOOD PRESSURE: 120 MMHG

## 2019-03-07 DIAGNOSIS — M86.372 CHRONIC MULTIFOCAL OSTEOMYELITIS, LEFT ANKLE AND FOOT (CMS/HCC): Primary | ICD-10-CM

## 2019-03-07 LAB
GLUCOSE BLD-MCNC: 138 MG/DL (ref 70–99)
GLUCOSE BLD-MCNC: 97 MG/DL (ref 70–99)
POCT TEST: ABNORMAL
POCT TEST: NORMAL

## 2019-03-07 PROCEDURE — 99183 HYPERBARIC OXYGEN THERAPY: CPT | Performed by: PLASTIC SURGERY

## 2019-03-07 PROCEDURE — 82948 REAGENT STRIP/BLOOD GLUCOSE: CPT

## 2019-03-07 PROCEDURE — G0277 HBOT, FULL BODY CHAMBER, 30M: HCPCS

## 2019-03-07 PROCEDURE — 5A05121 EXTRACORPOREAL HYPERBARIC OXYGENATION, INTERMITTENT: ICD-10-PCS | Performed by: PLASTIC SURGERY

## 2019-03-08 ENCOUNTER — OFFICE VISIT (OUTPATIENT)
Dept: WOUND CARE | Facility: HOSPITAL | Age: 59
End: 2019-03-08
Attending: SURGERY
Payer: COMMERCIAL

## 2019-03-08 VITALS
DIASTOLIC BLOOD PRESSURE: 62 MMHG | RESPIRATION RATE: 16 BRPM | SYSTOLIC BLOOD PRESSURE: 129 MMHG | TEMPERATURE: 97.5 F | HEART RATE: 85 BPM

## 2019-03-08 DIAGNOSIS — M86.372 CHRONIC MULTIFOCAL OSTEOMYELITIS, LEFT ANKLE AND FOOT (CMS/HCC): Primary | ICD-10-CM

## 2019-03-08 LAB
GLUCOSE BLD-MCNC: 123 MG/DL (ref 70–99)
GLUCOSE BLD-MCNC: 153 MG/DL (ref 70–99)
POCT TEST: ABNORMAL
POCT TEST: ABNORMAL

## 2019-03-08 PROCEDURE — G0277 HBOT, FULL BODY CHAMBER, 30M: HCPCS

## 2019-03-08 PROCEDURE — 82948 REAGENT STRIP/BLOOD GLUCOSE: CPT

## 2019-03-08 ASSESSMENT — PAIN SCALES - GENERAL: PAINLEVEL: 0-NO PAIN

## 2019-03-11 ENCOUNTER — ANESTHESIA EVENT (OUTPATIENT)
Dept: OPERATING ROOM | Facility: HOSPITAL | Age: 59
Setting detail: HOSPITAL OUTPATIENT SURGERY
End: 2019-03-11
Payer: COMMERCIAL

## 2019-03-11 ENCOUNTER — HOSPITAL ENCOUNTER (OUTPATIENT)
Facility: HOSPITAL | Age: 59
Discharge: HOME | End: 2019-03-13
Attending: PODIATRIST | Admitting: PODIATRIST
Payer: COMMERCIAL

## 2019-03-11 LAB
ABO + RH BLD: NORMAL
BLD GP AB SCN SERPL QL: NEGATIVE
D AG BLD QL: POSITIVE
ERYTHROCYTE [DISTWIDTH] IN BLOOD BY AUTOMATED COUNT: 17.3 % (ref 11.6–14.4)
GLUCOSE BLD-MCNC: 104 MG/DL (ref 70–99)
GLUCOSE BLD-MCNC: 110 MG/DL (ref 70–99)
GLUCOSE BLD-MCNC: 158 MG/DL (ref 70–99)
HCT VFR BLDCO AUTO: 25.3 %
HGB BLD-MCNC: 7.5 G/DL
LABORATORY COMMENT REPORT: NORMAL
MCH RBC QN AUTO: 22.9 PG (ref 28–33.2)
MCHC RBC AUTO-ENTMCNC: 29.6 G/DL (ref 32.2–36.5)
MCV RBC AUTO: 77.1 FL (ref 83–98)
PDW BLD AUTO: 9.8 FL (ref 9.4–12.4)
PLATELET # BLD AUTO: 370 K/UL
POCT TEST: ABNORMAL
RBC # BLD AUTO: 3.28 M/UL (ref 4.5–5.8)
WBC # BLD AUTO: 8.09 K/UL

## 2019-03-11 PROCEDURE — 86920 COMPATIBILITY TEST SPIN: CPT

## 2019-03-11 PROCEDURE — 86901 BLOOD TYPING SEROLOGIC RH(D): CPT

## 2019-03-11 PROCEDURE — 25000000 HC PHARMACY GENERAL: Performed by: PODIATRIST

## 2019-03-11 PROCEDURE — 0HRNX74 REPLACEMENT OF LEFT FOOT SKIN WITH AUTOLOGOUS TISSUE SUBSTITUTE, PARTIAL THICKNESS, EXTERNAL APPROACH: ICD-10-PCS | Performed by: PODIATRIST

## 2019-03-11 PROCEDURE — 63600000 HC DRUGS/DETAIL CODE: Performed by: ANESTHESIOLOGY

## 2019-03-11 PROCEDURE — 25800000 HC PHARMACY IV SOLUTIONS: Performed by: PODIATRIST

## 2019-03-11 PROCEDURE — 63600000 HC DRUGS/DETAIL CODE: Performed by: PODIATRIST

## 2019-03-11 PROCEDURE — 63700000 HC SELF-ADMINISTRABLE DRUG: Performed by: PODIATRIST

## 2019-03-11 PROCEDURE — 0L8T3ZZ DIVISION OF LEFT ANKLE TENDON, PERCUTANEOUS APPROACH: ICD-10-PCS | Performed by: PODIATRIST

## 2019-03-11 PROCEDURE — 0HBHXZZ EXCISION OF RIGHT UPPER LEG SKIN, EXTERNAL APPROACH: ICD-10-PCS | Performed by: PODIATRIST

## 2019-03-11 PROCEDURE — 37000001 HC ANESTHESIA GENERAL: Performed by: PODIATRIST

## 2019-03-11 PROCEDURE — 71000001 HC PACU PHASE 1 INITIAL 30MIN: Performed by: PODIATRIST

## 2019-03-11 PROCEDURE — 36430 TRANSFUSION BLD/BLD COMPNT: CPT

## 2019-03-11 PROCEDURE — 0HBJXZZ EXCISION OF LEFT UPPER LEG SKIN, EXTERNAL APPROACH: ICD-10-PCS | Performed by: PODIATRIST

## 2019-03-11 PROCEDURE — 63600000 HC DRUGS/DETAIL CODE: Performed by: NURSE ANESTHETIST, CERTIFIED REGISTERED

## 2019-03-11 PROCEDURE — 86920 COMPATIBILITY TEST SPIN: CPT | Mod: 91

## 2019-03-11 PROCEDURE — 85027 COMPLETE CBC AUTOMATED: CPT | Performed by: PODIATRIST

## 2019-03-11 PROCEDURE — 36000003 HC OR LEVEL 3 INITIAL 30MIN: Performed by: PODIATRIST

## 2019-03-11 PROCEDURE — 0HRLX74 REPLACEMENT OF LEFT LOWER LEG SKIN WITH AUTOLOGOUS TISSUE SUBSTITUTE, PARTIAL THICKNESS, EXTERNAL APPROACH: ICD-10-PCS | Performed by: PODIATRIST

## 2019-03-11 PROCEDURE — 36000013 HC OR LEVEL 3 EA ADDL MIN: Performed by: PODIATRIST

## 2019-03-11 PROCEDURE — 27200000 HC STERILE SUPPLY: Performed by: PODIATRIST

## 2019-03-11 PROCEDURE — 71000011 HC PACU PHASE 1 EA ADDL MIN: Performed by: PODIATRIST

## 2019-03-11 PROCEDURE — 36415 COLL VENOUS BLD VENIPUNCTURE: CPT | Performed by: PODIATRIST

## 2019-03-11 PROCEDURE — 25000000 HC PHARMACY GENERAL: Performed by: NURSE ANESTHETIST, CERTIFIED REGISTERED

## 2019-03-11 RX ORDER — METFORMIN HYDROCHLORIDE 500 MG/1
1000 TABLET ORAL 2 TIMES DAILY WITH MEALS
Status: DISCONTINUED | OUTPATIENT
Start: 2019-03-12 | End: 2019-03-13 | Stop reason: HOSPADM

## 2019-03-11 RX ORDER — SODIUM CHLORIDE 9 MG/ML
5 INJECTION, SOLUTION INTRAVENOUS AS NEEDED
Status: ACTIVE | OUTPATIENT
Start: 2019-03-11 | End: 2019-03-12

## 2019-03-11 RX ORDER — MIDAZOLAM HYDROCHLORIDE 2 MG/2ML
INJECTION, SOLUTION INTRAMUSCULAR; INTRAVENOUS AS NEEDED
Status: DISCONTINUED | OUTPATIENT
Start: 2019-03-11 | End: 2019-03-11 | Stop reason: SURG

## 2019-03-11 RX ORDER — EPHEDRINE SULFATE 50 MG/ML
INJECTION, SOLUTION INTRAVENOUS AS NEEDED
Status: DISCONTINUED | OUTPATIENT
Start: 2019-03-11 | End: 2019-03-11 | Stop reason: SURG

## 2019-03-11 RX ORDER — PROPOFOL 10 MG/ML
INJECTION, EMULSION INTRAVENOUS AS NEEDED
Status: DISCONTINUED | OUTPATIENT
Start: 2019-03-11 | End: 2019-03-11 | Stop reason: SURG

## 2019-03-11 RX ORDER — LISINOPRIL 40 MG/1
40 TABLET ORAL DAILY
Status: DISCONTINUED | OUTPATIENT
Start: 2019-03-12 | End: 2019-03-13 | Stop reason: HOSPADM

## 2019-03-11 RX ORDER — ONDANSETRON HYDROCHLORIDE 2 MG/ML
4 INJECTION, SOLUTION INTRAVENOUS
Status: DISCONTINUED | OUTPATIENT
Start: 2019-03-11 | End: 2019-03-11 | Stop reason: HOSPADM

## 2019-03-11 RX ORDER — CLOPIDOGREL BISULFATE 75 MG/1
75 TABLET ORAL DAILY
Status: DISCONTINUED | OUTPATIENT
Start: 2019-03-12 | End: 2019-03-13 | Stop reason: HOSPADM

## 2019-03-11 RX ORDER — ROCURONIUM BROMIDE 50 MG/5 ML
SYRINGE (ML) INTRAVENOUS AS NEEDED
Status: DISCONTINUED | OUTPATIENT
Start: 2019-03-11 | End: 2019-03-11 | Stop reason: SURG

## 2019-03-11 RX ORDER — DEXTROSE 40 %
15-30 GEL (GRAM) ORAL AS NEEDED
Status: DISCONTINUED | OUTPATIENT
Start: 2019-03-11 | End: 2019-03-11 | Stop reason: HOSPADM

## 2019-03-11 RX ORDER — ALOGLIPTIN 25 MG/1
25 TABLET, FILM COATED ORAL DAILY
Status: DISCONTINUED | OUTPATIENT
Start: 2019-03-11 | End: 2019-03-13 | Stop reason: HOSPADM

## 2019-03-11 RX ORDER — LIDOCAINE HYDROCHLORIDE 10 MG/ML
INJECTION, SOLUTION EPIDURAL; INFILTRATION; INTRACAUDAL; PERINEURAL AS NEEDED
Status: DISCONTINUED | OUTPATIENT
Start: 2019-03-11 | End: 2019-03-11 | Stop reason: SURG

## 2019-03-11 RX ORDER — ACETAMINOPHEN 500 MG
1000 TABLET ORAL EVERY 6 HOURS PRN
Status: DISCONTINUED | OUTPATIENT
Start: 2019-03-11 | End: 2019-03-13 | Stop reason: HOSPADM

## 2019-03-11 RX ORDER — NEOSTIGMINE METHYLSULFATE 1 MG/ML
INJECTION INTRAVENOUS AS NEEDED
Status: DISCONTINUED | OUTPATIENT
Start: 2019-03-11 | End: 2019-03-11 | Stop reason: SURG

## 2019-03-11 RX ORDER — DEXTROSE 50 % IN WATER (D50W) INTRAVENOUS SYRINGE
25 AS NEEDED
Status: DISCONTINUED | OUTPATIENT
Start: 2019-03-11 | End: 2019-03-11 | Stop reason: HOSPADM

## 2019-03-11 RX ORDER — ONDANSETRON HYDROCHLORIDE 2 MG/ML
INJECTION, SOLUTION INTRAVENOUS AS NEEDED
Status: DISCONTINUED | OUTPATIENT
Start: 2019-03-11 | End: 2019-03-11 | Stop reason: SURG

## 2019-03-11 RX ORDER — FENTANYL CITRATE 50 UG/ML
INJECTION, SOLUTION INTRAMUSCULAR; INTRAVENOUS AS NEEDED
Status: DISCONTINUED | OUTPATIENT
Start: 2019-03-11 | End: 2019-03-11 | Stop reason: SURG

## 2019-03-11 RX ORDER — ROSUVASTATIN CALCIUM 20 MG/1
20 TABLET, COATED ORAL EVERY MORNING
Status: DISCONTINUED | OUTPATIENT
Start: 2019-03-11 | End: 2019-03-13 | Stop reason: HOSPADM

## 2019-03-11 RX ORDER — GLYCOPYRROLATE 0.6MG/3ML
SYRINGE (ML) INTRAVENOUS AS NEEDED
Status: DISCONTINUED | OUTPATIENT
Start: 2019-03-11 | End: 2019-03-11 | Stop reason: SURG

## 2019-03-11 RX ORDER — BUPIVACAINE HYDROCHLORIDE AND EPINEPHRINE 2.5; 5 MG/ML; UG/ML
INJECTION, SOLUTION EPIDURAL; INFILTRATION; INTRACAUDAL; PERINEURAL AS NEEDED
Status: DISCONTINUED | OUTPATIENT
Start: 2019-03-11 | End: 2019-03-11 | Stop reason: HOSPADM

## 2019-03-11 RX ORDER — FENTANYL CITRATE 50 UG/ML
50 INJECTION, SOLUTION INTRAMUSCULAR; INTRAVENOUS
Status: DISCONTINUED | OUTPATIENT
Start: 2019-03-11 | End: 2019-03-11 | Stop reason: HOSPADM

## 2019-03-11 RX ORDER — INSULIN GLARGINE 100 [IU]/ML
26 INJECTION, SOLUTION SUBCUTANEOUS NIGHTLY
Status: DISCONTINUED | OUTPATIENT
Start: 2019-03-11 | End: 2019-03-13 | Stop reason: HOSPADM

## 2019-03-11 RX ORDER — INSULIN ASPART 100 [IU]/ML
3-10 INJECTION, SOLUTION INTRAVENOUS; SUBCUTANEOUS
Status: DISCONTINUED | OUTPATIENT
Start: 2019-03-12 | End: 2019-03-13 | Stop reason: HOSPADM

## 2019-03-11 RX ORDER — SODIUM CHLORIDE 9 MG/ML
INJECTION, SOLUTION INTRAVENOUS CONTINUOUS
Status: DISCONTINUED | OUTPATIENT
Start: 2019-03-11 | End: 2019-03-13 | Stop reason: HOSPADM

## 2019-03-11 RX ORDER — IBUPROFEN 200 MG
16-32 TABLET ORAL AS NEEDED
Status: DISCONTINUED | OUTPATIENT
Start: 2019-03-11 | End: 2019-03-11 | Stop reason: HOSPADM

## 2019-03-11 RX ORDER — LORAZEPAM 0.5 MG/1
0.5 TABLET ORAL DAILY PRN
Status: DISCONTINUED | OUTPATIENT
Start: 2019-03-11 | End: 2019-03-13 | Stop reason: HOSPADM

## 2019-03-11 RX ADMIN — FENTANYL CITRATE 25 MCG: 50 INJECTION, SOLUTION INTRAMUSCULAR; INTRAVENOUS at 17:10

## 2019-03-11 RX ADMIN — FENTANYL CITRATE 25 MCG: 50 INJECTION, SOLUTION INTRAMUSCULAR; INTRAVENOUS at 17:00

## 2019-03-11 RX ADMIN — FENTANYL CITRATE 25 MCG: 50 INJECTION, SOLUTION INTRAMUSCULAR; INTRAVENOUS at 16:55

## 2019-03-11 RX ADMIN — ROSUVASTATIN CALCIUM 20 MG: 20 TABLET, FILM COATED ORAL at 21:54

## 2019-03-11 RX ADMIN — ACETAMINOPHEN 1000 MG: 500 TABLET ORAL at 21:54

## 2019-03-11 RX ADMIN — Medication 50 MG: at 16:25

## 2019-03-11 RX ADMIN — FENTANYL CITRATE 100 MCG: 50 INJECTION, SOLUTION INTRAMUSCULAR; INTRAVENOUS at 16:25

## 2019-03-11 RX ADMIN — NEOSTIGMINE METHYLSULFATE 2.5 MG: 1 INJECTION INTRAVENOUS at 17:10

## 2019-03-11 RX ADMIN — AMPICILLIN SODIUM 2 G: 2 INJECTION, POWDER, FOR SOLUTION INTRAMUSCULAR; INTRAVENOUS at 22:14

## 2019-03-11 RX ADMIN — MIDAZOLAM HYDROCHLORIDE 2 MG: 1 INJECTION, SOLUTION INTRAMUSCULAR; INTRAVENOUS at 16:09

## 2019-03-11 RX ADMIN — INSULIN GLARGINE 26 UNITS: 100 INJECTION, SOLUTION SUBCUTANEOUS at 23:06

## 2019-03-11 RX ADMIN — ALOGLIPTIN 25 MG: 25 TABLET, FILM COATED ORAL at 21:54

## 2019-03-11 RX ADMIN — ONDANSETRON 4 MG: 2 INJECTION INTRAMUSCULAR; INTRAVENOUS at 17:28

## 2019-03-11 RX ADMIN — FENTANYL CITRATE 25 MCG: 50 INJECTION, SOLUTION INTRAMUSCULAR; INTRAVENOUS at 17:05

## 2019-03-11 RX ADMIN — SODIUM CHLORIDE: 9 INJECTION, SOLUTION INTRAVENOUS at 13:55

## 2019-03-11 RX ADMIN — PROPOFOL 200 MG: 10 INJECTION, EMULSION INTRAVENOUS at 16:25

## 2019-03-11 RX ADMIN — Medication 0.5 MG: at 17:10

## 2019-03-11 RX ADMIN — EPHEDRINE SULFATE 5 MG: 50 INJECTION INTRAVENOUS at 17:20

## 2019-03-11 RX ADMIN — Medication 5 ML: at 16:25

## 2019-03-11 ASSESSMENT — PAIN SCALES - GENERAL: PAIN_LEVEL: 1

## 2019-03-11 NOTE — ANESTHESIA PROCEDURE NOTES
Airway  Start Time: 3/11/2019 4:28 PM  Airway not difficult    General Information and Staff    Patient location during procedure: OR  Anesthesiologist: TAMI NICHOLS  Resident/CRNA: OSIEL EMMANUEL  Performed: resident/CRNA     Indications and Patient Condition  Indications for airway management: anesthesia  Sedation level: general  Preoxygenated: yes  Patient position: sniffing  Mask difficulty assessment: 1 - vent by mask    Final Airway Details  Final airway type: endotracheal airway      Successful airway: ETT  Cuffed: yes   Successful intubation technique: video laryngoscopy  Facilitating devices/methods: intubating stylet  Blade size: #4  ETT size: 8.0 mm  Cormack-Lehane Classification: grade I - full view of glottis  Placement verified by: chest auscultation   Measured from: lips  ETT to lips (cm): 22  Number of attempts at approach: 1  Number of other approaches attempted: 0  Atraumatic airway insertion

## 2019-03-11 NOTE — ANESTHESIA PREPROCEDURE EVALUATION
Anesthesia ROS/MED HX    Anesthesia History    Previous anesthetics  No family history of anesthetic complications  No history of anesthetic complications  Pulmonary - neg  Neuro/Psych    CVA (no residual)   Anxiety  Cardiovascular   dyslipidemia   hypertension   ECG reviewed and cardiac clearance reviewed  Comments: Reported intact per pt  Hematological    anticoagulants   anemia  GI/Hepatic   GERD  Renal Disease   chronic renal insufficiency  Endo/Other   Diabetes and patient Insulin dependent   Infectious disease (H/o Necrotizing fascitis)  Body Habitus: Overweight  ROS/MED HX Comments:    Anesthesia History: LMA#5, easy mask, DLX1 wit MAC3, G1 V , 7.0 ETT   Cardiology: 1/8/19 · Normal-sized left ventricular cavity. Normal systolic function. Estimated EF = 60- 65%. No regional wall motion abnormalities.  · Mildly dilated left atrium.  · Normal-sized right ventricular cavity with preserved systolic function.  · Small (<10mm) circumferential pericardial effusion.   Musculoskeletal: Osteomyelitis      Past Surgical History:   Procedure Laterality Date   • FOOT AMPUTATION THROUGH METATARSAL Left     LITTLE TOE   • INCISION AND DRAINAGE OF WOUND Left    • OTHER SURGICAL HISTORY      Loop recorder   • WISDOM TOOTH EXTRACTION     • WOUND DEBRIDEMENT Left        Physical Exam    Airway   Mallampati: II   TM distance: >3 FB   Neck ROM: full  Cardiovascular - normal   Rhythm: regular   Rate: normal  Pulmonary - normal   clear to auscultation  Other Findings   Reported intact per pt  Dental           Anesthesia Plan    Plan: general    Technique: general LMA     Lines and Monitors: PIV     Airway: oral intubation   ASA 3  Anesthetic plan and risks discussed with: patient  Induction:    intravenous   Postop Plan:   Pain Management: IV analgesics

## 2019-03-11 NOTE — ANESTHESIA POSTPROCEDURE EVALUATION
Patient: Harvey Lucero Jr.    Procedure Summary     Date:  03/11/19 Room / Location:  [facility] PAV OR 06 / [facility] OR [facility]    Anesthesia Start:  1619 Anesthesia Stop:  1838    Procedure:  Graft Application (Left Foot) Diagnosis:       Acquired equinus deformity of foot, unspecified laterality      (Left Equinus)    Surgeon:  Fabricio Tee DPM Responsible Provider:  Manoj Rosas MD    Anesthesia Type:  general ASA Status:  3          Anesthesia Type: general  PACU Vitals     No data found.            Anesthesia Post Evaluation    Pain score: 1  Pain management: adequate  Patient location during evaluation: PACU  Patient participation: complete - patient participated  Level of consciousness: awake and alert  Cardiovascular status: acceptable  Airway Patency: adequate  Respiratory status: acceptable  Hydration status: acceptable  Anesthetic complications: no

## 2019-03-11 NOTE — H&P
General Surgery History and Physical    admit for H&H, transfusion, post op pain control    HPI     Patient is a 58 y.o. male who presents status post skin graft, tendo Achilles lengthening percutaneous, exostectomy.  The patient has a tendency to bleed and thus was admitted for 2 units of packed red blood cells as well as pain control.  In the PACU alert and oriented with pain well controlled.    Medical History:   Past Medical History:   Diagnosis Date   • Anxiety    • Chronic osteomyelitis (CMS/HCC) ([facility])    • Dyslipidemia    • GERD (gastroesophageal reflux disease)    • History of CVA (cerebrovascular accident)    • Hypertension    • Lipid disorder    • Open wound     LEFT FOOT BOTTOM   • TIA (transient ischemic attack)     2012   • Type 2 diabetes mellitus (CMS/[facility]) ([facility])        Surgical History:   Past Surgical History:   Procedure Laterality Date   • FOOT AMPUTATION THROUGH METATARSAL Left     LITTLE TOE   • INCISION AND DRAINAGE OF WOUND Left    • OTHER SURGICAL HISTORY      Loop recorder   • WISDOM TOOTH EXTRACTION     • WOUND DEBRIDEMENT Left        Social History:   Social History     Social History Narrative    Patient is  and had 3 children - 2 living and 1 dead    Patient utilizes a walker to remain steady.       Family History: History reviewed. No pertinent family history.    Allergies: No known allergies    Home Medications:  •  ampicillin, Infuse 2 g into a venous catheter every 4 (four) hours.  •  insulin glargine, 26 units at bedtime  •  lisinopril, Take 40 mg by mouth once daily.  •  rosuvastatin, 20 mg.    •  SITagliptin, Take 1 tablet (100 mg total) by mouth daily.  •  acetaminophen, Take 1,000 mg by mouth every 6 (six) hours as needed for mild pain.  •  blood sugar diagnostic, for blood glucose monitoring 3-4x day  •  blood-glucose meter, for blood glucose monitoring  •  clopidogrel, TAKE 1 TABLET BY MOUTH EVERY DAY  •  lancets, for blood glucose monitoring 3x day  •  LORazepam, Take  0.5 mg by mouth daily as needed for anxiety.  •  metFORMIN, take 1 tablet by oral route 2 times every day with morning and evening meals  •  pen needle, diabetic, For blood sugar monitoring 3x day Dx:  E11.9  •  pen needle, diabetic, For insulin administration once daily    Current Medications:  •  acetaminophen, 1,000 mg, oral, q6h PRN  •  alogliptin, 25 mg, oral, Daily  •  ampicillin, 2 g, intravenous, q4h INT  •  [START ON 3/12/2019] clopidogrel, 75 mg, oral, Daily (6a)  •  glucose, 16-32 g of dextrose, oral, PRN **OR** dextrose, 15-30 g of dextrose, oral, PRN **OR** glucagon, 1 mg, intramuscular, PRN **OR** dextrose in water, 25 mL, intravenous, PRN  •  fentaNYL, 50 mcg, intravenous, q15 min PRN  •  insulin glargine, 26 Units, subcutaneous, Nightly  •  [START ON 3/12/2019] lisinopril, 40 mg, oral, Daily (6a)  •  LORazepam, 0.5 mg, oral, Daily PRN  •  [START ON 3/12/2019] metFORMIN, 1,000 mg, oral, BID with meals  •  ondansetron, 4 mg, intravenous, q15 min PRN  •  rosuvastatin, 20 mg, oral, q AM  •  sodium chloride 0.9 %, , intravenous, Continuous  •  sodium chloride 0.9 %, 5 mL/hr, intravenous, PRN    Review of Systems  Pertinent items are noted in HPI.    Objective     Vital Signs for the last 24 hours:  Temp:  [36.7 °C (98.1 °F)-36.8 °C (98.2 °F)] 36.8 °C (98.2 °F)  Heart Rate:  [] 110  Resp:  [16-20] 16  BP: (117-164)/(72-80) 117/72    Physicial Exam    General appearance: alert, appears stated age and cooperative    Vascular: Palpable DP and PT arteries noted.  Ortho: Proximal amputation at the level of the cuneiforms and cuboid.  Neurovascular: Peripheral neuropathy noted  Dermatological: On dressing with sanguinous drainage noted, strikethrough.  Tegaderm with 4 x 4's and Xeroform to the skin graft sites along the lateral thighs as well as the medial lateral left calf.  A skin graft is applied to the left amputation site secured with staples with Xeroform over top.  There is a tendo Achilles  lengthening percutaneous aspect of the ankle.    Assessment/plan  58-year-old male status post percutaneous APRIL, exostectomy of the distal portion of the cuneiforms as well as the cuboid, skin graft from the lateral thigh as well as the medial lateral left calf; admitted for transfusion of 2 packed red blood cells as well as pain control.    Patient evaluated and treated with all questions answered.  Dressing reinforced twice  Xeroform to use the to be applied tomorrow to donor sites.  Elevate and ice  Infectious disease consul to be put in tomorrow morning and patient is to follow recommendations.  Patient is currently on ampicillin.  Nonweightbearing left lower extremity.  Pain as needed  No DVT prophylaxis as patient is known to have a adequate bleeding would like to avoid any further blood loss.  Patient is to be discharged home tomorrow  2 units of blood transfused

## 2019-03-12 PROBLEM — S81.802A LEG WOUND, LEFT: Status: ACTIVE | Noted: 2019-03-12

## 2019-03-12 LAB
ANION GAP SERPL CALC-SCNC: 8 MEQ/L (ref 3–15)
BASOPHILS # BLD: 0.06 K/UL (ref 0.01–0.1)
BASOPHILS NFR BLD: 0.5 %
BUN SERPL-MCNC: 17 MG/DL (ref 8–20)
CALCIUM SERPL-MCNC: 8.3 MG/DL (ref 8.9–10.3)
CHLORIDE SERPL-SCNC: 105 MEQ/L (ref 98–109)
CO2 SERPL-SCNC: 22 MEQ/L (ref 22–32)
CREAT SERPL-MCNC: 1 MG/DL
DIFFERENTIAL METHOD BLD: ABNORMAL
EOSINOPHIL # BLD: 0.05 K/UL (ref 0.04–0.54)
EOSINOPHIL NFR BLD: 0.4 %
ERYTHROCYTE [DISTWIDTH] IN BLOOD BY AUTOMATED COUNT: 17.2 % (ref 11.6–14.4)
GFR SERPL CREATININE-BSD FRML MDRD: >60 ML/MIN/1.73M*2
GLUCOSE BLD-MCNC: 199 MG/DL (ref 70–99)
GLUCOSE BLD-MCNC: 212 MG/DL (ref 70–99)
GLUCOSE BLD-MCNC: 247 MG/DL (ref 70–99)
GLUCOSE SERPL-MCNC: 234 MG/DL (ref 70–99)
HCT VFR BLDCO AUTO: 22 %
HCT VFR BLDCO AUTO: 23 %
HGB BLD-MCNC: 6.8 G/DL
HGB BLD-MCNC: 7.2 G/DL
IMM GRANULOCYTES # BLD AUTO: 0.08 K/UL (ref 0–0.08)
IMM GRANULOCYTES NFR BLD AUTO: 0.7 %
LYMPHOCYTES # BLD: 0.96 K/UL (ref 1.2–3.5)
LYMPHOCYTES NFR BLD: 8.6 %
MCH RBC QN AUTO: 23.8 PG (ref 28–33.2)
MCHC RBC AUTO-ENTMCNC: 31.3 G/DL (ref 32.2–36.5)
MCV RBC AUTO: 76.2 FL (ref 83–98)
MONOCYTES # BLD: 1.15 K/UL (ref 0.3–1)
MONOCYTES NFR BLD: 10.2 %
NEUTROPHILS # BLD: 8.92 K/UL (ref 1.7–7)
NEUTS SEG NFR BLD: 79.6 %
NRBC BLD-RTO: 0 %
PDW BLD AUTO: 9.9 FL (ref 9.4–12.4)
PLATELET # BLD AUTO: 319 K/UL
POCT TEST: ABNORMAL
POTASSIUM SERPL-SCNC: 4.3 MEQ/L (ref 3.6–5.1)
RBC # BLD AUTO: 3.02 M/UL (ref 4.5–5.8)
SODIUM SERPL-SCNC: 135 MEQ/L (ref 136–144)
WBC # BLD AUTO: 11.22 K/UL

## 2019-03-12 PROCEDURE — 85018 HEMOGLOBIN: CPT | Mod: 59 | Performed by: PODIATRIST

## 2019-03-12 PROCEDURE — 63700000 HC SELF-ADMINISTRABLE DRUG: Performed by: PODIATRIST

## 2019-03-12 PROCEDURE — 80048 BASIC METABOLIC PNL TOTAL CA: CPT | Performed by: PODIATRIST

## 2019-03-12 PROCEDURE — P9016 RBC LEUKOCYTES REDUCED: HCPCS

## 2019-03-12 PROCEDURE — 63700000 HC SELF-ADMINISTRABLE DRUG: Performed by: INTERNAL MEDICINE

## 2019-03-12 PROCEDURE — 85025 COMPLETE CBC W/AUTO DIFF WBC: CPT | Performed by: PODIATRIST

## 2019-03-12 PROCEDURE — 63600000 HC DRUGS/DETAIL CODE: Performed by: PODIATRIST

## 2019-03-12 PROCEDURE — 36415 COLL VENOUS BLD VENIPUNCTURE: CPT | Performed by: PODIATRIST

## 2019-03-12 PROCEDURE — 36430 TRANSFUSION BLD/BLD COMPNT: CPT

## 2019-03-12 PROCEDURE — 25800000 HC PHARMACY IV SOLUTIONS: Performed by: PODIATRIST

## 2019-03-12 RX ORDER — AMOXICILLIN 500 MG/1
500 CAPSULE ORAL EVERY 8 HOURS
Status: DISCONTINUED | OUTPATIENT
Start: 2019-03-12 | End: 2019-03-13 | Stop reason: HOSPADM

## 2019-03-12 RX ORDER — SODIUM CHLORIDE 9 MG/ML
5 INJECTION, SOLUTION INTRAVENOUS AS NEEDED
Status: DISCONTINUED | OUTPATIENT
Start: 2019-03-12 | End: 2019-03-13 | Stop reason: HOSPADM

## 2019-03-12 RX ORDER — IBUPROFEN 200 MG
16-32 TABLET ORAL AS NEEDED
Status: DISCONTINUED | OUTPATIENT
Start: 2019-03-12 | End: 2019-03-13 | Stop reason: HOSPADM

## 2019-03-12 RX ORDER — DEXTROSE 50 % IN WATER (D50W) INTRAVENOUS SYRINGE
25 AS NEEDED
Status: DISCONTINUED | OUTPATIENT
Start: 2019-03-12 | End: 2019-03-13 | Stop reason: HOSPADM

## 2019-03-12 RX ORDER — DEXTROSE 40 %
15-30 GEL (GRAM) ORAL AS NEEDED
Status: DISCONTINUED | OUTPATIENT
Start: 2019-03-12 | End: 2019-03-13 | Stop reason: HOSPADM

## 2019-03-12 RX ADMIN — CLOPIDOGREL BISULFATE 75 MG: 75 TABLET ORAL at 09:35

## 2019-03-12 RX ADMIN — SODIUM CHLORIDE: 9 INJECTION, SOLUTION INTRAVENOUS at 02:12

## 2019-03-12 RX ADMIN — INSULIN ASPART 3 UNITS: 100 INJECTION, SOLUTION INTRAVENOUS; SUBCUTANEOUS at 17:34

## 2019-03-12 RX ADMIN — AMOXICILLIN 500 MG: 500 CAPSULE ORAL at 22:10

## 2019-03-12 RX ADMIN — AMPICILLIN SODIUM 2 G: 2 INJECTION, POWDER, FOR SOLUTION INTRAMUSCULAR; INTRAVENOUS at 09:36

## 2019-03-12 RX ADMIN — AMPICILLIN SODIUM 2 G: 2 INJECTION, POWDER, FOR SOLUTION INTRAMUSCULAR; INTRAVENOUS at 02:06

## 2019-03-12 RX ADMIN — ACETAMINOPHEN 1000 MG: 500 TABLET ORAL at 12:37

## 2019-03-12 RX ADMIN — AMPICILLIN SODIUM 2 G: 2 INJECTION, POWDER, FOR SOLUTION INTRAMUSCULAR; INTRAVENOUS at 05:58

## 2019-03-12 RX ADMIN — AMOXICILLIN 500 MG: 500 CAPSULE ORAL at 14:14

## 2019-03-12 RX ADMIN — ACETAMINOPHEN 1000 MG: 500 TABLET ORAL at 06:12

## 2019-03-12 RX ADMIN — ACETAMINOPHEN 1000 MG: 500 TABLET ORAL at 19:00

## 2019-03-12 RX ADMIN — INSULIN GLARGINE 26 UNITS: 100 INJECTION, SOLUTION SUBCUTANEOUS at 22:09

## 2019-03-12 RX ADMIN — METFORMIN HYDROCHLORIDE 1000 MG: 500 TABLET ORAL at 17:34

## 2019-03-12 RX ADMIN — ALOGLIPTIN 25 MG: 25 TABLET, FILM COATED ORAL at 09:34

## 2019-03-12 RX ADMIN — METFORMIN HYDROCHLORIDE 1000 MG: 500 TABLET ORAL at 09:35

## 2019-03-12 RX ADMIN — ROSUVASTATIN CALCIUM 20 MG: 20 TABLET, FILM COATED ORAL at 09:34

## 2019-03-12 RX ADMIN — LISINOPRIL 40 MG: 40 TABLET ORAL at 09:35

## 2019-03-12 RX ADMIN — INSULIN ASPART 3 UNITS: 100 INJECTION, SOLUTION INTRAVENOUS; SUBCUTANEOUS at 09:33

## 2019-03-12 NOTE — OP NOTE
Blank Op Note    Hospital: [facility]  Medical Record Number:  [id number]  Patient Name:  Harvey Lucero Jr.  YOB: 1960   Date of Operation:  3/11/2019  Primary Surgeon:  Dr. Fabricio Tee  First Assistant:  FREEDOM Ulloa Dr., DPM     Preoperative Diagnosis:    Left foot and ankle wound  Left equinus    Postoperative Diagnosis:   Same as above    Operation:  Skin allograft application and retrieval  Percutaneous APRIL    Anesthesia: Local + MAC  1% lidocaine with epinephrine in order to achieve good hemostasis      Indication: Harvey Lucero Jr. is a 58 y.o. male with a chronic wound to the left lower extremity status post proximal Lisfranc disarticulation amputation.  Patient has been undergoing hyperbaric oxygen treatment as well as IV antibiotics per infectious disease request.  He has opted for surgical intervention to help treat the chronic wound of the left foot with bone exposed.  He also has a tendon exposed to the medial lower extremity.  He pharmacologically has an equinus deformity noted.    Operative Findings:  No abscess    Estimated Blood Loss: 10 mL    No speicimens    Implants: * No implants in log *           Complications:  None; patient tolerated the procedure well.           Disposition: PACU - hemodynamically stable.           Condition: stable    Procedure:   Patient evaluated by anesthesia, consent achieved and the correct surgical site was marked in pre op.  The patient was then brought back to the OR and placed on the table in the normal supine position. Anesthesia was achieved through MAC and local consisting of 50 cc 1% lidocaine plain with epinephrine injected at the donor sites superficially in the dermal region. The foot ankle calves and thighs were then prepped and draped in the normal aseptic technique and a timeout was performed.   After timeout a stab incision was made just 2 cm proximal to the tendo Achilles insertion at the center of the  achilles tendon.  The blade was twisted perpendicular to the tendon and shifted to the anterior transecting the medial portion of the Achilles tendon.  There was noted to be moderate non pulsatile sanguinous drainage at this time which a lap was compressed at the stab incision for approximately 20 minutes in duration stopping the outflow of drainage.  Next, the blade was again put through the same stab incision and used from the center of the Achilles tendon and transected posterior to anterior through the lateral portion of the Achilles tendon fully transecting the tendon in order to get reduction of the equinus deformity.  There is not much reduction noted thus another stab incision was used to transect the Achilles tendon further.  There was noted to be mild increase in dorsiflexion, however not much.    The wound was cleansed with normal sterile staline.     Next mineral oil was placed along the donor sites to the outer thighs which were marked approximately 8 inches x 4 inches in order to cover the wound to the foot and ankle.  Using a dermatome along the marked out borders of the lateral thighs the superficial skin was removed at the dermis.  The retrieved donor site skin was sent through the mesher increasing surface area.  The retrieved autograft was stapled along the wound to the foot and ankle.  Using a bone nipper prior to autograft application the cuneiform as well as the cuboid that was exposed was debrided to normal healthy bleeding bone.  There was also tendon exposed to the medial left leg which was debrided with a #15 blade to healthy tissue.  The autograft which was stapled to the wound to the foot and ankle was then sufficient thus donor sites were marked along the medial lateral left calf approximately 6 x 4 cm.  The dermatome was also used along these marked borders and then retrieved autograft was sent through the mesher to increase surface area the autograft was applied along the wound and  stapled in place covering the entirety of the wound.  The wound was measured to be 625 cm².  There is approximately 100-150 cc of blood loss during surgery.    The donor sites were covered with Xeroform, 4 x 4, Tegaderm.  The wound to the left foot was covered with Xeroform ABD DST in the Achilles tendon which was transected percutaneously was stapled and covered with Xeroform DSD as well. Kerlix was applied to the foot and ankle extending just below the knee as well as a tightly wrapped Coban and Ace.  The patient was sent to the PACU in stable condition.  He is to be admitted for transfusion of packed red blood cells.  He will have an ID consult in the morning for intra venous antibiotic recommendations.  He is to be nonweightbearing        Fabricio Tee DPM  Phone Number: [phone]

## 2019-03-12 NOTE — ASSESSMENT & PLAN NOTE
He status post skin grafting by podiatry  His latest set rate was over 120 and he had exposed bone  His original cultures showed Streptococcus anginosus and Enterococcus faecalis  I suspect the infection was not completely resolved though he has been healing well  At this point we can stop the IV ampicillin and transition him to oral amoxicillin 500 mg 3 times daily, my intent is for 3 months and then reassess

## 2019-03-12 NOTE — NURSING NOTE
Patient's repeat Hgb 6.8 today. Orders put in for blood transfusion. Dr. Urban notified that both graft site dressings saturated. Spoke with Sav from podiatry and dressing reinforced with abd bandages and kerlix per his suggestion. Dr. Urban will assess when out of the OR. PRBC's infusing without difficulty. Pt. Voided 275ml and 200 ml. Bladder scanned for 473ml. Pt. Denies any discomfort at this time.will give him some more time to void and rescan.

## 2019-03-12 NOTE — NURSING NOTE
B/L thighs and LLE draining a moderate to large amount of serosang drainage. Dr. Payne notified. Orders obtained to reinforce all dressing as needed until am rounds.

## 2019-03-12 NOTE — NURSING NOTE
Pt bladder scanned for 992cc. Pt voided 300 cc. Straight cath'd without difficulty for 700 cc of yellow urine. Pt DTV at 1600.

## 2019-03-12 NOTE — CONSULTS
Infectious Disease Consult Note    Patient Name: Harvey Lcuero Jr.  MR#: [id number]  : 1960  Admission Date: 3/11/2019  Consult Date: 19 11:34 AM   Consultant: Severo Dias MD    Reason for Consult: long term abx, patient with history necrotizing fasciitis  Referring Provider: Dr. Tee        Harvey Lucero Jr. is a 58 y.o. male who was admitted on 3/11/2019.  He  sustained severe infection with Streptococcus anginosus and Enterococcus faecalis which caused necrotizing fasciitis and underwent amputation of the foot performed at the beginning of January of this year.  Since then, he has been getting IV ampicillin at home however his wound remained open with some discharge and exposed blackened bone, but has been healing enough that he was ready for a skin graft.  He is inflammatory markers remains severely elevated with his ESR being more than 120 last week.  However the patient did not have fevers chills or any symptoms.  His foot was grafted yesterday and he was admitted for blood transfusion      Allergies:   Allergies   Allergen Reactions   • No Known Allergies        Medical History:   Past Medical History:   Diagnosis Date   • Anxiety    • Chronic osteomyelitis (CMS/[facility]) ([facility])    • Dyslipidemia    • GERD (gastroesophageal reflux disease)    • History of CVA (cerebrovascular accident)    • Hypertension    • Lipid disorder    • Open wound     LEFT FOOT BOTTOM   • TIA (transient ischemic attack)        • Type 2 diabetes mellitus (CMS/[facility]) ([facility])        Surgical History:   Past Surgical History:   Procedure Laterality Date   • FOOT AMPUTATION THROUGH METATARSAL Left     LITTLE TOE   • INCISION AND DRAINAGE OF WOUND Left    • OTHER SURGICAL HISTORY      Loop recorder   • WISDOM TOOTH EXTRACTION     • WOUND DEBRIDEMENT Left        Social History   Substance Use Topics   • Smoking status: Former Smoker     Types: Cigarettes   • Smokeless tobacco: Never Used      Comment: 35 years  ago; Ex-smoker   • Alcohol use No       Family History: History reviewed. No pertinent family history.    Review of Systems    All other systems reviewed and negative except as noted in the HPI.    Medications:    Current IP Meds         Frequency     amoxicillin (AMOXIL) capsule 500 mg      Every 8 hours     glucose chewable tablet 16-32 g of dextrose  (Hypoglycemia Treatment Protocol and Hyperglycemia Validation Protocol)      As needed     dextrose 40 % oral gel 15-30 g of dextrose  (Hypoglycemia Treatment Protocol and Hyperglycemia Validation Protocol)      As needed     glucagon (GLUCAGEN) injection 1 mg  (Hypoglycemia Treatment Protocol and Hyperglycemia Validation Protocol)      As needed     dextrose in water injection 12.5 g  (Hypoglycemia Treatment Protocol and Hyperglycemia Validation Protocol)      As needed     clopidogrel (PLAVIX) tablet 75 mg      Daily     lisinopril (PRINIVIL) tablet 40 mg      Daily     metFORMIN (GLUCOPHAGE) tablet 1,000 mg      2 times daily with meals     insulin aspart U-100 (NovoLOG) pen 3-10 Units  (Novolog Insulin Correction Factor for patient weight < 200 lb)      3 times daily with meals     insulin glargine (LANTUS SOLOSTAR) pen 26 Units      Nightly     alogliptin (NESINA) tablet 25 mg      Daily     ampicillin IVPB 2 g in 100 mL NSS vial in bag  Status:  Discontinued      Every 4 hours interval     rosuvastatin (CRESTOR) tablet 20 mg      Every morning     sodium chloride 0.9 % infusion      As needed     acetaminophen (TYLENOL) tablet 1,000 mg      Every 6 hours PRN     LORazepam (ATIVAN) tablet 0.5 mg      Daily PRN     glucose chewable tablet 16-32 g of dextrose  (Hypoglycemia Treatment Protocol and Hyperglycemia Validation Protocol)  Status:  Discontinued      As needed     dextrose 40 % oral gel 15-30 g of dextrose  (Hypoglycemia Treatment Protocol and Hyperglycemia Validation Protocol)  Status:  Discontinued      As needed     glucagon (GLUCAGEN) injection 1 mg   (Hypoglycemia Treatment Protocol and Hyperglycemia Validation Protocol)  Status:  Discontinued      As needed     dextrose in water injection 12.5 g  (Hypoglycemia Treatment Protocol and Hyperglycemia Validation Protocol)  Status:  Discontinued      As needed     fentaNYL (PF) (SUBLIMAZE) injection 50 mcg  Status:  Discontinued      Every 15 min PRN     ondansetron (ZOFRAN) injection 4 mg  Status:  Discontinued      Every 15 min PRN     ondansetron (ZOFRAN) injection  Status:  Discontinued      As needed     ePHEDrine injection  Status:  Discontinued      As needed     glycopyrrolate (ROBINUL) injection  Status:  Discontinued      As needed     neostigmine methylsulfate (BLOXIVERZ) injection  Status:  Discontinued      As needed     bupivacaine-epinephrine (PF) (MARCAINE w/EPI) 0.25 %-1:200,000 injection  Status:  Discontinued      As needed     propofol (DIPRIVAN) continuous infusion  Status:  Discontinued      As needed     fentaNYL (PF) (SUBLIMAZE) injection  Status:  Discontinued      As needed     lidocaine PF (XYLOCAINE) 10 mg/mL (1 %) injection  Status:  Discontinued      As needed     rocuronium (ZEMURON) injection  Status:  Discontinued      As needed     midazolam (VERSED) 1 mg/mL injection  Status:  Discontinued      As needed     sodium chloride 0.9 % infusion      Continuous          Anti-infectives     Start     Dose/Rate Route Frequency Ordered Stop    19 1400  amoxicillin (AMOXIL) capsule 500 mg      500 mg oral Every 8 hours 19 1132              Vital Signs:    Temp:  [36.6 °C (97.8 °F)-37.8 °C (100 °F)] 36.7 °C (98.1 °F)  Heart Rate:  [] 92  Resp:  [14-28] 18  BP: (108-164)/(58-80) 136/58    Temp (72hrs), Av °C (98.6 °F), Min:36.6 °C (97.8 °F), Max:37.8 °C (100 °F)      Physical Exam     Gen: Aox3  HEENT: OP clear  Neck: Supple  LAD: No cervical LAD  Lungs: CTAB  CV: RRR no murmurs  Abd: Soft NTND +BS  Ext: The left foot is dressed clean dry and intact the thigh has a donor  site for the skin graft  Skin: no rash  Neuro: II-XII intact        Lines, Drains, Airways, Wounds:  Peripheral IV 03/11/19 Left Forearm (Active)   Number of days: 1       Surgical Incision Foot Left (Active)   Number of days: 67       Surgical Incision Leg Upper;Right (Active)   Number of days: 1       Surgical Incision Leg Left (Active)   Number of days: 1       Labs:    Lab Results   Component Value Date    WBC 11.22 (H) 03/12/2019    HGB 7.2 (L) 03/12/2019    HCT 23.0 (L) 03/12/2019    MCV 76.2 (L) 03/12/2019     03/12/2019     Lab Results   Component Value Date    GLUCOSE 234 (H) 03/12/2019    CALCIUM 8.3 (L) 03/12/2019     (L) 03/12/2019    K 4.3 03/12/2019    CO2 22 03/12/2019     03/12/2019    BUN 17 03/12/2019    CREATININE 1.0 03/12/2019     Lab Results   Component Value Date    ALT 16 05/04/2018    AST 11 05/04/2018    ALKPHOS 60 05/04/2018    BILITOT 0.6 05/04/2018     UA Results     No lab values to display.        Microbiology Results     ** No results found for the last 720 hours. **           Pathology Results     ** No results found for the last 720 hours. **          Echo:     Cardiac Imaging   TRANSTHORACIC ECHO (TTE) COMPLETE 01/08/2019    Narrative · Normal-sized left ventricular cavity. Normal systolic function.   Estimated EF = 60- 65%. No regional wall motion abnormalities.  · Mildly dilated left atrium.  · Normal-sized right ventricular cavity with preserved systolic function.  · Small (<10mm) circumferential pericardial effusion.          Imaging:    Radiology Imaging   XR CHEST 1 VW    Narrative CLINICAL HISTORY:  PICC line    COMMENT:    Views: Single frontal portable..    Comparison date: January 23rd 2019.    The heart and mediastinal contours are intact with mild cardiomegaly.  The  trachea is midline.  Bibasilar volume loss is noted.  No large effusions are  noted.  A right upper extremity PICC is present with tip in the distal SVC.  Cardiac recording device is  noted.  The osseous structures are intact with  degenerative change.          Impression IMPRESSION:  A right upper extremity PICC is present with tip in the distal SVC.           Patient Active Problem List   Diagnosis Code   • History of CVA (cerebrovascular accident) Z86.73   • Type 2 diabetes mellitus (CMS/[facility]) ([facility]) E11.9   • Lipid disorder E78.9   • Hypertension I10   • Acute hematogenous osteomyelitis of left foot (CMS/HCC) ([facility]) M86.072   • Anemia D64.9   • Leukocytosis D72.829   • Hyponatremia E87.1   • Elevated serum creatinine R79.89   • Bacteremia R78.81   • Gas gangrene (CMS/HCC) A48.0   • Streptococcal sepsis (CMS/HCC) ([facility]) A40.9   • Necrotizing fasciitis (CMS/HCC) M72.6   • Open wound of ankle and foot S91.009A, S91.309A   • Hypokalemia E87.6   • Localized edema R60.0   • Type 2 diabetes mellitus with diabetic peripheral angiopathy and gangrene, with long-term current use of insulin (CMS/HCC) E11.52, Z79.4   • Type 2 diabetes mellitus with foot ulcer, with long-term current use of insulin (CMS/HCC) E11.621, L97.509, Z79.4   • Essential hypertension I10   • Chronic multifocal osteomyelitis, left ankle and foot (CMS/HCC) ([facility]) M86.372   • History of necrotizing fasciitis Z87.39   • Lipid disorder E78.9   • History of CVA (cerebrovascular accident) Z86.73   • Claustrophobia F40.240   • CVA (cerebral vascular accident) (CMS/HCC) ([facility]) I63.9   • Type 2 diabetes mellitus with skin complication, with long-term current use of insulin (CMS/HCC) E11.628, Z79.4   • Dyslipidemia E78.5   • GERD (gastroesophageal reflux disease) K21.9   • Iron deficiency E61.1   • Leg wound, left S81.802A     Open wound of ankle and foot   Assessment & Plan    He is status post skin grafting by podiatry  His latest sed rate was over 120 and he had exposed bone  His original cultures showed Streptococcus anginosus and Enterococcus faecalis  I suspect the infection has not completely resolved though he has been healing well  At this  point we can stop the IV ampicillin and transition him to oral amoxicillin 500 mg 3 times daily, my intent is for 3 months and then reassess  Follow-up with me in 3-4 weeks            Severo Dias MD  3/12/2019 11:34 AM

## 2019-03-12 NOTE — NURSING NOTE
5Pt voided 150 cc and 150 cc since sx. Bladder scanned for 992 cc. Dr. Payne notified. RN requested an order to straight cath pt.

## 2019-03-12 NOTE — PROGRESS NOTES
Podiatry Progress Note    Subjective  58y male seen bedside this morning s/p POD#1 L exostectomy midfoot, APRIL, and skin graft with donor sites at the lateral thighs as well as left calf. Dressings to B/L LE including donor sites intact. NAD. Unable to urinate overnight, straight cath this morning. Denies n/v/f/c/sob.    2 units PRBC overnight, post-op.      All PMH/social hx/surgical hx/family hx reviewed for the patient for today's visit.    Objective   Labs  All labs and vitals reviewed today    CBC Results       03/11/19 02/09/19 02/09/19                    1918 1333 0414         WBC 8.09 - 8.87         RBC 3.28 (L) - 2.91 (L)         HGB 7.5 (L) 8.7 (L) 7.3 (L)         HCT 25.3 (L) 27.8 (L) 23.4 (L)         MCV 77.1 (L) - 80.4 (L)         MCH 22.9 (L) - 25.1 (L)         MCHC 29.6 (L) - 31.2 (L)          (H) - 393 (H)         Comment for WBC at 0414 on 02/09/19:  ALL RESULTS HAVE BEEN CHECKED    Comment for PLT at 0414 on 02/09/19:  RESULTS CHECKED        BMP Results       02/09/19 02/08/19 01/19/19                    0548 1342 0545          135 (L) 134 (L)         K 4.3 4.1 4.1         Cl 103 101 102         CO2 24 24 25         Glucose 120 (H) 149 (H) 175 (H)         BUN 8 8 12         Creatinine 0.8 0.8 0.9         Calcium 8.1 (L) 8.2 (L) 7.8 (L)         Anion Gap 9 10 7         EGFR &gt;60.0 &gt;60.0 &gt;60.0                       Imaging  I have reviewed the Imaging from the last 24 hrs.    Vital signs in last 24 hours:  Temp:  [36.6 °C (97.8 °F)-37.8 °C (100 °F)] 37.8 °C (100 °F)  Heart Rate:  [] 97  Resp:  [14-28] 16  BP: (108-164)/(60-80) 147/73      Intake/Output Summary (Last 24 hours) at 03/12/19 0736  Last data filed at 03/12/19 0600   Gross per 24 hour   Intake             1650 ml   Output              310 ml   Net             1340 ml       Physical Exam:  General appearance: AAOx3, appears stated age,cooperative  Head: normocephalic, without obvious abnormality, atraumatic  Eyes:  "conjunctivae/corneas clear. PERRL, EOM's intact. Fundi benign.  Ears: normal TM's and external ear canals both ears  Nose: Nares normal. Septum midline. Mucosa normal. No drainage or sinus tenderness.  Lungs: clear to auscultation bilaterally  Heart:: regular rate and rhythm  Abdomen: soft, non-tender; bowel sounds normal; no masses, no organomegaly      LE Focused Physical Exam :  Vascular : DP and PT pulses RLE palpable. Skin temp warm to warm proximal to distal B/L. No pain to the posterior proximal calf - no clinical signs of DVT  Neuro : Gross sensation intact. Light touch and protective sensation intact but diminished.  Ortho : Previous LisFranc amp LLE. Pain at the skin graft harvest sites with palpation (left calf, left thigh, and right thigh). Ankle joint equinus.  Derm : Skin graft sites at the right and left thighs with granular bases, active bleeding noted. Skin graft site at the left calf, medial and lateral aspects with granular bases, active bleeding noted.  L Foot with intact skin graft, adhered well with intact staples circumferentially. Active bleeding. No clinical signs of infection.      A/P : 58y male seen bedside this morning s/p POD#1 L exostectomy midfoot, APRIL, and skin graft with donor sites at the lateral thighs as well as left calf.  - Dressing to the skin graft donor sites changed today : xeroform, 4x4\" gauze, tegaderm  - Dressing to the L foot changed today  :  XEROFORM LEFT INTACT OVER SKIN GRAFT. DSD, ABD, kerlix, webril, ACE.  - 2 units PRBC overnight. 3/11 Hg 7.5. 3/12 collection pending  - 992cc's on bladder scan this morning. Order for straight cath  - ID consult placed. Patient with history of necrotizing fasciitis and extensive debridement/amputations to the LLE. Currently on IV ampicillin, appreciate your recommendations moving forward with antibiotic course.  - NWB LLE, encourage elevation LLE  - Pain control as needed by the patient  - No DVT ppx at this time - patient with " active bleeding, post-operative.  - Continue home medications  - Regular adult diet, 2000 gurvinder CC with NCS. Adjust as needed  - If patient able to urinate on his own later today and Hgb stable after dressing change, patient for discharge to home. Hope to D/C PICC with PO abx, pending ID recommendations.  - To continue outpatient therapy with Dr. Tee in [facility].  - Please contact podiatry resident on call with any questions or concerns.    Jen Urban DPM PGY-2  Pager [phone]

## 2019-03-12 NOTE — PATIENT CARE CONFERENCE
Care Progression Rounds Note  Date: 3/12/2019  Time: 10:31 AM     Patient Name: Harvey Lucero Jr.     Medical Record Number: [id number]   YOB: 1960  Sex: Male      Room/Bed: 5412    Admitting Diagnosis: Acquired equinus deformity of foot, unspecified laterality [M21.6X9]   Admit Date/Time: 3/11/2019 12:31 PM    Primary Diagnosis: No Principal Problem: There is no principal problem currently on the Problem List. Please update the Problem List and refresh.  Principal Problem: No Principal Problem: There is no principal problem currently on the Problem List. Please update the Problem List and refresh.    GMLOS: pending  Anticipated Discharge Date: 3/13/2019    AM-PAC  Mobility Score:      Discharge Planning:       Barriers to Discharge:  Barriers to Discharge: Medical issues not resolved    Participants:  advanced practice provider, , nursing, physical therapy, social work/services

## 2019-03-12 NOTE — PROGRESS NOTES
Met w/pt re DC plan- pt lives w/spouse in a 3  Story  Home with a first floor set-up--pt. Is indep in adls-he uses a RW to ambulate-currently under the care of [facility] and [facility] for IV abx and [facility] care. Marlen for DC rachel home with follow-up w/[facility] amd [facility]--notified WILMA/Jorge of possible DC today.

## 2019-03-12 NOTE — NURSING NOTE
Pt received 2 units of PRBC's this shift. Pt showed no signs or symptoms of a negative reaction. Pt received 650 cc of PRBC's.

## 2019-03-12 NOTE — PLAN OF CARE
Problem: Diabetes, Type 2 (Adult)  Goal: Signs and Symptoms of Listed Potential Problems Will be Absent, Minimized or Managed (Diabetes, Type 2)  Outcome: Ongoing (interventions implemented as appropriate)   03/12/19 1144   Diabetes, Type 2   Problems Assessed (Type 2 Diabetes) situational response   Problems Present (Type 2 Diabetes) hyperglycemia       Problem: Pain, Acute (Adult)  Goal: Identify Related Risk Factors and Signs and Symptoms  Outcome: Ongoing (interventions implemented as appropriate)    Goal: Acceptable Pain Control/Comfort Level  Outcome: Ongoing (interventions implemented as appropriate)      Problem: Fall Risk (Adult)  Goal: Identify Related Risk Factors and Signs and Symptoms  Outcome: Ongoing (interventions implemented as appropriate)    Goal: Absence of Falls  Outcome: Ongoing (interventions implemented as appropriate)

## 2019-03-12 NOTE — PLAN OF CARE
Problem: Diabetes, Type 2 (Adult)  Goal: Signs and Symptoms of Listed Potential Problems Will be Absent, Minimized or Managed (Diabetes, Type 2)  Outcome: Ongoing (interventions implemented as appropriate)   03/12/19 0524   Diabetes, Type 2   Problems Assessed (Type 2 Diabetes) all   Problems Present (Type 2 Diabetes) none       Problem: Pain, Acute (Adult)  Goal: Identify Related Risk Factors and Signs and Symptoms  Outcome: Ongoing (interventions implemented as appropriate)    Goal: Acceptable Pain Control/Comfort Level  Outcome: Ongoing (interventions implemented as appropriate)      Problem: Fall Risk (Adult)  Goal: Identify Related Risk Factors and Signs and Symptoms  Outcome: Ongoing (interventions implemented as appropriate)    Goal: Absence of Falls  Outcome: Ongoing (interventions implemented as appropriate)

## 2019-03-13 VITALS
SYSTOLIC BLOOD PRESSURE: 169 MMHG | RESPIRATION RATE: 18 BRPM | HEIGHT: 74 IN | DIASTOLIC BLOOD PRESSURE: 87 MMHG | TEMPERATURE: 97.7 F | BODY MASS INDEX: 26.56 KG/M2 | WEIGHT: 207 LBS | OXYGEN SATURATION: 97 % | HEART RATE: 87 BPM

## 2019-03-13 LAB
ANION GAP SERPL CALC-SCNC: 10 MEQ/L (ref 3–15)
APTT PPP: 36 SEC (ref 23–35)
BASOPHILS # BLD: 0.06 K/UL (ref 0.01–0.1)
BASOPHILS NFR BLD: 0.6 %
BUN SERPL-MCNC: 14 MG/DL (ref 8–20)
CALCIUM SERPL-MCNC: 8.7 MG/DL (ref 8.9–10.3)
CHLORIDE SERPL-SCNC: 104 MEQ/L (ref 98–109)
CO2 SERPL-SCNC: 23 MEQ/L (ref 22–32)
CREAT SERPL-MCNC: 1 MG/DL
DIFFERENTIAL METHOD BLD: NORMAL
EOSINOPHIL # BLD: 0.23 K/UL (ref 0.04–0.54)
EOSINOPHIL NFR BLD: 2.5 %
ERYTHROCYTE [DISTWIDTH] IN BLOOD BY AUTOMATED COUNT: 17.3 % (ref 11.6–14.4)
GFR SERPL CREATININE-BSD FRML MDRD: >60 ML/MIN/1.73M*2
GLUCOSE BLD-MCNC: 144 MG/DL (ref 70–99)
GLUCOSE SERPL-MCNC: 152 MG/DL (ref 70–99)
HCT VFR BLDCO AUTO: 26.5 %
HGB BLD-MCNC: 8.4 G/DL
IMM GRANULOCYTES # BLD AUTO: 0.08 K/UL (ref 0–0.08)
IMM GRANULOCYTES NFR BLD AUTO: 0.9 %
INR PPP: 1.3 INR
LYMPHOCYTES # BLD: 1.27 K/UL (ref 1.2–3.5)
LYMPHOCYTES NFR BLD: 13.6 %
MCH RBC QN AUTO: 25 PG (ref 28–33.2)
MCHC RBC AUTO-ENTMCNC: 31.7 G/DL (ref 32.2–36.5)
MCV RBC AUTO: 78.9 FL (ref 83–98)
MONOCYTES # BLD: 0.97 K/UL (ref 0.3–1)
MONOCYTES NFR BLD: 10.4 %
NEUTROPHILS # BLD: 6.75 K/UL (ref 1.7–7)
NEUTS SEG NFR BLD: 72 %
NRBC BLD-RTO: 0 %
PDW BLD AUTO: 10.2 FL (ref 9.4–12.4)
PLATELET # BLD AUTO: 307 K/UL
POCT TEST: ABNORMAL
POTASSIUM SERPL-SCNC: 3.9 MEQ/L (ref 3.6–5.1)
PROTHROMBIN TIME: 15.7 SEC (ref 12.2–14.5)
RBC # BLD AUTO: 3.36 M/UL (ref 4.5–5.8)
SODIUM SERPL-SCNC: 137 MEQ/L (ref 136–144)
WBC # BLD AUTO: 9.36 K/UL

## 2019-03-13 PROCEDURE — 63700000 HC SELF-ADMINISTRABLE DRUG: Performed by: INTERNAL MEDICINE

## 2019-03-13 PROCEDURE — 63700000 HC SELF-ADMINISTRABLE DRUG: Performed by: PODIATRIST

## 2019-03-13 PROCEDURE — 36415 COLL VENOUS BLD VENIPUNCTURE: CPT | Performed by: PODIATRIST

## 2019-03-13 PROCEDURE — 85025 COMPLETE CBC W/AUTO DIFF WBC: CPT | Performed by: PODIATRIST

## 2019-03-13 PROCEDURE — 85730 THROMBOPLASTIN TIME PARTIAL: CPT | Performed by: PODIATRIST

## 2019-03-13 PROCEDURE — 85610 PROTHROMBIN TIME: CPT | Performed by: PODIATRIST

## 2019-03-13 PROCEDURE — 80048 BASIC METABOLIC PNL TOTAL CA: CPT | Performed by: PODIATRIST

## 2019-03-13 RX ADMIN — LISINOPRIL 40 MG: 40 TABLET ORAL at 09:16

## 2019-03-13 RX ADMIN — ALOGLIPTIN 25 MG: 25 TABLET, FILM COATED ORAL at 09:15

## 2019-03-13 RX ADMIN — ACETAMINOPHEN 1000 MG: 500 TABLET ORAL at 00:42

## 2019-03-13 RX ADMIN — METFORMIN HYDROCHLORIDE 1000 MG: 500 TABLET ORAL at 09:15

## 2019-03-13 RX ADMIN — CLOPIDOGREL BISULFATE 75 MG: 75 TABLET ORAL at 09:16

## 2019-03-13 RX ADMIN — ACETAMINOPHEN 1000 MG: 500 TABLET ORAL at 09:17

## 2019-03-13 RX ADMIN — AMOXICILLIN 500 MG: 500 CAPSULE ORAL at 06:42

## 2019-03-13 RX ADMIN — ROSUVASTATIN CALCIUM 20 MG: 20 TABLET, FILM COATED ORAL at 09:15

## 2019-03-13 NOTE — PROGRESS NOTES
Podiatry Progress Note    Subjective  58y male seen bedside this morning s/p POD#2 L exostectomy midfoot, APRIL, and skin graft with donor sites at the lateral thighs as well as left calf. Dressings to B/L LE including donor sites intact, no strikethrough. NAD. Denies n/v/f/c/sob.      All PMH/social hx/surgical hx/family hx reviewed for the patient for today's visit.    Objective   Labs  All labs and vitals reviewed today    CBC Results       03/13/19 03/12/19 03/12/19                    0452 1138 1003         WBC 9.36 - 11.22 (H)         RBC 3.36 (L) - 3.02 (L)         HGB 8.4 (L) 6.8 (LL) 7.2 (L)         HCT 26.5 (L) 22.0 (L) 23.0 (L)         MCV 78.9 (L) - 76.2 (L)         MCH 25.0 (L) - 23.8 (L)         MCHC 31.7 (L) - 31.3 (L)          - 319         Comment for HGB at 1138 on 03/12/19:  RESULT CHECKED. This result has been called to KIKO CALIXTO by Erika Vuong on 03 12 19 at 11:58, and has been read back.         BMP Results       03/13/19 03/12/19 02/09/19                    0452 1003 0548          135 (L) 136         K 3.9 4.3 4.3         Cl 104 105 103         CO2 23 22 24         Glucose 152 (H) 234 (H) 120 (H)         BUN 14 17 8         Creatinine 1.0 1.0 0.8         Calcium 8.7 (L) 8.3 (L) 8.1 (L)         Anion Gap 10 8 9         EGFR &gt;60.0 &gt;60.0 &gt;60.0                       Imaging  I have reviewed the Imaging from the last 24 hrs.    Vital signs in last 24 hours:  Temp:  [36.6 °C (97.9 °F)-37.4 °C (99.3 °F)] 36.6 °C (97.9 °F)  Heart Rate:  [] 94  Resp:  [16-18] 17  BP: (125-150)/(58-79) 146/71      Intake/Output Summary (Last 24 hours) at 03/13/19 0726  Last data filed at 03/12/19 2300   Gross per 24 hour   Intake          1192.75 ml   Output             1200 ml   Net            -7.25 ml       Physical Exam:  General appearance: AAOx3, appears stated age,cooperative  Head: normocephalic, without obvious abnormality, atraumatic  Eyes: conjunctivae/corneas clear. PERRL,  EOM's intact. Fundi benign.  Ears: normal TM's and external ear canals both ears  Nose: Nares normal. Septum midline. Mucosa normal. No drainage or sinus tenderness.  Lungs: clear to auscultation bilaterally  Heart:: regular rate and rhythm  Abdomen: soft, non-tender; bowel sounds normal; no masses, no organomegaly      LE Focused Physical Exam :  Vascular : DP and PT pulses RLE palpable. Skin temp warm to warm proximal to distal B/L. No pain to the posterior proximal calf - no clinical signs of DVT  Neuro : Gross sensation intact. Light touch and protective sensation intact but diminished.  Ortho : Previous LisFranc amp LLE. Pain at the skin graft harvest sites with palpation (left calf, left thigh, and right thigh). Ankle joint equinus.  Derm : Dressings to B/L LE left intact        A/P : 58y male seen bedside this morning s/p POD#2 L exostectomy midfoot, APRIL, and skin graft with donor sites at the lateral thighs as well as left calf.  - Dressing to B/L LE left intact today, no strikethrough.  - 2 units PRBC 3/12 Hgb this morning 8.4  - patient urinating on his own without complication  - abx per ID recs : PO ampicillin  - NWB LLE, encourage elevation LLE  - Pain control as needed by the patient  - No DVT ppx at this time - patient with active bleeding, post-operative.  - Continue home medications  - Regular adult diet, 2000 gurvinder CC with NCS. Adjust as needed  - To continue outpatient therapy with Dr. Tee in [facility].  - Please contact podiatry resident on call with any questions or concerns.     Jen Urban DPM PGY-2  Pager [phone]

## 2019-03-13 NOTE — NURSING NOTE
Patient voids 150-200 at a time.  Refused to be bladder scanned at this time.  No c/o bladder discomfort.  Will continue to monitor.

## 2019-03-13 NOTE — DISCHARGE INSTRUCTIONS
PICC and/or MIDLINE Removal, Care After  Refer to this sheet in the next few weeks. These instructions provide you with information on caring for yourself after your procedure. Your health care provider may also give you more specific instructions. Your treatment has been planned according to current medical practices, but problems sometimes occur. Call your health care provider if you have any problems or questions after your procedure.  What can I expect after the procedure?  After your procedure, it is typical to have mild discomfort at the insertion site. This should not last for more than a day.  Follow these instructions at home:  You may remove the bandage after 24 hours. The PICC/MIDLINE insertion site is very small. A small scab may develop over the insertion site.  It is okay to wash the site gently with soap and water. Be careful not to remove or pick off the scab. Gently pat the site dry after washing it. You do not need to put another bandage over the insertion site.  Do not lift anything heavy or do strenuous physical activity for 24 hours after the PICC/MIDLINE is removed. This includes:  · Weight lifting.  · Strenuous yard work.  · Any physical activity with repetitive arm movement.  Contact a health care provider if:  · You have swelling or puffiness in your arm at the PICC/MIDLINE insertion site.  · You have increasing tenderness at the PICC/MIDLINE insertion site.  Get help right away if:  · You have numbness or tingling in your fingers, hand, or arm.  · Your arm looks blue and feels cold.  · You have redness around the insertion site or a red streak goes up your arm.  · You have any type of drainage from the PICC/MIDLINE insertion site. This includes drainage such as:  ¨ Bleeding from the insertion site. If this happens, apply firm, direct pressure to the PICC/MIDLINE insertion site with a clean towel.  ¨ Drainage that is yellow or tan.  · You have a fever.  This information is not intended to  "replace advice given to you by your health care provider. Make sure you discuss any questions you have with your health care provider.  Document Released: 12/23/2014 Document Revised: 05/25/2017 Document Reviewed: 10/10/2014  [employer] ([employer]: [place]) Interactive Patient Education © 2017 [employer]      [facility]  [address] [place] [address], 3rd Floor  DANIEL Pierce      Dr. Fabricio Tee, DPM      Post Operative Instructions    Keep foot elevated as much as possible above the level of the heart.    Keep bandage clean and dry.  Do not remove unless instructed to do so by your Physician.  If bandage becomes soiled or wet call the office immediately.    WOUND CARE :   Change donor site dressings on Thursday (3/14) or Friday (3/15) - this includes the left and right lateral thighs as well as the left calf at both the medial and lateral aspects. Remove current dressings and cleanse areas with normal sterile saline, pat COMPLETELY dry. Apply skin prep to the periwounds. Apply xeroform to the wounds with overlying 4x4\" gauze. Apply tegaderm overtop of the dressing.    Left foot should have wound vac applied on Thursday (3/14) or Friday (3/15). Remove the dressing, including the xeroform to the foot but LEAVE SKIN GRAFT INTACT! The skin graft should absolutely not be disturbed. Cleanse the periwound, pat completely dry, and apply skin prep to the periwound only.    Change the wound vac every Tuesday, Thursday, Saturday : Cut ADAPTIC to size of wound and then black sponge to size of wound with no overlap. Apply skin prep to the periwound and all areas where tegaderm will come in direct contact, apply tegaderm over sponge, and apply any additional tegaderm to assure no leaks from sponge/wound. Cut small hole through sponge/tegaderm, about the size of a dime. Apply hose directly over hole. Apply additional tegaderm over hose borders to assure no leakage. Lock the distal end of hose to the distal end of line connecting to " "cannula. Once locked, turn VAC on, set at 125mmHg continuous. Check Seal Check to make sure there are no leaks, yellow signifies there is too much leakage and additional tegaderm over the leaking area is needed. Check \"Seal Check\" to make sure there are no leaks. Yellow signifies there is too much leakage and additional tegaderm/adhesive strips over the leaking area is needed. It is IMPERATIVE that the tubing does NOT come in contact with the patient's skin, as this may result in a pressure ulcer or deep tissue injury.        Take mediation as prescribed:  Pain medication: you can take tylenol around the clock for pain control    Antibiotic : amoxicillin 500mg, take 1 tablet three times a day for at least 1 month. You should follow up with Infectious Disease in 3 weeks for re-evaluation and further adjustment to your antibiotics.    Bear weight on the area only as advised:  NO weight bearing to the left foot, use of crutches/walker.    Should you have excessive bleeding (through the bandage) or excessive discomfort, please call the office immediately.    Continue HBO therapy in the [facility], appointment on Monday, 3/18/19    "

## 2019-03-13 NOTE — PROGRESS NOTES
Per 5APV rounds, pt for dc today and on his way already. Per MD Notes, Pt will not need to be on DARSHAN and will be switched to poi amoxoicillin TID on dc. Wound care and wound vac instructions clearly written on dc instructions. [facility] jordan Garza aware of above

## 2019-03-13 NOTE — DISCHARGE SUMMARY
Inpatient Discharge Summary    BRIEF OVERVIEW  Admitting Provider:  H&P Notes 2/11/2019 to 3/13/2019     Date of Service Author Author Type Status Note Type File Time    03/11/19 8677 Miroslava Bess DPM Resident Cosign Needed H&P 03/11/19 5843    03/08/19 1409 Delmy Carr MD  Signed H&P 03/08/19 1409          Attending Provider: Fabricio Tee DPM Attending phys phone: [phone]  Primary Care Physician at Discharge: ALLISON Avitia [phone]    Admission Date: 3/11/2019     Discharge Date: 3/13/2019    Primary Discharge Diagnosis  No Principal Problem: There is no principal problem currently on the Problem List. Please update the Problem List and refresh.    Secondary Discharge Diagnosis  Active Hospital Problems    Diagnosis Date Noted   • Leg wound, left 03/12/2019   • Open wound of ankle and foot 01/04/2019      Resolved Hospital Problems    Diagnosis Date Noted Date Resolved   No resolved problems to display.       DETAILS OF HOSPITAL STAY    Operative Procedures Performed  Procedure(s):  Graft Application    Consults:   Consult Notes 2/11/2019 to 3/13/2019     Date of Service Author Author Type Status Note Type File Time    03/12/19 1133 Severo Dias MD Physician Signed Consults 03/12/19 1139          Consult Orders During Admission:  IP CONSULT TO INFECTIOUS DISEASE     Procedures: Left foot debridement with skin graft applicationk  Pertinent Test Results: labs: Hemoglobin 8.4 3/13/19    Imaging  No results found.        Presenting Problem/History of Present Illness  No admission diagnosis documented on admit to order.     Patient was admitted to [facility] s/p left lower extremity bone debridement with skin graft harvest and application.    Exam on Day of Discharge  Patient seen and examined on day of discharge.  Dressing to B/L LE intact without strikethrough, changed 3/12.     Hospital Course  Admission on 3/11/19 after surgical intervention of the left lower extremity.  "He was given 2 units of PRBC after surgery secondary to blood loss. Labs on 3/12/19 recorded Hgb 6.4. He was ordered another 2 units of PRBC that afternoon. Labs on 3/13/19 recorded Hgb 8.4.  Patient was straight cathed on 3/12 secondary to bladder scan >900cc's. Patient was later able to urinate on his own that afternoon.  Pain controlled with Tylenol.  Dressings to B/L LE with xeroform/DSD/ABD/webril/ACE to the L foot/ankle. Xeroform/DSD/tegaderm to all skin graft harvest sites. ACE to the thighs for compression/blood coagulation.  Seen by Infectious Disease who adjusted antibiotics - switched from IV ampicillin to PO ampicillin. PICC pulled while inhouse.    Discharge Orders     Medication List      CONTINUE taking these medications    acetaminophen 500 mg tablet  Commonly known as:  TYLENOL  Take 1,000 mg by mouth every 6 (six) hours as needed for mild pain.     BASAGLAR KWIKPEN U-100 INSULIN 100 unit/mL (3 mL) subcutaneous pen  Generic drug:  insulin glargine  26 units at bedtime     blood-glucose meter kit  for blood glucose monitoring     clopidogrel 75 mg tablet  Commonly known as:  PLAVIX  TAKE 1 TABLET BY MOUTH EVERY DAY     lancets misc  for blood glucose monitoring 3x day     lisinopril 40 mg tablet  Commonly known as:  PRINIVIL  Take 40 mg by mouth once daily.     LORazepam 0.5 mg tablet  Commonly known as:  ATIVAN  Take 0.5 mg by mouth daily as needed for anxiety.     metFORMIN 1,000 mg tablet  Commonly known as:  GLUCOPHAGE  take 1 tablet by oral route 2 times every day with morning and evening meals     ONETOUCH ULTRA TEST strip  Generic drug:  blood sugar diagnostic  for blood glucose monitoring 3-4x day     * pen needle, diabetic 32 gauge x 1/4\" needle  Commonly known as:  NOVOFINE 32  For insulin administration once daily     * pen needle, diabetic 32 gauge x 1/4\" needle  Commonly known as:  BD ULTRA-FINE MICRO PEN NEEDLE  For blood sugar monitoring 3x day Dx:  E11.9     rosuvastatin 20 mg " tablet  Commonly known as:  CRESTOR  20 mg.     SITagliptin 100 mg tablet  Commonly known as:  SHILPIUVIA  Take 1 tablet (100 mg total) by mouth daily.        * This list has 2 medication(s) that are the same as other medications prescribed for you. Read the directions carefully, and ask your doctor or other care provider to review them with you.            STOP taking these medications    ampicillin 2 g/100 mL IVPB                Outpatient Follow-Ups  Encounter Information     You do not currently have any appointments scheduled.        Referrals:  No orders of the defined types were placed in this encounter.      Active Issues Requiring Follow-up  Issue: outpatient follow up with Infectious Disease, PCP, wound care with Dr. Tee      Test Results Pending at Discharge  Unresulted Labs     Start     Ordered    03/13/19 0600  CBC and Differential  Daily     Start Status   03/14/19 0600 Scheduled   03/15/19 0600 Scheduled   03/16/19 0600 Scheduled   03/17/19 0600 Scheduled       03/12/19 1811    03/13/19 0600  Basic metabolic panel  Daily     Start Status   03/14/19 0600 Scheduled   03/15/19 0600 Scheduled   03/16/19 0600 Scheduled   03/17/19 0600 Scheduled       03/12/19 1811              Discharge Disposition  Home   Code Status at Discharge: Full Code  Physician Order for Life-Sustaining Treatment Document Status      No documents found

## 2019-03-14 LAB
CROSSMATCH: NORMAL
ISBT CODE: 600
ISBT CODE: 6200
PRODUCT CODE: NORMAL
PRODUCT STATUS: NORMAL
SPECIMEN EXP DATE BLD: NORMAL
UNIT ABO: NORMAL
UNIT ID: NORMAL
UNIT RH: NEGATIVE
UNIT RH: POSITIVE

## 2019-03-18 ENCOUNTER — OFFICE VISIT (OUTPATIENT)
Dept: WOUND CARE | Facility: HOSPITAL | Age: 59
End: 2019-03-18
Attending: PLASTIC SURGERY
Payer: COMMERCIAL

## 2019-03-18 VITALS
HEART RATE: 86 BPM | SYSTOLIC BLOOD PRESSURE: 115 MMHG | TEMPERATURE: 98.5 F | DIASTOLIC BLOOD PRESSURE: 67 MMHG | RESPIRATION RATE: 16 BRPM

## 2019-03-18 DIAGNOSIS — M86.372 CHRONIC MULTIFOCAL OSTEOMYELITIS, LEFT ANKLE AND FOOT (CMS/HCC): Primary | ICD-10-CM

## 2019-03-18 LAB
GLUCOSE BLD-MCNC: 128 MG/DL (ref 70–99)
GLUCOSE BLD-MCNC: 208 MG/DL (ref 70–99)
POCT TEST: ABNORMAL
POCT TEST: ABNORMAL

## 2019-03-18 PROCEDURE — 82948 REAGENT STRIP/BLOOD GLUCOSE: CPT

## 2019-03-18 PROCEDURE — 99183 HYPERBARIC OXYGEN THERAPY: CPT | Performed by: PLASTIC SURGERY

## 2019-03-18 PROCEDURE — G0277 HBOT, FULL BODY CHAMBER, 30M: HCPCS

## 2019-03-18 PROCEDURE — 200200 PR NO CHARGE: Performed by: PLASTIC SURGERY

## 2019-03-18 ASSESSMENT — PAIN SCALES - GENERAL: PAINLEVEL: 0-NO PAIN

## 2019-03-18 NOTE — PROGRESS NOTES
Hyperbaric Oxygen Therapy Treatment Summary    Patient Name: Harvey Lucero Jr.             : 1960  Date of Visit: 3/18/2019    Indication for Treatment:   Chronic Refractory Osteomyelitis (Unresponsive to Conventional Medical and Surgical Managrment): Laterality: Left  Site: Foot and ankle    Comments: Restart a course of hyperbaric oxygen therapy 1 week after surgical treatment of complex high metatarsal amputation wound area of the left foot status post split-thickness skin graft closure of residual lower calf ankle and foot wounds.  The wound VAC dressing is in appropriate position functioning and the donor site dressing wounds of the split-thickness grafts left leg are intact.  He tolerated the hyperbaric oxygen therapy session well.      Treatment Summary:   Continue present Hyperbaric Oxygen Treatment Plan: Treatment 2.4 YADI x 90 minutes with 5 minute airbreak x 2      Response To Treatment:  Physician was present during total Hyperbaric Oxygen Treatment including the ascent and decent portions.  Post-Treatment: The patient tolerated today's HBO Treatment without complication

## 2019-03-19 ENCOUNTER — OFFICE VISIT (OUTPATIENT)
Dept: WOUND CARE | Facility: HOSPITAL | Age: 59
End: 2019-03-19
Attending: PLASTIC SURGERY
Payer: COMMERCIAL

## 2019-03-19 VITALS
HEART RATE: 86 BPM | DIASTOLIC BLOOD PRESSURE: 75 MMHG | TEMPERATURE: 96.5 F | SYSTOLIC BLOOD PRESSURE: 134 MMHG | RESPIRATION RATE: 18 BRPM

## 2019-03-19 DIAGNOSIS — M86.372 CHRONIC MULTIFOCAL OSTEOMYELITIS, LEFT ANKLE AND FOOT (CMS/HCC): Primary | ICD-10-CM

## 2019-03-19 LAB
GLUCOSE BLD-MCNC: 116 MG/DL (ref 70–99)
GLUCOSE BLD-MCNC: 229 MG/DL (ref 70–99)
POCT TEST: ABNORMAL
POCT TEST: ABNORMAL

## 2019-03-19 PROCEDURE — 99183 HYPERBARIC OXYGEN THERAPY: CPT | Performed by: PLASTIC SURGERY

## 2019-03-19 PROCEDURE — 82948 REAGENT STRIP/BLOOD GLUCOSE: CPT

## 2019-03-19 PROCEDURE — G0277 HBOT, FULL BODY CHAMBER, 30M: HCPCS

## 2019-03-19 PROCEDURE — 200200 PR NO CHARGE: Performed by: PLASTIC SURGERY

## 2019-03-19 NOTE — PROGRESS NOTES
Hyperbaric Oxygen Therapy Treatment Summary    Patient Name: Harvey Lucero Jr.             : 1960  Date of Visit: 3/19/2019    Indication for Treatment:   Chronic Refractory Osteomyelitis (Unresponsive to Conventional Medical and Surgical Managrment): Laterality: Left  Site: foot/ankle    Comments:Digital photo of graft area from yesterday looked great.  Plan grafted wound inspection with Dr. Tee tomorrow.      Treatment Summary:   Continue present Hyperbaric Oxygen Treatment Plan: Treatment 2.4 YADI x 90 minutes with 5 minute airbreak x 2      Response To Treatment:  Physician was present during total Hyperbaric Oxygen Treatment including the ascent and decent portions.  Post-Treatment: The patient tolerated today's HBO Treatment without complication

## 2019-03-20 ENCOUNTER — OFFICE VISIT (OUTPATIENT)
Dept: WOUND CARE | Facility: HOSPITAL | Age: 59
End: 2019-03-20
Attending: PLASTIC SURGERY
Payer: COMMERCIAL

## 2019-03-20 VITALS
TEMPERATURE: 97.1 F | RESPIRATION RATE: 18 BRPM | SYSTOLIC BLOOD PRESSURE: 114 MMHG | DIASTOLIC BLOOD PRESSURE: 71 MMHG | HEART RATE: 88 BPM

## 2019-03-20 VITALS
RESPIRATION RATE: 18 BRPM | DIASTOLIC BLOOD PRESSURE: 71 MMHG | TEMPERATURE: 97.1 F | SYSTOLIC BLOOD PRESSURE: 114 MMHG | HEART RATE: 88 BPM

## 2019-03-20 DIAGNOSIS — M86.372 CHRONIC MULTIFOCAL OSTEOMYELITIS, LEFT ANKLE AND FOOT (CMS/HCC): Primary | ICD-10-CM

## 2019-03-20 DIAGNOSIS — M86.372 CHRONIC MULTIFOCAL OSTEOMYELITIS, LEFT ANKLE AND FOOT (CMS/HCC): ICD-10-CM

## 2019-03-20 DIAGNOSIS — S81.802D WOUND OF LEFT LOWER EXTREMITY, SUBSEQUENT ENCOUNTER: Primary | ICD-10-CM

## 2019-03-20 LAB
GLUCOSE BLD-MCNC: 100 MG/DL (ref 70–99)
GLUCOSE BLD-MCNC: 145 MG/DL (ref 70–99)
POCT TEST: ABNORMAL
POCT TEST: ABNORMAL

## 2019-03-20 PROCEDURE — 200200 PR NO CHARGE: Performed by: PLASTIC SURGERY

## 2019-03-20 PROCEDURE — 82948 REAGENT STRIP/BLOOD GLUCOSE: CPT

## 2019-03-20 PROCEDURE — 99183 HYPERBARIC OXYGEN THERAPY: CPT | Performed by: PLASTIC SURGERY

## 2019-03-20 PROCEDURE — G0277 HBOT, FULL BODY CHAMBER, 30M: HCPCS

## 2019-03-20 RX ORDER — AMOXICILLIN 500 MG/1
500 CAPSULE ORAL 2 TIMES DAILY
COMMUNITY
End: 2019-04-09

## 2019-03-20 NOTE — PROGRESS NOTES
Subjective      Patient ID: Harvey Lucero Jr. is a 58 y.o. male.    HPI patient seen today chief complaint status post transmetatarsal resection and also resection of medial cuneiform.  There is non-viability patient presents with wet-to-dry dressings wound VAC failed last evening the arm was set patient Mariama denies any fever chills night sweats and feels the best she is felt in several months.  Patient is status post TMA with harvesting a split the skin graft application to his left leg.  Zhang Leslie denies any fever chills night sweats.    The following have been reviewed and updated as appropriate in this visit:         Past Medical History: He  has a past medical history of Anxiety; Chronic osteomyelitis (CMS/[facility]) ([facility]); Dyslipidemia; GERD (gastroesophageal reflux disease); History of CVA (cerebrovascular accident); Hypertension; Lipid disorder; Open wound; TIA (transient ischemic attack); and Type 2 diabetes mellitus (CMS/[facility]) ([facility]). He also has no past medical history of Depression.  Past Surgical History: He  has a past surgical history that includes [place] tooth extraction; Other surgical history; Incision and drainage of wound (Left); Wound debridement (Left); and Foot amputation through metatarsal (Left).  Medication: He has a current medication list which includes the following prescription(s): acetaminophen, blood sugar diagnostic, blood-glucose meter, clopidogrel, insulin glargine, lancets, lisinopril, lorazepam, metformin, pen needle, diabetic, pen needle, diabetic, rosuvastatin, and sitagliptin.  Allergies: He is allergic to no known allergies.  Social History: He  reports that he has quit smoking. His smoking use included Cigarettes. He has never used smokeless tobacco. He reports that he does not drink alcohol or use drugs.    Review of Systems:  Review of Systems      Objective Graft sites are well incorporated there is still some presence of the posterior tibial tendon however is  granulating in its moist and sweats final motion muscle flaps were also taken to the plantar medial plantar aspect of foot through a small area area open anteriorly but has a fibrous polyp was a granular base is noted positive active bleeding there is noted to be no signs of desiccation exposure no other irregularities noted at this time.    Foot Exam    Assessment/Plan Status post proximal transmitting ampuatation with excision of medial cuneiform  2.  Status post tendon transfer   3.  Tissue and grafting with the second time around with application of skin graft and harvesting skin graft to the area.    Plan  1.  Is still very very probably patient may still lose limb  2 patient continue on ampicillin every 4 hours for the residual osteomyelitis #  3 patient with HBO to try to reduce the amount of a bone infection to improve healing can also increase oxygenation   4 4 instructed patient this time that wound care will continue to be progressive over the course of time I did recommend that we change to dressing the thighs that is here for attack and this will be done daily will talk to Annika the nurse and also to Dr. Vinson had a great day.  Patient still at high risk for limb loss instruct the patient continue with good diet plan the patient was reevaluated in 1 week  Problem List Items Addressed This Visit     RESOLVED: Chronic multifocal osteomyelitis, left ankle and foot (CMS/HCC) (HCC)    Leg wound, left - Primary          No Follow-up on file.    Fabricio Tee DPM

## 2019-03-20 NOTE — PROGRESS NOTES
Hyperbaric Oxygen Therapy Treatment Summary    Patient Name: Harvey Lucero Jr.             : 1960  Date of Visit: 3/20/2019    Indication for Treatment:   Chronic Refractory Osteomyelitis (Unresponsive to Conventional Medical and Surgical Managrment): Laterality: Left  Site: Foot/ankle    Comments: His left foot wound area was examined by Dr. Tee and myself mistreating excellent initial taken healing of the mesh split-thickness skin graft areas with no signs of soft tissue infection and good progressive healing.  Discussed further management of the wound site as well as the skin graft donor sites and I am in agreement with Dr. Tee's current management regimen with Xeroform gauze.  He is now at hyperbaric oxygen treatment #26 with a plan of a total of 40 treatments.  He is tolerating them very well.      Treatment Summary:   Continue present Hyperbaric Oxygen Treatment Plan: Treatment 2.4 YADI x 90 minutes with 5 minute airbreak x 2      Response To Treatment:  Physician was present during total Hyperbaric Oxygen Treatment including the ascent and decent portions.  Post-Treatment: The patient tolerated today's HBO Treatment without complication

## 2019-03-20 NOTE — OP NOTE
Hospital: [facility]  Medical Record Number:  [id number]  Patient Name:  Harvey Lucero Jr.  YOB: 1960   Date of Operation:  3/11/2019  Primary Surgeon:  Dr. Juliocesar Lr  First Assistant:  Dr. Randal MCCLUREM   Second Assistant: Miroslava Bess PGY2     Preoperative Diagnosis:    Left foot and ankle wound  Left equinus     Postoperative Diagnosis:   Same as above     Operation:  Skin allograft application and retrieval/harvest  Percutaneous APRIL     Anesthesia: Local + MAC  1% lidocaine with epinephrine in order to achieve good hemostasis        Indication: Harvey Lucero Jr. is a 58 y.o. male with a chronic wound to the left lower extremity status post proximal Lisfranc disarticulation amputation.  Patient has been undergoing hyperbaric oxygen treatment as well as IV antibiotics per infectious disease request.  He has opted for surgical intervention to help treat the chronic wound of the left foot with bone exposed.  He also has a tendon exposed to the medial lower extremity.  He pharmacologically has an equinus deformity noted.     Operative Findings:  No abscess     Estimated Blood Loss: 10 mL     No speicimens     Implants: * No implants in log *           Complications:  None; patient tolerated the procedure well.           Disposition: PACU - hemodynamically stable.           Condition: stable     Procedure:   Patient evaluated by anesthesia, consent achieved and the correct surgical site was marked in pre op.  The patient was then brought back to the OR and placed on the table in the normal supine position. Anesthesia was achieved through MAC and local consisting of 50 cc 1% lidocaine plain with epinephrine injected at the donor sites superficially in the dermal region. The foot ankle calves and thighs were then prepped and draped in the normal aseptic technique and a timeout was performed.   After timeout a stab incision was made just 2 cm proximal to the tendo Achilles insertion  at the center of the achilles tendon.  The blade was twisted perpendicular to the tendon and shifted to the anterior transecting the medial portion of the Achilles tendon.  There was noted to be moderate non pulsatile sanguinous drainage at this time which a lap was compressed at the stab incision for approximately 20 minutes in duration stopping the outflow of drainage.  Next, the blade was again put through the same stab incision and used from the center of the Achilles tendon and transected posterior to anterior through the lateral portion of the Achilles tendon fully transecting the tendon in order to get reduction of the equinus deformity.  There is not much reduction noted thus another stab incision was used to transect the Achilles tendon further.  There was noted to be mild increase in dorsiflexion, however not much.     The wound was cleansed with normal sterile staline.      Next mineral oil was placed along the donor sites to the outer thighs which were marked approximately 8 inches x 4 inches in order to cover the wound to the foot and ankle.  Using a dermatome along the marked out borders of the lateral thighs the superficial skin was removed at the dermis.  The retrieved donor site skin was sent through the mesher increasing surface area.  The retrieved autograft was stapled along the wound to the foot and ankle.  Using a bone nipper prior to autograft application the cuneiform as well as the cuboid that was exposed was debrided to normal healthy bleeding bone.  There was also tendon exposed to the medial left leg which was debrided with a #15 blade to healthy tissue.  The autograft which was stapled to the wound to the foot and ankle was then sufficient thus donor sites were marked along the medial lateral left calf approximately 6 x 4 cm.  The dermatome was also used along these marked borders and then retrieved autograft was sent through the mesher to increase surface area the autograft was applied  along the wound and stapled in place covering the entirety of the wound.  The wound was measured to be 625 cm².  There is approximately 100-150 cc of blood loss during surgery.     The donor sites were covered with Xeroform, 4 x 4, Tegaderm.  The wound to the left foot was covered with Xeroform ABD DST in the Achilles tendon which was transected percutaneously was stapled and covered with Xeroform DSD as well. Kerlix was applied to the foot and ankle extending just below the knee as well as a tightly wrapped Coban and Ace.  The patient was sent to the PACU in stable condition.  He is to be admitted for transfusion of packed red blood cells.  He will have an ID consult in the morning for intra venous antibiotic recommendations.  He is to be nonweightbearing        Ca Lr DPM  Phone Number: [phone]

## 2019-03-21 ENCOUNTER — OFFICE VISIT (OUTPATIENT)
Dept: WOUND CARE | Facility: HOSPITAL | Age: 59
End: 2019-03-21
Attending: PLASTIC SURGERY
Payer: COMMERCIAL

## 2019-03-21 VITALS
HEART RATE: 91 BPM | RESPIRATION RATE: 18 BRPM | SYSTOLIC BLOOD PRESSURE: 126 MMHG | DIASTOLIC BLOOD PRESSURE: 72 MMHG | TEMPERATURE: 97.2 F

## 2019-03-21 DIAGNOSIS — M86.372 CHRONIC MULTIFOCAL OSTEOMYELITIS, LEFT ANKLE AND FOOT (CMS/HCC): Primary | ICD-10-CM

## 2019-03-21 LAB
GLUCOSE BLD-MCNC: 143 MG/DL (ref 70–99)
GLUCOSE BLD-MCNC: 177 MG/DL (ref 70–99)
POCT TEST: ABNORMAL
POCT TEST: ABNORMAL

## 2019-03-21 PROCEDURE — 82948 REAGENT STRIP/BLOOD GLUCOSE: CPT

## 2019-03-21 PROCEDURE — 200200 PR NO CHARGE: Performed by: PLASTIC SURGERY

## 2019-03-21 PROCEDURE — 99183 HYPERBARIC OXYGEN THERAPY: CPT | Performed by: PLASTIC SURGERY

## 2019-03-21 NOTE — PROGRESS NOTES
Hyperbaric Oxygen Therapy Treatment Summary    Patient Name: Harvey Lucero Jr.             : 1960  Date of Visit: 3/21/2019    Indication for Treatment:   Chronic Refractory Osteomyelitis (Unresponsive to Conventional Medical and Surgical Managrment): Laterality: Left  Site: Foot and ankle    Comments: He is scheduled to have another dressing changed by home care nursing of the left lower extremity foot and ankle graft area this afternoon.  We have forwarded recommendations to home nursing care in reference to ongoing wound dressing care and donor site care which have been reviewed by both myself and Dr. Tee.      Treatment Summary:   Continue present Hyperbaric Oxygen Treatment Plan: Treatment 2.4 YADI x 90 minutes with 5 minute airbreak x 2      Response To Treatment:  Physician was present during total Hyperbaric Oxygen Treatment including the ascent and decent portions.  Post-Treatment: The patient tolerated today's HBO Treatment without complication

## 2019-03-22 ENCOUNTER — OFFICE VISIT (OUTPATIENT)
Dept: WOUND CARE | Facility: HOSPITAL | Age: 59
End: 2019-03-22
Attending: PODIATRIST
Payer: COMMERCIAL

## 2019-03-22 VITALS
SYSTOLIC BLOOD PRESSURE: 131 MMHG | RESPIRATION RATE: 16 BRPM | TEMPERATURE: 98 F | DIASTOLIC BLOOD PRESSURE: 59 MMHG | HEART RATE: 86 BPM

## 2019-03-22 DIAGNOSIS — M86.372 CHRONIC MULTIFOCAL OSTEOMYELITIS, LEFT ANKLE AND FOOT (CMS/HCC): ICD-10-CM

## 2019-03-22 LAB
GLUCOSE BLD-MCNC: 113 MG/DL (ref 70–99)
GLUCOSE BLD-MCNC: 145 MG/DL (ref 70–99)
POCT TEST: ABNORMAL
POCT TEST: ABNORMAL

## 2019-03-22 PROCEDURE — G0277 HBOT, FULL BODY CHAMBER, 30M: HCPCS

## 2019-03-22 PROCEDURE — 82948 REAGENT STRIP/BLOOD GLUCOSE: CPT

## 2019-03-22 ASSESSMENT — PAIN SCALES - GENERAL: PAINLEVEL: 8

## 2019-03-22 NOTE — PROGRESS NOTES
Hyperbaric Oxygen Therapy Treatment Summary    Patient Name: Harvey Lucero Jr.             : 1960  Date of Visit: 3/22/2019    Indication for Treatment:   Chronic Refractory Osteomyelitis (Unresponsive to Conventional Medical and Surgical Managrment): Laterality: Left  Site: Foot and ankle      Treatment Summary:   Continue present Hyperbaric Oxygen Treatment Plan: Treatment 2.4 YADI x 90 minutes with 5 minute airbreak x 2      Response To Treatment:  Physician was present during total Hyperbaric Oxygen Treatment including the ascent and decent portions.

## 2019-03-25 ENCOUNTER — OFFICE VISIT (OUTPATIENT)
Dept: WOUND CARE | Facility: HOSPITAL | Age: 59
End: 2019-03-25
Attending: PLASTIC SURGERY
Payer: COMMERCIAL

## 2019-03-25 VITALS
HEART RATE: 93 BPM | RESPIRATION RATE: 18 BRPM | SYSTOLIC BLOOD PRESSURE: 144 MMHG | DIASTOLIC BLOOD PRESSURE: 80 MMHG | TEMPERATURE: 98.5 F

## 2019-03-25 DIAGNOSIS — M86.372 CHRONIC MULTIFOCAL OSTEOMYELITIS, LEFT ANKLE AND FOOT (CMS/HCC): ICD-10-CM

## 2019-03-25 LAB
GLUCOSE BLD-MCNC: 105 MG/DL (ref 70–99)
GLUCOSE BLD-MCNC: 156 MG/DL (ref 70–99)
POCT TEST: ABNORMAL
POCT TEST: ABNORMAL

## 2019-03-25 PROCEDURE — 200200 PR NO CHARGE: Performed by: PLASTIC SURGERY

## 2019-03-25 PROCEDURE — 82948 REAGENT STRIP/BLOOD GLUCOSE: CPT

## 2019-03-25 PROCEDURE — 99183 HYPERBARIC OXYGEN THERAPY: CPT | Performed by: PLASTIC SURGERY

## 2019-03-25 PROCEDURE — G0277 HBOT, FULL BODY CHAMBER, 30M: HCPCS

## 2019-03-25 NOTE — PROGRESS NOTES
Hyperbaric Oxygen Therapy Treatment Summary    Patient Name: Harvey Lucero Jr.             : 1960  Date of Visit: 3/25/2019    Indication for Treatment:   Chronic Refractory Osteomyelitis (Unresponsive to Conventional Medical and Surgical Managrment): Laterality: Left  Site: Foot/Ankle    Comments:      Treatment Summary:   Continue present Hyperbaric Oxygen Treatment Plan: Treatment 2.4 YADI x 90 minutes with 5 minute airbreak x 2      Response To Treatment:  Physician was present during total Hyperbaric Oxygen Treatment including the ascent and decent portions.  Post-Treatment: The patient tolerated today's HBO Treatment without complication

## 2019-03-26 ENCOUNTER — OFFICE VISIT (OUTPATIENT)
Dept: WOUND CARE | Facility: HOSPITAL | Age: 59
End: 2019-03-26
Attending: PLASTIC SURGERY
Payer: COMMERCIAL

## 2019-03-26 VITALS
TEMPERATURE: 98.1 F | SYSTOLIC BLOOD PRESSURE: 141 MMHG | DIASTOLIC BLOOD PRESSURE: 72 MMHG | HEART RATE: 93 BPM | RESPIRATION RATE: 18 BRPM

## 2019-03-26 DIAGNOSIS — M86.372 CHRONIC MULTIFOCAL OSTEOMYELITIS, LEFT ANKLE AND FOOT (CMS/HCC): Primary | ICD-10-CM

## 2019-03-26 LAB
GLUCOSE BLD-MCNC: 118 MG/DL (ref 70–99)
GLUCOSE BLD-MCNC: 127 MG/DL (ref 70–99)
POCT TEST: ABNORMAL
POCT TEST: ABNORMAL

## 2019-03-26 PROCEDURE — 200200 PR NO CHARGE: Performed by: PLASTIC SURGERY

## 2019-03-26 PROCEDURE — 99183 HYPERBARIC OXYGEN THERAPY: CPT | Performed by: PLASTIC SURGERY

## 2019-03-26 PROCEDURE — G0277 HBOT, FULL BODY CHAMBER, 30M: HCPCS

## 2019-03-26 PROCEDURE — 82948 REAGENT STRIP/BLOOD GLUCOSE: CPT

## 2019-03-26 NOTE — PROGRESS NOTES
Hyperbaric Oxygen Therapy Treatment Summary    Patient Name: Harvey Lucero Jr.             : 1960  Date of Visit: 3/26/2019    Indication for Treatment:   Chronic Refractory Osteomyelitis (Unresponsive to Conventional Medical and Surgical Managrment): Laterality: Left  Site: Foot/ankle    Comments:Today's treatment completes #30 of planned 40 total.  His donor wounds are reported near completely healed.  We plan skin graft wound area inspection again tomorrow by Dr. Tee and me.      Treatment Summary:   Continue present Hyperbaric Oxygen Treatment Plan: Treatment 2.4 YADI x 90 minutes with 5 minute airbreak x 2      Response To Treatment:  Physician was present during total Hyperbaric Oxygen Treatment including the ascent and decent portions.  Post-Treatment: The patient tolerated today's HBO Treatment without complication

## 2019-03-27 ENCOUNTER — OFFICE VISIT (OUTPATIENT)
Dept: WOUND CARE | Facility: HOSPITAL | Age: 59
End: 2019-03-27
Attending: PLASTIC SURGERY
Payer: COMMERCIAL

## 2019-03-27 ENCOUNTER — OFFICE VISIT (OUTPATIENT)
Dept: WOUND CARE | Facility: HOSPITAL | Age: 59
End: 2019-03-27
Attending: PODIATRIST
Payer: COMMERCIAL

## 2019-03-27 VITALS
DIASTOLIC BLOOD PRESSURE: 70 MMHG | SYSTOLIC BLOOD PRESSURE: 119 MMHG | TEMPERATURE: 97.3 F | RESPIRATION RATE: 18 BRPM | HEART RATE: 83 BPM

## 2019-03-27 DIAGNOSIS — M86.372 CHRONIC MULTIFOCAL OSTEOMYELITIS, LEFT ANKLE AND FOOT (CMS/HCC): Primary | ICD-10-CM

## 2019-03-27 DIAGNOSIS — R60.0 LOCALIZED EDEMA: ICD-10-CM

## 2019-03-27 DIAGNOSIS — Z87.39 HISTORY OF NECROTIZING FASCIITIS: Primary | ICD-10-CM

## 2019-03-27 LAB
GLUCOSE BLD-MCNC: 100 MG/DL (ref 70–99)
GLUCOSE BLD-MCNC: 170 MG/DL (ref 70–99)
POCT TEST: ABNORMAL
POCT TEST: ABNORMAL

## 2019-03-27 PROCEDURE — 82948 REAGENT STRIP/BLOOD GLUCOSE: CPT

## 2019-03-27 PROCEDURE — 200200 PR NO CHARGE: Performed by: PLASTIC SURGERY

## 2019-03-27 PROCEDURE — G0277 HBOT, FULL BODY CHAMBER, 30M: HCPCS

## 2019-03-27 PROCEDURE — 99183 HYPERBARIC OXYGEN THERAPY: CPT | Performed by: PLASTIC SURGERY

## 2019-03-27 NOTE — PROGRESS NOTES
Hyperbaric Oxygen Therapy Treatment Summary    Patient Name: Harvey Lucero Jr.             : 1960  Date of Visit: 3/27/2019    Indication for Treatment:   Chronic Refractory Osteomyelitis (Unresponsive to Conventional Medical and Surgical Managrment): Laterality: Left  Site: Foot/ankle    Comments: Left foot wound area was evaluated by Dr. Randal guevara this morning prior to his hyperbaric treatment.  The skin grafted wound areas are healing very well and staples were removed this morning.  Is a minimal anterior area requiring continued secondary healing and no signs of soft tissue infection at all.  The skin graft donor sites appear to be near completely healed and he is continuing his home wound care nursing management.  He was then treated with his hyperbaric oxygen therapy session and tolerated it very well.      Treatment Summary:   Continue present Hyperbaric Oxygen Treatment Plan: Treatment 2.4 YADI x 90 minutes with 5 minute airbreak x 2      Response To Treatment:  Physician was present during total Hyperbaric Oxygen Treatment including the ascent and decent portions.  Post-Treatment: The patient tolerated today's HBO Treatment without complication

## 2019-03-27 NOTE — PROGRESS NOTES
Subjective      Patient ID: Harvey Lucero Jr. is a 58 y.o. male.    HPI patient seen today chief complaint status post transmetatarsal resection and also resection of medial cuneiform.  There is non-viability patient presents with wet-to-dry dressings wound VAC failed last evening the arm was set patient Mariama denies any fever chills night sweats and feels the best she is felt in several months.  Patient is status post TMA with harvesting a split the skin graft application to his left leg.  Zhang Leslie denies any fever chills night sweats.    The following have been reviewed and updated as appropriate in this visit:         Past Medical History: He  has a past medical history of Anxiety; Chronic osteomyelitis (CMS/[facility]) ([facility]); Dyslipidemia; GERD (gastroesophageal reflux disease); History of CVA (cerebrovascular accident); Hypertension; Lipid disorder; Open wound; TIA (transient ischemic attack); and Type 2 diabetes mellitus (CMS/[facility]) ([facility]). He also has no past medical history of Depression.  Past Surgical History: He  has a past surgical history that includes [place] tooth extraction; Other surgical history; Incision and drainage of wound (Left); Wound debridement (Left); and Foot amputation through metatarsal (Left).  Medication: He has a current medication list which includes the following prescription(s): acetaminophen, amoxicillin, blood sugar diagnostic, blood-glucose meter, clopidogrel, insulin glargine, lancets, lisinopril, lorazepam, metformin, pen needle, diabetic, pen needle, diabetic, rosuvastatin, and sitagliptin.  Allergies: He is allergic to no known allergies.  Social History: He  reports that he has quit smoking. His smoking use included Cigarettes. He has never used smokeless tobacco. He reports that he does not drink alcohol or use drugs.    Review of Systems:  Review of Systems      Objective Graft sites are well incorporated there is still some presence of the posterior tibial tendon however  is granulating in its moist and sweats final motion muscle flaps were also taken to the plantar medial plantar aspect of foot through a small area area open anteriorly but has a fibrous polyp was a granular base is noted positive active bleeding there is noted to be no signs of desiccation exposure no other irregularities noted at this time.    Foot Exam    Assessment/Plan Status post proximal transmitting ampuatation with excision of medial cuneiform  2.  Status post tendon transfer   3.  Tissue and grafting with the second time around with application of skin graft and harvesting skin graft to the area.    Plan  1.  Is still very very probably patient may still lose limb  2 patient continue on ampicillin every 4 hours for the residual osteomyelitis #  3 patient with HBO to try to reduce the amount of a bone infection to improve healing can also increase oxygenation   4 4 instructed patient this time that wound care will continue to be progressive over the course of time I did recommend that we change to dressing the thighs that is here for attack and this will be done daily will talk to Annika the nurse and also to Dr. Vinson had a great day.  Patient still at high risk for limb loss instruct the patient continue with good diet plan the patient was reevaluated in 1 week  Problem List Items Addressed This Visit     None          No Follow-up on file.    Fabricio Tee DPM

## 2019-03-28 ENCOUNTER — OFFICE VISIT (OUTPATIENT)
Dept: WOUND CARE | Facility: HOSPITAL | Age: 59
End: 2019-03-28
Attending: PLASTIC SURGERY
Payer: COMMERCIAL

## 2019-03-28 VITALS
HEART RATE: 91 BPM | TEMPERATURE: 97.6 F | RESPIRATION RATE: 18 BRPM | DIASTOLIC BLOOD PRESSURE: 76 MMHG | SYSTOLIC BLOOD PRESSURE: 136 MMHG

## 2019-03-28 DIAGNOSIS — M86.372 CHRONIC MULTIFOCAL OSTEOMYELITIS, LEFT ANKLE AND FOOT (CMS/HCC): Primary | ICD-10-CM

## 2019-03-28 LAB
GLUCOSE BLD-MCNC: 119 MG/DL (ref 70–99)
GLUCOSE BLD-MCNC: 178 MG/DL (ref 70–99)
POCT TEST: ABNORMAL
POCT TEST: ABNORMAL

## 2019-03-28 PROCEDURE — 200200 PR NO CHARGE: Performed by: PLASTIC SURGERY

## 2019-03-28 PROCEDURE — G0277 HBOT, FULL BODY CHAMBER, 30M: HCPCS

## 2019-03-28 PROCEDURE — 82948 REAGENT STRIP/BLOOD GLUCOSE: CPT

## 2019-03-28 PROCEDURE — 99183 HYPERBARIC OXYGEN THERAPY: CPT | Performed by: PLASTIC SURGERY

## 2019-03-28 NOTE — PROGRESS NOTES
Hyperbaric Oxygen Therapy Treatment Summary    Patient Name: Harvey Lucero Jr.             : 1960  Date of Visit: 3/28/2019    Indication for Treatment:   Chronic Refractory Osteomyelitis (Unresponsive to Conventional Medical and Surgical Managrment): Laterality: Left  Site: Foot/ankle    Comments:Wound exam shows good progressive take of skin grafts and healed graft donor site wounds.  Completed # 32 of planned 40 HBO treatments and tolerated it well.      Treatment Summary:   Continue present Hyperbaric Oxygen Treatment Plan: Treatment 2.4 YADI x 90 minutes with 5 minute airbreak x 2      Response To Treatment:  Physician was present during total Hyperbaric Oxygen Treatment including the ascent and decent portions.  Post-Treatment: The patient tolerated today's HBO Treatment without complication

## 2019-03-29 ENCOUNTER — OFFICE VISIT (OUTPATIENT)
Dept: WOUND CARE | Facility: HOSPITAL | Age: 59
End: 2019-03-29
Attending: HOSPITALIST
Payer: COMMERCIAL

## 2019-03-29 VITALS
DIASTOLIC BLOOD PRESSURE: 74 MMHG | SYSTOLIC BLOOD PRESSURE: 140 MMHG | HEART RATE: 89 BPM | RESPIRATION RATE: 18 BRPM | TEMPERATURE: 97.2 F

## 2019-03-29 DIAGNOSIS — M86.372 CHRONIC MULTIFOCAL OSTEOMYELITIS, LEFT ANKLE AND FOOT (CMS/HCC): Primary | ICD-10-CM

## 2019-03-29 DIAGNOSIS — M86.072 ACUTE HEMATOGENOUS OSTEOMYELITIS OF LEFT FOOT (CMS/HCC): ICD-10-CM

## 2019-03-29 LAB
GLUCOSE BLD-MCNC: 186 MG/DL (ref 70–99)
GLUCOSE BLD-MCNC: 230 MG/DL (ref 70–99)
POCT TEST: ABNORMAL
POCT TEST: ABNORMAL

## 2019-03-29 PROCEDURE — 82948 REAGENT STRIP/BLOOD GLUCOSE: CPT

## 2019-03-29 PROCEDURE — 99999 PR OFFICE/OUTPT VISIT,PROCEDURE ONLY: CPT | Performed by: HOSPITALIST

## 2019-03-29 PROCEDURE — 5A05121 EXTRACORPOREAL HYPERBARIC OXYGENATION, INTERMITTENT: ICD-10-PCS | Performed by: HOSPITALIST

## 2019-03-29 PROCEDURE — 99183 HYPERBARIC OXYGEN THERAPY: CPT | Performed by: HOSPITALIST

## 2019-03-29 PROCEDURE — G0277 HBOT, FULL BODY CHAMBER, 30M: HCPCS

## 2019-04-01 ENCOUNTER — OFFICE VISIT (OUTPATIENT)
Dept: WOUND CARE | Facility: HOSPITAL | Age: 59
End: 2019-04-01
Attending: PLASTIC SURGERY
Payer: COMMERCIAL

## 2019-04-01 VITALS
SYSTOLIC BLOOD PRESSURE: 152 MMHG | RESPIRATION RATE: 18 BRPM | DIASTOLIC BLOOD PRESSURE: 76 MMHG | TEMPERATURE: 97.6 F | HEART RATE: 89 BPM

## 2019-04-01 DIAGNOSIS — M86.372 CHRONIC MULTIFOCAL OSTEOMYELITIS, LEFT ANKLE AND FOOT (CMS/HCC): Primary | ICD-10-CM

## 2019-04-01 LAB
GLUCOSE BLD-MCNC: 117 MG/DL (ref 70–99)
GLUCOSE BLD-MCNC: 166 MG/DL (ref 70–99)
POCT TEST: ABNORMAL
POCT TEST: ABNORMAL

## 2019-04-01 PROCEDURE — G0277 HBOT, FULL BODY CHAMBER, 30M: HCPCS

## 2019-04-01 PROCEDURE — 82948 REAGENT STRIP/BLOOD GLUCOSE: CPT

## 2019-04-01 PROCEDURE — 200200 PR NO CHARGE: Performed by: PLASTIC SURGERY

## 2019-04-01 PROCEDURE — 99183 HYPERBARIC OXYGEN THERAPY: CPT | Performed by: PLASTIC SURGERY

## 2019-04-01 NOTE — PROGRESS NOTES
Hyperbaric Oxygen Therapy Treatment Summary    Patient Name: Harvey Lucero Jr.             : 1960  Date of Visit: 2019    Indication for Treatment:   Chronic Refractory Osteomyelitis (Unresponsive to Conventional Medical and Surgical Managrment): Laterality: Left  Site: foot,ankle    Comments:The wound area demonstrates progressive healing of the skin grafted areas with no sign of soft tissue infection. Ruben tolerated today's treatment without any difficulty.      Treatment Summary:   Continue present Hyperbaric Oxygen Treatment Plan: Treatment 2.4 YADI x 90 minutes with 5 minute airbreak x 2      Response To Treatment:  Physician was present during total Hyperbaric Oxygen Treatment including the ascent and decent portions.  Post-Treatment: The patient tolerated today's HBO Treatment without complication

## 2019-04-02 ENCOUNTER — OFFICE VISIT (OUTPATIENT)
Dept: WOUND CARE | Facility: HOSPITAL | Age: 59
End: 2019-04-02
Attending: PLASTIC SURGERY
Payer: COMMERCIAL

## 2019-04-02 VITALS
TEMPERATURE: 96.9 F | DIASTOLIC BLOOD PRESSURE: 84 MMHG | SYSTOLIC BLOOD PRESSURE: 144 MMHG | HEART RATE: 89 BPM | RESPIRATION RATE: 18 BRPM

## 2019-04-02 DIAGNOSIS — M86.372 CHRONIC MULTIFOCAL OSTEOMYELITIS, LEFT ANKLE AND FOOT (CMS/HCC): Primary | ICD-10-CM

## 2019-04-02 LAB
GLUCOSE BLD-MCNC: 125 MG/DL (ref 70–99)
GLUCOSE BLD-MCNC: 167 MG/DL (ref 70–99)
POCT TEST: ABNORMAL
POCT TEST: ABNORMAL

## 2019-04-02 PROCEDURE — 82948 REAGENT STRIP/BLOOD GLUCOSE: CPT

## 2019-04-02 PROCEDURE — 99183 HYPERBARIC OXYGEN THERAPY: CPT | Performed by: PLASTIC SURGERY

## 2019-04-02 PROCEDURE — 200200 PR NO CHARGE: Performed by: PLASTIC SURGERY

## 2019-04-02 PROCEDURE — G0277 HBOT, FULL BODY CHAMBER, 30M: HCPCS

## 2019-04-02 ASSESSMENT — PAIN SCALES - GENERAL: PAINLEVEL: 0-NO PAIN

## 2019-04-02 NOTE — PROGRESS NOTES
Hyperbaric Oxygen Therapy Treatment Summary    Patient Name: Harvey Lucero Jr.             : 1960  Date of Visit: 2019    Indication for Treatment:   Chronic Refractory Osteomyelitis (Unresponsive to Conventional Medical and Surgical Managrment): Laterality: Left  Site: foot/ankle    Comments:Completed # 35 out of planned 40 HBO treatments today.  Wound dressing completed and graft healing continues to progress well.  BGs remaining stable.      Treatment Summary:   Continue present Hyperbaric Oxygen Treatment Plan: Treatment 2.4 YADI x 90 minutes with 5 minute airbreak x 2      Response To Treatment:  Physician was present during total Hyperbaric Oxygen Treatment including the ascent and decent portions.  Post-Treatment: The patient tolerated today's HBO Treatment without complication

## 2019-04-03 ENCOUNTER — OFFICE VISIT (OUTPATIENT)
Dept: WOUND CARE | Facility: HOSPITAL | Age: 59
End: 2019-04-03
Attending: PODIATRIST
Payer: COMMERCIAL

## 2019-04-03 ENCOUNTER — OFFICE VISIT (OUTPATIENT)
Dept: WOUND CARE | Facility: HOSPITAL | Age: 59
End: 2019-04-03
Attending: PLASTIC SURGERY
Payer: COMMERCIAL

## 2019-04-03 VITALS
RESPIRATION RATE: 18 BRPM | SYSTOLIC BLOOD PRESSURE: 153 MMHG | HEART RATE: 93 BPM | TEMPERATURE: 96.5 F | DIASTOLIC BLOOD PRESSURE: 73 MMHG

## 2019-04-03 VITALS
DIASTOLIC BLOOD PRESSURE: 73 MMHG | SYSTOLIC BLOOD PRESSURE: 153 MMHG | TEMPERATURE: 96.5 F | HEART RATE: 93 BPM | RESPIRATION RATE: 18 BRPM

## 2019-04-03 DIAGNOSIS — Z79.4 TYPE 2 DIABETES MELLITUS WITH DIABETIC PERIPHERAL ANGIOPATHY AND GANGRENE, WITH LONG-TERM CURRENT USE OF INSULIN (CMS/HCC): Primary | ICD-10-CM

## 2019-04-03 DIAGNOSIS — M86.072 ACUTE HEMATOGENOUS OSTEOMYELITIS OF LEFT FOOT (CMS/HCC): ICD-10-CM

## 2019-04-03 DIAGNOSIS — M86.372 CHRONIC MULTIFOCAL OSTEOMYELITIS, LEFT ANKLE AND FOOT (CMS/HCC): ICD-10-CM

## 2019-04-03 DIAGNOSIS — Z79.4 TYPE 2 DIABETES MELLITUS WITH FOOT ULCER, WITH LONG-TERM CURRENT USE OF INSULIN (CMS/HCC): ICD-10-CM

## 2019-04-03 DIAGNOSIS — M86.372 CHRONIC MULTIFOCAL OSTEOMYELITIS, LEFT ANKLE AND FOOT (CMS/HCC): Primary | ICD-10-CM

## 2019-04-03 DIAGNOSIS — E11.621 TYPE 2 DIABETES MELLITUS WITH FOOT ULCER, WITH LONG-TERM CURRENT USE OF INSULIN (CMS/HCC): ICD-10-CM

## 2019-04-03 DIAGNOSIS — S81.802D WOUND OF LEFT LOWER EXTREMITY, SUBSEQUENT ENCOUNTER: ICD-10-CM

## 2019-04-03 DIAGNOSIS — E11.52 TYPE 2 DIABETES MELLITUS WITH DIABETIC PERIPHERAL ANGIOPATHY AND GANGRENE, WITH LONG-TERM CURRENT USE OF INSULIN (CMS/HCC): Primary | ICD-10-CM

## 2019-04-03 DIAGNOSIS — L97.509 TYPE 2 DIABETES MELLITUS WITH FOOT ULCER, WITH LONG-TERM CURRENT USE OF INSULIN (CMS/HCC): ICD-10-CM

## 2019-04-03 DIAGNOSIS — R60.0 LOCALIZED EDEMA: ICD-10-CM

## 2019-04-03 LAB
GLUCOSE BLD-MCNC: 117 MG/DL (ref 70–99)
GLUCOSE BLD-MCNC: 139 MG/DL (ref 70–99)
POCT TEST: ABNORMAL
POCT TEST: ABNORMAL

## 2019-04-03 PROCEDURE — 99183 HYPERBARIC OXYGEN THERAPY: CPT | Performed by: PLASTIC SURGERY

## 2019-04-03 PROCEDURE — G0277 HBOT, FULL BODY CHAMBER, 30M: HCPCS

## 2019-04-03 PROCEDURE — 200200 PR NO CHARGE: Performed by: PLASTIC SURGERY

## 2019-04-03 PROCEDURE — 82948 REAGENT STRIP/BLOOD GLUCOSE: CPT

## 2019-04-03 NOTE — PROGRESS NOTES
Subjective      Patient ID: Harvey Lucero Jr. is a 58 y.o. male.    HPI patient seen today chief complaint status post transmetatarsal resection and also resection of medial cuneiform.  There is non-viability patient presents with wet-to-dry dressings wound VAC failed last evening the arm was set patient Mariama denies any fever chills night sweats and feels the best she is felt in several months.  Patient is status post TMA with harvesting a split the skin graft application to his left leg.  Zhang Leslie denies any fever chills night sweats.    The following have been reviewed and updated as appropriate in this visit:         Past Medical History: He  has a past medical history of Anxiety; Chronic osteomyelitis (CMS/[facility]) ([facility]); Dyslipidemia; GERD (gastroesophageal reflux disease); History of CVA (cerebrovascular accident); Hypertension; Lipid disorder; Open wound; TIA (transient ischemic attack); and Type 2 diabetes mellitus (CMS/[facility]) ([facility]). He also has no past medical history of Depression.  Past Surgical History: He  has a past surgical history that includes [place] tooth extraction; Other surgical history; Incision and drainage of wound (Left); Wound debridement (Left); and Foot amputation through metatarsal (Left).  Medication: He has a current medication list which includes the following prescription(s): acetaminophen, amoxicillin, blood sugar diagnostic, blood-glucose meter, clopidogrel, insulin glargine, lancets, lisinopril, lorazepam, metformin, pen needle, diabetic, pen needle, diabetic, rosuvastatin, and sitagliptin.  Allergies: He is allergic to no known allergies.  Social History: He  reports that he has quit smoking. His smoking use included Cigarettes. He has never used smokeless tobacco. He reports that he does not drink alcohol or use drugs.    Review of Systems:  Review of Systems      Objective Graft sites are well incorporated there is still some presence of the posterior tibial tendon however  is granulating in its moist and sweats final motion muscle flaps were also taken to the plantar medial plantar aspect of foot through a small area area open anteriorly but has a fibrous polyp was a granular base is noted positive active bleeding there is noted to be no signs of desiccation exposure no other irregularities noted at this time.    Foot Exam    Assessment/Plan Status post proximal transmitting ampuatation with excision of medial cuneiform  2.  Status post tendon transfer   3.  Tissue and grafting with the second time around with application of skin graft and harvesting skin graft to the area.    Plan  1.  Is still very very probably patient may still lose limb  2 patient continue on ampicillin every 4 hours for the residual osteomyelitis #  3 patient with HBO to try to reduce the amount of a bone infection to improve healing can also increase oxygenation   4 4 instructed patient this time that wound care will continue to be progressive over the course of time I did recommend that we change to dressing the thighs that is here for attack and this will be done daily will talk to Annika the nurse and also to Dr. Vinson had a great day.  Patient still at high risk for limb loss instruct the patient continue with good diet plan the patient was reevaluated in 1 week  Problem List Items Addressed This Visit     None          No Follow-up on file.    Fabricio Tee DPM

## 2019-04-03 NOTE — PROGRESS NOTES
Hyperbaric Oxygen Therapy Treatment Summary    Patient Name: Harvey Lucero Jr.             : 1960  Date of Visit: 4/3/2019    Indication for Treatment:   Chronic Refractory Osteomyelitis (Unresponsive to [employer] [facility]agrment): Laterality: Left  Site: foot/ankle    Comments: Ruben's wound was evaluated by Dr. Tee this morning.  The grafted area continues to heal very well in the granulating area is clean and healthy with no signs of superficial or deep infection at this time.  Dr. Tee feels that the residual to do 4% of infected bone area demonstrates no evidence clinically of persistent infection and I am in agreement.  Ruben is now at #36 of planned 40 hyperbaric oxygen treatments and will complete the final 4.  He is doing very well and we are pleased with his progress.  For further evaluation findings and plans for upcoming initiation of ambulation, I would refer you to Dr. Tee's visit note for today.      Treatment Summary:   Continue present Hyperbaric Oxygen Treatment Plan: Treatment 2.4 YADI x 90 minutes with 5 minute airbreak x 2      Response To Treatment:  Physician was present during total Hyperbaric Oxygen Treatment including the ascent and decent portions.  Post-Treatment: The patient tolerated today's HBO Treatment without complication

## 2019-04-04 ENCOUNTER — OFFICE VISIT (OUTPATIENT)
Dept: WOUND CARE | Facility: HOSPITAL | Age: 59
End: 2019-04-04
Attending: PLASTIC SURGERY
Payer: COMMERCIAL

## 2019-04-04 VITALS
RESPIRATION RATE: 16 BRPM | HEART RATE: 85 BPM | SYSTOLIC BLOOD PRESSURE: 147 MMHG | TEMPERATURE: 98 F | DIASTOLIC BLOOD PRESSURE: 81 MMHG

## 2019-04-04 DIAGNOSIS — M86.372 CHRONIC MULTIFOCAL OSTEOMYELITIS, LEFT ANKLE AND FOOT (CMS/HCC): Primary | ICD-10-CM

## 2019-04-04 LAB
GLUCOSE BLD-MCNC: 113 MG/DL (ref 70–99)
GLUCOSE BLD-MCNC: 162 MG/DL (ref 70–99)
POCT TEST: ABNORMAL
POCT TEST: ABNORMAL

## 2019-04-04 PROCEDURE — 82948 REAGENT STRIP/BLOOD GLUCOSE: CPT

## 2019-04-04 PROCEDURE — 99183 HYPERBARIC OXYGEN THERAPY: CPT | Performed by: PLASTIC SURGERY

## 2019-04-04 PROCEDURE — G0277 HBOT, FULL BODY CHAMBER, 30M: HCPCS

## 2019-04-04 PROCEDURE — 200200 PR NO CHARGE: Performed by: PLASTIC SURGERY

## 2019-04-04 ASSESSMENT — PAIN SCALES - GENERAL: PAINLEVEL: 0-NO PAIN

## 2019-04-04 NOTE — PROGRESS NOTES
Hyperbaric Oxygen Therapy Treatment Summary    Patient Name: Harvey Lucero Jr.             : 1960  Date of Visit: 2019    Indication for Treatment:   Chronic Refractory Osteomyelitis (Unresponsive to [employer] Medical [facility] Surgical Managrment): Laterality: Left  Site: foot/ankle    Comments:The wound examination demonstrates continued graft healing with no signs of STI.  The donor site wounds are nearly completely healed. Post treatment dressing reapplied with layer Xeroform and occlusive padded gauze dressing and ace wrapping support with redress donor sites.  At # 36 of planned 40 treatments.      Treatment Summary:   Continue present Hyperbaric Oxygen Treatment Plan: Treatment 2.4 YADI x 90 minutes with 5 minute airbreak x 2      Response To Treatment:  Physician was present during total Hyperbaric Oxygen Treatment including the ascent and decent portions.  Post-Treatment: The patient tolerated today's HBO Treatment without complication

## 2019-04-05 ENCOUNTER — OFFICE VISIT (OUTPATIENT)
Dept: WOUND CARE | Facility: HOSPITAL | Age: 59
End: 2019-04-05
Attending: PODIATRIST
Payer: COMMERCIAL

## 2019-04-05 VITALS
TEMPERATURE: 96.9 F | RESPIRATION RATE: 18 BRPM | SYSTOLIC BLOOD PRESSURE: 155 MMHG | HEART RATE: 86 BPM | DIASTOLIC BLOOD PRESSURE: 78 MMHG

## 2019-04-05 DIAGNOSIS — M86.372 CHRONIC MULTIFOCAL OSTEOMYELITIS, LEFT ANKLE AND FOOT (CMS/HCC): ICD-10-CM

## 2019-04-05 DIAGNOSIS — S91.009A OPEN WOUND OF ANKLE AND FOOT: Primary | ICD-10-CM

## 2019-04-05 DIAGNOSIS — M86.072 ACUTE HEMATOGENOUS OSTEOMYELITIS OF LEFT FOOT (CMS/HCC): ICD-10-CM

## 2019-04-05 DIAGNOSIS — S91.309A OPEN WOUND OF ANKLE AND FOOT: Primary | ICD-10-CM

## 2019-04-05 LAB
GLUCOSE BLD-MCNC: 121 MG/DL (ref 70–99)
GLUCOSE BLD-MCNC: 168 MG/DL (ref 70–99)
POCT TEST: ABNORMAL
POCT TEST: ABNORMAL

## 2019-04-05 PROCEDURE — G0277 HBOT, FULL BODY CHAMBER, 30M: HCPCS

## 2019-04-05 PROCEDURE — 82948 REAGENT STRIP/BLOOD GLUCOSE: CPT

## 2019-04-05 NOTE — PROGRESS NOTES
Hyperbaric Oxygen Therapy Treatment Summary    Patient Name: Harvey Lucero Jr.             : 1960  Date of Visit: 2019    Indication for Treatment:   Chronic Refractory Osteomyelitis (Unresponsive to [employer] Medical [employer] Surgical Managrment): Laterality: Left  Site: foot/ankle    Comments:The wound examination demonstrates continued graft healing with no signs of STI.  The donor site wounds are nearly completely healed. Post treatment dressing reapplied with layer Xeroform and occlusive padded gauze dressing and ace wrapping support with redress donor sites.  At # 37 of planned 40 treatments.      Treatment Summary:   Continue present Hyperbaric Oxygen Treatment Plan: Treatment 2.4 YADI x 90 minutes with 5 minute airbreak x 2      Response To Treatment:  Physician was present during total Hyperbaric Oxygen Treatment including the ascent and decent portions.  Post-Treatment: The patient tolerated today's HBO Treatment without complication

## 2019-04-08 ENCOUNTER — OFFICE VISIT (OUTPATIENT)
Dept: WOUND CARE | Facility: HOSPITAL | Age: 59
End: 2019-04-08
Attending: PLASTIC SURGERY
Payer: COMMERCIAL

## 2019-04-08 VITALS
SYSTOLIC BLOOD PRESSURE: 129 MMHG | RESPIRATION RATE: 16 BRPM | DIASTOLIC BLOOD PRESSURE: 77 MMHG | TEMPERATURE: 98.5 F | HEART RATE: 93 BPM

## 2019-04-08 DIAGNOSIS — M86.372 CHRONIC MULTIFOCAL OSTEOMYELITIS, LEFT ANKLE AND FOOT (CMS/HCC): ICD-10-CM

## 2019-04-08 LAB
GLUCOSE BLD-MCNC: 100 MG/DL (ref 70–99)
GLUCOSE BLD-MCNC: 151 MG/DL (ref 70–99)
POCT TEST: ABNORMAL
POCT TEST: ABNORMAL

## 2019-04-08 PROCEDURE — 5A05121 EXTRACORPOREAL HYPERBARIC OXYGENATION, INTERMITTENT: ICD-10-PCS | Performed by: PLASTIC SURGERY

## 2019-04-08 PROCEDURE — 99183 HYPERBARIC OXYGEN THERAPY: CPT | Performed by: PLASTIC SURGERY

## 2019-04-08 PROCEDURE — 200200 PR NO CHARGE: Performed by: PLASTIC SURGERY

## 2019-04-08 PROCEDURE — 82948 REAGENT STRIP/BLOOD GLUCOSE: CPT

## 2019-04-08 PROCEDURE — G0277 HBOT, FULL BODY CHAMBER, 30M: HCPCS

## 2019-04-08 ASSESSMENT — PAIN SCALES - GENERAL: PAINLEVEL: 0-NO PAIN

## 2019-04-09 ENCOUNTER — OFFICE VISIT (OUTPATIENT)
Dept: WOUND CARE | Facility: HOSPITAL | Age: 59
End: 2019-04-09
Attending: PLASTIC SURGERY
Payer: COMMERCIAL

## 2019-04-09 VITALS
TEMPERATURE: 97.6 F | RESPIRATION RATE: 18 BRPM | DIASTOLIC BLOOD PRESSURE: 89 MMHG | HEART RATE: 88 BPM | SYSTOLIC BLOOD PRESSURE: 177 MMHG

## 2019-04-09 DIAGNOSIS — M86.372 CHRONIC MULTIFOCAL OSTEOMYELITIS, LEFT ANKLE AND FOOT (CMS/HCC): Primary | ICD-10-CM

## 2019-04-09 LAB
GLUCOSE BLD-MCNC: 120 MG/DL (ref 70–99)
GLUCOSE BLD-MCNC: 169 MG/DL (ref 70–99)
POCT TEST: ABNORMAL
POCT TEST: ABNORMAL

## 2019-04-09 PROCEDURE — 5A05121 EXTRACORPOREAL HYPERBARIC OXYGENATION, INTERMITTENT: ICD-10-PCS | Performed by: PLASTIC SURGERY

## 2019-04-09 PROCEDURE — 82948 REAGENT STRIP/BLOOD GLUCOSE: CPT

## 2019-04-09 PROCEDURE — G0277 HBOT, FULL BODY CHAMBER, 30M: HCPCS

## 2019-04-09 PROCEDURE — 200200 PR NO CHARGE: Performed by: PLASTIC SURGERY

## 2019-04-09 PROCEDURE — 99183 HYPERBARIC OXYGEN THERAPY: CPT | Performed by: PLASTIC SURGERY

## 2019-04-09 RX ORDER — AMOXICILLIN 500 MG/1
500 CAPSULE ORAL EVERY 8 HOURS
Qty: 30 CAPSULE | Refills: 1 | Status: SHIPPED | OUTPATIENT
Start: 2019-04-09 | End: 2019-05-09

## 2019-04-09 NOTE — PROGRESS NOTES
Hyperbaric Oxygen Therapy Treatment Summary    Patient Name: Harvey Lucero Jr.             : 1960  Date of Visit: 2019    Indication for Treatment:   Chronic Refractory Osteomyelitis (Unresponsive to [employer] Medical [employer] Surgical Managrment): Laterality: Left  Site: foot/ankle    Comments:Tolerated last HBOT treatment today.  Graft area continues to look good. Dr. Tee feels clinically no evidence of persistent deep tissue or osteomyelitis at his transmet wound area and I agree. He has completely the treatment regimen with excellent response.      Treatment Summary:   Continue present Hyperbaric Oxygen Treatment Plan: Treatment 2.4 YADI x 90 minutes with 5 minute airbreak x 2      Response To Treatment:  Physician was present during total Hyperbaric Oxygen Treatment including the ascent and decent portions.  Post-Treatment: The patient tolerated today's HBO Treatment without complication

## 2019-04-10 ENCOUNTER — OFFICE VISIT (OUTPATIENT)
Dept: WOUND CARE | Facility: HOSPITAL | Age: 59
End: 2019-04-10
Attending: PLASTIC SURGERY
Payer: COMMERCIAL

## 2019-04-10 VITALS — TEMPERATURE: 98.4 F

## 2019-04-10 DIAGNOSIS — M86.372 CHRONIC MULTIFOCAL OSTEOMYELITIS, LEFT ANKLE AND FOOT (CMS/HCC): ICD-10-CM

## 2019-04-10 DIAGNOSIS — L03.032 CELLULITIS AND ABSCESS OF TOE OF LEFT FOOT: ICD-10-CM

## 2019-04-10 DIAGNOSIS — I10 ESSENTIAL HYPERTENSION: Primary | ICD-10-CM

## 2019-04-10 DIAGNOSIS — K21.9 GASTROESOPHAGEAL REFLUX DISEASE, ESOPHAGITIS PRESENCE NOT SPECIFIED: ICD-10-CM

## 2019-04-10 DIAGNOSIS — L02.612 CELLULITIS AND ABSCESS OF TOE OF LEFT FOOT: ICD-10-CM

## 2019-04-10 DIAGNOSIS — M86.072 ACUTE HEMATOGENOUS OSTEOMYELITIS OF LEFT FOOT (CMS/HCC): ICD-10-CM

## 2019-04-10 PROCEDURE — 27200105 HC AQUA CELL 4X4

## 2019-04-10 NOTE — PROGRESS NOTES
Subjective      Patient ID: Harvey Lucero Jr. is a 58 y.o. male.    HPI patient seen today chief complaint status post transmetatarsal resection and also resection of medial cuneiform.  There is non-viability patient presents with wet-to-dry dressings wound VAC failed last evening the arm was set patient Mariama denies any fever chills night sweats and feels the best she is felt in several months.  Patient is status post TMA with harvesting a split the skin graft application to his left leg.  Zhang Leslie denies any fever chills night sweats.    The following have been reviewed and updated as appropriate in this visit:         Past Medical History: He  has a past medical history of Anxiety; Chronic osteomyelitis (CMS/[facility]) ([facility]); Dyslipidemia; GERD (gastroesophageal reflux disease); History of CVA (cerebrovascular accident); Hypertension; Lipid disorder; Open wound; TIA (transient ischemic attack); and Type 2 diabetes mellitus (CMS/[facility]) ([facility]). He also has no past medical history of Depression.  Past Surgical History: He  has a past surgical history that includes [place] tooth extraction; Other surgical history; Incision and drainage of wound (Left); Wound debridement (Left); and Foot amputation through metatarsal (Left).  Medication: He has a current medication list which includes the following prescription(s): acetaminophen, amoxicillin, blood sugar diagnostic, blood-glucose meter, clopidogrel, insulin glargine, lancets, lisinopril, lorazepam, metformin, pen needle, diabetic, pen needle, diabetic, rosuvastatin, and sitagliptin.  Allergies: He is allergic to no known allergies.  Social History: He  reports that he has quit smoking. His smoking use included Cigarettes. He has never used smokeless tobacco. He reports that he does not drink alcohol or use drugs.    Review of Systems:  Review of Systems      Objective Graft sites are well incorporated there is still some presence of the posterior tibial tendon however  is granulating in its moist and sweats final motion muscle flaps were also taken to the plantar medial plantar aspect of foot through a small area area open anteriorly but has a fibrous polyp was a granular base is noted positive active bleeding there is noted to be no signs of desiccation exposure no other irregularities noted at this time.    Foot Exam    Assessment/Plan Status post proximal transmitting ampuatation with excision of medial cuneiform  2.  Status post tendon transfer   3.  Tissue and grafting with the second time around with application of skin graft and harvesting skin graft to the area.    Plan  1.  Is still very very probably patient may still lose limb  2 patient continue on ampicillin every 4 hours for the residual osteomyelitis #  3 patient with HBO to try to reduce the amount of a bone infection to improve healing can also increase oxygenation   4 4 instructed patient this time that wound care will continue to be progressive over the course of time I did recommend that we change to dressing the thighs that is here for attack and this will be done daily will talk to Annika the nurse and also to Dr. Vinson had a great day.  Patient still at high risk for limb loss instruct the patient continue with good diet plan the patient was reevaluated in 1 week  Problem List Items Addressed This Visit     None          Return in about 1 week (around 4/17/2019) for Recheck.    Fabricio Tee DPM

## 2019-04-10 NOTE — PATIENT INSTRUCTIONS
Wound Healing Center Instructions    MEDICATIONS     Medication Note3  Continue present medications as prescribed by the Wound Healing Center or other physicians you see. To avoid any problems keep the Wound Healing Center informed each visit of any medications changes that occur.     WOUND CARE     Clean Wound with:    Treatment 1   Location: left foot  Dressing: adaptic , aquacel & dry dressing    Dressing Care Frequency: every 2-3 days  are Provider: visiting nurse       Basic Principles      • Wash your hands thoroughly with soap and water and after each dressing change. If someone other than the patient changes the dressing, it’s best to wear disposable gloves.   • Do not get the wound or dressing wet.   • To shower: remove the dressing, shower with soap and water (including washing the wound - do not use a washcloth), air dry, then redress the wound.  • Do not take a tub bath  • Keep all your dressings in a clean, covered container at home to avoid dust and contamination.  • Discard used dressings in a plastic bag or covered trash container.  • Check your wound and the surrounding skin at each dressing change for redness, warmth, swelling, increased pain, foul odor, fever, pus or abnormal drainage or discharge.  • Notify Wound Healing Center if any of these changes occur - Dept: [phone].        Nutrition  • Eat a well, balanced diet with adequate protein to support wound healing.   • Take a multivitamin every day. Adequate nutrition supports healing and new tissue growth.  • All diabetic patients should strive to keep blood sugars within a normal, practical range.   • Elevated blood sugars can delay your wound healing.        ACTIVITY     Elevate legs above level of heart   Limit activities    MOBILITY     Wheelchair

## 2019-04-17 ENCOUNTER — OFFICE VISIT (OUTPATIENT)
Dept: WOUND CARE | Facility: HOSPITAL | Age: 59
End: 2019-04-17
Attending: PODIATRIST
Payer: COMMERCIAL

## 2019-04-17 DIAGNOSIS — E11.52 TYPE 2 DIABETES MELLITUS WITH DIABETIC PERIPHERAL ANGIOPATHY AND GANGRENE, WITH LONG-TERM CURRENT USE OF INSULIN (CMS/HCC): Primary | ICD-10-CM

## 2019-04-17 DIAGNOSIS — L97.509 TYPE 2 DIABETES MELLITUS WITH FOOT ULCER, WITH LONG-TERM CURRENT USE OF INSULIN (CMS/HCC): ICD-10-CM

## 2019-04-17 DIAGNOSIS — S91.309A OPEN WOUND OF ANKLE AND FOOT: ICD-10-CM

## 2019-04-17 DIAGNOSIS — S81.802D WOUND OF LEFT LOWER EXTREMITY, SUBSEQUENT ENCOUNTER: ICD-10-CM

## 2019-04-17 DIAGNOSIS — S91.009A OPEN WOUND OF ANKLE AND FOOT: ICD-10-CM

## 2019-04-17 DIAGNOSIS — E11.621 TYPE 2 DIABETES MELLITUS WITH FOOT ULCER, WITH LONG-TERM CURRENT USE OF INSULIN (CMS/HCC): ICD-10-CM

## 2019-04-17 DIAGNOSIS — M86.672 OSTEOMYELITIS, CHRONIC, ANKLE OR FOOT, LEFT (CMS/HCC): ICD-10-CM

## 2019-04-17 DIAGNOSIS — A48.0 GAS GANGRENE (CMS/HCC): ICD-10-CM

## 2019-04-17 DIAGNOSIS — Z79.4 TYPE 2 DIABETES MELLITUS WITH DIABETIC PERIPHERAL ANGIOPATHY AND GANGRENE, WITH LONG-TERM CURRENT USE OF INSULIN (CMS/HCC): Primary | ICD-10-CM

## 2019-04-17 DIAGNOSIS — M86.372 CHRONIC MULTIFOCAL OSTEOMYELITIS OF LEFT FOOT (CMS/HCC): ICD-10-CM

## 2019-04-17 DIAGNOSIS — Z79.4 TYPE 2 DIABETES MELLITUS WITH FOOT ULCER, WITH LONG-TERM CURRENT USE OF INSULIN (CMS/HCC): ICD-10-CM

## 2019-04-17 PROCEDURE — 27200105 HC AQUA CELL 4X4

## 2019-04-17 NOTE — PATIENT INSTRUCTIONS
Wound Healing Center Instructions    MEDICATIONS     Medication Note3  Continue present medications as prescribed by the Wound Healing Center or other physicians you see. To avoid any problems keep the Wound Healing Center informed each visit of any medications changes that occur.     WOUND CARE     Clean Wound with:    Treatment 1   Location: left foot  Dressing: adaptic , aquacel & dry dressing    Dressing Care Frequency: every 2-3 days  are Provider: visiting nurse       Basic Principles      • Wash your hands thoroughly with soap and water and after each dressing change. If someone other than the patient changes the dressing, it’s best to wear disposable gloves.   • Do not get the wound or dressing wet.   • To shower: remove the dressing, shower with soap and water (including washing the wound - do not use a washcloth), air dry, then redress the wound.  • Do not take a tub bath  • Keep all your dressings in a clean, covered container at home to avoid dust and contamination.  • Discard used dressings in a plastic bag or covered trash container.  • Check your wound and the surrounding skin at each dressing change for redness, warmth, swelling, increased pain, foul odor, fever, pus or abnormal drainage or discharge.  • Notify Wound Healing Center if any of these changes occur - Dept: [phone].        Nutrition  • Eat a well, balanced diet with adequate protein to support wound healing.   • Take a multivitamin every day. Adequate nutrition supports healing and new tissue growth.  • All diabetic patients should strive to keep blood sugars within a normal, practical range.   • Elevated blood sugars can delay your wound healing.        ACTIVITY     Elevate legs above level of heart   Limit activities    MOBILITY     Wheelchair

## 2019-04-17 NOTE — PROGRESS NOTES
Subjective      Patient ID: Harvey Lucero Jr. is a 58 y.o. male.    HPI patient seen today chief complaint status post transmetatarsal resection and also resection of medial cuneiform.  There is non-viability patient presents with wet-to-dry dressings wound VAC failed last evening the arm was set patient Mariama denies any fever chills night sweats and feels the best she is felt in several months.  Patient is status post TMA with harvesting a split the skin graft application to his left leg.  Zhang Leslie denies any fever chills night sweats.    The following have been reviewed and updated as appropriate in this visit:         Past Medical History: He  has a past medical history of Anxiety; Chronic osteomyelitis (CMS/[facility]) ([facility]); Dyslipidemia; GERD (gastroesophageal reflux disease); History of CVA (cerebrovascular accident); Hypertension; Lipid disorder; Open wound; TIA (transient ischemic attack); and Type 2 diabetes mellitus (CMS/[facility]) ([facility]). He also has no past medical history of Depression.  Past Surgical History: He  has a past surgical history that includes [place] tooth extraction; Other surgical history; Incision and drainage of wound (Left); Wound debridement (Left); and Foot amputation through metatarsal (Left).  Medication: He has a current medication list which includes the following prescription(s): acetaminophen, amoxicillin, blood sugar diagnostic, blood-glucose meter, clopidogrel, insulin glargine, lancets, lisinopril, lorazepam, metformin, pen needle, diabetic, pen needle, diabetic, rosuvastatin, and sitagliptin.  Allergies: He is allergic to no known allergies.  Social History: He  reports that he has quit smoking. His smoking use included Cigarettes. He has never used smokeless tobacco. He reports that he does not drink alcohol or use drugs.    Review of Systems:  Review of Systems      Objective Graft sites are well incorporated there is still some presence of the posterior tibial tendon however  is granulating in its moist and sweats final motion muscle flaps were also taken to the plantar medial plantar aspect of foot through a small area area open anteriorly but has a fibrous polyp was a granular base is noted positive active bleeding there is noted to be no signs of desiccation exposure no other irregularities noted at this time.    Foot Exam    Assessment/Plan Status post proximal transmitting ampuatation with excision of medial cuneiform  2.  Status post tendon transfer   3.  Tissue and grafting with the second time around with application of skin graft and harvesting skin graft to the area.    Plan  1.  Is still very very probably patient may still lose limb  2 patient continue on ampicillin every 4 hours for the residual osteomyelitis #  3 patient with HBO to try to reduce the amount of a bone infection to improve healing can also increase oxygenation   4 4 instructed patient this time that wound care will continue to be progressive over the course of time I did recommend that we change to dressing the thighs that is here for attack and this will be done daily will talk to Annika the nurse and also to Dr. Vinson had a great day.  Patient still at high risk for limb loss instruct the patient continue with good diet plan the patient was reevaluated in 1 week  Problem List Items Addressed This Visit     None          No Follow-up on file.    Fabricio Tee DPM

## 2019-04-22 ENCOUNTER — DOCUMENTATION (OUTPATIENT)
Dept: WOUND CARE | Facility: HOSPITAL | Age: 59
End: 2019-04-22

## 2019-04-22 NOTE — PROGRESS NOTES
"Called [employer] & spoke with \"Shana \" RN, who approved application #1 of apligraf for DOS 4/24/19.  EXT # [id number].  "

## 2019-04-24 ENCOUNTER — OFFICE VISIT (OUTPATIENT)
Dept: WOUND CARE | Facility: HOSPITAL | Age: 59
End: 2019-04-24
Attending: PLASTIC SURGERY
Payer: COMMERCIAL

## 2019-04-24 VITALS
HEART RATE: 87 BPM | DIASTOLIC BLOOD PRESSURE: 76 MMHG | RESPIRATION RATE: 18 BRPM | SYSTOLIC BLOOD PRESSURE: 146 MMHG | TEMPERATURE: 98.7 F

## 2019-04-24 DIAGNOSIS — S91.009A OPEN WOUND OF ANKLE AND FOOT: Primary | ICD-10-CM

## 2019-04-24 DIAGNOSIS — E11.52 TYPE 2 DIABETES MELLITUS WITH DIABETIC PERIPHERAL ANGIOPATHY AND GANGRENE, WITH LONG-TERM CURRENT USE OF INSULIN (CMS/HCC): ICD-10-CM

## 2019-04-24 DIAGNOSIS — Z79.4 TYPE 2 DIABETES MELLITUS WITH DIABETIC PERIPHERAL ANGIOPATHY AND GANGRENE, WITH LONG-TERM CURRENT USE OF INSULIN (CMS/HCC): ICD-10-CM

## 2019-04-24 DIAGNOSIS — S91.309A OPEN WOUND OF ANKLE AND FOOT: Primary | ICD-10-CM

## 2019-04-24 PROCEDURE — 15275 SKIN SUB GRAFT FACE/NK/HF/G: CPT | Performed by: PLASTIC SURGERY

## 2019-04-24 PROCEDURE — 0HRNXK3 REPLACEMENT OF LEFT FOOT SKIN WITH NONAUTOLOGOUS TISSUE SUBSTITUTE, FULL THICKNESS, EXTERNAL APPROACH: ICD-10-PCS | Performed by: PLASTIC SURGERY

## 2019-04-24 ASSESSMENT — ENCOUNTER SYMPTOMS
WOUND: 1
ENDOCRINE NEGATIVE: 1
FATIGUE: 1
MYALGIAS: 1
FEVER: 0
EYES NEGATIVE: 1
NEUROLOGICAL NEGATIVE: 1
ALLERGIC/IMMUNOLOGIC NEGATIVE: 1
PSYCHIATRIC NEGATIVE: 1
RESPIRATORY NEGATIVE: 1
HEMATOLOGIC/LYMPHATIC NEGATIVE: 1
GASTROINTESTINAL NEGATIVE: 1

## 2019-04-24 NOTE — PATIENT INSTRUCTIONS
Wound Healing Center Instructions    MEDICATIONS     Medication Note  Continue present medications as prescribed by the Wound Healing Center or other physicians you see. To avoid any problems keep the Wound Healing Center informed each visit of any medications changes that occur.     WOUND CARE     Clean Wound with: Saline Solution   Treatment 1   Location: left foot amp  Dressing: APLIGRAF applied today.  Please change OUTER dressing down to adaptic & steristrips.  May change adaptic if necessary.    Dressing Care Frequency: Daily and Every Other Day  Care Provider: Family and Visiting Nurse       Basic Principles      • Wash your hands thoroughly with soap and water and after each dressing change. If someone other than the patient changes the dressing, it’s best to wear disposable gloves.   • Do not get the wound or dressing wet.   • To shower: remove the dressing, shower with soap and water (including washing the wound - do not use a washcloth), air dry, then redress the wound.  • Do not take a tub bath  • Keep all your dressings in a clean, covered container at home to avoid dust and contamination.  • Discard used dressings in a plastic bag or covered trash container.  • Check your wound and the surrounding skin at each dressing change for redness, warmth, swelling, increased pain, foul odor, fever, pus or abnormal drainage or discharge.  • Notify Wound Healing Center if any of these changes occur - Dept: [phone].        Nutrition  • Eat a well, balanced diet with adequate protein to support wound healing.   • Take a multivitamin every day. Adequate nutrition supports healing and new tissue growth.  • All diabetic patients should strive to keep blood sugars within a normal, practical range.   • Elevated blood sugars can delay your wound healing.        ACTIVITY     Elevate legs above level of heart   Normal activity then rest and elevation  Limit activities    MOBILITY     Wheelchair and independent with  boot

## 2019-04-24 NOTE — PROCEDURES
Allograft  Date/Time: 4/24/2019 10:16 AM  Performed by: MARY ANNE LYNN JR  Authorized by: MARY ANNE LYNN JR     Consent:     Consent obtained:  Written    Consent given by:  Patient    Risks discussed:  Bleeding and infection (Poor wound healing)    Alternatives discussed:  No treatment  Pre-procedure details:     Preparation: Patient was prepped and draped in usual sterile fashion    Anesthesia (see MAR for exact dosages):     Anesthesia method:  Topical application    Topical anesthetic:  Lidocaine gel  Procedure details:     Wound exploration location: extremity      Wound age (days):  >14  Skin layer closed with:     Wound care performed:  Staples removed and steri-strips placed  Dressing:     Dressing applied:  Kerlix, 4x4 multipack and Steri-Strips    Wrapped with:  Terri 4 inch, elastic bandage 4 inch and elastic bandage 3 inch  Post-procedure details:     Patient tolerance of procedure:  Tolerated well, no immediate complications  Comments:      Informed consent was obtained after review of the plan of treatment and use of an Apligraf graft.  The benefits, risks, and complications but were specifically reviewed and understood.  A timeout was completed identifying the patient's full name and his date of birth and our planned operative procedure.  The wound area was pretreated with cleansing and application of topical 1% lidocaine gel and then saline flush.  A sterile curette was used to remove the overlying layer of biofilm and slough down to healthy bleeding granulation tissue at the wound bed.  The initial wound size was 10.5 cm x 10.5 cm x 0.3 cm.  The patient signed a consent form and there was no indication of adjustment for a limited declared Ramsey directive.  Several exuberant granulating areas were treated with topical application of silver nitrate.  The Apligraf was appropriately identified with the outdate noted.  Since sterile technique the graft was elevated off of the packing and placed in  appropriate position over the wound site.  The graft was smoothened and affixed to the wound with Steri-Strip taping.  The entire Apligraf graft was used without any wastage.  Prior to placement checking of the outdate and serial number as well as during that the tissue was within appropriate pH range had been completed.  The graft placement looked fine.  And occlusive dressing of layer of Adaptic and multilayer gauze pads with ABD pad and Kerlix with Ace wrapping was applied.  He tolerated this procedure well.    Cesario Smith Jr., MD

## 2019-04-24 NOTE — ASSESSMENT & PLAN NOTE
Assessment demonstrates progressive healing of the wound margins with a layer of yellowish exudate over the wound bed.  After application of topical 1% lidocaine gel, a sterile curette was used to remove this in preparation for application of an Apligraf dressing.  Please see procedure dictation.  A new layer dressing was applied.  There was a moderate amount of bleeding and he will be maintained on his aspirin and Plavix.  We will plan to have home nursing care wound dressing change performed tomorrow to assess the wound area and degree of drainage with plan of not disturbing the Adaptic layer unless necessary.  Ruben and I have otherwise discussed that he will stay off of the foot is much as possible over the next 4 days and will return in 1 week to be reevaluated by Dr. Tee.

## 2019-04-24 NOTE — PROGRESS NOTES
Patient ID: Harvey Lucero Jr.                              : 1960  MRN: [id number]                                            Visit Date: 2019  Encounter Provider: Cesario Smith Jr  Referring Provider: No ref. provider found    Subjective I have a little more pain in the wound area when I do more standing     HPI  Harvey Lucero Jr. is a 58 y.o. old male with a chief compliant of Chronic Non-healing Wound (osteomyelitis)   presenting today for postoperative follow-up care and planned application of an Apligraf graft to the residual wound area of the left transmetatarsal foot wound.  Ruben is seen today with his family.  He notes that he otherwise is feeling very well and has no signs of systemic infection.  He has been maintained on home nursing wound care with dressing changes..      The following have been reviewed and updated as appropriate in this visit:  Allergies  Meds  Problems       Past Medical History:  has a past medical history of Anxiety; Chronic osteomyelitis (CMS/HCC) ([facility]); Dyslipidemia; GERD (gastroesophageal reflux disease); History of CVA (cerebrovascular accident); Hypertension; Lipid disorder; Open wound; TIA (transient ischemic attack); and Type 2 diabetes mellitus (CMS/[facility]) ([facility]). He also has no past medical history of Depression.  Past Surgical History:  has a past surgical history that includes [place] tooth extraction; Other surgical history; Incision and drainage of wound (Left); Wound debridement (Left); and Foot amputation through metatarsal (Left).  Social History:  reports that he has quit smoking. His smoking use included Cigarettes. He has never used smokeless tobacco. He reports that he does not drink alcohol or use drugs.  Family History: family history is not on file.  Medications:   Current Outpatient Prescriptions:   •  acetaminophen (TYLENOL) 500 mg tablet, Take 1,000 mg by mouth every 6 (six) hours as needed for mild pain., Disp: , Rfl:   •  amoxicillin  "(AMOXIL) 500 mg capsule, Take 1 capsule (500 mg total) by mouth every 8 (eight) hours., Disp: 30 capsule, Rfl: 1  •  blood sugar diagnostic (ONETOUCH ULTRA TEST) strip, for blood glucose monitoring 3-4x day, Disp: , Rfl:   •  blood-glucose meter kit, for blood glucose monitoring, Disp: , Rfl:   •  clopidogrel (PLAVIX) 75 mg tablet, TAKE 1 TABLET BY MOUTH EVERY DAY, Disp: 90 tablet, Rfl: 0  •  insulin glargine (BASAGLAR KWIKPEN U-100 INSULIN) 100 unit/mL (3 mL) subcutaneous pen, 26 units at bedtime, Disp: , Rfl:   •  lancets misc, for blood glucose monitoring 3x day, Disp: , Rfl:   •  lisinopril (PRINIVIL) 40 mg tablet, Take 40 mg by mouth once daily., Disp: , Rfl: 3  •  LORazepam (ATIVAN) 0.5 mg tablet, Take 0.5 mg by mouth daily as needed for anxiety., Disp: , Rfl:   •  metFORMIN (GLUCOPHAGE) 1,000 mg tablet, take 1 tablet by oral route 2 times every day with morning and evening meals, Disp: , Rfl:   •  pen needle, diabetic (BD ULTRA-FINE MICRO PEN NEEDLE) 32 gauge x 1/4\" needle, For blood sugar monitoring 3x day Dx:  E11.9, Disp: 300 each, Rfl: 3  •  pen needle, diabetic (NOVOFINE 32) 32 gauge x 1/4\" needle, For insulin administration once daily, Disp: 300 each, Rfl: 3  •  rosuvastatin (CRESTOR) 20 mg tablet, 20 mg.  , Disp: , Rfl:   •  SITagliptin (JANUVIA) 100 mg tablet, Take 1 tablet (100 mg total) by mouth daily., Disp: 90 tablet, Rfl: 2    Allergies: is allergic to no known allergies.     Review of Systems   Constitutional: Positive for fatigue. Negative for fever.   HENT: Negative.    Eyes: Negative.    Respiratory: Negative.    Cardiovascular: Positive for leg swelling.   Gastrointestinal: Negative.    Endocrine: Negative.    Musculoskeletal: Positive for myalgias.   Skin: Positive for wound.   Allergic/Immunologic: Negative.    Neurological: Negative.    Hematological: Negative.    Psychiatric/Behavioral: Negative.      Objective   BP (!) 146/76 (BP Location: Left upper arm, Patient Position: Sitting)   " Pulse 87   Temp 37.1 °C (98.7 °F) (Oral)   Resp 18     Physical Exam   Constitutional: He is oriented to person, place, and time. He appears well-developed and well-nourished.   HENT:   Head: Normocephalic.   Eyes: EOM are normal.   Pulmonary/Chest: Effort normal.   Musculoskeletal: He exhibits edema.   Minimal left lower extremity and ankle edema   Neurological: He is alert and oriented to person, place, and time.   Skin: Skin is warm and dry. No erythema.   Layer of yellowish biofilm/slough over the persistent wound area with no evidence of soft tissue infection and complete healing of the previous grafted zones as well as donor sites.   Psychiatric: He has a normal mood and affect. His behavior is normal. Judgment and thought content normal.   Vitals reviewed.        Wound of left foot    Assessment/Plan    Diagnosis Plan   1. Open wound of ankle and foot     2. Type 2 diabetes mellitus with diabetic peripheral angiopathy and gangrene, with long-term current use of insulin (CMS/[facility])       Problem List Items Addressed This Visit     Open wound of ankle and foot - Primary     Assessment demonstrates progressive healing of the wound margins with a layer of yellowish exudate over the wound bed.  After application of topical 1% lidocaine gel, a sterile curette was used to remove this in preparation for application of an Apligraf dressing.  Please see procedure dictation.  A new layer dressing was applied.  There was a moderate amount of bleeding and he will be maintained on his aspirin and Plavix.  We will plan to have home nursing care wound dressing change performed tomorrow to assess the wound area and degree of drainage with plan of not disturbing the Adaptic layer unless necessary.  Ruben and I have otherwise discussed that he will stay off of the foot is much as possible over the next 4 days and will return in 1 week to be reevaluated by Dr. Tee.         Type 2 diabetes mellitus with diabetic peripheral  angiopathy and gangrene, with long-term current use of insulin (CMS/[facility])     Ruben reports that his glucose control has been maintained well with his home insulin regimen.             Ruben has reported that he is still having difficulty with focusing his eyes particularly with reading and nearsightedness without any complaint of haziness or opacification of his vision.  This appears to be a persistent lens effect from the hyperbaric oxygen treatment and we have again reviewed this.  Given that he is at 3 weeks status post completing his therapy course, we both agreed that he should see his ophthalmologist for an evaluation and consideration as to whether he may need some permanent refractive change in his glasses.    Return in about 1 week (around 5/1/2019).     Cesario Smith Jr, MD

## 2019-04-29 ENCOUNTER — DOCUMENTATION (OUTPATIENT)
Dept: WOUND CARE | Facility: HOSPITAL | Age: 59
End: 2019-04-29

## 2019-04-29 NOTE — PROGRESS NOTES
"Called [employer] & spoke with \"Shana RN\", who approved apligraf application #2 .  Auth submitted through [employer] EXT # [id number].  "

## 2019-05-01 ENCOUNTER — OFFICE VISIT (OUTPATIENT)
Dept: WOUND CARE | Facility: HOSPITAL | Age: 59
End: 2019-05-01
Attending: PODIATRIST
Payer: COMMERCIAL

## 2019-05-01 VITALS
TEMPERATURE: 98.2 F | RESPIRATION RATE: 20 BRPM | HEART RATE: 90 BPM | DIASTOLIC BLOOD PRESSURE: 57 MMHG | SYSTOLIC BLOOD PRESSURE: 123 MMHG

## 2019-05-01 DIAGNOSIS — L97.509 TYPE 2 DIABETES MELLITUS WITH FOOT ULCER, WITH LONG-TERM CURRENT USE OF INSULIN (CMS/HCC): ICD-10-CM

## 2019-05-01 DIAGNOSIS — S81.802D WOUND OF LEFT LOWER EXTREMITY, SUBSEQUENT ENCOUNTER: ICD-10-CM

## 2019-05-01 DIAGNOSIS — A48.0 GAS GANGRENE (CMS/HCC): ICD-10-CM

## 2019-05-01 DIAGNOSIS — E11.8 TYPE 2 DIABETES MELLITUS WITH COMPLICATION, WITH LONG-TERM CURRENT USE OF INSULIN (CMS/HCC): ICD-10-CM

## 2019-05-01 DIAGNOSIS — Z86.73 HISTORY OF CVA (CEREBROVASCULAR ACCIDENT): ICD-10-CM

## 2019-05-01 DIAGNOSIS — E11.621 TYPE 2 DIABETES MELLITUS WITH FOOT ULCER, WITH LONG-TERM CURRENT USE OF INSULIN (CMS/HCC): ICD-10-CM

## 2019-05-01 DIAGNOSIS — R60.0 LOCALIZED EDEMA: ICD-10-CM

## 2019-05-01 DIAGNOSIS — E11.52 TYPE 2 DIABETES MELLITUS WITH DIABETIC PERIPHERAL ANGIOPATHY AND GANGRENE, WITH LONG-TERM CURRENT USE OF INSULIN (CMS/HCC): Primary | ICD-10-CM

## 2019-05-01 DIAGNOSIS — Z79.4 TYPE 2 DIABETES MELLITUS WITH DIABETIC PERIPHERAL ANGIOPATHY AND GANGRENE, WITH LONG-TERM CURRENT USE OF INSULIN (CMS/HCC): Primary | ICD-10-CM

## 2019-05-01 DIAGNOSIS — S91.009A OPEN WOUND OF ANKLE AND FOOT: ICD-10-CM

## 2019-05-01 DIAGNOSIS — S91.309A OPEN WOUND OF ANKLE AND FOOT: ICD-10-CM

## 2019-05-01 DIAGNOSIS — Z79.4 TYPE 2 DIABETES MELLITUS WITH FOOT ULCER, WITH LONG-TERM CURRENT USE OF INSULIN (CMS/HCC): ICD-10-CM

## 2019-05-01 DIAGNOSIS — Z79.4 TYPE 2 DIABETES MELLITUS WITH COMPLICATION, WITH LONG-TERM CURRENT USE OF INSULIN (CMS/HCC): ICD-10-CM

## 2019-05-01 PROCEDURE — 15271 SKIN SUB GRAFT TRNK/ARM/LEG: CPT | Performed by: PODIATRIST

## 2019-05-01 PROCEDURE — 0HRLXK3 REPLACEMENT OF LEFT LOWER LEG SKIN WITH NONAUTOLOGOUS TISSUE SUBSTITUTE, FULL THICKNESS, EXTERNAL APPROACH: ICD-10-PCS | Performed by: PODIATRIST

## 2019-05-01 NOTE — PROGRESS NOTES
Subjective      Patient ID: Harvey Lucero Jr. is a 58 y.o. male.    HPI    The following have been reviewed and updated as appropriate in this visit:     Patient seen status post osteomyelitis and surgical wound left foot.  Patient status post 1 week Apligraf aspect of the left foot.  Patient denies any fever chills or night sweats.    Past Medical History: He  has a past medical history of Anxiety; Chronic osteomyelitis (CMS/HCC) ([facility]); Dyslipidemia; GERD (gastroesophageal reflux disease); History of CVA (cerebrovascular accident); Hypertension; Lipid disorder; Open wound; TIA (transient ischemic attack); and Type 2 diabetes mellitus (CMS/[facility]) ([facility]). He also has no past medical history of Depression.  Past Surgical History: He  has a past surgical history that includes [place] tooth extraction; Other surgical history; Incision and drainage of wound (Left); Wound debridement (Left); and Foot amputation through metatarsal (Left).  Medication: He has a current medication list which includes the following prescription(s): acetaminophen, amoxicillin, blood sugar diagnostic, blood-glucose meter, clopidogrel, insulin glargine, lancets, lisinopril, lorazepam, metformin, pen needle, diabetic, pen needle, diabetic, rosuvastatin, and sitagliptin.  Allergies: He is allergic to no known allergies.  Social History: He  reports that he has quit smoking. His smoking use included Cigarettes. He has never used smokeless tobacco. He reports that he does not drink alcohol or use drugs.    Review of Systems:  Review of Systems      Objective Organs decreased eyes decreased that there is no bone exposure all tendons and soft tissue is covered.  No other inflammatory spots were noted in the area.    Foot Exam    Assessment/Plan   Problem List Items Addressed This Visit     None        Diabetes mellitus with ulceration left foot  2.  Diabetes mellitus osteomalacia which is chronic.  3.  Diabetes mellitus with neuropathy.    .  Plan.  1.   Patient was placed in the operative spine position after the left was prepped in normal aseptic fashion was hydrated there is a left foot series directly visualize a debris was taken through skin and subcu tissue with use of a cotton applicators between mild active bleeding there is no accommodation for the previous graft site.  Areas prepped and also the fascia was scrubbed in normal sterile fashion the Apligraf there is no prepped it was turned with the crosshatched in the dermal side down was placed to the wound bed this was attached with Steri-Strips to get angry to the area Adaptic 4 x 4 dry sterile dressing applied to the area.  Patient taught procedure well without complications patient also advised that return to normal parable levels patient did not remember postop instructions keep the area clean dry intact for 24 hours home nursing will continue to assess and address the secondary dressing patient recalls any fever chills night sweats.  Patient continue with antibiotics as directed.  Return in about 1 week (around 5/8/2019).    Fabricio Tee DPM

## 2019-05-01 NOTE — PATIENT INSTRUCTIONS
Wound Healing Center Instructions    MEDICATIONS     Medication Note  Continue present medications as prescribed by the Wound Healing Center or other physicians you see. To avoid any problems keep the Wound Healing Center informed each visit of any medications changes that occur.     WOUND CARE     Clean Wound with: Saline Solution   Treatment 1   Location: left foot amp   Dressing: Change outer dressing down to steristrips with ABD, webril & ace  Dressing Care Frequency: Daily and Every Other Day  Care Provider: Family and Visiting Nurse       Basic Principles      • Wash your hands thoroughly with soap and water and after each dressing change. If someone other than the patient changes the dressing, it’s best to wear disposable gloves.   • Do not get the wound or dressing wet.   • To shower: remove the dressing, shower with soap and water (including washing the wound - do not use a washcloth), air dry, then redress the wound.  • Do not take a tub bath  • Keep all your dressings in a clean, covered container at home to avoid dust and contamination.  • Discard used dressings in a plastic bag or covered trash container.  • Check your wound and the surrounding skin at each dressing change for redness, warmth, swelling, increased pain, foul odor, fever, pus or abnormal drainage or discharge.  • Notify Wound Healing Center if any of these changes occur - Dept: [phone].        Nutrition  • Eat a well, balanced diet with adequate protein to support wound healing.   • Take a multivitamin every day. Adequate nutrition supports healing and new tissue growth.  • All diabetic patients should strive to keep blood sugars within a normal, practical range.   • Elevated blood sugars can delay your wound healing.        ACTIVITY     Elevate legs above level of heart   Normal activity then rest and elevation  May excercise  May walk    MOBILITY     boot

## 2019-05-08 ENCOUNTER — OFFICE VISIT (OUTPATIENT)
Dept: WOUND CARE | Facility: HOSPITAL | Age: 59
End: 2019-05-08
Attending: PODIATRIST
Payer: COMMERCIAL

## 2019-05-08 VITALS — TEMPERATURE: 98.7 F

## 2019-05-08 DIAGNOSIS — M86.072 ACUTE HEMATOGENOUS OSTEOMYELITIS OF LEFT FOOT (CMS/HCC): Primary | ICD-10-CM

## 2019-05-08 DIAGNOSIS — S81.802D WOUND OF LEFT LOWER EXTREMITY, SUBSEQUENT ENCOUNTER: ICD-10-CM

## 2019-05-08 DIAGNOSIS — L97.509 TYPE 2 DIABETES MELLITUS WITH FOOT ULCER, WITH LONG-TERM CURRENT USE OF INSULIN (CMS/HCC): ICD-10-CM

## 2019-05-08 DIAGNOSIS — Z79.4 TYPE 2 DIABETES MELLITUS WITH FOOT ULCER, WITH LONG-TERM CURRENT USE OF INSULIN (CMS/HCC): ICD-10-CM

## 2019-05-08 DIAGNOSIS — E11.621 TYPE 2 DIABETES MELLITUS WITH FOOT ULCER, WITH LONG-TERM CURRENT USE OF INSULIN (CMS/HCC): ICD-10-CM

## 2019-05-08 DIAGNOSIS — M86.372 CHRONIC MULTIFOCAL OSTEOMYELITIS, LEFT ANKLE AND FOOT (CMS/HCC): ICD-10-CM

## 2019-05-08 NOTE — PROGRESS NOTES
Subjective      Patient ID: Harvey Lucero Jr. is a 58 y.o. male.    HPI patient seen today chief complaint status post transmetatarsal resection and also resection of medial cuneiform.  There is non-viability patient presents with wet-to-dry dressings wound VAC failed last evening the arm was set patient Mariama denies any fever chills night sweats and feels the best she is felt in several months.  Patient is status post TMA with harvesting a split the skin graft application to his left leg.  Zhang Leslie denies any fever chills night sweats.    The following have been reviewed and updated as appropriate in this visit:         Past Medical History: He  has a past medical history of Anxiety; Chronic osteomyelitis (CMS/[facility]) ([facility]); Dyslipidemia; GERD (gastroesophageal reflux disease); History of CVA (cerebrovascular accident); Hypertension; Lipid disorder; Open wound; TIA (transient ischemic attack); and Type 2 diabetes mellitus (CMS/[facility]) ([facility]). He also has no past medical history of Depression.  Past Surgical History: He  has a past surgical history that includes [place] tooth extraction; Other surgical history; Incision and drainage of wound (Left); Wound debridement (Left); and Foot amputation through metatarsal (Left).  Medication: He has a current medication list which includes the following prescription(s): acetaminophen, amoxicillin, blood sugar diagnostic, blood-glucose meter, clopidogrel, insulin glargine, lancets, lisinopril, lorazepam, metformin, pen needle, diabetic, pen needle, diabetic, rosuvastatin, and sitagliptin.  Allergies: He is allergic to no known allergies.  Social History: He  reports that he has quit smoking. His smoking use included Cigarettes. He has never used smokeless tobacco. He reports that he does not drink alcohol or use drugs.    Review of Systems:  Review of Systems      Objective Graft sites are well incorporated there is still some presence of the posterior tibial tendon however  is granulating in its moist and sweats final motion muscle flaps were also taken to the plantar medial plantar aspect of foot through a small area area open anteriorly but has a fibrous polyp was a granular base is noted positive active bleeding there is noted to be no signs of desiccation exposure no other irregularities noted at this time.    Foot Exam    Assessment/Plan Status post proximal transmitting ampuatation with excision of medial cuneiform  2.  Status post tendon transfer   3.  Tissue and grafting with the second time around with application of skin graft and harvesting skin graft to the area.    Plan  1.  Is still very very probably patient may still lose limb  At this time patient's graft continues to incorporate.  Will recommend Apligraf moving forward to help continue to heal area.  At this time patient continue with Adaptic ABD and dry sterile dressing continue weightbearing as tolerated.  Also will be fitted for hopefully filler brace coming up in the next 2 weeks.  Problem List Items Addressed This Visit     None          Return in about 1 week (around 5/15/2019) for Recheck.    Fabricio Tee DPM

## 2019-05-08 NOTE — PATIENT INSTRUCTIONS
Wound Healing Center Instructions    MEDICATIONS     Medication Note  Continue present medications as prescribed by the Wound Healing Center or other physicians you see. To avoid any problems keep the Wound Healing Center informed each visit of any medications changes that occur.     WOUND CARE     Clean Wound with: Saline Solution   Treatment 1   Location: left foot amp   Dressing: Apply adaptic & dry dressing with ace  Dressing Care Frequency: Daily and Every Other Day as needed  Care Provider: Family and Visiting Nurse       Basic Principles      • Wash your hands thoroughly with soap and water and after each dressing change. If someone other than the patient changes the dressing, it’s best to wear disposable gloves.   • Do not get the wound or dressing wet.   • To shower: remove the dressing, shower with soap and water (including washing the wound - do not use a washcloth), air dry, then redress the wound.  • Do not take a tub bath  • Keep all your dressings in a clean, covered container at home to avoid dust and contamination.  • Discard used dressings in a plastic bag or covered trash container.  • Check your wound and the surrounding skin at each dressing change for redness, warmth, swelling, increased pain, foul odor, fever, pus or abnormal drainage or discharge.  • Notify Wound Healing Center if any of these changes occur - Dept: [phone].        Nutrition  • Eat a well, balanced diet with adequate protein to support wound healing.   • Take a multivitamin every day. Adequate nutrition supports healing and new tissue growth.  • All diabetic patients should strive to keep blood sugars within a normal, practical range.   • Elevated blood sugars can delay your wound healing.        ACTIVITY     Elevate legs above level of heart   Normal activity then rest and elevation  May excercise  May walk    MOBILITY     boot

## 2019-05-09 ENCOUNTER — DOCUMENTATION (OUTPATIENT)
Dept: WOUND CARE | Facility: HOSPITAL | Age: 59
End: 2019-05-09

## 2019-05-09 NOTE — PROGRESS NOTES
"Called [employer] & spoke with representative \"Hans\" to obtain preauthorization for EPIFIX application # 3.  Request was submitted through [employer] and is still pending EXT# [id number].  Hans stated a [employer] nurse will call [facility] with authorization prior to DOS 5/15/19.  "

## 2019-05-13 ENCOUNTER — DOCUMENTATION (OUTPATIENT)
Dept: WOUND CARE | Facility: HOSPITAL | Age: 59
End: 2019-05-13

## 2019-05-15 ENCOUNTER — OFFICE VISIT (OUTPATIENT)
Dept: WOUND CARE | Facility: HOSPITAL | Age: 59
End: 2019-05-15
Attending: PODIATRIST
Payer: COMMERCIAL

## 2019-05-15 VITALS — DIASTOLIC BLOOD PRESSURE: 73 MMHG | HEART RATE: 81 BPM | TEMPERATURE: 98.7 F | SYSTOLIC BLOOD PRESSURE: 142 MMHG

## 2019-05-15 DIAGNOSIS — Z79.4 TYPE 2 DIABETES MELLITUS WITH FOOT ULCER, WITH LONG-TERM CURRENT USE OF INSULIN (CMS/HCC): ICD-10-CM

## 2019-05-15 DIAGNOSIS — Z01.810 PREOP CARDIOVASCULAR EXAM: ICD-10-CM

## 2019-05-15 DIAGNOSIS — E11.52 TYPE 2 DIABETES MELLITUS WITH DIABETIC PERIPHERAL ANGIOPATHY AND GANGRENE, WITH LONG-TERM CURRENT USE OF INSULIN (CMS/HCC): Primary | ICD-10-CM

## 2019-05-15 DIAGNOSIS — S91.309A OPEN WOUND OF ANKLE AND FOOT: ICD-10-CM

## 2019-05-15 DIAGNOSIS — Z79.4 TYPE 2 DIABETES MELLITUS WITH DIABETIC PERIPHERAL ANGIOPATHY AND GANGRENE, WITH LONG-TERM CURRENT USE OF INSULIN (CMS/HCC): Primary | ICD-10-CM

## 2019-05-15 DIAGNOSIS — L97.509 TYPE 2 DIABETES MELLITUS WITH FOOT ULCER, WITH LONG-TERM CURRENT USE OF INSULIN (CMS/HCC): ICD-10-CM

## 2019-05-15 DIAGNOSIS — E11.621 TYPE 2 DIABETES MELLITUS WITH FOOT ULCER, WITH LONG-TERM CURRENT USE OF INSULIN (CMS/HCC): ICD-10-CM

## 2019-05-15 DIAGNOSIS — M86.072 ACUTE HEMATOGENOUS OSTEOMYELITIS OF LEFT FOOT (CMS/HCC): ICD-10-CM

## 2019-05-15 DIAGNOSIS — M72.6 NECROTIZING FASCIITIS (CMS/HCC): ICD-10-CM

## 2019-05-15 DIAGNOSIS — M86.372 CHRONIC MULTIFOCAL OSTEOMYELITIS, LEFT ANKLE AND FOOT (CMS/HCC): ICD-10-CM

## 2019-05-15 DIAGNOSIS — S91.009A OPEN WOUND OF ANKLE AND FOOT: ICD-10-CM

## 2019-05-15 PROCEDURE — 15272 SKIN SUB GRAFT T/A/L ADD-ON: CPT | Performed by: PODIATRIST

## 2019-05-15 PROCEDURE — 15271 SKIN SUB GRAFT TRNK/ARM/LEG: CPT | Performed by: PODIATRIST

## 2019-05-15 PROCEDURE — 0HRNXJZ REPLACEMENT OF LEFT FOOT SKIN WITH SYNTHETIC SUBSTITUTE, EXTERNAL APPROACH: ICD-10-PCS | Performed by: PODIATRIST

## 2019-05-15 PROCEDURE — 15275 SKIN SUB GRAFT FACE/NK/HF/G: CPT | Performed by: PODIATRIST

## 2019-05-15 NOTE — PROGRESS NOTES
Subjective      Patient ID: Harvey Lucero Jr. is a 58 y.o. male.    HPI    The following have been reviewed and updated as appropriate in this visit:     Patient seen status post osteomyelitis and surgical wound left foot.  Patient status post 1 week Apligraf aspect of the left foot.  Patient denies any fever chills or night sweats.    Past Medical History: He  has a past medical history of Anxiety; Chronic osteomyelitis (CMS/HCC) ([facility]); Dyslipidemia; GERD (gastroesophageal reflux disease); History of CVA (cerebrovascular accident); Hypertension; Lipid disorder; Open wound; TIA (transient ischemic attack); and Type 2 diabetes mellitus (CMS/[facility]) ([facility]). He also has no past medical history of Depression.  Past Surgical History: He  has a past surgical history that includes [place] tooth extraction; Other surgical history; Incision and drainage of wound (Left); Wound debridement (Left); and Foot amputation through metatarsal (Left).  Medication: He has a current medication list which includes the following prescription(s): acetaminophen, blood sugar diagnostic, blood-glucose meter, clopidogrel, insulin glargine, lancets, lisinopril, lorazepam, metformin, pen needle, diabetic, pen needle, diabetic, rosuvastatin, and sitagliptin.  Allergies: He is allergic to no known allergies.  Social History: He  reports that he has quit smoking. His smoking use included Cigarettes. He has never used smokeless tobacco. He reports that he does not drink alcohol or use drugs.    Review of Systems:  Review of Systems      Objective Organs decreased eyes decreased that there is no bone exposure all tendons and soft tissue is covered.  No other inflammatory spots were noted in the area.    Foot Exam    Assessment/Plan   Problem List Items Addressed This Visit     None        Diabetes mellitus with ulceration left foot  2.  Diabetes mellitus osteomalacia which is chronic.  3.  Diabetes mellitus with neuropathy.    .  Plan.  1.  Patient was  placed in the operative supine position after the left was prepped in normal aseptic fashion was hydrated there is a left foot series directly visualize a debris was taken through skin and subcu tissue with use of a cotton applicators between mild active bleeding there is no accommodation for the previous graft site.  Areas prepped and also the fascia was scrubbed in normal sterile fashion the Apligraf there is no prepped it was turned with the crosshatched in the dermal side down was placed to the wound bed this was attached with Steri-Strips to get angry to the area Adaptic 4 x 4 dry sterile dressing applied to the area.  Patient taught procedure well without complications patient also advised that return to normal parable levels patient did not remember postop instructions keep the area clean dry intact for 24 hours home nursing will continue to assess and address the secondary dressing patient recalls any fever chills night sweats.  Patient continue with antibiotics as directed.  No Follow-up on file.    Fabricio Tee DPM

## 2019-05-15 NOTE — PATIENT INSTRUCTIONS
Wound Healing Center Instructions    MEDICATIONS     Medication Note  Continue present medications as prescribed by the Wound Healing Center or other physicians you see. To avoid any problems keep the Wound Healing Center informed each visit of any medications changes that occur.     WOUND CARE     Clean Wound with: Saline Solution   Treatment 1   Location: left foot amp   Dressing: Apply adaptic & dry dressing with ace  Dressing Care Frequency: Daily and Every Other Day as needed  Care Provider: Family and Visiting Nurse       Basic Principles      • Wash your hands thoroughly with soap and water and after each dressing change. If someone other than the patient changes the dressing, it’s best to wear disposable gloves.   • Do not get the wound or dressing wet.   • To shower: remove the dressing, shower with soap and water (including washing the wound - do not use a washcloth), air dry, then redress the wound.  • Do not take a tub bath  • Keep all your dressings in a clean, covered container at home to avoid dust and contamination.  • Discard used dressings in a plastic bag or covered trash container.  • Check your wound and the surrounding skin at each dressing change for redness, warmth, swelling, increased pain, foul odor, fever, pus or abnormal drainage or discharge.  • Notify Wound Healing Center if any of these changes occur - Dept: [phone].        Nutrition  • Eat a well, balanced diet with adequate protein to support wound healing.   • Take a multivitamin every day. Adequate nutrition supports healing and new tissue growth.  • All diabetic patients should strive to keep blood sugars within a normal, practical range.   • Elevated blood sugars can delay your wound healing.        ACTIVITY     Elevate legs above level of heart   Normal activity then rest and elevation  May excercise  May walk    MOBILITY     boot

## 2019-05-17 ENCOUNTER — DOCUMENTATION (OUTPATIENT)
Dept: WOUND CARE | Facility: HOSPITAL | Age: 59
End: 2019-05-17

## 2019-05-17 NOTE — PROGRESS NOTES
"Called [employer] & spoke with representative \"Leeann OLVERA\", who approved apligraf #4 application for DOS 5/17/19.  Authorization # [id number]. DX code [id number].  "

## 2019-05-22 ENCOUNTER — OFFICE VISIT (OUTPATIENT)
Dept: WOUND CARE | Facility: HOSPITAL | Age: 59
End: 2019-05-22
Attending: PODIATRIST
Payer: COMMERCIAL

## 2019-05-22 VITALS — TEMPERATURE: 98.6 F | RESPIRATION RATE: 18 BRPM | DIASTOLIC BLOOD PRESSURE: 79 MMHG | SYSTOLIC BLOOD PRESSURE: 140 MMHG

## 2019-05-22 DIAGNOSIS — M86.372 CHRONIC MULTIFOCAL OSTEOMYELITIS, LEFT ANKLE AND FOOT (CMS/HCC): ICD-10-CM

## 2019-05-22 DIAGNOSIS — Z79.4 TYPE 2 DIABETES MELLITUS WITH DIABETIC PERIPHERAL ANGIOPATHY AND GANGRENE, WITH LONG-TERM CURRENT USE OF INSULIN (CMS/HCC): ICD-10-CM

## 2019-05-22 DIAGNOSIS — E11.52 TYPE 2 DIABETES MELLITUS WITH DIABETIC PERIPHERAL ANGIOPATHY AND GANGRENE, WITH LONG-TERM CURRENT USE OF INSULIN (CMS/HCC): ICD-10-CM

## 2019-05-22 DIAGNOSIS — M86.672 OSTEOMYELITIS, CHRONIC, ANKLE OR FOOT, LEFT (CMS/HCC): ICD-10-CM

## 2019-05-22 DIAGNOSIS — S81.802D WOUND OF LEFT LOWER EXTREMITY, SUBSEQUENT ENCOUNTER: ICD-10-CM

## 2019-05-22 DIAGNOSIS — E11.621 TYPE 2 DIABETES MELLITUS WITH FOOT ULCER, WITH LONG-TERM CURRENT USE OF INSULIN (CMS/HCC): ICD-10-CM

## 2019-05-22 DIAGNOSIS — L97.509 TYPE 2 DIABETES MELLITUS WITH FOOT ULCER, WITH LONG-TERM CURRENT USE OF INSULIN (CMS/HCC): ICD-10-CM

## 2019-05-22 DIAGNOSIS — D50.8 OTHER IRON DEFICIENCY ANEMIA: ICD-10-CM

## 2019-05-22 DIAGNOSIS — I10 ESSENTIAL HYPERTENSION: Primary | ICD-10-CM

## 2019-05-22 DIAGNOSIS — M86.072 ACUTE HEMATOGENOUS OSTEOMYELITIS OF LEFT FOOT (CMS/HCC): ICD-10-CM

## 2019-05-22 DIAGNOSIS — Z79.4 TYPE 2 DIABETES MELLITUS WITH FOOT ULCER, WITH LONG-TERM CURRENT USE OF INSULIN (CMS/HCC): ICD-10-CM

## 2019-05-22 PROCEDURE — 15271 SKIN SUB GRAFT TRNK/ARM/LEG: CPT | Performed by: PODIATRIST

## 2019-05-22 PROCEDURE — 15275 SKIN SUB GRAFT FACE/NK/HF/G: CPT

## 2019-05-22 PROCEDURE — 0HRNXK3 REPLACEMENT OF LEFT FOOT SKIN WITH NONAUTOLOGOUS TISSUE SUBSTITUTE, FULL THICKNESS, EXTERNAL APPROACH: ICD-10-PCS | Performed by: PODIATRIST

## 2019-05-22 NOTE — PROGRESS NOTES
Subjective      Patient ID: Harvey Lucero Jr. is a 58 y.o. male.    HPI    The following have been reviewed and updated as appropriate in this visit:     Patient seen status post osteomyelitis and surgical wound left foot.  Patient status post 1 week Apligraf aspect of the left foot.  Patient denies any fever chills or night sweats.    Past Medical History: He  has a past medical history of Anxiety; Chronic osteomyelitis (CMS/HCC) ([facility]); Dyslipidemia; GERD (gastroesophageal reflux disease); History of CVA (cerebrovascular accident); Hypertension; Lipid disorder; Open wound; TIA (transient ischemic attack); and Type 2 diabetes mellitus (CMS/[facility]) ([facility]). He also has no past medical history of Depression.  Past Surgical History: He  has a past surgical history that includes [place] tooth extraction; Other surgical history; Incision and drainage of wound (Left); Wound debridement (Left); and Foot amputation through metatarsal (Left).  Medication: He has a current medication list which includes the following prescription(s): acetaminophen, blood sugar diagnostic, blood-glucose meter, clopidogrel, insulin glargine, lancets, lisinopril, lorazepam, metformin, pen needle, diabetic, pen needle, diabetic, rosuvastatin, and sitagliptin.  Allergies: He is allergic to no known allergies.  Social History: He  reports that he has quit smoking. His smoking use included Cigarettes. He has never used smokeless tobacco. He reports that he does not drink alcohol or use drugs.    Review of Systems:  Review of Systems      Objective Organs decreased eyes decreased that there is no bone exposure all tendons and soft tissue is covered.  No other inflammatory spots were noted in the area.    Foot Exam    Assessment/Plan   Problem List Items Addressed This Visit     None        Diabetes mellitus with ulceration left foot  2.  Diabetes mellitus osteomalacia which is chronic.  3.  Diabetes mellitus with neuropathy.    .  Plan.  1.  Patient was  placed in the operative supine position after the left was prepped in normal aseptic fashion was hydrated there is a left foot series directly visualize a debris was taken through skin and subcu tissue with use of a cotton applicators between mild active bleeding there is no accommodation for the previous graft site.  Areas prepped and also the fascia was scrubbed in normal sterile fashion the Apligraf there is no prepped it was turned with the crosshatched in the dermal side down was placed to the wound bed this was attached with Steri-Strips to get angry to the area Adaptic 4 x 4 dry sterile dressing applied to the area.  Patient taught procedure well without complications patient also advised that return to normal parable levels patient did not remember postop instructions keep the area clean dry intact for 24 hours home nursing will continue to assess and address the secondary dressing patient recalls any fever chills night sweats.  Patient continue with antibiotics as directed.  Return in about 1 week (around 5/29/2019) for Recheck.    Fabricio Tee DPM

## 2019-05-22 NOTE — PATIENT INSTRUCTIONS
Wound Healing Center Instructions    MEDICATIONS     Medication Note  Continue present medications as prescribed by the Wound Healing Center or other physicians you see. To avoid any problems keep the Wound Healing Center informed each visit of any medications changes that occur.     WOUND CARE     Clean Wound with: Saline Solution   Treatment 1   Location: left foot amp   Dressing: Apply adaptic & dry dressing with ace  Leave the adaptic intact with steristrips  Apligraf applied today by Dr Tee . Call for any concerns   Dressing Care Frequency: Mondays and Fridays   Wound Center on Wednesdays   Care Provider: Family and Visiting Nurse       Basic Principles      • Wash your hands thoroughly with soap and water and after each dressing change. If someone other than the patient changes the dressing, it’s best to wear disposable gloves.   • Do not get the wound or dressing wet.   • To shower: remove the dressing, shower with soap and water (including washing the wound - do not use a washcloth), air dry, then redress the wound.  • Do not take a tub bath  • Keep all your dressings in a clean, covered container at home to avoid dust and contamination.  • Discard used dressings in a plastic bag or covered trash container.  • Check your wound and the surrounding skin at each dressing change for redness, warmth, swelling, increased pain, foul odor, fever, pus or abnormal drainage or discharge.  • Notify Wound Healing Center if any of these changes occur - Dept: [phone].        Nutrition  • Eat a well, balanced diet with adequate protein to support wound healing.   • Take a multivitamin every day. Adequate nutrition supports healing and new tissue growth.  • All diabetic patients should strive to keep blood sugars within a normal, practical range.   • Elevated blood sugars can delay your wound healing.        ACTIVITY     Elevate legs above level of heart   Normal activity then rest and elevation  May excercise  May  walk    MOBILITY     boot

## 2019-05-29 ENCOUNTER — OFFICE VISIT (OUTPATIENT)
Dept: WOUND CARE | Facility: HOSPITAL | Age: 59
End: 2019-05-29
Attending: PODIATRIST
Payer: COMMERCIAL

## 2019-05-29 VITALS — TEMPERATURE: 97.8 F

## 2019-05-29 DIAGNOSIS — A48.0 GAS GANGRENE (CMS/HCC): ICD-10-CM

## 2019-05-29 DIAGNOSIS — Z79.4 TYPE 2 DIABETES MELLITUS WITH FOOT ULCER, WITH LONG-TERM CURRENT USE OF INSULIN (CMS/HCC): ICD-10-CM

## 2019-05-29 DIAGNOSIS — M86.372 CHRONIC MULTIFOCAL OSTEOMYELITIS OF LEFT FOOT (CMS/HCC): ICD-10-CM

## 2019-05-29 DIAGNOSIS — E11.52 TYPE 2 DIABETES MELLITUS WITH DIABETIC PERIPHERAL ANGIOPATHY AND GANGRENE, WITH LONG-TERM CURRENT USE OF INSULIN (CMS/HCC): Primary | ICD-10-CM

## 2019-05-29 DIAGNOSIS — E11.621 TYPE 2 DIABETES MELLITUS WITH FOOT ULCER, WITH LONG-TERM CURRENT USE OF INSULIN (CMS/HCC): ICD-10-CM

## 2019-05-29 DIAGNOSIS — Z79.4 TYPE 2 DIABETES MELLITUS WITH DIABETIC PERIPHERAL ANGIOPATHY AND GANGRENE, WITH LONG-TERM CURRENT USE OF INSULIN (CMS/HCC): Primary | ICD-10-CM

## 2019-05-29 DIAGNOSIS — E11.8 TYPE 2 DIABETES MELLITUS WITH COMPLICATION, WITH LONG-TERM CURRENT USE OF INSULIN (CMS/HCC): ICD-10-CM

## 2019-05-29 DIAGNOSIS — L97.509 TYPE 2 DIABETES MELLITUS WITH FOOT ULCER, WITH LONG-TERM CURRENT USE OF INSULIN (CMS/HCC): ICD-10-CM

## 2019-05-29 DIAGNOSIS — Z79.4 TYPE 2 DIABETES MELLITUS WITH COMPLICATION, WITH LONG-TERM CURRENT USE OF INSULIN (CMS/HCC): ICD-10-CM

## 2019-05-29 NOTE — PATIENT INSTRUCTIONS
Wound Healing Center Instructions    MEDICATIONS     Medication Note  Continue present medications as prescribed by the Wound Healing Center or other physicians you see. To avoid any problems keep the Wound Healing Center informed each visit of any medications changes that occur.     WOUND CARE     Clean Wound with: Saline Solution   Treatment 1   Location: left foot/leg  Dressing: Apply yessi, adaptic, with dry dressing & ace  Dressing Care Frequency: Every Other Day  Care Provider: Family and Visiting Nurse       Basic Principles      • Wash your hands thoroughly with soap and water and after each dressing change. If someone other than the patient changes the dressing, it’s best to wear disposable gloves.   • Do not get the wound or dressing wet.   • To shower: remove the dressing, shower with soap and water (including washing the wound - do not use a washcloth), air dry, then redress the wound.  • Do not take a tub bath  • Keep all your dressings in a clean, covered container at home to avoid dust and contamination.  • Discard used dressings in a plastic bag or covered trash container.  • Check your wound and the surrounding skin at each dressing change for redness, warmth, swelling, increased pain, foul odor, fever, pus or abnormal drainage or discharge.  • Notify Wound Healing Center if any of these changes occur - Dept: [phone].        Nutrition  • Eat a well, balanced diet with adequate protein to support wound healing.   • Take a multivitamin every day. Adequate nutrition supports healing and new tissue growth.  • All diabetic patients should strive to keep blood sugars within a normal, practical range.   • Elevated blood sugars can delay your wound healing.        ACTIVITY     Elevate legs above level of heart   Normal activity then rest and elevation  May excercise  May walk    MOBILITY     boot

## 2019-05-29 NOTE — PROGRESS NOTES
Subjective      Patient ID: Harvey Lucero Jr. is a 58 y.o. male.    HPI    The following have been reviewed and updated as appropriate in this visit:     Patient seen status post osteomyelitis and surgical wound left foot.  Patient status post 1 week Apligraf aspect of the left foot.  Patient denies any fever chills or night sweats.    Past Medical History: He  has a past medical history of Anxiety; Chronic osteomyelitis (CMS/HCC) ([facility]); Dyslipidemia; GERD (gastroesophageal reflux disease); History of CVA (cerebrovascular accident); Hypertension; Lipid disorder; Open wound; TIA (transient ischemic attack); and Type 2 diabetes mellitus (CMS/[facility]) ([facility]). He also has no past medical history of Depression.  Past Surgical History: He  has a past surgical history that includes [place] tooth extraction; Other surgical history; Incision and drainage of wound (Left); Wound debridement (Left); and Foot amputation through metatarsal (Left).  Medication: He has a current medication list which includes the following prescription(s): acetaminophen, blood sugar diagnostic, blood-glucose meter, clopidogrel, insulin glargine, lancets, lisinopril, lorazepam, metformin, pen needle, diabetic, pen needle, diabetic, rosuvastatin, and sitagliptin.  Allergies: He is allergic to no known allergies.  Social History: He  reports that he has quit smoking. His smoking use included Cigarettes. He has never used smokeless tobacco. He reports that he does not drink alcohol or use drugs.    Review of Systems:  Review of Systems      Objective Organs decreased eyes decreased that there is no bone exposure all tendons and soft tissue is covered.  No other inflammatory spots were noted in the area.    Foot Exam    Assessment/Plan   Problem List Items Addressed This Visit     None        Diabetes mellitus with ulceration left foot  2.  Diabetes mellitus osteomalacia which is chronic.  3.  Diabetes mellitus with neuropathy.    .  Plan.  1.  Patient was  placed in the operative supine position after the left was prepped in normal aseptic fashion was hydrated there is a left foot series directly visualize a debris was taken through skin and subcu tissue with use of a cotton applicators between mild active bleeding there is no accommodation for the previous graft site.  Areas prepped and also the fascia was scrubbed in normal sterile fashion the Apligraf there is no prepped it was turned with the crosshatched in the dermal side down was placed to the wound bed this was attached with Steri-Strips to get angry to the area Adaptic 4 x 4 dry sterile dressing applied to the area.  Patient taught procedure well without complications patient also advised that return to normal parable levels patient did not remember postop instructions keep the area clean dry intact for 24 hours home nursing will continue to assess and address the secondary dressing patient recalls any fever chills night sweats.  Patient continue with antibiotics as directed.  No Follow-up on file.    Fabricio Tee DPM

## 2019-06-05 ENCOUNTER — OFFICE VISIT (OUTPATIENT)
Dept: WOUND CARE | Facility: HOSPITAL | Age: 59
End: 2019-06-05
Attending: PODIATRIST
Payer: COMMERCIAL

## 2019-06-05 VITALS — TEMPERATURE: 98.3 F

## 2019-06-05 DIAGNOSIS — Z79.4 TYPE 2 DIABETES MELLITUS WITH FOOT ULCER, WITH LONG-TERM CURRENT USE OF INSULIN (CMS/HCC): ICD-10-CM

## 2019-06-05 DIAGNOSIS — E11.52 TYPE 2 DIABETES MELLITUS WITH DIABETIC PERIPHERAL ANGIOPATHY AND GANGRENE, WITH LONG-TERM CURRENT USE OF INSULIN (CMS/HCC): Primary | ICD-10-CM

## 2019-06-05 DIAGNOSIS — S81.802D WOUND OF LEFT LOWER EXTREMITY, SUBSEQUENT ENCOUNTER: ICD-10-CM

## 2019-06-05 DIAGNOSIS — E11.621 TYPE 2 DIABETES MELLITUS WITH FOOT ULCER, WITH LONG-TERM CURRENT USE OF INSULIN (CMS/HCC): ICD-10-CM

## 2019-06-05 DIAGNOSIS — Z79.4 TYPE 2 DIABETES MELLITUS WITH DIABETIC PERIPHERAL ANGIOPATHY AND GANGRENE, WITH LONG-TERM CURRENT USE OF INSULIN (CMS/HCC): Primary | ICD-10-CM

## 2019-06-05 DIAGNOSIS — L97.509 TYPE 2 DIABETES MELLITUS WITH FOOT ULCER, WITH LONG-TERM CURRENT USE OF INSULIN (CMS/HCC): ICD-10-CM

## 2019-06-05 DIAGNOSIS — M72.6 NECROTIZING FASCIITIS (CMS/HCC): ICD-10-CM

## 2019-06-05 DIAGNOSIS — E11.8 TYPE 2 DIABETES MELLITUS WITH COMPLICATION, WITH LONG-TERM CURRENT USE OF INSULIN (CMS/HCC): ICD-10-CM

## 2019-06-05 DIAGNOSIS — Z79.4 TYPE 2 DIABETES MELLITUS WITH COMPLICATION, WITH LONG-TERM CURRENT USE OF INSULIN (CMS/HCC): ICD-10-CM

## 2019-06-05 PROCEDURE — 27200114 HC PROMOGRAN MATRIX SMALL

## 2019-06-05 RX ORDER — AMOXICILLIN 500 MG/1
500 CAPSULE ORAL 2 TIMES DAILY
Qty: 30 CAPSULE | Refills: 1 | Status: SHIPPED | OUTPATIENT
Start: 2019-06-05 | End: 2019-06-19

## 2019-06-05 NOTE — PATIENT INSTRUCTIONS
Wound Healing Center Instructions    MEDICATIONS     Medication Note  Continue present medications as prescribed by the Wound Healing Center or other physicians you see. To avoid any problems keep the Wound Healing Center informed each visit of any medications changes that occur.     WOUND CARE     Clean Wound with: Saline Solution   Treatment 1   Location: left foot/leg  Dressing: Apply yessi, adaptic, with dry dressing & ace  Dressing Care Frequency: Every Other Day  Care Provider: Family and Visiting Nurse       Basic Principles      • Wash your hands thoroughly with soap and water and after each dressing change. If someone other than the patient changes the dressing, it’s best to wear disposable gloves.   • Do not get the wound or dressing wet.   • To shower: remove the dressing, shower with soap and water (including washing the wound - do not use a washcloth), air dry, then redress the wound.  • Do not take a tub bath  • Keep all your dressings in a clean, covered container at home to avoid dust and contamination.  • Discard used dressings in a plastic bag or covered trash container.  • Check your wound and the surrounding skin at each dressing change for redness, warmth, swelling, increased pain, foul odor, fever, pus or abnormal drainage or discharge.  • Notify Wound Healing Center if any of these changes occur - Dept: [phone].        Nutrition  • Eat a well, balanced diet with adequate protein to support wound healing.   • Take a multivitamin every day. Adequate nutrition supports healing and new tissue growth.  • All diabetic patients should strive to keep blood sugars within a normal, practical range.   • Elevated blood sugars can delay your wound healing.        ACTIVITY     Elevate legs above level of heart   Normal activity then rest and elevation  May excercise  May walk    MOBILITY     boot  Wound Healing Center Instructions    MEDICATIONS     Medication Note  Continue present medications as  prescribed by the Wound Healing Center or other physicians you see. To avoid any problems keep the Wound Healing Center informed each visit of any medications changes that occur.     WOUND CARE     Clean Wound with: Saline Solution   Treatment 1   Location: left foot/leg  Dressing: Apply yessi, adaptic, with dry dressing & ace  Dressing Care Frequency: Every Other Day  Care Provider: Family and Visiting Nurse       Basic Principles      • Wash your hands thoroughly with soap and water and after each dressing change. If someone other than the patient changes the dressing, it’s best to wear disposable gloves.   • Do not get the wound or dressing wet.   • To shower: remove the dressing, shower with soap and water (including washing the wound - do not use a washcloth), air dry, then redress the wound.  • Do not take a tub bath  • Keep all your dressings in a clean, covered container at home to avoid dust and contamination.  • Discard used dressings in a plastic bag or covered trash container.  • Check your wound and the surrounding skin at each dressing change for redness, warmth, swelling, increased pain, foul odor, fever, pus or abnormal drainage or discharge.  • Notify Wound Healing Center if any of these changes occur - Dept: [phone].        Nutrition  • Eat a well, balanced diet with adequate protein to support wound healing.   • Take a multivitamin every day. Adequate nutrition supports healing and new tissue growth.  • All diabetic patients should strive to keep blood sugars within a normal, practical range.   • Elevated blood sugars can delay your wound healing.        ACTIVITY     Elevate legs above level of heart   Normal activity then rest and elevation  May excercise  May walk    MOBILITY     boot

## 2019-06-05 NOTE — PROGRESS NOTES
Subjective      Patient ID: Harvey Lucero Jr. is a 58 y.o. male.    HPI    The following have been reviewed and updated as appropriate in this visit:     Patient seen status post osteomyelitis and surgical wound left foot.  Patient status post 1 week Apligraf aspect of the left foot.  Patient denies any fever chills or night sweats.    Past Medical History: He  has a past medical history of Anxiety; Chronic osteomyelitis (CMS/HCC) ([facility]); Dyslipidemia; GERD (gastroesophageal reflux disease); History of CVA (cerebrovascular accident); Hypertension; Lipid disorder; Open wound; TIA (transient ischemic attack); and Type 2 diabetes mellitus (CMS/HCC) ([facility]). He also has no past medical history of Depression.  Past Surgical History: He  has a past surgical history that includes [place] tooth extraction; Other surgical history; Incision and drainage of wound (Left); Wound debridement (Left); and Foot amputation through metatarsal (Left).  Medication: He has a current medication list which includes the following prescription(s): acetaminophen, blood sugar diagnostic, blood-glucose meter, clopidogrel, insulin glargine, lancets, lisinopril, lorazepam, metformin, pen needle, diabetic, pen needle, diabetic, rosuvastatin, and sitagliptin.  Allergies: He is allergic to no known allergies.  Social History: He  reports that he has quit smoking. His smoking use included Cigarettes. He has never used smokeless tobacco. He reports that he does not drink alcohol or use drugs.    Review of Systems:  Review of Systems      Objective Organs decreased eyes decreased that there is no bone exposure all tendons and soft tissue is covered.  No other inflammatory spots were noted in the area.    Foot Exam    Assessment/Plan   Problem List Items Addressed This Visit     None        Diabetes mellitus with ulceration left foot  2.  Diabetes mellitus osteomyelitis  which is chronic.  3.  Diabetes mellitus with neuropathy.  4 .severe neuropathy      Plan.  1. Pt is being ,molded for forefoot filler and mafo brace and will continue with  Current treatment course and maintain similar activity level.  Fabricio Tee DPM

## 2019-06-12 ENCOUNTER — OFFICE VISIT (OUTPATIENT)
Dept: WOUND CARE | Facility: HOSPITAL | Age: 59
End: 2019-06-12
Attending: PODIATRIST
Payer: COMMERCIAL

## 2019-06-12 VITALS — TEMPERATURE: 98.8 F

## 2019-06-12 DIAGNOSIS — L97.509 TYPE 2 DIABETES MELLITUS WITH FOOT ULCER, WITH LONG-TERM CURRENT USE OF INSULIN (CMS/HCC): ICD-10-CM

## 2019-06-12 DIAGNOSIS — Z79.4 TYPE 2 DIABETES MELLITUS WITH FOOT ULCER, WITH LONG-TERM CURRENT USE OF INSULIN (CMS/HCC): ICD-10-CM

## 2019-06-12 DIAGNOSIS — Z79.4 TYPE 2 DIABETES MELLITUS WITH DIABETIC PERIPHERAL ANGIOPATHY AND GANGRENE, WITH LONG-TERM CURRENT USE OF INSULIN (CMS/HCC): Primary | ICD-10-CM

## 2019-06-12 DIAGNOSIS — M86.372 CHRONIC MULTIFOCAL OSTEOMYELITIS, LEFT ANKLE AND FOOT (CMS/HCC): ICD-10-CM

## 2019-06-12 DIAGNOSIS — M86.072 ACUTE HEMATOGENOUS OSTEOMYELITIS OF LEFT FOOT (CMS/HCC): ICD-10-CM

## 2019-06-12 DIAGNOSIS — E11.52 TYPE 2 DIABETES MELLITUS WITH DIABETIC PERIPHERAL ANGIOPATHY AND GANGRENE, WITH LONG-TERM CURRENT USE OF INSULIN (CMS/HCC): Primary | ICD-10-CM

## 2019-06-12 DIAGNOSIS — E11.621 TYPE 2 DIABETES MELLITUS WITH FOOT ULCER, WITH LONG-TERM CURRENT USE OF INSULIN (CMS/HCC): ICD-10-CM

## 2019-06-12 NOTE — PROGRESS NOTES
Subjective      Patient ID: Harvey Lucero Jr. is a 59 y.o. male.    HPI    The following have been reviewed and updated as appropriate in this visit:     Patient seen status post osteomyelitis and surgical wound left foot.  Patient status post 1 week Apligraf aspect of the left foot.  Patient denies any fever chills or night sweats.    Past Medical History: He  has a past medical history of Anxiety; Chronic osteomyelitis (CMS/[facility]) ([facility]); Dyslipidemia; GERD (gastroesophageal reflux disease); History of CVA (cerebrovascular accident); Hypertension; Lipid disorder; Open wound; TIA (transient ischemic attack); and Type 2 diabetes mellitus (CMS/[facility]) ([facility]). He also has no past medical history of Depression.  Past Surgical History: He  has a past surgical history that includes [place] tooth extraction; Other surgical history; Incision and drainage of wound (Left); Wound debridement (Left); and Foot amputation through metatarsal (Left).  Medication: He has a current medication list which includes the following prescription(s): acetaminophen, amoxicillin, blood sugar diagnostic, blood-glucose meter, clopidogrel, insulin glargine, lancets, lisinopril, lorazepam, metformin, pen needle, diabetic, pen needle, diabetic, rosuvastatin, and sitagliptin.  Allergies: He is allergic to no known allergies.  Social History: He  reports that he has quit smoking. His smoking use included Cigarettes. He has never used smokeless tobacco. He reports that he does not drink alcohol or use drugs.    Review of Systems:  Review of Systems      Objective Organs decreased eyes decreased that there is no bone exposure all tendons and soft tissue is covered.  No other inflammatory spots were noted in the area.    Foot Exam    Assessment/Plan   Problem List Items Addressed This Visit     Chronic multifocal osteomyelitis, left ankle and foot (CMS/[facility]) ([facility])        Diabetes mellitus with ulceration left foot  2.  Diabetes mellitus osteomyelitis  which  is chronic.  3.  Diabetes mellitus with neuropathy.  4 .severe neuropathy     Plan.  1. Pt is being ,molded for forefoot filler and mafo brace and will continue with  Current treatment course and maintain similar activity level.Continue with [facility] and follow up 1 week  Fabricio Tee DPM

## 2019-06-12 NOTE — PATIENT INSTRUCTIONS
Wound Healing Center Instructions    MEDICATIONS     Medication Note  Continue present medications as prescribed by the Wound Healing Center or other physicians you see. To avoid any problems keep the Wound Healing Center informed each visit of any medications changes that occur.     WOUND CARE     Clean Wound with: Saline Solution   Treatment 1   Location: left foot/leg  Dressing: Apply yessi, adaptic, with dry dressing & ace  Dressing Care Frequency: Every Other Day  Care Provider: family       Basic Principles      • Wash your hands thoroughly with soap and water and after each dressing change. If someone other than the patient changes the dressing, it’s best to wear disposable gloves.   • Do not get the wound or dressing wet.   • To shower: remove the dressing, shower with soap and water (including washing the wound - do not use a washcloth), air dry, then redress the wound.  • Do not take a tub bath  • Keep all your dressings in a clean, covered container at home to avoid dust and contamination.  • Discard used dressings in a plastic bag or covered trash container.  • Check your wound and the surrounding skin at each dressing change for redness, warmth, swelling, increased pain, foul odor, fever, pus or abnormal drainage or discharge.  • Notify Wound Healing Center if any of these changes occur - Dept: [phone].        Nutrition  • Eat a well, balanced diet with adequate protein to support wound healing.   • Take a multivitamin every day. Adequate nutrition supports healing and new tissue growth.  • All diabetic patients should strive to keep blood sugars within a normal, practical range.   • Elevated blood sugars can delay your wound healing.        ACTIVITY     Elevate legs above level of heart   Normal activity then rest and elevation  May excercise  May walk    MOBILITY     boot  Wound Healing Center Instructions    MEDICATIONS     Medication Note  Continue present medications as prescribed by the  Wound Healing Center or other physicians you see. To avoid any problems keep the Wound Healing Center informed each visit of any medications changes that occur.     WOUND CARE     Clean Wound with: Saline Solution   Treatment 1   Location: left foot/leg  Dressing: Apply yessi, adaptic, with dry dressing & ace  Dressing Care Frequency: Every Other Day  Care Provider: Family and Visiting Nurse       Basic Principles      • Wash your hands thoroughly with soap and water and after each dressing change. If someone other than the patient changes the dressing, it’s best to wear disposable gloves.   • Do not get the wound or dressing wet.   • To shower: remove the dressing, shower with soap and water (including washing the wound - do not use a washcloth), air dry, then redress the wound.  • Do not take a tub bath  • Keep all your dressings in a clean, covered container at home to avoid dust and contamination.  • Discard used dressings in a plastic bag or covered trash container.  • Check your wound and the surrounding skin at each dressing change for redness, warmth, swelling, increased pain, foul odor, fever, pus or abnormal drainage or discharge.  • Notify Wound Healing Center if any of these changes occur - Dept: [phone].        Nutrition  • Eat a well, balanced diet with adequate protein to support wound healing.   • Take a multivitamin every day. Adequate nutrition supports healing and new tissue growth.  • All diabetic patients should strive to keep blood sugars within a normal, practical range.   • Elevated blood sugars can delay your wound healing.        ACTIVITY     Elevate legs above level of heart   Normal activity then rest and elevation  May excercise  May walk    MOBILITY     boot

## 2019-06-19 ENCOUNTER — OFFICE VISIT (OUTPATIENT)
Dept: WOUND CARE | Facility: HOSPITAL | Age: 59
End: 2019-06-19
Attending: PODIATRIST
Payer: COMMERCIAL

## 2019-06-19 VITALS — TEMPERATURE: 98.1 F

## 2019-06-19 DIAGNOSIS — S81.802D WOUND OF LEFT LOWER EXTREMITY, SUBSEQUENT ENCOUNTER: ICD-10-CM

## 2019-06-19 DIAGNOSIS — S91.009A OPEN WOUND OF ANKLE AND FOOT: ICD-10-CM

## 2019-06-19 DIAGNOSIS — E11.621 TYPE 2 DIABETES MELLITUS WITH FOOT ULCER, WITH LONG-TERM CURRENT USE OF INSULIN (CMS/HCC): ICD-10-CM

## 2019-06-19 DIAGNOSIS — Z79.4 TYPE 2 DIABETES MELLITUS WITH DIABETIC PERIPHERAL ANGIOPATHY AND GANGRENE, WITH LONG-TERM CURRENT USE OF INSULIN (CMS/HCC): Primary | ICD-10-CM

## 2019-06-19 DIAGNOSIS — I10 ESSENTIAL HYPERTENSION: ICD-10-CM

## 2019-06-19 DIAGNOSIS — L97.509 TYPE 2 DIABETES MELLITUS WITH FOOT ULCER, WITH LONG-TERM CURRENT USE OF INSULIN (CMS/HCC): ICD-10-CM

## 2019-06-19 DIAGNOSIS — R60.0 LOCALIZED EDEMA: ICD-10-CM

## 2019-06-19 DIAGNOSIS — M86.372 CHRONIC MULTIFOCAL OSTEOMYELITIS, LEFT ANKLE AND FOOT (CMS/HCC): ICD-10-CM

## 2019-06-19 DIAGNOSIS — Z79.4 TYPE 2 DIABETES MELLITUS WITH FOOT ULCER, WITH LONG-TERM CURRENT USE OF INSULIN (CMS/HCC): ICD-10-CM

## 2019-06-19 DIAGNOSIS — S91.309A OPEN WOUND OF ANKLE AND FOOT: ICD-10-CM

## 2019-06-19 DIAGNOSIS — E11.52 TYPE 2 DIABETES MELLITUS WITH DIABETIC PERIPHERAL ANGIOPATHY AND GANGRENE, WITH LONG-TERM CURRENT USE OF INSULIN (CMS/HCC): Primary | ICD-10-CM

## 2019-06-19 DIAGNOSIS — Z87.39 HISTORY OF NECROTIZING FASCIITIS: ICD-10-CM

## 2019-06-19 NOTE — PATIENT INSTRUCTIONS
[facility]aling Center Instructions    MEDICATIONS     Medication Note  Continue present medications as prescribed by the [facility]aling [facility] or other physicians you see. To avoid any problems keep the [facility]aling Center informed each visit of any medications changes that occur.     WOUND CARE     Clean Wound with: Saline Solution   Treatment 1   Location: left foot/leg  Dressing: Apply yessi, adaptic, with dry dressing & ace  Dressing Care Frequency: Every Other Day  Care Provider: family       Basic Principles      • Wash your hands thoroughly with soap and water and after each dressing change. If someone other than the patient changes the dressing, it’s best to wear disposable gloves.   • Do not get the wound or dressing wet.   • To shower: remove the dressing, shower with soap and water (including washing the wound - do not use a washcloth), air dry, then redress the wound.  • Do not take a tub bath  • Keep all your dressings in a clean, covered container at home to avoid dust and contamination.  • Discard used dressings in a plastic bag or covered trash container.  • Check your wound and the surrounding skin at each dressing change for redness, warmth, swelling, increased pain, foul odor, fever, pus or abnormal drainage or discharge.  • Notify [facility]aling Center if any of these changes occur - Dept: [phone].        Nutrition  • Eat a well, balanced diet with adequate protein to support wound healing.   • Take a multivitamin every day. Adequate nutrition supports healing and new tissue growth.  • All diabetic patients should strive to keep blood sugars within a normal, practical range.   • Elevated blood sugars can delay your wound healing.        ACTIVITY     Elevate legs above level of heart   Normal activity then rest and elevation  May excercise  May walk    MOBILITY     boot  W[facility] Healing Center Instructions    MEDICATIONS     Medication Note  Continue present medications as prescribed by the  [facility]aling Center or other physicians you see. To avoid any problems keep the [facility]aling Center informed each visit of any medications changes that occur.     WOUND CARE     Clean Wound with: Saline Solution   Treatment 1   Location: left foot/leg  Dressing: Apply yessi, adaptic, with dry dressing & ace  Dressing Care Frequency: Every Other Day  Care Provider: Family and Visiting Nurse       Basic Principles      • Wash your hands thoroughly with soap and water and after each dressing change. If someone other than the patient changes the dressing, it’s best to wear disposable gloves.   • Do not get the wound or dressing wet.   • To shower: remove the dressing, shower with soap and water (including washing the wound - do not use a washcloth), air dry, then redress the wound.  • Do not take a tub bath  • Keep all your dressings in a clean, covered container at home to avoid dust and contamination.  • Discard used dressings in a plastic bag or covered trash container.  • Check your wound and the surrounding skin at each dressing change for redness, warmth, swelling, increased pain, foul odor, fever, pus or abnormal drainage or discharge.  • Notify [facility]aling Center if any of these changes occur - Dept: [phone].        Nutrition  • Eat a well, balanced diet with adequate protein to support wound healing.   • Take a multivitamin every day. Adequate nutrition supports healing and new tissue growth.  • All diabetic patients should strive to keep blood sugars within a normal, practical range.   • Elevated blood sugars can delay your wound healing.        ACTIVITY     Elevate legs above level of heart   Normal activity then rest and elevation  May excercise  May walk    MOBILITY     boot  [facility]aling Center Instructions    MEDICATIONS     Medication Note  Continue present medications as prescribed by the [facility] or other physicians you see. To avoid any problems keep the [facility]aling Center  informed each visit of any medications changes that occur.     WOUND CARE     Clean Wound with: Saline Solution   Treatment 1   Location: left foot/leg  Dressing: Apply yessi, adaptic, with dry dressing & ace  Dressing Care Frequency: Every Other Day  Care Provider: family       Basic Principles      • Wash your hands thoroughly with soap and water and after each dressing change. If someone other than the patient changes the dressing, it’s best to wear disposable gloves.   • Do not get the wound or dressing wet.   • To shower: remove the dressing, shower with soap and water (including washing the wound - do not use a washcloth), air dry, then redress the wound.  • Do not take a tub bath  • Keep all your dressings in a clean, covered container at home to avoid dust and contamination.  • Discard used dressings in a plastic bag or covered trash container.  • Check your wound and the surrounding skin at each dressing change for redness, warmth, swelling, increased pain, foul odor, fever, pus or abnormal drainage or discharge.  • Notify [facility]aling Center if any of these changes occur - Dept: [phone].        Nutrition  • Eat a well, balanced diet with adequate protein to support wound healing.   • Take a multivitamin every day. Adequate nutrition supports healing and new tissue growth.  • All diabetic patients should strive to keep blood sugars within a normal, practical range.   • Elevated blood sugars can delay your wound healing.        ACTIVITY     Elevate legs above level of heart   Normal activity then rest and elevation  May excercise  May walk    MOBILITY     boot  [facility]aling Center Instructions    MEDICATIONS     Medication Note  Continue present medications as prescribed by the [facility]aling Center or other physicians you see. To avoid any problems keep the [facility]aling Center informed each visit of any medications changes that occur.     WOUND CARE     Clean Wound with: Saline Solution   Treatment  1   Location: left foot/leg  Dressing: Apply yessi, adaptic, with dry dressing & ace  Dressing Care Frequency: Every Other Day  Care Provider: Family and Visiting Nurse       Basic Principles      • Wash your hands thoroughly with soap and water and after each dressing change. If someone other than the patient changes the dressing, it’s best to wear disposable gloves.   • Do not get the wound or dressing wet.   • To shower: remove the dressing, shower with soap and water (including washing the wound - do not use a washcloth), air dry, then redress the wound.  • Do not take a tub bath  • Keep all your dressings in a clean, covered container at home to avoid dust and contamination.  • Discard used dressings in a plastic bag or covered trash container.  • Check your wound and the surrounding skin at each dressing change for redness, warmth, swelling, increased pain, foul odor, fever, pus or abnormal drainage or discharge.  • Notify [facility]aling Center if any of these changes occur - Dept: [phone].        Nutrition  • Eat a well, balanced diet with adequate protein to support wound healing.   • Take a multivitamin every day. Adequate nutrition supports healing and new tissue growth.  • All diabetic patients should strive to keep blood sugars within a normal, practical range.   • Elevated blood sugars can delay your wound healing.        ACTIVITY     Elevate legs above level of heart   Normal activity then rest and elevation  May excercise  May walk    MOBILITY     boot

## 2019-06-19 NOTE — PROGRESS NOTES
Subjective      Patient ID: Harvey Lucero Jr. is a 59 y.o. male.    HPI    The following have been reviewed and updated as appropriate in this visit:     Patient seen status post osteomyelitis and surgical wound left foot.  Patient status post 1 week Apligraf aspect of the left foot.  Patient denies any fever chills or night sweats.    Past Medical History: He  has a past medical history of Anxiety; Chronic osteomyelitis (CMS/HCC) ([facility]); Dyslipidemia; GERD (gastroesophageal reflux disease); History of CVA (cerebrovascular accident); Hypertension; Lipid disorder; Open wound; TIA (transient ischemic attack); and Type 2 diabetes mellitus (CMS/[facility]) ([facility]). He also has no past medical history of Depression.  Past Surgical History: He  has a past surgical history that includes [place] tooth extraction; Other surgical history; Incision and drainage of wound (Left); Wound debridement (Left); and Foot amputation through metatarsal (Left).  Medication: He has a current medication list which includes the following prescription(s): acetaminophen, amoxicillin, blood sugar diagnostic, blood-glucose meter, clopidogrel, insulin glargine, lancets, lisinopril, lorazepam, metformin, pen needle, diabetic, pen needle, diabetic, rosuvastatin, and sitagliptin.  Allergies: He is allergic to no known allergies.  Social History: He  reports that he has quit smoking. His smoking use included Cigarettes. He has never used smokeless tobacco. He reports that he does not drink alcohol or use drugs.    Review of Systems:  Review of Systems      Objective Organs decreased eyes decreased that there is no bone exposure all tendons and soft tissue is covered.  No other inflammatory spots were noted in the area.    Foot Exam    Assessment/Plan   Problem List Items Addressed This Visit     Hypertension    Open wound of ankle and foot    Localized edema    Type 2 diabetes mellitus with diabetic peripheral angiopathy and gangrene, with long-term current  use of insulin (CMS/HCC) - Primary    Type 2 diabetes mellitus with foot ulcer, with long-term current use of insulin (CMS/HCC)    Chronic multifocal osteomyelitis, left ankle and foot (CMS/HCC) (HCC)    History of necrotizing fasciitis    Leg wound, left        Diabetes mellitus with ulceration left foot  2.  Diabetes mellitus osteomyelitis  which is chronic.  3.  Diabetes mellitus with neuropathy.  4 .severe neuropathy     Plan.  1. Pt is being ,molded for forefoot filler and mafo brace and will continue with  Current treatment course and maintain similar activity level.Continue with current [facility] care treatment and follow up 1 week  Fabricio Tee DPM

## 2019-06-26 ENCOUNTER — OFFICE VISIT (OUTPATIENT)
Dept: WOUND CARE | Facility: HOSPITAL | Age: 59
End: 2019-06-26
Attending: PODIATRIST
Payer: COMMERCIAL

## 2019-06-26 VITALS — TEMPERATURE: 98.8 F

## 2019-06-26 DIAGNOSIS — M86.072 ACUTE HEMATOGENOUS OSTEOMYELITIS OF LEFT FOOT (CMS/HCC): ICD-10-CM

## 2019-06-26 DIAGNOSIS — I10 ESSENTIAL HYPERTENSION: ICD-10-CM

## 2019-06-26 DIAGNOSIS — S91.009A OPEN WOUND OF ANKLE AND FOOT: Primary | ICD-10-CM

## 2019-06-26 DIAGNOSIS — S81.802D WOUND OF LEFT LOWER EXTREMITY, SUBSEQUENT ENCOUNTER: ICD-10-CM

## 2019-06-26 DIAGNOSIS — S91.309A OPEN WOUND OF ANKLE AND FOOT: Primary | ICD-10-CM

## 2019-06-26 DIAGNOSIS — A48.0 GAS GANGRENE (CMS/HCC): ICD-10-CM

## 2019-06-26 PROCEDURE — 27200114 HC PROMOGRAN MATRIX SMALL

## 2019-06-26 NOTE — PROGRESS NOTES
Subjective      Patient ID: Harvey Lucero Jr. is a 59 y.o. male.    HPI    The following have been reviewed and updated as appropriate in this visit:     Patient seen status post osteomyelitis and surgical wound left foot.  Patient status post 1 week Apligraf aspect of the left foot.  Patient denies any fever chills or night sweats.    Past Medical History: He  has a past medical history of Anxiety; Chronic osteomyelitis (CMS/HCC) ([facility]); Dyslipidemia; GERD (gastroesophageal reflux disease); History of CVA (cerebrovascular accident); Hypertension; Lipid disorder; Open wound; TIA (transient ischemic attack); and Type 2 diabetes mellitus (CMS/HCC) ([facility]). He also has no past medical history of Depression.  Past Surgical History: He  has a past surgical history that includes [place] tooth extraction; Other surgical history; Incision and drainage of wound (Left); Wound debridement (Left); and Foot amputation through metatarsal (Left).  Medication: He has a current medication list which includes the following prescription(s): acetaminophen, blood sugar diagnostic, blood-glucose meter, clopidogrel, insulin glargine, lancets, lisinopril, lorazepam, metformin, pen needle, diabetic, pen needle, diabetic, rosuvastatin, and sitagliptin.  Allergies: He is allergic to no known allergies.  Social History: He  reports that he has quit smoking. His smoking use included Cigarettes. He has never used smokeless tobacco. He reports that he does not drink alcohol or use drugs.    Review of Systems:  Review of Systems      Objective Organs decreased eyes decreased that there is no bone exposure all tendons and soft tissue is covered.  No other inflammatory spots were noted in the area.    Foot Exam    Assessment/Plan   Problem List Items Addressed This Visit     None        Diabetes mellitus with ulceration left foot  2.  Diabetes mellitus osteomyelitis  which is chronic.  3.  Diabetes mellitus with neuropathy.  4 .severe neuropathy      Plan.  1. Pt was fitted for forefoot filler and mafo brace and will continue with  Current treatment course. and maintain similar activity level.Continue with current [facility] care treatment and follow up 1 week  Fabricio Tee DPM

## 2019-06-26 NOTE — PATIENT INSTRUCTIONS
Wound Healing Center Instructions    MEDICATIONS     Medication Note  Continue present medications as prescribed by the [facility]aling Center or other physicians you see. To avoid any problems keep the [facility]ound [facility]aling Center informed each visit of any medications changes that occur.     WOUND CARE     Clean Wound with: Saline Solution   Treatment 1   Location: left foot/leg  Dressing: Apply yessi, adaptic, with dry dressing & ace  Dressing Care Frequency: Every Other Day  Care Provider: family       Basic Principles      • Wash your hands thoroughly with soap and water and after each dressing change. If someone other than the patient changes the dressing, it’s best to wear disposable gloves.   • Do not get the wound or dressing wet.   • To shower: remove the dressing, shower with soap and water (including washing the wound - do not use a washcloth), air dry, then redress the wound.  • Do not take a tub bath  • Keep all your dressings in a clean, covered container at home to avoid dust and contamination.  • Discard used dressings in a plastic bag or covered trash container.  • Check your wound and the surrounding skin at each dressing change for redness, warmth, swelling, increased pain, foul odor, fever, pus or abnormal drainage or discharge.  • Notify W[facility] Healing Center if any of these changes occur - Dept: [phone].        Nutrition  • Eat a well, balanced diet with adequate protein to support wound healing.   • Take a multivitamin every day. Adequate nutrition supports healing and new tissue growth.  • All diabetic patients should strive to keep blood sugars within a normal, practical range.   • Elevated blood sugars can delay your wound healing.        ACTIVITY     Elevate legs above level of heart   Normal activity then rest and elevation  May excercise  May walk    MOBILITY     boot  Wound Healing Center Instructions    MEDICATIONS     Medication Note  Continue present medications as prescribed by the  [facility]aling Center or other physicians you see. To avoid any problems keep the [facility]aling Center informed each visit of any medications changes that occur.     WOUND CARE     Clean Wound with: Saline Solution   Treatment 1   Location: left foot/leg  Dressing: Apply yessi, adaptic, with dry dressing & ace  Dressing Care Frequency: Every Other Day  Care Provider: Family and Visiting Nurse       Basic Principles      • Wash your hands thoroughly with soap and water and after each dressing change. If someone other than the patient changes the dressing, it’s best to wear disposable gloves.   • Do not get the wound or dressing wet.   • To shower: remove the dressing, shower with soap and water (including washing the wound - do not use a washcloth), air dry, then redress the wound.  • Do not take a tub bath  • Keep all your dressings in a clean, covered container at home to avoid dust and contamination.  • Discard used dressings in a plastic bag or covered trash container.  • Check your wound and the surrounding skin at each dressing change for redness, warmth, swelling, increased pain, foul odor, fever, pus or abnormal drainage or discharge.  • Notify W[facility] Healing Center if any of these changes occur - Dept: [phone].        Nutrition  • Eat a well, balanced diet with adequate protein to support wound healing.   • Take a multivitamin every day. Adequate nutrition supports healing and new tissue growth.  • All diabetic patients should strive to keep blood sugars within a normal, practical range.   • Elevated blood sugars can delay your wound healing.        ACTIVITY     Elevate legs above level of heart   Normal activity then rest and elevation  May excercise  May walk    MOBILITY     boot  Wound Healing Center Instructions    MEDICATIONS     Medication Note  Continue present medications as prescribed by the [facility]aling Center or other physicians you see. To avoid any problems keep the [facility]ound Healing Center  informed each visit of any medications changes that occur.     WOUND CARE     Clean Wound with: Saline Solution   Treatment 1   Location: left foot/leg  Dressing: Apply yessi, adaptic, with dry dressing & ace  Dressing Care Frequency: Every Other Day  Care Provider: family       Basic Principles      • Wash your hands thoroughly with soap and water and after each dressing change. If someone other than the patient changes the dressing, it’s best to wear disposable gloves.   • Do not get the wound or dressing wet.   • To shower: remove the dressing, shower with soap and water (including washing the wound - do not use a washcloth), air dry, then redress the wound.  • Do not take a tub bath  • Keep all your dressings in a clean, covered container at home to avoid dust and contamination.  • Discard used dressings in a plastic bag or covered trash container.  • Check your wound and the surrounding skin at each dressing change for redness, warmth, swelling, increased pain, foul odor, fever, pus or abnormal drainage or discharge.  • Notify Wound Healing Center if any of these changes occur - Dept: [phone].        Nutrition  • Eat a well, balanced diet with adequate protein to support wound healing.   • Take a multivitamin every day. Adequate nutrition supports healing and new tissue growth.  • All diabetic patients should strive to keep blood sugars within a normal, practical range.   • Elevated blood sugars can delay your wound healing.        ACTIVITY     Elevate legs above level of heart   Normal activity then rest and elevation  May excercise  May walk    MOBILITY     boot  Wound Healing Center Instructions    MEDICATIONS     Medication Note  Continue present medications as prescribed by the [facility]ound [facility]aling Center or other physicians you see. To avoid any problems keep the [facility]ound Healing Center informed each visit of any medications changes that occur.     WOUND CARE     Clean Wound with: Saline Solution   Treatment  1   Location: left foot/leg  Dressing: Apply yessi, adaptic, with dry dressing & ace  Dressing Care Frequency: Every Other Day  Care Provider: Family and Visiting Nurse       Basic Principles      • Wash your hands thoroughly with soap and water and after each dressing change. If someone other than the patient changes the dressing, it’s best to wear disposable gloves.   • Do not get the wound or dressing wet.   • To shower: remove the dressing, shower with soap and water (including washing the wound - do not use a washcloth), air dry, then redress the wound.  • Do not take a tub bath  • Keep all your dressings in a clean, covered container at home to avoid dust and contamination.  • Discard used dressings in a plastic bag or covered trash container.  • Check your wound and the surrounding skin at each dressing change for redness, warmth, swelling, increased pain, foul odor, fever, pus or abnormal drainage or discharge.  • Notify W[facility] Healing Center if any of these changes occur - Dept: [phone].        Nutrition  • Eat a well, balanced diet with adequate protein to support wound healing.   • Take a multivitamin every day. Adequate nutrition supports healing and new tissue growth.  • All diabetic patients should strive to keep blood sugars within a normal, practical range.   • Elevated blood sugars can delay your wound healing.        ACTIVITY     Elevate legs above level of heart   Normal activity then rest and elevation  May excercise  May walk    MOBILITY     boot

## 2019-07-03 ENCOUNTER — OFFICE VISIT (OUTPATIENT)
Dept: WOUND CARE | Facility: HOSPITAL | Age: 59
End: 2019-07-03
Attending: PODIATRIST
Payer: COMMERCIAL

## 2019-07-03 VITALS — RESPIRATION RATE: 18 BRPM | TEMPERATURE: 98.1 F | HEART RATE: 80 BPM

## 2019-07-03 DIAGNOSIS — E11.8 TYPE 2 DIABETES MELLITUS WITH COMPLICATION, WITH LONG-TERM CURRENT USE OF INSULIN (CMS/HCC): ICD-10-CM

## 2019-07-03 DIAGNOSIS — E11.52 TYPE 2 DIABETES MELLITUS WITH DIABETIC PERIPHERAL ANGIOPATHY AND GANGRENE, WITH LONG-TERM CURRENT USE OF INSULIN (CMS/HCC): Primary | ICD-10-CM

## 2019-07-03 DIAGNOSIS — E11.621 TYPE 2 DIABETES MELLITUS WITH FOOT ULCER, WITH LONG-TERM CURRENT USE OF INSULIN (CMS/HCC): ICD-10-CM

## 2019-07-03 DIAGNOSIS — Z79.4 TYPE 2 DIABETES MELLITUS WITH COMPLICATION, WITH LONG-TERM CURRENT USE OF INSULIN (CMS/HCC): ICD-10-CM

## 2019-07-03 DIAGNOSIS — M86.372 CHRONIC MULTIFOCAL OSTEOMYELITIS, LEFT ANKLE AND FOOT (CMS/HCC): ICD-10-CM

## 2019-07-03 DIAGNOSIS — L97.509 TYPE 2 DIABETES MELLITUS WITH FOOT ULCER, WITH LONG-TERM CURRENT USE OF INSULIN (CMS/HCC): ICD-10-CM

## 2019-07-03 DIAGNOSIS — Z79.4 TYPE 2 DIABETES MELLITUS WITH FOOT ULCER, WITH LONG-TERM CURRENT USE OF INSULIN (CMS/HCC): ICD-10-CM

## 2019-07-03 DIAGNOSIS — Z79.4 TYPE 2 DIABETES MELLITUS WITH DIABETIC PERIPHERAL ANGIOPATHY AND GANGRENE, WITH LONG-TERM CURRENT USE OF INSULIN (CMS/HCC): Primary | ICD-10-CM

## 2019-07-03 PROCEDURE — 27200114 HC PROMOGRAN MATRIX SMALL

## 2019-07-03 PROCEDURE — 27200105 HC AQUA CELL 4X4

## 2019-07-03 RX ORDER — AMOXICILLIN 500 MG/1
500 CAPSULE ORAL 2 TIMES DAILY
COMMUNITY
End: 2019-07-03

## 2019-07-03 RX ORDER — MELOXICAM 15 MG/1
15 TABLET ORAL DAILY
Qty: 90 TABLET | Refills: 1 | Status: ON HOLD | OUTPATIENT
Start: 2019-07-03 | End: 2023-09-19 | Stop reason: ENTERED-IN-ERROR

## 2019-07-03 RX ORDER — AMOXICILLIN 500 MG/1
500 CAPSULE ORAL 2 TIMES DAILY
COMMUNITY
Start: 2019-07-03 | End: 2019-07-03 | Stop reason: SDUPTHER

## 2019-07-03 RX ORDER — AMOXICILLIN 500 MG/1
500 CAPSULE ORAL 2 TIMES DAILY
Qty: 60 CAPSULE | Refills: 3 | Status: SHIPPED | OUTPATIENT
Start: 2019-07-03 | End: 2023-09-26 | Stop reason: HOSPADM

## 2019-07-03 NOTE — PATIENT INSTRUCTIONS
[facility] Instructions    MEDICATIONS     Medication Note  Continue present medications as prescribed by the [facility] or other physicians you see. To avoid any problems keep the [facility] informed each visit of any medications changes that occur.     WOUND CARE     Clean Wound with: Saline Solution   Treatment 1   Location: left foot/leg  Dressing: Apply yessi, adaptic, with dry dressing & ace  Dressing Care Frequency: Every Other Day  Care Provider: family       Basic Principles      • Wash your hands thoroughly with soap and water and after each dressing change. If someone other than the patient changes the dressing, it’s best to wear disposable gloves.   • Do not get the wound or dressing wet.   • To shower: remove the dressing, shower with soap and water (including washing the wound - do not use a washcloth), air dry, then redress the wound.  • Do not take a tub bath  • Keep all your dressings in a clean, covered container at home to avoid dust and contamination.  • Discard used dressings in a plastic bag or covered trash container.  • Check your wound and the surrounding skin at each dressing change for redness, warmth, swelling, increased pain, foul odor, fever, pus or abnormal drainage or discharge.  • Notify [facility] if any of these changes occur - Dept: [phone].        Nutrition  • Eat a well, balanced diet with adequate protein to support wound healing.   • Take a multivitamin every day. Adequate nutrition supports healing and new tissue growth.  • All diabetic patients should strive to keep blood sugars within a normal, practical range.   • Elevated blood sugars can delay your wound healing.        ACTIVITY     Elevate legs above level of heart   Normal activity then rest and elevation  May excercise  May walk    MOBILITY     boot  [facility] Instructions    MEDICATIONS     Medication Note  Continue present medications as prescribed by the  [facility] or other physicians you see. To avoid any problems keep the [facility] informed each visit of any medications changes that occur.     WOUND CARE     Clean Wound with: Saline Solution   Treatment 1   Location: left foot/leg  Dressing: Apply yessi, adaptic, with dry dressing & ace  Dressing Care Frequency: Every Other Day  Care Provider: Family and Visiting Nurse       Basic Principles      • Wash your hands thoroughly with soap and water and after each dressing change. If someone other than the patient changes the dressing, it’s best to wear disposable gloves.   • Do not get the wound or dressing wet.   • To shower: remove the dressing, shower with soap and water (including washing the wound - do not use a washcloth), air dry, then redress the wound.  • Do not take a tub bath  • Keep all your dressings in a clean, covered container at home to avoid dust and contamination.  • Discard used dressings in a plastic bag or covered trash container.  • Check your wound and the surrounding skin at each dressing change for redness, warmth, swelling, increased pain, foul odor, fever, pus or abnormal drainage or discharge.  • Notify [facility] Center if any of these changes occur - Dept: [phone].        Nutrition  • Eat a well, balanced diet with adequate protein to support wound healing.   • Take a multivitamin every day. Adequate nutrition supports healing and new tissue growth.  • All diabetic patients should strive to keep blood sugars within a normal, practical range.   • Elevated blood sugars can delay your wound healing.        ACTIVITY     Elevate legs above level of heart   Normal activity then rest and elevation  May excercise  May walk    MOBILITY     boot  [facility] Instructions    MEDICATIONS     Medication Note  Continue present medications as prescribed by the [facility] or other physicians you see. To avoid any problems keep the [facility]  informed each visit of any medications changes that occur.     WOUND CARE     Clean Wound with: Saline Solution   Treatment 1   Location: left foot/leg  Dressing: Apply yessi, adaptic, with dry dressing & ace  Dressing Care Frequency: Every Other Day  Care Provider: family       Basic Principles      • Wash your hands thoroughly with soap and water and after each dressing change. If someone other than the patient changes the dressing, it’s best to wear disposable gloves.   • Do not get the wound or dressing wet.   • To shower: remove the dressing, shower with soap and water (including washing the wound - do not use a washcloth), air dry, then redress the wound.  • Do not take a tub bath  • Keep all your dressings in a clean, covered container at home to avoid dust and contamination.  • Discard used dressings in a plastic bag or covered trash container.  • Check your wound and the surrounding skin at each dressing change for redness, warmth, swelling, increased pain, foul odor, fever, pus or abnormal drainage or discharge.  • Notify [facility] if any of these changes occur - Dept: [phone].        Nutrition  • Eat a well, balanced diet with adequate protein to support wound healing.   • Take a multivitamin every day. Adequate nutrition supports healing and new tissue growth.  • All diabetic patients should strive to keep blood sugars within a normal, practical range.   • Elevated blood sugars can delay your wound healing.        ACTIVITY     Elevate legs above level of heart   Normal activity then rest and elevation  May excercise  May walk    MOBILITY     boot  [facility] Instructions    MEDICATIONS     Medication Note  Continue present medications as prescribed by the [facility] or other physicians you see. To avoid any problems keep the [facility] informed each visit of any medications changes that occur.     WOUND CARE     Clean Wound with: Saline Solution   Treatment  1   Location: left foot/leg  Dressing: Apply yessi, adaptic, with dry dressing & ace  Dressing Care Frequency: Every Other Day  Care Provider: Family and Visiting Nurse       Basic Principles      • Wash your hands thoroughly with soap and water and after each dressing change. If someone other than the patient changes the dressing, it’s best to wear disposable gloves.   • Do not get the wound or dressing wet.   • To shower: remove the dressing, shower with soap and water (including washing the wound - do not use a washcloth), air dry, then redress the wound.  • Do not take a tub bath  • Keep all your dressings in a clean, covered container at home to avoid dust and contamination.  • Discard used dressings in a plastic bag or covered trash container.  • Check your wound and the surrounding skin at each dressing change for redness, warmth, swelling, increased pain, foul odor, fever, pus or abnormal drainage or discharge.  • Notify [facility] if any of these changes occur - Dept: [phone].        Nutrition  • Eat a well, balanced diet with adequate protein to support wound healing.   • Take a multivitamin every day. Adequate nutrition supports healing and new tissue growth.  • All diabetic patients should strive to keep blood sugars within a normal, practical range.   • Elevated blood sugars can delay your wound healing.        ACTIVITY     Elevate legs above level of heart   Normal activity then rest and elevation  May excercise  May walk    MOBILITY     boot  [facility] Instructions    MEDICATIONS     Medication Note  Continue present medications as prescribed by the [facility] or other physicians you see. To avoid any problems keep the [facility] informed each visit of any medications changes that occur.     WOUND CARE     Clean Wound with: Saline Solution   Treatment 1   Location: left foot/leg  Dressing: Apply yessi, adaptic, with dry dressing & ace  Dressing Care  Frequency: Every Other Day  Care Provider: family       Basic Principles      • Wash your hands thoroughly with soap and water and after each dressing change. If someone other than the patient changes the dressing, it’s best to wear disposable gloves.   • Do not get the wound or dressing wet.   • To shower: remove the dressing, shower with soap and water (including washing the wound - do not use a washcloth), air dry, then redress the wound.  • Do not take a tub bath  • Keep all your dressings in a clean, covered container at home to avoid dust and contamination.  • Discard used dressings in a plastic bag or covered trash container.  • Check your wound and the surrounding skin at each dressing change for redness, warmth, swelling, increased pain, foul odor, fever, pus or abnormal drainage or discharge.  • Notify [facility] if any of these changes occur - Dept: [phone].        Nutrition  • Eat a well, balanced diet with adequate protein to support wound healing.   • Take a multivitamin every day. Adequate nutrition supports healing and new tissue growth.  • All diabetic patients should strive to keep blood sugars within a normal, practical range.   • Elevated blood sugars can delay your wound healing.        ACTIVITY     Elevate legs above level of heart   Normal activity then rest and elevation  May excercise  May walk    MOBILITY     boot  [facility] Instructions    MEDICATIONS     Medication Note  Continue present medications as prescribed by the [facility] or other physicians you see. To avoid any problems keep the [facility] informed each visit of any medications changes that occur.     WOUND CARE     Clean Wound with: Saline Solution   Treatment 1   Location: left foot/leg  Dressing: Apply yessi, adaptic, with dry dressing & ace  Dressing Care Frequency: Every Other Day  Care Provider: Family and Visiting Nurse       Basic Principles      • Wash your hands  thoroughly with soap and water and after each dressing change. If someone other than the patient changes the dressing, it’s best to wear disposable gloves.   • Do not get the wound or dressing wet.   • To shower: remove the dressing, shower with soap and water (including washing the wound - do not use a washcloth), air dry, then redress the wound.  • Do not take a tub bath  • Keep all your dressings in a clean, covered container at home to avoid dust and contamination.  • Discard used dressings in a plastic bag or covered trash container.  • Check your wound and the surrounding skin at each dressing change for redness, warmth, swelling, increased pain, foul odor, fever, pus or abnormal drainage or discharge.  • Notify [facility] if any of these changes occur - Dept: [phone].        Nutrition  • Eat a well, balanced diet with adequate protein to support wound healing.   • Take a multivitamin every day. Adequate nutrition supports healing and new tissue growth.  • All diabetic patients should strive to keep blood sugars within a normal, practical range.   • Elevated blood sugars can delay your wound healing.        ACTIVITY     Elevate legs above level of heart   Normal activity then rest and elevation  May excercise  May walk    MOBILITY     boot  [facility] Instructions    MEDICATIONS     Medication Note  Continue present medications as prescribed by the [facility] or other physicians you see. To avoid any problems keep the [facility] informed each visit of any medications changes that occur.     WOUND CARE     Clean Wound with: Saline Solution   Treatment 1   Location: left foot/leg  Dressing: Apply yessi, adaptic, with dry dressing & ace  Dressing Care Frequency: Every Other Day  Care Provider: family       Basic Principles      • Wash your hands thoroughly with soap and water and after each dressing change. If someone other than the patient changes the dressing, it’s  best to wear disposable gloves.   • Do not get the wound or dressing wet.   • To shower: remove the dressing, shower with soap and water (including washing the wound - do not use a washcloth), air dry, then redress the wound.  • Do not take a tub bath  • Keep all your dressings in a clean, covered container at home to avoid dust and contamination.  • Discard used dressings in a plastic bag or covered trash container.  • Check your wound and the surrounding skin at each dressing change for redness, warmth, swelling, increased pain, foul odor, fever, pus or abnormal drainage or discharge.  • Notify [facility] if any of these changes occur - Dept: [phone].        Nutrition  • Eat a well, balanced diet with adequate protein to support wound healing.   • Take a multivitamin every day. Adequate nutrition supports healing and new tissue growth.  • All diabetic patients should strive to keep blood sugars within a normal, practical range.   • Elevated blood sugars can delay your wound healing.        ACTIVITY     Elevate legs above level of heart   Normal activity then rest and elevation  May excercise  May walk    MOBILITY     boot  [facility] Instructions    MEDICATIONS     Medication Note  Continue present medications as prescribed by the [facility] or other physicians you see. To avoid any problems keep the [facility] informed each visit of any medications changes that occur.     WOUND CARE     Clean Wound with: Saline Solution   Treatment 1   Location: left foot/leg  Dressing: Apply yessi, adaptic, with dry dressing & ace  Dressing Care Frequency: Every Other Day  Care Provider: Family and Visiting Nurse       Basic Principles      • Wash your hands thoroughly with soap and water and after each dressing change. If someone other than the patient changes the dressing, it’s best to wear disposable gloves.   • Do not get the wound or dressing wet.   • To shower: remove the  dressing, shower with soap and water (including washing the wound - do not use a washcloth), air dry, then redress the wound.  • Do not take a tub bath  • Keep all your dressings in a clean, covered container at home to avoid dust and contamination.  • Discard used dressings in a plastic bag or covered trash container.  • Check your wound and the surrounding skin at each dressing change for redness, warmth, swelling, increased pain, foul odor, fever, pus or abnormal drainage or discharge.  • Notify [facility] if any of these changes occur - Dept: [phone].        Nutrition  • Eat a well, balanced diet with adequate protein to support wound healing.   • Take a multivitamin every day. Adequate nutrition supports healing and new tissue growth.  • All diabetic patients should strive to keep blood sugars within a normal, practical range.   • Elevated blood sugars can delay your wound healing.        ACTIVITY     Elevate legs above level of heart   Normal activity then rest and elevation  May excercise  May walk    MOBILITY     boot

## 2019-07-03 NOTE — PROGRESS NOTES
Subjective      Patient ID: Harvey Lucero Jr. is a 59 y.o. male.    HPI    The following have been reviewed and updated as appropriate in this visit:     Patient seen status post osteomyelitis and surgical wound left foot.  Patient status several weeks Apligraf aspect of the left foot.  Patient denies any fever chills or night sweats.  Patient relates she is been very active at work.  He is noticed some increased pain and discomfort in the posterior aspect of his legs near his Achilles.  He states he is working 8 to 10 hours a day over time.  He denies any current fever chills night sweats but he still continue taking amoxicillin.    Past Medical History: He  has a past medical history of Anxiety; Chronic osteomyelitis (CMS/[facility]) ([facility]); Dyslipidemia; GERD (gastroesophageal reflux disease); History of CVA (cerebrovascular accident); Hypertension; Lipid disorder; Open wound; TIA (transient ischemic attack); and Type 2 diabetes mellitus (CMS/[facility]) ([facility]). He also has no past medical history of Depression.  Past Surgical History: He  has a past surgical history that includes [place] tooth extraction; Other surgical history; Incision and drainage of wound (Left); Wound debridement (Left); and Foot amputation through metatarsal (Left).  Medication: He has a current medication list which includes the following prescription(s): acetaminophen, amoxicillin, blood sugar diagnostic, blood-glucose meter, clopidogrel, insulin glargine, lancets, lisinopril, lorazepam, metformin, pen needle, diabetic, pen needle, diabetic, rosuvastatin, sitagliptin, and meloxicam.  Allergies: He is allergic to no known allergies.  Social History: He  reports that he has quit smoking. His smoking use included Cigarettes. He has never used smokeless tobacco. He reports that he does not drink alcohol or use drugs.    Review of Systems:  Review of Systems      Objective Organs decreased eyes decreased that there is no bone exposure all tendons and soft  tissue is covered.  No other inflammatory spots were noted in the area.  Pain in the area where the tendo Achilles was lengthened.  Is a palpable swelling in the area.  There is pain to the posterior calf.  There is negative signs of Homans sign are negative press test note the area no other area measures were noted in the area.  No signs of DVT in the area.  Foot Exam    Assessment/Plan   Problem List Items Addressed This Visit     None        Diabetes mellitus with ulceration left foot  2.  Diabetes mellitus osteomyelitis  which is chronic.  3.  Diabetes mellitus with neuropathy.  4 .severe neuropathy   Pain is a 6 out of 10.  Plan.  1. Pt was fitted for forefoot filler and mafo brace and will continue with  Current treatment course. and maintain similar activity level.Continue with current wpound care treatment and follow up 1 week spoke the patient length is still healing very very attentive to because of the the transfer of muscles to different swing phase and stance phase this is causing increased pressure to the intrinsic muscles legs.  They are working hard this will pass over time.  Will place patient on meloxicam 15 mg 1 tab daily.  To help reduce inflammation and reduce swelling.  We will also do Tubigrip Angle dry sterile dressing.  Continue elevate offload.  I told her to continue to be on the foot swelling is normal.  We will hopefully transition to a Moffa brace as stated by the orthotist.  Fabricio Tee DPM

## 2019-07-11 ENCOUNTER — OFFICE VISIT (OUTPATIENT)
Dept: WOUND CARE | Facility: HOSPITAL | Age: 59
End: 2019-07-11
Attending: PLASTIC SURGERY
Payer: COMMERCIAL

## 2019-07-11 VITALS — HEART RATE: 82 BPM | TEMPERATURE: 98.8 F | RESPIRATION RATE: 18 BRPM

## 2019-07-11 DIAGNOSIS — S91.009A OPEN WOUND OF ANKLE AND FOOT: Primary | ICD-10-CM

## 2019-07-11 DIAGNOSIS — S91.309A OPEN WOUND OF ANKLE AND FOOT: Primary | ICD-10-CM

## 2019-07-11 PROCEDURE — 99213 OFFICE O/P EST LOW 20 MIN: CPT | Performed by: PLASTIC SURGERY

## 2019-07-11 ASSESSMENT — ENCOUNTER SYMPTOMS
SHORTNESS OF BREATH: 0
FATIGUE: 0
PSYCHIATRIC NEGATIVE: 1
EYES NEGATIVE: 1
MYALGIAS: 1
CHEST TIGHTNESS: 0
ARTHRALGIAS: 1
NEUROLOGICAL NEGATIVE: 1
WOUND: 1
COUGH: 1
ALLERGIC/IMMUNOLOGIC NEGATIVE: 1
HEMATOLOGIC/LYMPHATIC NEGATIVE: 1
CHILLS: 1
GASTROINTESTINAL NEGATIVE: 1

## 2019-07-11 NOTE — ASSESSMENT & PLAN NOTE
After application of topical 1% lidocaine gel, a sterile curette was used to remove residual Angle and biofilm demonstrating clean healthy bleeding underlying granulation tissue at the wound base.  The wound overall is smaller with less width.  We will continue the every other day application of Angle and dry occlusive padded dressing change by his wife with continued elevation of the leg is much as possible.  A double layer Tubigrip stocking will be used during the daytime for support and he will be maintained in his current supportive boot use.  He will report any local or systemic signs of infection as we have discussed and will otherwise be rechecked by Dr. Tee in 6 days.

## 2019-07-11 NOTE — PROGRESS NOTES
Patient ID: Harvey Lucero Jr.                              : 1960  MRN: [id number]                                            Visit Date: 2019  Encounter Provider: Cesario Smith Jr  Referring Provider: No ref. provider found    Subjective I work 10 hours on  and then take  off to elevate my leg.     HPI  Harvey Lucero Jr. is a 59 y.o. old male with a chief compliant of Chronic Non-healing Wound   presenting today for ongoing evaluation and management of a right foot high transmetatarsal amputation wound that is incompletely healed but progressing.  He has been on home wound dressing care performed by his wife, Marianne.  He has had several episodes of chills but without fever and has had a postnasal drip with sinus congestion.  His blood glucoses have been well controlled.  The meloxicam seems to be working better to control his inflammation and Achilles area soreness..      The following have been reviewed and updated as appropriate in this visit:       Past Medical History:  has a past medical history of Anxiety; Chronic osteomyelitis (CMS/HCC) ([facility]); Dyslipidemia; GERD (gastroesophageal reflux disease); History of CVA (cerebrovascular accident); Hypertension; Lipid disorder; Open wound; TIA (transient ischemic attack); and Type 2 diabetes mellitus (CMS/HCC) ([facility]). He also has no past medical history of Depression.  Past Surgical History:  has a past surgical history that includes Petros tooth extraction; Other surgical history; Incision and drainage of wound (Left); Wound debridement (Left); and Foot amputation through metatarsal (Left).  Social History:  reports that he has quit smoking. His smoking use included Cigarettes. He has never used smokeless tobacco. He reports that he does not drink alcohol or use drugs.  Family History: family history is not on file.  Medications:   Current Outpatient Prescriptions:   •  acetaminophen (TYLENOL) 500 mg tablet, Take 1,000 mg by mouth  "every 6 (six) hours as needed for mild pain., Disp: , Rfl:   •  amoxicillin (AMOXIL) 500 mg capsule, Take 1 capsule (500 mg total) by mouth 2 (two) times a day., Disp: 60 capsule, Rfl: 3  •  blood sugar diagnostic (ONETOUCH ULTRA TEST) strip, for blood glucose monitoring 3-4x day, Disp: , Rfl:   •  blood-glucose meter kit, for blood glucose monitoring, Disp: , Rfl:   •  clopidogrel (PLAVIX) 75 mg tablet, TAKE 1 TABLET BY MOUTH EVERY DAY, Disp: 90 tablet, Rfl: 0  •  insulin glargine (BASAGLAR KWIKPEN U-100 INSULIN) 100 unit/mL (3 mL) subcutaneous pen, 26 units at bedtime, Disp: , Rfl:   •  lancets misc, for blood glucose monitoring 3x day, Disp: , Rfl:   •  lisinopril (PRINIVIL) 40 mg tablet, Take 40 mg by mouth once daily., Disp: , Rfl: 3  •  LORazepam (ATIVAN) 0.5 mg tablet, Take 0.5 mg by mouth daily as needed for anxiety., Disp: , Rfl:   •  meloxicam (MOBIC) 15 mg tablet, Take 1 tablet (15 mg total) by mouth daily., Disp: 90 tablet, Rfl: 1  •  metFORMIN (GLUCOPHAGE) 1,000 mg tablet, take 1 tablet by oral route 2 times every day with morning and evening meals, Disp: , Rfl:   •  pen needle, diabetic (BD ULTRA-FINE MICRO PEN NEEDLE) 32 gauge x 1/4\" needle, For blood sugar monitoring 3x day Dx:  E11.9, Disp: 300 each, Rfl: 3  •  pen needle, diabetic (NOVOFINE 32) 32 gauge x 1/4\" needle, For insulin administration once daily, Disp: 300 each, Rfl: 3  •  rosuvastatin (CRESTOR) 20 mg tablet, 20 mg.  , Disp: , Rfl:   •  SITagliptin (JANUVIA) 100 mg tablet, Take 1 tablet (100 mg total) by mouth daily., Disp: 90 tablet, Rfl: 2    Allergies: is allergic to no known allergies.     Review of Systems   Constitutional: Positive for chills. Negative for fatigue.   HENT: Negative.    Eyes: Negative.    Respiratory: Positive for cough. Negative for chest tightness and shortness of breath.    Cardiovascular: Positive for leg swelling.   Gastrointestinal: Negative.    Endocrine:        Reports glucoses under good control "   Genitourinary: Negative.    Musculoskeletal: Positive for arthralgias and myalgias.   Skin: Positive for wound.   Allergic/Immunologic: Negative.    Neurological: Negative.    Hematological: Negative.    Psychiatric/Behavioral: Negative.      Objective   Pulse 82   Temp 37.1 °C (98.8 °F)   Resp 18     Physical Exam   Constitutional: He is oriented to person, place, and time. He appears well-developed and well-nourished.   HENT:   Head: Normocephalic.   Eyes: EOM are normal.   Pulmonary/Chest: Effort normal.   Musculoskeletal: He exhibits edema.   Neurological: He is alert and oriented to person, place, and time.   Skin: Skin is warm and dry. No erythema.   The residual right foot amputation open wound appears smaller than that noted on the digital photograph from last week exam with very clean base granulation tissue and no exposure of bone or tendon structures.  There is no evidence of soft tissue infection in the area of the wound or the calf and ankle.  There is still edema over the area of the lower Achilles without any palpable subcutaneous or deep fluid collection.   Psychiatric: He has a normal mood and affect. His behavior is normal. Judgment and thought content normal.   Vitals reviewed.        Left foot wound    Assessment/Plan    Diagnosis Plan   1. Open wound of ankle and foot       Problem List Items Addressed This Visit     Open wound of ankle and foot - Primary     After application of topical 1% lidocaine gel, a sterile curette was used to remove residual Angle and biofilm demonstrating clean healthy bleeding underlying granulation tissue at the wound base.  The wound overall is smaller with less width.  We will continue the every other day application of Angle and dry occlusive padded dressing change by his wife with continued elevation of the leg is much as possible.  A double layer Tubigrip stocking will be used during the daytime for support and he will be maintained in his current  supportive boot use.  He will report any local or systemic signs of infection as we have discussed and will otherwise be rechecked by Dr. Tee in 6 days.               No Follow-up on file.     Cesario Smith Jr, MD

## 2019-07-11 NOTE — PATIENT INSTRUCTIONS
[facility]aling [facility] Instructions    MEDICATIONS     Medication Note  Continue present medications as prescribed by the [facility] Healing Center or other physicians you see. To avoid any problems keep the Wound Healing Center informed each visit of any medications changes that occur.     WOUND CARE     Clean Wound with: Saline Solution   Treatment 1   Location: left foot/leg  Dressing: Apply yessi, adaptic, with dry dressing & ace oer tubigrip  Dressing Care Frequency: Every Other Day  Care Provider: family       Basic Principles      • Wash your hands thoroughly with soap and water and after each dressing change. If someone other than the patient changes the dressing, it’s best to wear disposable gloves.   • Do not get the wound or dressing wet.   • To shower: remove the dressing, shower with soap and water (including washing the wound - do not use a washcloth), air dry, then redress the wound.  • Do not take a tub bath  • Keep all your dressings in a clean, covered container at home to avoid dust and contamination.  • Discard used dressings in a plastic bag or covered trash container.  • Check your wound and the surrounding skin at each dressing change for redness, warmth, swelling, increased pain, foul odor, fever, pus or abnormal drainage or discharge.  • Notify Wound Healing Center if any of these changes occur - Dept: [phone].        Nutrition  • Eat a well, balanced diet with adequate protein to support wound healing.   • Take a multivitamin every day. Adequate nutrition supports healing and new tissue growth.  • All diabetic patients should strive to keep blood sugars within a normal, practical range.   • Elevated blood sugars can delay your wound healing.        ACTIVITY     Elevate legs above level of heart   Normal activity then rest and elevation  May excercise  May walk    MOBILITY     boot  Wound Healing Center Instructions    MEDICATIONS     Medication Note  Continue present medications as  prescribed by the [facility]aling Center or other physicians you see. To avoid any problems keep the [facility]aling Center informed each visit of any medications changes that occur.     WOUND CARE     Clean Wound with: Saline Solution   Treatment 1   Location: left foot/leg  Dressing: Apply yessi, adaptic, with dry dressing & ace  Dressing Care Frequency: Every Other Day  Care Provider: Family and Visiting Nurse       Basic Principles      • Wash your hands thoroughly with soap and water and after each dressing change. If someone other than the patient changes the dressing, it’s best to wear disposable gloves.   • Do not get the wound or dressing wet.   • To shower: remove the dressing, shower with soap and water (including washing the wound - do not use a washcloth), air dry, then redress the wound.  • Do not take a tub bath  • Keep all your dressings in a clean, covered container at home to avoid dust and contamination.  • Discard used dressings in a plastic bag or covered trash container.  • Check your wound and the surrounding skin at each dressing change for redness, warmth, swelling, increased pain, foul odor, fever, pus or abnormal drainage or discharge.  • Notify Wound Healing Center if any of these changes occur - Dept: [phone].        Nutrition  • Eat a well, balanced diet with adequate protein to support wound healing.   • Take a multivitamin every day. Adequate nutrition supports healing and new tissue growth.  • All diabetic patients should strive to keep blood sugars within a normal, practical range.   • Elevated blood sugars can delay your wound healing.        ACTIVITY     Elevate legs above level of heart   Normal activity then rest and elevation  May excercise  May walk    MOBILITY     boot  W[facility]aling Center Instructions    MEDICATIONS     Medication Note  Continue present medications as prescribed by the [facility]ound [facility]aling Center or other physicians you see. To avoid any problems keep the W[facility]  Healing Center informed each visit of any medications changes that occur.     WOUND CARE     Clean Wound with: Saline Solution   Treatment 1   Location: left foot/leg  Dressing: Apply yessi, adaptic, with dry dressing & ace  Dressing Care Frequency: Every Other Day  Care Provider: family       Basic Principles      • Wash your hands thoroughly with soap and water and after each dressing change. If someone other than the patient changes the dressing, it’s best to wear disposable gloves.   • Do not get the wound or dressing wet.   • To shower: remove the dressing, shower with soap and water (including washing the wound - do not use a washcloth), air dry, then redress the wound.  • Do not take a tub bath  • Keep all your dressings in a clean, covered container at home to avoid dust and contamination.  • Discard used dressings in a plastic bag or covered trash container.  • Check your wound and the surrounding skin at each dressing change for redness, warmth, swelling, increased pain, foul odor, fever, pus or abnormal drainage or discharge.  • Notify Wound Healing Center if any of these changes occur - Dept: [phone].        Nutrition  • Eat a well, balanced diet with adequate protein to support wound healing.   • Take a multivitamin every day. Adequate nutrition supports healing and new tissue growth.  • All diabetic patients should strive to keep blood sugars within a normal, practical range.   • Elevated blood sugars can delay your wound healing.        ACTIVITY     Elevate legs above level of heart   Normal activity then rest and elevation  May excercise  May walk    MOBILITY     boot  Wound Healing Center Instructions    MEDICATIONS     Medication Note  Continue present medications as prescribed by the [facility]aling Center or other physicians you see. To avoid any problems keep the [facility]aling Center informed each visit of any medications changes that occur.     WOUND CARE     Clean Wound with: Saline  Solution   Treatment 1   Location: left foot/leg  Dressing: Apply yessi, adaptic, with dry dressing & ace  Dressing Care Frequency: Every Other Day  Care Provider: Family and Visiting Nurse       Basic Principles      • Wash your hands thoroughly with soap and water and after each dressing change. If someone other than the patient changes the dressing, it’s best to wear disposable gloves.   • Do not get the wound or dressing wet.   • To shower: remove the dressing, shower with soap and water (including washing the wound - do not use a washcloth), air dry, then redress the wound.  • Do not take a tub bath  • Keep all your dressings in a clean, covered container at home to avoid dust and contamination.  • Discard used dressings in a plastic bag or covered trash container.  • Check your wound and the surrounding skin at each dressing change for redness, warmth, swelling, increased pain, foul odor, fever, pus or abnormal drainage or discharge.  • Notify Wound Healing Center if any of these changes occur - Dept: [phone].        Nutrition  • Eat a well, balanced diet with adequate protein to support wound healing.   • Take a multivitamin every day. Adequate nutrition supports healing and new tissue growth.  • All diabetic patients should strive to keep blood sugars within a normal, practical range.   • Elevated blood sugars can delay your wound healing.        ACTIVITY     Elevate legs above level of heart   Normal activity then rest and elevation  May excercise  May walk    MOBILITY     boot  [facility]aling Center Instructions    MEDICATIONS     Medication Note  Continue present medications as prescribed by the [facility]aling Center or other physicians you see. To avoid any problems keep the [facility] Healing Center informed each visit of any medications changes that occur.     WOUND CARE     Clean Wound with: Saline Solution   Treatment 1   Location: left foot/leg  Dressing: Apply yessi, adaptic, with dry dressing &  ace  Dressing Care Frequency: Every Other Day  Care Provider: family       Basic Principles      • Wash your hands thoroughly with soap and water and after each dressing change. If someone other than the patient changes the dressing, it’s best to wear disposable gloves.   • Do not get the wound or dressing wet.   • To shower: remove the dressing, shower with soap and water (including washing the wound - do not use a washcloth), air dry, then redress the wound.  • Do not take a tub bath  • Keep all your dressings in a clean, covered container at home to avoid dust and contamination.  • Discard used dressings in a plastic bag or covered trash container.  • Check your wound and the surrounding skin at each dressing change for redness, warmth, swelling, increased pain, foul odor, fever, pus or abnormal drainage or discharge.  • Notify Wound Healing Center if any of these changes occur - Dept: [phone].        Nutrition  • Eat a well, balanced diet with adequate protein to support wound healing.   • Take a multivitamin every day. Adequate nutrition supports healing and new tissue growth.  • All diabetic patients should strive to keep blood sugars within a normal, practical range.   • Elevated blood sugars can delay your wound healing.        ACTIVITY     Elevate legs above level of heart   Normal activity then rest and elevation  May excercise  May walk    MOBILITY     boot  W[facility] Healing Center Instructions    MEDICATIONS     Medication Note  Continue present medications as prescribed by the [facility]ound Healing Center or other physicians you see. To avoid any problems keep the [facility]ound Healing Center informed each visit of any medications changes that occur.     WOUND CARE     Clean Wound with: Saline Solution   Treatment 1   Location: left foot/leg  Dressing: Apply yessi, adaptic, with dry dressing & ace  Dressing Care Frequency: Every Other Day  Care Provider: Family and Visiting Nurse       Basic Principles      • Wash  your hands thoroughly with soap and water and after each dressing change. If someone other than the patient changes the dressing, it’s best to wear disposable gloves.   • Do not get the wound or dressing wet.   • To shower: remove the dressing, shower with soap and water (including washing the wound - do not use a washcloth), air dry, then redress the wound.  • Do not take a tub bath  • Keep all your dressings in a clean, covered container at home to avoid dust and contamination.  • Discard used dressings in a plastic bag or covered trash container.  • Check your wound and the surrounding skin at each dressing change for redness, warmth, swelling, increased pain, foul odor, fever, pus or abnormal drainage or discharge.  • Notify Wound Healing Center if any of these changes occur - Dept: [phone].        Nutrition  • Eat a well, balanced diet with adequate protein to support wound healing.   • Take a multivitamin every day. Adequate nutrition supports healing and new tissue growth.  • All diabetic patients should strive to keep blood sugars within a normal, practical range.   • Elevated blood sugars can delay your wound healing.        ACTIVITY     Elevate legs above level of heart   Normal activity then rest and elevation  May excercise  May walk    MOBILITY     boot  [facility]aling Center Instructions    MEDICATIONS     Medication Note  Continue present medications as prescribed by the [facility]aling Center or other physicians you see. To avoid any problems keep the [facility]aling Center informed each visit of any medications changes that occur.     WOUND CARE     Clean Wound with: Saline Solution   Treatment 1   Location: left foot/leg  Dressing: Apply yessi, adaptic, with dry dressing & ace  Dressing Care Frequency: Every Other Day  Care Provider: family       Basic Principles      • Wash your hands thoroughly with soap and water and after each dressing change. If someone other than the patient changes the  dressing, it’s best to wear disposable gloves.   • Do not get the wound or dressing wet.   • To shower: remove the dressing, shower with soap and water (including washing the wound - do not use a washcloth), air dry, then redress the wound.  • Do not take a tub bath  • Keep all your dressings in a clean, covered container at home to avoid dust and contamination.  • Discard used dressings in a plastic bag or covered trash container.  • Check your wound and the surrounding skin at each dressing change for redness, warmth, swelling, increased pain, foul odor, fever, pus or abnormal drainage or discharge.  • Notify W[facility] Healing Center if any of these changes occur - Dept: [phone].        Nutrition  • Eat a well, balanced diet with adequate protein to support wound healing.   • Take a multivitamin every day. Adequate nutrition supports healing and new tissue growth.  • All diabetic patients should strive to keep blood sugars within a normal, practical range.   • Elevated blood sugars can delay your wound healing.        ACTIVITY     Elevate legs above level of heart   Normal activity then rest and elevation  May excercise  May walk    MOBILITY     boot  [facility]aling Center Instructions    MEDICATIONS     Medication Note  Continue present medications as prescribed by the [facility]aling Center or other physicians you see. To avoid any problems keep the [facility]aling Center informed each visit of any medications changes that occur.     WOUND CARE     Clean Wound with: Saline Solution   Treatment 1   Location: left foot/leg  Dressing: Apply yessi, adaptic, with dry dressing & ace  Dressing Care Frequency: Every Other Day  Care Provider: Family and Visiting Nurse       Basic Principles      • Wash your hands thoroughly with soap and water and after each dressing change. If someone other than the patient changes the dressing, it’s best to wear disposable gloves.   • Do not get the wound or dressing wet.   • To shower:  remove the dressing, shower with soap and water (including washing the wound - do not use a washcloth), air dry, then redress the wound.  • Do not take a tub bath  • Keep all your dressings in a clean, covered container at home to avoid dust and contamination.  • Discard used dressings in a plastic bag or covered trash container.  • Check your wound and the surrounding skin at each dressing change for redness, warmth, swelling, increased pain, foul odor, fever, pus or abnormal drainage or discharge.  • Notify [facility] Healing Center if any of these changes occur - Dept: [phone].        Nutrition  • Eat a well, balanced diet with adequate protein to support wound healing.   • Take a multivitamin every day. Adequate nutrition supports healing and new tissue growth.  • All diabetic patients should strive to keep blood sugars within a normal, practical range.   • Elevated blood sugars can delay your wound healing.        ACTIVITY     Elevate legs above level of heart   Normal activity then rest and elevation  May excercise  May walk    MOBILITY     boot

## 2019-07-17 ENCOUNTER — OFFICE VISIT (OUTPATIENT)
Dept: WOUND CARE | Facility: HOSPITAL | Age: 59
End: 2019-07-17
Attending: PODIATRIST
Payer: COMMERCIAL

## 2019-07-17 VITALS — RESPIRATION RATE: 16 BRPM | TEMPERATURE: 98.2 F | HEART RATE: 80 BPM

## 2019-07-17 DIAGNOSIS — Z79.4 TYPE 2 DIABETES MELLITUS WITH FOOT ULCER, WITH LONG-TERM CURRENT USE OF INSULIN (CMS/HCC): ICD-10-CM

## 2019-07-17 DIAGNOSIS — R60.0 LOCALIZED EDEMA: ICD-10-CM

## 2019-07-17 DIAGNOSIS — M72.6 NECROTIZING FASCIITIS (CMS/HCC): Primary | ICD-10-CM

## 2019-07-17 DIAGNOSIS — S91.009A OPEN WOUND OF ANKLE AND FOOT: ICD-10-CM

## 2019-07-17 DIAGNOSIS — L97.509 TYPE 2 DIABETES MELLITUS WITH FOOT ULCER, WITH LONG-TERM CURRENT USE OF INSULIN (CMS/HCC): ICD-10-CM

## 2019-07-17 DIAGNOSIS — S91.309A OPEN WOUND OF ANKLE AND FOOT: ICD-10-CM

## 2019-07-17 DIAGNOSIS — E11.621 TYPE 2 DIABETES MELLITUS WITH FOOT ULCER, WITH LONG-TERM CURRENT USE OF INSULIN (CMS/HCC): ICD-10-CM

## 2019-07-17 PROCEDURE — 27200105 HC AQUA CELL 4X4

## 2019-07-17 PROCEDURE — 27200114 HC PROMOGRAN MATRIX SMALL

## 2019-07-17 NOTE — PROGRESS NOTES
Subjective      Patient ID: Harvey Lucero Jr. is a 59 y.o. male.    HPI    The following have been reviewed and updated as appropriate in this visit:     Patient seen status post osteomyelitis and surgical wound left foot.  Patient status several weeks Apligraf aspect of the left foot.  Patient denies any fever chills or night sweats.  Patient relates she is been very active at work.  He is noticed some increased pain and discomfort in the posterior aspect of his legs near his Achilles.  He states he is working 8 to 10 hours a day over time.  He denies any current fever chills night sweats but he still continue taking amoxicillin.    Past Medical History: He  has a past medical history of Anxiety; Chronic osteomyelitis (CMS/[facility]) ([facility]); Dyslipidemia; GERD (gastroesophageal reflux disease); History of CVA (cerebrovascular accident); Hypertension; Lipid disorder; Open wound; TIA (transient ischemic attack); and Type 2 diabetes mellitus (CMS/[facility]) ([facility]). He also has no past medical history of Depression.  Past Surgical History: He  has a past surgical history that includes [place] tooth extraction; Other surgical history; Incision and drainage of wound (Left); Wound debridement (Left); and Foot amputation through metatarsal (Left).  Medication: He has a current medication list which includes the following prescription(s): acetaminophen, amoxicillin, blood sugar diagnostic, blood-glucose meter, clopidogrel, insulin glargine, lancets, lisinopril, lorazepam, meloxicam, metformin, pen needle, diabetic, pen needle, diabetic, rosuvastatin, and sitagliptin.  Allergies: He is allergic to no known allergies.  Social History: He  reports that he has quit smoking. His smoking use included Cigarettes. He has never used smokeless tobacco. He reports that he does not drink alcohol or use drugs.    Review of Systems:  Review of Systems      Objective Organs decreased eyes decreased that there is no bone exposure all tendons and soft  tissue is covered.  No other inflammatory spots were noted in the area.  Pain in the area where the tendo Achilles was lengthened.  Is a palpable swelling in the area.  There is pain to the posterior calf.  There is negative signs of Homans sign are negative press test note the area no other area measures were noted in the area.  No signs of DVT in the area.  Foot Exam    Assessment/Plan   Problem List Items Addressed This Visit     None        Diabetes mellitus with ulceration left foot  2.  Diabetes mellitus osteomyelitis  which is chronic.  3.  Diabetes mellitus with neuropathy.  4 .severe neuropathy   Pain is a 6 out of 10.  Plan.  1. Pt was fitted for forefoot filler and mafo brace and will continue with  Current treatment course. and maintain similar activity level.Continue with current wpound care treatment and follow up 1 week spoke the patient length is still healing very very attentive to because of the the transfer of muscles to different swing phase and stance phase this is causing increased pressure to the intrinsic muscles legs.  They are working hard this will pass over time.  Will place patient on meloxicam 15 mg 1 tab daily.  To help reduce inflammation and reduce swelling.  We will also do Tubigrip Angle dry sterile dressing.  Continue elevate offload.  I told her to continue to be on the foot swelling is normal.  We will hopefully transition to a Moffa brace as stated by the orthotist.  Fabricio Tee DPM

## 2019-07-17 NOTE — PATIENT INSTRUCTIONS
Wound Healing Center Instructions    MEDICATIONS     Medication Note  Continue present medications as prescribed by the Wound Healing Center or other physicians you see. To avoid any problems keep the Wound Healing Center informed each visit of any medications changes that occur.     WOUND CARE     Clean Wound with: Saline Solution   Treatment 1   Location: left foot/leg  Dressing: Apply yessi, adaptic, with dry dressing & ace over tubigrip  Dressing Care Frequency: Every Other Day  Care Provider: family       Basic Principles      • Wash your hands thoroughly with soap and water and after each dressing change. If someone other than the patient changes the dressing, it’s best to wear disposable gloves.   • Do not get the wound or dressing wet.   • To shower: remove the dressing, shower with soap and water (including washing the wound - do not use a washcloth), air dry, then redress the wound.  • Do not take a tub bath  • Keep all your dressings in a clean, covered container at home to avoid dust and contamination.  • Discard used dressings in a plastic bag or covered trash container.  • Check your wound and the surrounding skin at each dressing change for redness, warmth, swelling, increased pain, foul odor, fever, pus or abnormal drainage or discharge.  • Notify Wound Healing Center if any of these changes occur - Dept: [phone].        Nutrition  • Eat a well, balanced diet with adequate protein to support wound healing.   • Take a multivitamin every day. Adequate nutrition supports healing and new tissue growth.  • All diabetic patients should strive to keep blood sugars within a normal, practical range.   • Elevated blood sugars can delay your wound healing.        ACTIVITY     Elevate legs above level of heart   Normal activity then rest and elevation  May excercise  May walk    MOBILITY     boot

## 2019-07-24 ENCOUNTER — OFFICE VISIT (OUTPATIENT)
Dept: WOUND CARE | Facility: HOSPITAL | Age: 59
End: 2019-07-24
Attending: PODIATRIST
Payer: COMMERCIAL

## 2019-07-24 VITALS — TEMPERATURE: 99.2 F

## 2019-07-24 DIAGNOSIS — S91.009A OPEN WOUND OF ANKLE AND FOOT: ICD-10-CM

## 2019-07-24 DIAGNOSIS — Z79.4 TYPE 2 DIABETES MELLITUS WITH FOOT ULCER, WITH LONG-TERM CURRENT USE OF INSULIN (CMS/HCC): ICD-10-CM

## 2019-07-24 DIAGNOSIS — L97.509 TYPE 2 DIABETES MELLITUS WITH FOOT ULCER, WITH LONG-TERM CURRENT USE OF INSULIN (CMS/HCC): ICD-10-CM

## 2019-07-24 DIAGNOSIS — Z87.39 HISTORY OF NECROTIZING FASCIITIS: ICD-10-CM

## 2019-07-24 DIAGNOSIS — E11.52 TYPE 2 DIABETES MELLITUS WITH DIABETIC PERIPHERAL ANGIOPATHY AND GANGRENE, WITH LONG-TERM CURRENT USE OF INSULIN (CMS/HCC): Primary | ICD-10-CM

## 2019-07-24 DIAGNOSIS — Z79.4 TYPE 2 DIABETES MELLITUS WITH DIABETIC PERIPHERAL ANGIOPATHY AND GANGRENE, WITH LONG-TERM CURRENT USE OF INSULIN (CMS/HCC): Primary | ICD-10-CM

## 2019-07-24 DIAGNOSIS — E11.621 TYPE 2 DIABETES MELLITUS WITH FOOT ULCER, WITH LONG-TERM CURRENT USE OF INSULIN (CMS/HCC): ICD-10-CM

## 2019-07-24 DIAGNOSIS — R60.0 LOCALIZED EDEMA: ICD-10-CM

## 2019-07-24 DIAGNOSIS — S91.309A OPEN WOUND OF ANKLE AND FOOT: ICD-10-CM

## 2019-07-24 DIAGNOSIS — S81.802D WOUND OF LEFT LOWER EXTREMITY, SUBSEQUENT ENCOUNTER: ICD-10-CM

## 2019-07-24 PROCEDURE — 27200114 HC PROMOGRAN MATRIX SMALL

## 2019-07-24 RX ORDER — FUROSEMIDE 20 MG/1
20 TABLET ORAL DAILY
Qty: 90 TABLET | Refills: 1 | Status: ON HOLD | OUTPATIENT
Start: 2019-07-24 | End: 2023-09-19 | Stop reason: ENTERED-IN-ERROR

## 2019-07-24 NOTE — PATIENT INSTRUCTIONS
Wound Healing Center Instructions    MEDICATIONS     Medication Note  Continue present medications as prescribed by the [facility]ound Healing Center or other physicians you see. To avoid any problems keep the Wound Healing Center informed each visit of any medications changes that occur.     WOUND CARE     Clean Wound with: Saline Solution   Treatment 1   Location: left foot/leg  Dressing: Apply yessi, adaptic, with dry dressing & ace over tubigrip size F   Dressing Care Frequency: Every Other Day  Care Provider: family       Basic Principles      • Wash your hands thoroughly with soap and water and after each dressing change. If someone other than the patient changes the dressing, it’s best to wear disposable gloves.   • Do not get the wound or dressing wet.   • To shower: remove the dressing, shower with soap and water (including washing the wound - do not use a washcloth), air dry, then redress the wound.  • Do not take a tub bath  • Keep all your dressings in a clean, covered container at home to avoid dust and contamination.  • Discard used dressings in a plastic bag or covered trash container.  • Check your wound and the surrounding skin at each dressing change for redness, warmth, swelling, increased pain, foul odor, fever, pus or abnormal drainage or discharge.  • Notify Wound Healing Center if any of these changes occur - Dept: [phone].        Nutrition  • Eat a well, balanced diet with adequate protein to support wound healing.   • Take a multivitamin every day. Adequate nutrition supports healing and new tissue growth.  • All diabetic patients should strive to keep blood sugars within a normal, practical range.   • Elevated blood sugars can delay your wound healing.        ACTIVITY     Elevate legs above level of heart   Normal activity then rest and elevation  May excercise  May walk    MOBILITY     boot

## 2019-07-24 NOTE — PROGRESS NOTES
Subjective      Patient ID: Harvey Lucero Jr. is a 59 y.o. male.    HPI    The following have been reviewed and updated as appropriate in this visit:     Patient seen status post osteomyelitis and surgical wound left foot.  Patient status several weeks Apligraf aspect of the left foot.  Patient denies any fever chills or night sweats.  Patient relates she is been very active at work.  He is noticed some increased pain and discomfort in the posterior aspect of his legs near his Achilles.  He states he is working 8 to 10 hours a day over time.  He denies any current fever chills night sweats but he still continue taking amoxicillin.    Past Medical History: He  has a past medical history of Anxiety; Chronic osteomyelitis (CMS/[facility]) ([facility]); Dyslipidemia; GERD (gastroesophageal reflux disease); History of CVA (cerebrovascular accident); Hypertension; Lipid disorder; Open wound; TIA (transient ischemic attack); and Type 2 diabetes mellitus (CMS/[facility]) ([facility]). He also has no past medical history of Depression.  Past Surgical History: He  has a past surgical history that includes [place] tooth extraction; Other surgical history; Incision and drainage of wound (Left); Wound debridement (Left); and Foot amputation through metatarsal (Left).  Medication: He has a current medication list which includes the following prescription(s): acetaminophen, amoxicillin, blood sugar diagnostic, blood-glucose meter, clopidogrel, insulin glargine, lancets, lisinopril, lorazepam, meloxicam, metformin, pen needle, diabetic, pen needle, diabetic, rosuvastatin, and sitagliptin.  Allergies: He is allergic to no known allergies.  Social History: He  reports that he has quit smoking. His smoking use included Cigarettes. He has never used smokeless tobacco. He reports that he does not drink alcohol or use drugs.    Review of Systems:  Review of Systems      Objective Organs decreased eyes decreased that there is no bone exposure all tendons and soft  tissue is covered.  No other inflammatory spots were noted in the area.  Pain in the area where the tendo Achilles was lengthened.  Is a palpable swelling in the area.  There is pain to the posterior calf.  There is negative signs of Homans sign are negative press test note the area no other area measures were noted in the area.  No signs of DVT in the area.  Foot Exam    Assessment/Plan   Problem List Items Addressed This Visit     Open wound of ankle and foot    Localized edema    Type 2 diabetes mellitus with diabetic peripheral angiopathy and gangrene, with long-term current use of insulin (CMS/[facility]) - Primary    Type 2 diabetes mellitus with foot ulcer, with long-term current use of insulin (CMS/[facility])    History of necrotizing fasciitis    Leg wound, left        Diabetes mellitus with ulceration left foot  2.  Diabetes mellitus osteomyelitis  which is chronic.  3.  Diabetes mellitus with neuropathy.  4 .severe neuropathy   Pain is a 6 out of 10.  Plan.  1. Pt was fitted for forefoot filler and mafo brace and will continue with  Current treatment course. and maintain similar activity level.Continue with current wpound care treatment and follow up 1 week spoke the patient length is still healing very very attentive to because of the the transfer of muscles to different swing phase and stance phase this is causing increased pressure to the intrinsic muscles legs.  They are working hard this will pass over time.  Will place patient on meloxicam 15 mg 1 tab daily.  To help reduce inflammation and reduce swelling.  We will also do Tubigrip Angle dry sterile dressing.  Continue elevate offload.  I told her to continue to be on the foot swelling is normal.  We will hopefully transition to a Moffa brace as stated by the orthotist.  Will add lasix 20mg once a day to the leg on working day  Fabricio Tee DPM

## 2019-07-31 ENCOUNTER — OFFICE VISIT (OUTPATIENT)
Dept: WOUND CARE | Facility: HOSPITAL | Age: 59
End: 2019-07-31
Attending: PODIATRIST
Payer: COMMERCIAL

## 2019-07-31 VITALS — TEMPERATURE: 98.2 F

## 2019-07-31 DIAGNOSIS — Z79.4 TYPE 2 DIABETES MELLITUS WITH FOOT ULCER, WITH LONG-TERM CURRENT USE OF INSULIN (CMS/HCC): Primary | ICD-10-CM

## 2019-07-31 DIAGNOSIS — L97.509 TYPE 2 DIABETES MELLITUS WITH FOOT ULCER, WITH LONG-TERM CURRENT USE OF INSULIN (CMS/HCC): Primary | ICD-10-CM

## 2019-07-31 DIAGNOSIS — E11.621 TYPE 2 DIABETES MELLITUS WITH FOOT ULCER, WITH LONG-TERM CURRENT USE OF INSULIN (CMS/HCC): Primary | ICD-10-CM

## 2019-07-31 DIAGNOSIS — S81.802D WOUND OF LEFT LOWER EXTREMITY, SUBSEQUENT ENCOUNTER: ICD-10-CM

## 2019-07-31 DIAGNOSIS — M86.372 CHRONIC MULTIFOCAL OSTEOMYELITIS, LEFT ANKLE AND FOOT (CMS/HCC): ICD-10-CM

## 2019-07-31 PROCEDURE — 27200114 HC PROMOGRAN MATRIX SMALL

## 2019-07-31 PROCEDURE — 27200105 HC AQUA CELL 4X4

## 2019-07-31 NOTE — PATIENT INSTRUCTIONS
Wound Healing Center Instructions    MEDICATIONS     Medication Note  Continue present medications as prescribed by the Wound Healing Center or other physicians you see. To avoid any problems keep the Wound Healing Center informed each visit of any medications changes that occur.     WOUND CARE     Clean Wound with: Saline Solution   Treatment 1   Location: left foot/leg  Dressing: Apply yessi, adaptic, with dry dressing & ace.  Use amlactin on dried skin areas   Dressing Care Frequency: Every Other Dayor daily  Care Provider: family       Basic Principles      • Wash your hands thoroughly with soap and water and after each dressing change. If someone other than the patient changes the dressing, it’s best to wear disposable gloves.   • Do not get the wound or dressing wet.   • To shower: remove the dressing, shower with soap and water (including washing the wound - do not use a washcloth), air dry, then redress the wound.  • Do not take a tub bath  • Keep all your dressings in a clean, covered container at home to avoid dust and contamination.  • Discard used dressings in a plastic bag or covered trash container.  • Check your wound and the surrounding skin at each dressing change for redness, warmth, swelling, increased pain, foul odor, fever, pus or abnormal drainage or discharge.  • Notify Wound Healing Center if any of these changes occur - Dept: [phone].        Nutrition  • Eat a well, balanced diet with adequate protein to support wound healing.   • Take a multivitamin every day. Adequate nutrition supports healing and new tissue growth.  • All diabetic patients should strive to keep blood sugars within a normal, practical range.   • Elevated blood sugars can delay your wound healing.        ACTIVITY     Elevate legs above level of heart   Normal activity then rest and elevation  May excercise  May walk    MOBILITY     boot

## 2019-07-31 NOTE — PROGRESS NOTES
Subjective      Patient ID: Harvey Lucero Jr. is a 59 y.o. male.    HPI    The following have been reviewed and updated as appropriate in this visit:     Patient seen status post osteomyelitis and surgical wound left foot.  Patient status several weeks Apligraf aspect of the left foot.  Patient denies any fever chills or night sweats.  Patient relates she is been very active at work.  He is noticed some increased pain and discomfort in the posterior aspect of his legs near his Achilles.  He states he is working 8 to 10 hours a day over time.  He denies any current fever chills night sweats but he still continue taking amoxicillin.    Past Medical History: He  has a past medical history of Anxiety; Chronic osteomyelitis (CMS/[facility]) ([facility]); Dyslipidemia; GERD (gastroesophageal reflux disease); History of CVA (cerebrovascular accident); Hypertension; Lipid disorder; Open wound; TIA (transient ischemic attack); and Type 2 diabetes mellitus (CMS/[facility]) ([facility]). He also has no past medical history of Depression.  Past Surgical History: He  has a past surgical history that includes [place] tooth extraction; Other surgical history; Incision and drainage of wound (Left); Wound debridement (Left); and Foot amputation through metatarsal (Left).  Medication: He has a current medication list which includes the following prescription(s): acetaminophen, amoxicillin, blood sugar diagnostic, blood-glucose meter, clopidogrel, furosemide, insulin glargine, lancets, lisinopril, lorazepam, meloxicam, metformin, pen needle, diabetic, pen needle, diabetic, rosuvastatin, and sitagliptin.  Allergies: He is allergic to no known allergies.  Social History: He  reports that he has quit smoking. His smoking use included Cigarettes. He has never used smokeless tobacco. He reports that he does not drink alcohol or use drugs.    Review of Systems:  Review of Systems      Objective Organs decreased eyes decreased that there is no bone exposure all  tendons and soft tissue is covered.  No other inflammatory spots were noted in the area.  Pain in the area where the tendo Achilles was lengthened.  Is a palpable swelling in the area.  There is pain to the posterior calf.  There is negative signs of Homans sign are negative press test note the area no other area measures were noted in the area.  No signs of DVT in the area.  Foot Exam    Assessment/Plan   Problem List Items Addressed This Visit     None        Diabetes mellitus with ulceration left foot  2.  Diabetes mellitus osteomyelitis  which is chronic.  3.  Diabetes mellitus with neuropathy.  4 .severe neuropathy   Pain is a 6 out of 10.  Plan.  1. Pt was fitted for forefoot filler and mafo brace and will continue with  Current treatment course. and maintain similar activity level.Continue with current wpound care treatment and follow up 1 week spoke the patient length is still healing very very attentive to because of the the transfer of muscles to different swing phase and stance phase this is causing increased pressure to the intrinsic muscles legs.  They are working hard this will pass over time.  Will place patient on meloxicam 15 mg 1 tab daily.  To help reduce inflammation and reduce swelling.  We will also do Tubigrip Angle dry sterile dressing.  Continue elevate offload.  I told her to continue to be on the foot swelling is normal.  We will hopefully transition to a Moffa brace as stated by the orthotist.  Will add lasix 20mg once a day to the leg on working day  Fabricio Tee DPM

## 2019-08-07 ENCOUNTER — OFFICE VISIT (OUTPATIENT)
Dept: WOUND CARE | Facility: HOSPITAL | Age: 59
End: 2019-08-07
Attending: PLASTIC SURGERY
Payer: COMMERCIAL

## 2019-08-07 VITALS — TEMPERATURE: 98.2 F

## 2019-08-07 DIAGNOSIS — S81.802D WOUND OF LEFT LOWER EXTREMITY, SUBSEQUENT ENCOUNTER: ICD-10-CM

## 2019-08-07 DIAGNOSIS — S91.009A OPEN WOUND OF ANKLE AND FOOT: ICD-10-CM

## 2019-08-07 DIAGNOSIS — Z79.4 TYPE 2 DIABETES MELLITUS WITH FOOT ULCER, WITH LONG-TERM CURRENT USE OF INSULIN (CMS/HCC): ICD-10-CM

## 2019-08-07 DIAGNOSIS — E11.621 TYPE 2 DIABETES MELLITUS WITH FOOT ULCER, WITH LONG-TERM CURRENT USE OF INSULIN (CMS/HCC): ICD-10-CM

## 2019-08-07 DIAGNOSIS — M86.072 ACUTE HEMATOGENOUS OSTEOMYELITIS OF LEFT FOOT (CMS/HCC): ICD-10-CM

## 2019-08-07 DIAGNOSIS — E11.52 TYPE 2 DIABETES MELLITUS WITH DIABETIC PERIPHERAL ANGIOPATHY AND GANGRENE, WITH LONG-TERM CURRENT USE OF INSULIN (CMS/HCC): Primary | ICD-10-CM

## 2019-08-07 DIAGNOSIS — M86.372 CHRONIC MULTIFOCAL OSTEOMYELITIS, LEFT ANKLE AND FOOT (CMS/HCC): ICD-10-CM

## 2019-08-07 DIAGNOSIS — L97.509 TYPE 2 DIABETES MELLITUS WITH FOOT ULCER, WITH LONG-TERM CURRENT USE OF INSULIN (CMS/HCC): ICD-10-CM

## 2019-08-07 DIAGNOSIS — S91.309A OPEN WOUND OF ANKLE AND FOOT: ICD-10-CM

## 2019-08-07 DIAGNOSIS — Z79.4 TYPE 2 DIABETES MELLITUS WITH DIABETIC PERIPHERAL ANGIOPATHY AND GANGRENE, WITH LONG-TERM CURRENT USE OF INSULIN (CMS/HCC): Primary | ICD-10-CM

## 2019-08-07 PROCEDURE — 27200114 HC PROMOGRAN MATRIX SMALL

## 2019-08-07 NOTE — PROGRESS NOTES
Subjective      Patient ID: Harvey Lucero Jr. is a 59 y.o. male.    HPI    The following have been reviewed and updated as appropriate in this visit:     Patient seen status post osteomyelitis and surgical wound left foot.  Patient status several weeks Apligraf aspect of the left foot.  Patient denies any fever chills or night sweats.  Patient relates she is been very active at work.  He is noticed some increased pain and discomfort in the posterior aspect of his legs near his Achilles.  He states he is working 8 to 10 hours a day over time.  He denies any current fever chills night sweats but he still continue taking amoxicillin.    Past Medical History: He  has a past medical history of Anxiety; Chronic osteomyelitis (CMS/[facility]) ([facility]); Dyslipidemia; GERD (gastroesophageal reflux disease); History of CVA (cerebrovascular accident); Hypertension; Lipid disorder; Open wound; TIA (transient ischemic attack); and Type 2 diabetes mellitus (CMS/[facility]) ([facility]). He also has no past medical history of Depression.  Past Surgical History: He  has a past surgical history that includes [place] tooth extraction; Other surgical history; Incision and drainage of wound (Left); Wound debridement (Left); and Foot amputation through metatarsal (Left).  Medication: He has a current medication list which includes the following prescription(s): acetaminophen, amoxicillin, blood sugar diagnostic, blood-glucose meter, clopidogrel, furosemide, insulin glargine, lancets, lisinopril, lorazepam, meloxicam, metformin, pen needle, diabetic, pen needle, diabetic, rosuvastatin, and sitagliptin.  Allergies: He is allergic to no known allergies.  Social History: He  reports that he has quit smoking. His smoking use included Cigarettes. He has never used smokeless tobacco. He reports that he does not drink alcohol or use drugs.    Review of Systems:  Review of Systems      Objective Organs decreased eyes decreased that there is no bone exposure all  tendons and soft tissue is covered.  No other inflammatory spots were noted in the area.  Pain in the area where the tendo Achilles was lengthened.  Is a palpable swelling in the area.  There is pain to the posterior calf.  There is negative signs of Homans sign are negative press test note the area no other area measures were noted in the area.  No signs of DVT in the area.  Foot Exam    Assessment/Plan   Problem List Items Addressed This Visit     None        Diabetes mellitus with ulceration left foot  2.  Diabetes mellitus osteomyelitis  which is chronic.  3.  Diabetes mellitus with neuropathy.  4 .severe neuropathy   Pain is a 6 out of 10.  Plan.  1. Pt was fitted for forefoot filler and mafo brace and will continue with  Current treatment course. and maintain similar activity level.Continue with current wpound care treatment and follow up 1 week spoke the patient length is still healing very very attentive to because of the the transfer of muscles to different swing phase and stance phase this is causing increased pressure to the intrinsic muscles legs.  They are working hard this will pass over time.  Will place patient on meloxicam 15 mg 1 tab daily.  To help reduce inflammation and reduce swelling.  We will also do Tubigrip Angle dry sterile dressing.  Continue elevate offload.  I told her to continue to be on the foot swelling is normal.  We will hopefully transition to a Moffa brace as stated by the orthotist.  Will add lasix 20mg once a day to the leg on working day swelling is greatly reduced at this visit.  Fabricio Tee DPM

## 2019-08-14 ENCOUNTER — OFFICE VISIT (OUTPATIENT)
Dept: WOUND CARE | Facility: HOSPITAL | Age: 59
End: 2019-08-14
Attending: PODIATRIST
Payer: COMMERCIAL

## 2019-08-14 DIAGNOSIS — Z79.4 TYPE 2 DIABETES MELLITUS WITH FOOT ULCER, WITH LONG-TERM CURRENT USE OF INSULIN (CMS/HCC): ICD-10-CM

## 2019-08-14 DIAGNOSIS — Z79.4 TYPE 2 DIABETES MELLITUS WITH DIABETIC PERIPHERAL ANGIOPATHY AND GANGRENE, WITH LONG-TERM CURRENT USE OF INSULIN (CMS/HCC): Primary | ICD-10-CM

## 2019-08-14 DIAGNOSIS — S91.309A OPEN WOUND OF ANKLE AND FOOT: ICD-10-CM

## 2019-08-14 DIAGNOSIS — S91.009A OPEN WOUND OF ANKLE AND FOOT: ICD-10-CM

## 2019-08-14 DIAGNOSIS — E11.621 TYPE 2 DIABETES MELLITUS WITH FOOT ULCER, WITH LONG-TERM CURRENT USE OF INSULIN (CMS/HCC): ICD-10-CM

## 2019-08-14 DIAGNOSIS — L97.509 TYPE 2 DIABETES MELLITUS WITH FOOT ULCER, WITH LONG-TERM CURRENT USE OF INSULIN (CMS/HCC): ICD-10-CM

## 2019-08-14 DIAGNOSIS — E11.52 TYPE 2 DIABETES MELLITUS WITH DIABETIC PERIPHERAL ANGIOPATHY AND GANGRENE, WITH LONG-TERM CURRENT USE OF INSULIN (CMS/HCC): Primary | ICD-10-CM

## 2019-08-14 DIAGNOSIS — S81.802D WOUND OF LEFT LOWER EXTREMITY, SUBSEQUENT ENCOUNTER: ICD-10-CM

## 2019-08-14 PROCEDURE — 27200114 HC PROMOGRAN MATRIX SMALL

## 2019-08-14 NOTE — PATIENT INSTRUCTIONS
Wound Healing Center Instructions    MEDICATIONS     Medication Note  Continue present medications as prescribed by the Wound Healing Center or other physicians you see. To avoid any problems keep the Wound Healing Center informed each visit of any medications changes that occur.     WOUND CARE     Clean Wound with: Saline Solution   Treatment 1   Location: left foot/leg  Dressing: Apply yessi, adaptic, with dry dressing & ace.  Use amlactin on dried skin areas   Dressing Care Frequency: Every Other Dayor daily  Care Provider: family       Basic Principles      • Wash your hands thoroughly with soap and water and after each dressing change. If someone other than the patient changes the dressing, it’s best to wear disposable gloves.   • Do not get the wound or dressing wet.   • To shower: remove the dressing, shower with soap and water (including washing the wound - do not use a washcloth), air dry, then redress the wound.  • Do not take a tub bath  • Keep all your dressings in a clean, covered container at home to avoid dust and contamination.  • Discard used dressings in a plastic bag or covered trash container.  • Check your wound and the surrounding skin at each dressing change for redness, warmth, swelling, increased pain, foul odor, fever, pus or abnormal drainage or discharge.  • Notify Wound Healing Center if any of these changes occur - Dept: [phone].        Nutrition  • Eat a well, balanced diet with adequate protein to support wound healing.   • Take a multivitamin every day. Adequate nutrition supports healing and new tissue growth.  • All diabetic patients should strive to keep blood sugars within a normal, practical range.   • Elevated blood sugars can delay your wound healing.        ACTIVITY     Elevate legs above level of heart   Normal activity then rest and elevation  May excercise  May walk    MOBILITY     boot

## 2019-08-14 NOTE — PROGRESS NOTES
Subjective      Patient ID: Harvey Lucero Jr. is a 59 y.o. male.    HPI    The following have been reviewed and updated as appropriate in this visit:     Patient seen status post osteomyelitis and surgical wound left foot.  Patient status several weeks Apligraf aspect of the left foot.  Patient denies any fever chills or night sweats.  Patient relates she is been very active at work.  He is noticed some increased pain and discomfort in the posterior aspect of his legs near his Achilles.  He states he is working 8 to 10 hours a day over time.  He denies any current fever chills night sweats but he still continue taking amoxicillin.    Past Medical History: He  has a past medical history of Anxiety; Chronic osteomyelitis (CMS/[facility]) ([facility]); Dyslipidemia; GERD (gastroesophageal reflux disease); History of CVA (cerebrovascular accident); Hypertension; Lipid disorder; Open wound; TIA (transient ischemic attack); and Type 2 diabetes mellitus (CMS/[facility]) ([facility]). He also has no past medical history of Depression.  Past Surgical History: He  has a past surgical history that includes [place] tooth extraction; Other surgical history; Incision and drainage of wound (Left); Wound debridement (Left); and Foot amputation through metatarsal (Left).  Medication: He has a current medication list which includes the following prescription(s): acetaminophen, amoxicillin, blood sugar diagnostic, blood-glucose meter, clopidogrel, furosemide, insulin glargine, lancets, lisinopril, lorazepam, meloxicam, metformin, pen needle, diabetic, pen needle, diabetic, rosuvastatin, and sitagliptin.  Allergies: He is allergic to no known allergies.  Social History: He  reports that he has quit smoking. His smoking use included Cigarettes. He has never used smokeless tobacco. He reports that he does not drink alcohol or use drugs.    Review of Systems:  Review of Systems      Objective Organs decreased eyes decreased that there is no bone exposure all  tendons and soft tissue is covered.  No other inflammatory spots were noted in the area.  Pain in the area where the tendo Achilles was lengthened.  Is a palpable swelling in the area.  There is pain to the posterior calf.  There is negative signs of Homans sign are negative press test note the area no other area measures were noted in the area.  No signs of DVT in the area.  Foot Exam    Assessment/Plan   Problem List Items Addressed This Visit     Open wound of ankle and foot    Type 2 diabetes mellitus with diabetic peripheral angiopathy and gangrene, with long-term current use of insulin (CMS/HCC) - Primary    Type 2 diabetes mellitus with foot ulcer, with long-term current use of insulin (CMS/HCC)    Leg wound, left        Diabetes mellitus with ulceration left foot  2.  Diabetes mellitus osteomyelitis  which is chronic.  3.  Diabetes mellitus with neuropathy.  4 .severe neuropathy   Pain is a 6 out of 10.  Plan.  1. Pt was fitted for forefoot filler and mafo brace and will continue with  Current treatment course. and maintain similar activity level.Continue with current wpound care treatment and follow up 1 week spoke the patient length is still healing very very attentive to because of the the transfer of muscles to different swing phase and stance phase this is causing increased pressure to the intrinsic muscles legs.  They are working hard this will pass over time.  Will place patient on meloxicam 15 mg 1 tab daily.  To help reduce inflammation and reduce swelling.  We will also do Tubigrip Angle dry sterile dressing.  Continue elevate offload.  I told her to continue to be on the foot swelling is normal.  We will hopefully transition to a Moffa brace as stated by the orthotist.  Will add lasix 20mg once a day to the leg on working day swelling is greatly reduced at this visit.  Fabricio Tee DPM

## 2019-08-21 ENCOUNTER — OFFICE VISIT (OUTPATIENT)
Dept: WOUND CARE | Facility: HOSPITAL | Age: 59
End: 2019-08-21
Attending: PODIATRIST
Payer: COMMERCIAL

## 2019-08-21 VITALS — TEMPERATURE: 98.4 F

## 2019-08-21 DIAGNOSIS — R60.0 LOCALIZED EDEMA: ICD-10-CM

## 2019-08-21 DIAGNOSIS — Z79.4 TYPE 2 DIABETES MELLITUS WITH DIABETIC PERIPHERAL ANGIOPATHY AND GANGRENE, WITH LONG-TERM CURRENT USE OF INSULIN (CMS/HCC): Primary | ICD-10-CM

## 2019-08-21 DIAGNOSIS — A48.0 GAS GANGRENE (CMS/HCC): ICD-10-CM

## 2019-08-21 DIAGNOSIS — M86.072 ACUTE HEMATOGENOUS OSTEOMYELITIS OF LEFT FOOT (CMS/HCC): ICD-10-CM

## 2019-08-21 DIAGNOSIS — E11.52 TYPE 2 DIABETES MELLITUS WITH DIABETIC PERIPHERAL ANGIOPATHY AND GANGRENE, WITH LONG-TERM CURRENT USE OF INSULIN (CMS/HCC): Primary | ICD-10-CM

## 2019-08-21 DIAGNOSIS — S91.309A OPEN WOUND OF ANKLE AND FOOT: ICD-10-CM

## 2019-08-21 DIAGNOSIS — S91.009A OPEN WOUND OF ANKLE AND FOOT: ICD-10-CM

## 2019-08-21 PROCEDURE — 27200105 HC AQUA CELL 4X4

## 2019-08-21 PROCEDURE — 27200114 HC PROMOGRAN MATRIX SMALL

## 2019-08-21 NOTE — PATIENT INSTRUCTIONS
Wound Healing Center Instructions    MEDICATIONS     Medication Note  Continue present medications as prescribed by the Wound Healing Center or other physicians you see. To avoid any problems keep the Wound Healing Center informed each visit of any medications changes that occur.     WOUND CARE     Clean Wound with: Saline Solution   Treatment 1   Location: left foot/leg  Dressing: Apply yessi, adaptic, with dry dressing & ace.  Use amlactin on dried skin areas   Dressing Care Frequency: Every Other Dayor daily  Care Provider: family       Basic Principles      • Wash your hands thoroughly with soap and water and after each dressing change. If someone other than the patient changes the dressing, it’s best to wear disposable gloves.   • Do not get the wound or dressing wet.   • To shower: remove the dressing, shower with soap and water (including washing the wound - do not use a washcloth), air dry, then redress the wound.  • Do not take a tub bath  • Keep all your dressings in a clean, covered container at home to avoid dust and contamination.  • Discard used dressings in a plastic bag or covered trash container.  • Check your wound and the surrounding skin at each dressing change for redness, warmth, swelling, increased pain, foul odor, fever, pus or abnormal drainage or discharge.  • Notify Wound Healing Center if any of these changes occur - Dept: [phone].        Nutrition  • Eat a well, balanced diet with adequate protein to support wound healing.   • Take a multivitamin every day. Adequate nutrition supports healing and new tissue growth.  • All diabetic patients should strive to keep blood sugars within a normal, practical range.   • Elevated blood sugars can delay your wound healing.        ACTIVITY     Elevate legs above level of heart   Normal activity then rest and elevation  May excercise  May walk    MOBILITY     boot

## 2019-08-21 NOTE — PROGRESS NOTES
Subjective      Patient ID: Harvey Lucero Jr. is a 59 y.o. male.    HPI    The following have been reviewed and updated as appropriate in this visit:     Patient seen status post osteomyelitis and surgical wound left foot.  Patient status several weeks Apligraf aspect of the left foot.  Patient denies any fever chills or night sweats.  Patient relates she is been very active at work.  He is noticed some increased pain and discomfort in the posterior aspect of his legs near his Achilles.  He states he is working 8 to 10 hours a day over time.  He denies any current fever chills night sweats but he still continue taking amoxicillin.    Past Medical History: He  has a past medical history of Anxiety; Chronic osteomyelitis (CMS/[facility]) ([facility]); Dyslipidemia; GERD (gastroesophageal reflux disease); History of CVA (cerebrovascular accident); Hypertension; Lipid disorder; Open wound; TIA (transient ischemic attack); and Type 2 diabetes mellitus (CMS/[facility]) ([facility]). He also has no past medical history of Depression.  Past Surgical History: He  has a past surgical history that includes [place] tooth extraction; Other surgical history; Incision and drainage of wound (Left); Wound debridement (Left); and Foot amputation through metatarsal (Left).  Medication: He has a current medication list which includes the following prescription(s): acetaminophen, amoxicillin, blood sugar diagnostic, blood-glucose meter, clopidogrel, furosemide, insulin glargine, lancets, lisinopril, lorazepam, meloxicam, metformin, pen needle, diabetic, pen needle, diabetic, rosuvastatin, and sitagliptin.  Allergies: He is allergic to no known allergies.  Social History: He  reports that he has quit smoking. His smoking use included Cigarettes. He has never used smokeless tobacco. He reports that he does not drink alcohol or use drugs.    Review of Systems:  Review of Systems      Objective Organs decreased eyes decreased that there is no bone exposure all  tendons and soft tissue is covered.  No other inflammatory spots were noted in the area.  Pain in the area where the tendo Achilles was lengthened.  Is a palpable swelling in the area.  There is pain to the posterior calf.  There is negative signs of Homans sign are negative press test note the area no other area measures were noted in the area.  No signs of DVT in the area.  Foot Exam    Assessment/Plan   Problem List Items Addressed This Visit     None        Diabetes mellitus with ulceration left foot  2.  Diabetes mellitus osteomyelitis  which is chronic.  3.  Diabetes mellitus with neuropathy.  4 .severe neuropathy   Pain is a 6 out of 10.  Plan.  1. Pt was fitted for forefoot filler and mafo brace and will continue with  Current treatment course. and maintain similar activity level.Continue with current wpound care treatment and follow up 1 week spoke the patient length is still healing very very attentive to because of the the transfer of muscles to different swing phase and stance phase this is causing increased pressure to the intrinsic muscles legs.  They are working hard this will pass over time.  Will place patient on meloxicam 15 mg 1 tab daily.  To help reduce inflammation and reduce swelling.  We will also do Tubigrip Angle dry sterile dressing.  Continue elevate offload.  I told her to continue to be on the foot swelling is normal.  We will hopefully transition to a Moffa brace as stated by the orthotist.  Will add lasix 20mg once a day to the leg on working day swelling is greatly reduced at this visit.  Fabricio Tee DPM

## 2019-08-28 ENCOUNTER — OFFICE VISIT (OUTPATIENT)
Dept: WOUND CARE | Facility: HOSPITAL | Age: 59
End: 2019-08-28
Attending: PODIATRIST
Payer: COMMERCIAL

## 2019-08-28 VITALS — TEMPERATURE: 98.2 F

## 2019-08-28 DIAGNOSIS — Z79.4 TYPE 2 DIABETES MELLITUS WITH DIABETIC PERIPHERAL ANGIOPATHY AND GANGRENE, WITH LONG-TERM CURRENT USE OF INSULIN (CMS/HCC): Primary | ICD-10-CM

## 2019-08-28 DIAGNOSIS — E11.52 TYPE 2 DIABETES MELLITUS WITH DIABETIC PERIPHERAL ANGIOPATHY AND GANGRENE, WITH LONG-TERM CURRENT USE OF INSULIN (CMS/HCC): Primary | ICD-10-CM

## 2019-08-28 DIAGNOSIS — M72.6 NECROTIZING FASCIITIS (CMS/HCC): ICD-10-CM

## 2019-08-28 DIAGNOSIS — M86.072 ACUTE HEMATOGENOUS OSTEOMYELITIS OF LEFT FOOT (CMS/HCC): ICD-10-CM

## 2019-08-28 DIAGNOSIS — M86.372 CHRONIC MULTIFOCAL OSTEOMYELITIS, LEFT ANKLE AND FOOT (CMS/HCC): ICD-10-CM

## 2019-08-28 DIAGNOSIS — S81.802D WOUND OF LEFT LOWER EXTREMITY, SUBSEQUENT ENCOUNTER: ICD-10-CM

## 2019-08-28 NOTE — PROGRESS NOTES
Subjective      Patient ID: Harvey Lucero Jr. is a 59 y.o. male.    HPI    The following have been reviewed and updated as appropriate in this visit:     Patient seen status post osteomyelitis and surgical wound left foot.  Patient status several weeks Apligraf aspect of the left foot.  Patient denies any fever chills or night sweats.  Patient relates she is been very active at work.  He is noticed some increased pain and discomfort in the posterior aspect of his legs near his Achilles.  He states he is working 8 to 10 hours a day over time.  He denies any current fever chills night sweats but he still continue taking amoxicillin.    Past Medical History: He  has a past medical history of Anxiety; Chronic osteomyelitis (CMS/[facility]) ([facility]); Dyslipidemia; GERD (gastroesophageal reflux disease); History of CVA (cerebrovascular accident); Hypertension; Lipid disorder; Open wound; TIA (transient ischemic attack); and Type 2 diabetes mellitus (CMS/[facility]) ([facility]). He also has no past medical history of Depression.  Past Surgical History: He  has a past surgical history that includes [place] tooth extraction; Other surgical history; Incision and drainage of wound (Left); Wound debridement (Left); and Foot amputation through metatarsal (Left).  Medication: He has a current medication list which includes the following prescription(s): acetaminophen, amoxicillin, blood sugar diagnostic, blood-glucose meter, clopidogrel, furosemide, insulin glargine, lancets, lisinopril, lorazepam, meloxicam, metformin, pen needle, diabetic, pen needle, diabetic, rosuvastatin, and sitagliptin.  Allergies: He is allergic to no known allergies.  Social History: He  reports that he has quit smoking. His smoking use included Cigarettes. He has never used smokeless tobacco. He reports that he does not drink alcohol or use drugs.    Review of Systems:  Review of Systems      Objective Organs decreased eyes decreased that there is no bone exposure all  tendons and soft tissue is covered.  No other inflammatory spots were noted in the area.  Pain in the area where the tendo Achilles was lengthened.  Is a palpable swelling in the area.  There is pain to the posterior calf.  There is negative signs of Homans sign are negative press test note the area no other area measures were noted in the area.  No signs of DVT in the area.  Foot Exam    Assessment/Plan   Problem List Items Addressed This Visit     None        Diabetes mellitus with ulceration left foot  2.  Diabetes mellitus osteomyelitis  which is chronic.  3.  Diabetes mellitus with neuropathy.  4 .severe neuropathy   Pain is a 6 out of 10.  Plan.  1. Pt was fitted for forefoot filler and mafo brace and will continue with  Current treatment course. and maintain similar activity level.Continue with current wpound care treatment and follow up 1 week spoke the patient length is still healing very very attentive to because of the the transfer of muscles to different swing phase and stance phase this is causing increased pressure to the intrinsic muscles legs.  They are working hard this will pass over time.  Will place patient on meloxicam 15 mg 1 tab daily.  To help reduce inflammation and reduce swelling.  We will also do Tubigrip Angle dry sterile dressing.  Continue elevate offload.  I told her to continue to be on the foot swelling is normal.  We will hopefully transition to a Moffa brace as stated by the orthotist.  Will add lasix 20mg once a day to the leg on working day swelling is greatly reduced at this visit.  Fabricio Tee DPM

## 2019-08-28 NOTE — PATIENT INSTRUCTIONS
Wound Healing Center Instructions    MEDICATIONS     Medication Note  Continue present medications as prescribed by the Wound Healing Center or other physicians you see. To avoid any problems keep the Wound Healing Center informed each visit of any medications changes that occur.     WOUND CARE     Clean Wound with: Saline Solution   Treatment 1   Location: left foot/leg  Dressing: Apply yessi, adaptic, with dry dressing & ace.  Use amlactin on dried skin areas   Dressing Care Frequency: r daily  Care Provider: family       Basic Principles      • Wash your hands thoroughly with soap and water and after each dressing change. If someone other than the patient changes the dressing, it’s best to wear disposable gloves.   • Do not get the wound or dressing wet.   • To shower: remove the dressing, shower with soap and water (including washing the wound - do not use a washcloth), air dry, then redress the wound.  • Do not take a tub bath  • Keep all your dressings in a clean, covered container at home to avoid dust and contamination.  • Discard used dressings in a plastic bag or covered trash container.  • Check your wound and the surrounding skin at each dressing change for redness, warmth, swelling, increased pain, foul odor, fever, pus or abnormal drainage or discharge.  • Notify Wound Healing Center if any of these changes occur - Dept: [phone].        Nutrition  • Eat a well, balanced diet with adequate protein to support wound healing.   • Take a multivitamin every day. Adequate nutrition supports healing and new tissue growth.  • All diabetic patients should strive to keep blood sugars within a normal, practical range.   • Elevated blood sugars can delay your wound healing.        ACTIVITY     Elevate legs above level of heart   Normal activity then rest and elevation  May excercise  May walk    MOBILITY     boot

## 2019-09-04 ENCOUNTER — OFFICE VISIT (OUTPATIENT)
Dept: WOUND CARE | Facility: HOSPITAL | Age: 59
End: 2019-09-04
Attending: PODIATRIST
Payer: COMMERCIAL

## 2019-09-04 VITALS — TEMPERATURE: 98.5 F

## 2019-09-04 DIAGNOSIS — M86.072 ACUTE HEMATOGENOUS OSTEOMYELITIS OF LEFT FOOT (CMS/HCC): ICD-10-CM

## 2019-09-04 DIAGNOSIS — Z79.4 TYPE 2 DIABETES MELLITUS WITH DIABETIC PERIPHERAL ANGIOPATHY AND GANGRENE, WITH LONG-TERM CURRENT USE OF INSULIN (CMS/HCC): Primary | ICD-10-CM

## 2019-09-04 DIAGNOSIS — S81.802D WOUND OF LEFT LOWER EXTREMITY, SUBSEQUENT ENCOUNTER: ICD-10-CM

## 2019-09-04 DIAGNOSIS — L97.509 TYPE 2 DIABETES MELLITUS WITH FOOT ULCER, WITH LONG-TERM CURRENT USE OF INSULIN (CMS/HCC): ICD-10-CM

## 2019-09-04 DIAGNOSIS — E11.621 TYPE 2 DIABETES MELLITUS WITH FOOT ULCER, WITH LONG-TERM CURRENT USE OF INSULIN (CMS/HCC): ICD-10-CM

## 2019-09-04 DIAGNOSIS — E11.52 TYPE 2 DIABETES MELLITUS WITH DIABETIC PERIPHERAL ANGIOPATHY AND GANGRENE, WITH LONG-TERM CURRENT USE OF INSULIN (CMS/HCC): Primary | ICD-10-CM

## 2019-09-04 DIAGNOSIS — M86.372 CHRONIC MULTIFOCAL OSTEOMYELITIS, LEFT ANKLE AND FOOT (CMS/HCC): ICD-10-CM

## 2019-09-04 DIAGNOSIS — Z79.4 TYPE 2 DIABETES MELLITUS WITH FOOT ULCER, WITH LONG-TERM CURRENT USE OF INSULIN (CMS/HCC): ICD-10-CM

## 2019-09-04 PROCEDURE — 11042 DBRDMT SUBQ TIS 1ST 20SQCM/<: CPT | Performed by: PODIATRIST

## 2019-09-04 PROCEDURE — 27200105 HC AQUA CELL 4X4

## 2019-09-04 PROCEDURE — 27200114 HC PROMOGRAN MATRIX SMALL

## 2019-09-04 NOTE — PROGRESS NOTES
Subjective      Patient ID: Harvey Lucero Jr. is a 59 y.o. male.    HPI    The following have been reviewed and updated as appropriate in this visit:     Patient seen status post osteomyelitis and surgical wound left foot.  Patient status several weeks Apligraf aspect of the left foot.  Patient denies any fever chills or night sweats.  Patient relates she is been very active at work.  He is noticed some increased pain and discomfort in the posterior aspect of his legs near his Achilles.  He states he is working 8 to 10 hours a day over time.  He denies any current fever chills night sweats but he still continue taking amoxicillin.    Past Medical History: He  has a past medical history of Anxiety; Chronic osteomyelitis (CMS/[facility]) ([facility]); Dyslipidemia; GERD (gastroesophageal reflux disease); History of CVA (cerebrovascular accident); Hypertension; Lipid disorder; Open wound; TIA (transient ischemic attack); and Type 2 diabetes mellitus (CMS/[facility]) ([facility]). He also has no past medical history of Depression.  Past Surgical History: He  has a past surgical history that includes [place] tooth extraction; Other surgical history; Incision and drainage of wound (Left); Wound debridement (Left); and Foot amputation through metatarsal (Left).  Medication: He has a current medication list which includes the following prescription(s): acetaminophen, amoxicillin, blood sugar diagnostic, blood-glucose meter, clopidogrel, furosemide, insulin glargine, lancets, lisinopril, lorazepam, meloxicam, metformin, pen needle, diabetic, pen needle, diabetic, sitagliptin, and rosuvastatin.  Allergies: He is allergic to no known allergies.  Social History: He  reports that he has quit smoking. His smoking use included Cigarettes. He has never used smokeless tobacco. He reports that he does not drink alcohol or use drugs.    Review of Systems:  Review of Systems      Objective Organs decreased eyes decreased that there is no bone exposure all  tendons and soft tissue is covered.  No other inflammatory spots were noted in the area.  Pain in the area where the tendo Achilles was lengthened.  Is a palpable swelling in the area.  There is pain to the posterior calf.  There is negative signs of Homans sign are negative press test note the area no other area measures were noted in the area.  No signs of DVT in the area.  Foot Exam    Assessment/Plan   Problem List Items Addressed This Visit     Type 2 diabetes mellitus with foot ulcer, with long-term current use of insulin (CMS/HCC)    Chronic multifocal osteomyelitis, left ankle and foot (CMS/HCC) (HCC)    Leg wound, left        Diabetes mellitus with ulceration left foot  2.  Diabetes mellitus osteomyelitis  which is chronic.  3.  Diabetes mellitus with neuropathy.  4 .severe neuropathy   Pain is a 6 out of 10.  Plan.  1. Pt was fitted for forefoot filler and mafo brace and will continue with  Current treatment course. and maintain similar activity level.Continue with current wpound care treatment and follow up 1 week spoke the patient length is still healing very very attentive to because of the the transfer of muscles to different swing phase and stance phase this is causing increased pressure to the intrinsic muscles legs.  They are working hard this will pass over time.  Will place patient on meloxicam 15 mg 1 tab daily.  To help reduce inflammation and reduce swelling.  We will also do Tubigrip Angle dry sterile dressing.  Continue elevate offload.  I told her to continue to be on the foot swelling is normal.  We will hopefully transition to a Moffa brace as stated by the orthotist.  Will add lasix 20mg once a day to the leg on working day swelling is greatly reduced at this visit.  Fabricio Tee DPM

## 2019-09-04 NOTE — PATIENT INSTRUCTIONS
Wound Healing Center Instructions    MEDICATIONS     Medication Note  Continue present medications as prescribed by the Wound Healing Center or other physicians you see. To avoid any problems keep the Wound Healing Center informed each visit of any medications changes that occur.     WOUND CARE     Clean Wound with: Saline Solution   Treatment 1   Location: left foot/leg  Dressing: Apply yessi, adaptic, with dry dressing & ace.  Use amlactin on dried skin areas   Dressing Care Frequency: r daily  Care Provider: family       Basic Principles      • Wash your hands thoroughly with soap and water and after each dressing change. If someone other than the patient changes the dressing, it’s best to wear disposable gloves.   • Do not get the wound or dressing wet.   • To shower: remove the dressing, shower with soap and water (including washing the wound - do not use a washcloth), air dry, then redress the wound.  • Do not take a tub bath  • Keep all your dressings in a clean, covered container at home to avoid dust and contamination.  • Discard used dressings in a plastic bag or covered trash container.  • Check your wound and the surrounding skin at each dressing change for redness, warmth, swelling, increased pain, foul odor, fever, pus or abnormal drainage or discharge.  • Notify Wound Healing Center if any of these changes occur - Dept: [phone].        Nutrition  • Eat a well, balanced diet with adequate protein to support wound healing.   • Take a multivitamin every day. Adequate nutrition supports healing and new tissue growth.  • All diabetic patients should strive to keep blood sugars within a normal, practical range.   • Elevated blood sugars can delay your wound healing.        ACTIVITY     Elevate legs above level of heart   Normal activity then rest and elevation  May excercise  May walk    MOBILITY     boot

## 2019-09-11 ENCOUNTER — OFFICE VISIT (OUTPATIENT)
Dept: WOUND CARE | Facility: HOSPITAL | Age: 59
End: 2019-09-11
Attending: PODIATRIST
Payer: COMMERCIAL

## 2019-09-11 VITALS — TEMPERATURE: 98.2 F

## 2019-09-11 DIAGNOSIS — S91.309A OPEN WOUND OF ANKLE AND FOOT: ICD-10-CM

## 2019-09-11 DIAGNOSIS — L97.509 TYPE 2 DIABETES MELLITUS WITH FOOT ULCER, WITH LONG-TERM CURRENT USE OF INSULIN (CMS/HCC): ICD-10-CM

## 2019-09-11 DIAGNOSIS — Z79.4 TYPE 2 DIABETES MELLITUS WITH DIABETIC PERIPHERAL ANGIOPATHY AND GANGRENE, WITH LONG-TERM CURRENT USE OF INSULIN (CMS/HCC): Primary | ICD-10-CM

## 2019-09-11 DIAGNOSIS — E11.52 TYPE 2 DIABETES MELLITUS WITH DIABETIC PERIPHERAL ANGIOPATHY AND GANGRENE, WITH LONG-TERM CURRENT USE OF INSULIN (CMS/HCC): Primary | ICD-10-CM

## 2019-09-11 DIAGNOSIS — M86.372 CHRONIC MULTIFOCAL OSTEOMYELITIS OF LEFT FOOT (CMS/HCC): ICD-10-CM

## 2019-09-11 DIAGNOSIS — Z79.4 TYPE 2 DIABETES MELLITUS WITH FOOT ULCER, WITH LONG-TERM CURRENT USE OF INSULIN (CMS/HCC): ICD-10-CM

## 2019-09-11 DIAGNOSIS — M86.372 CHRONIC MULTIFOCAL OSTEOMYELITIS, LEFT ANKLE AND FOOT (CMS/HCC): ICD-10-CM

## 2019-09-11 DIAGNOSIS — S81.802D WOUND OF LEFT LOWER EXTREMITY, SUBSEQUENT ENCOUNTER: ICD-10-CM

## 2019-09-11 DIAGNOSIS — S91.009A OPEN WOUND OF ANKLE AND FOOT: ICD-10-CM

## 2019-09-11 DIAGNOSIS — M72.6 NECROTIZING FASCIITIS (CMS/HCC): ICD-10-CM

## 2019-09-11 DIAGNOSIS — E11.621 TYPE 2 DIABETES MELLITUS WITH FOOT ULCER, WITH LONG-TERM CURRENT USE OF INSULIN (CMS/HCC): ICD-10-CM

## 2019-09-11 DIAGNOSIS — R60.0 LOCALIZED EDEMA: ICD-10-CM

## 2019-09-11 PROCEDURE — 27200105 HC AQUA CELL 4X4

## 2019-09-11 PROCEDURE — 27200114 HC PROMOGRAN MATRIX SMALL

## 2019-09-11 NOTE — PATIENT INSTRUCTIONS
Wound Healing Center Instructions    MEDICATIONS     Medication Note  Continue present medications as prescribed by the Wound Healing Center or other physicians you see. To avoid any problems keep the Wound Healing Center informed each visit of any medications changes that occur.     WOUND CARE     Clean Wound with: Saline Solution   Treatment 1   Location: left foot/leg  Dressing: Apply yessi, adaptic, with dry dressing & ace.  Use amlactin on dried skin areas   Dressing Care Frequency: r daily  Care Provider: family       Basic Principles      • Wash your hands thoroughly with soap and water and after each dressing change. If someone other than the patient changes the dressing, it’s best to wear disposable gloves.   • Do not get the wound or dressing wet.   • To shower: remove the dressing, shower with soap and water (including washing the wound - do not use a washcloth), air dry, then redress the wound.  • Do not take a tub bath  • Keep all your dressings in a clean, covered container at home to avoid dust and contamination.  • Discard used dressings in a plastic bag or covered trash container.  • Check your wound and the surrounding skin at each dressing change for redness, warmth, swelling, increased pain, foul odor, fever, pus or abnormal drainage or discharge.  • Notify Wound Healing Center if any of these changes occur - Dept: [phone].        Nutrition  • Eat a well, balanced diet with adequate protein to support wound healing.   • Take a multivitamin every day. Adequate nutrition supports healing and new tissue growth.  • All diabetic patients should strive to keep blood sugars within a normal, practical range.   • Elevated blood sugars can delay your wound healing.        ACTIVITY     Elevate legs above level of heart   Normal activity then rest and elevation  May excercise  May walk    MOBILITY     boot

## 2019-09-18 ENCOUNTER — OFFICE VISIT (OUTPATIENT)
Dept: WOUND CARE | Facility: HOSPITAL | Age: 59
End: 2019-09-18
Attending: PODIATRIST
Payer: COMMERCIAL

## 2019-09-18 VITALS — TEMPERATURE: 98.6 F

## 2019-09-18 DIAGNOSIS — Z79.4 TYPE 2 DIABETES MELLITUS WITH FOOT ULCER, WITH LONG-TERM CURRENT USE OF INSULIN (CMS/HCC): Primary | ICD-10-CM

## 2019-09-18 DIAGNOSIS — E11.621 TYPE 2 DIABETES MELLITUS WITH FOOT ULCER, WITH LONG-TERM CURRENT USE OF INSULIN (CMS/HCC): Primary | ICD-10-CM

## 2019-09-18 DIAGNOSIS — L97.509 TYPE 2 DIABETES MELLITUS WITH FOOT ULCER, WITH LONG-TERM CURRENT USE OF INSULIN (CMS/HCC): Primary | ICD-10-CM

## 2019-09-18 DIAGNOSIS — M86.372 CHRONIC MULTIFOCAL OSTEOMYELITIS OF LEFT FOOT (CMS/HCC): ICD-10-CM

## 2019-09-18 DIAGNOSIS — M86.072 ACUTE HEMATOGENOUS OSTEOMYELITIS OF LEFT FOOT (CMS/HCC): ICD-10-CM

## 2019-09-18 DIAGNOSIS — S81.802D WOUND OF LEFT LOWER EXTREMITY, SUBSEQUENT ENCOUNTER: ICD-10-CM

## 2019-09-18 DIAGNOSIS — M72.6 NECROTIZING FASCIITIS (CMS/HCC): ICD-10-CM

## 2019-09-18 PROCEDURE — 27200105 HC AQUA CELL 4X4

## 2019-09-18 PROCEDURE — 27200114 HC PROMOGRAN MATRIX SMALL

## 2019-09-18 PROCEDURE — 0HDLXZZ EXTRACTION OF LEFT LOWER LEG SKIN, EXTERNAL APPROACH: ICD-10-PCS | Performed by: PODIATRIST

## 2019-09-18 PROCEDURE — 11042 DBRDMT SUBQ TIS 1ST 20SQCM/<: CPT | Performed by: PODIATRIST

## 2019-09-18 PROCEDURE — 97597 DBRDMT OPN WND 1ST 20 CM/<: CPT

## 2019-09-18 NOTE — PATIENT INSTRUCTIONS
[facility]aling Center Instructions    MEDICATIONS     Medication Note  Continue present medications as prescribed by the [facility]aling [facility] or other physicians you see. To avoid any problems keep the [facility]aling Center informed each visit of any medications changes that occur.     WOUND CARE     Clean Wound with: Saline Solution   Treatment 1   Location: left foot/leg  Dressing: Apply yessi, adaptic, with dry dressing & ace.  Use amlactin on dried skin areas   Dressing Care Frequency: r daily  Care Provider: family       Basic Principles      • Wash your hands thoroughly with soap and water and after each dressing change. If someone other than the patient changes the dressing, it’s best to wear disposable gloves.   • Do not get the wound or dressing wet.   • To shower: remove the dressing, shower with soap and water (including washing the wound - do not use a washcloth), air dry, then redress the wound.  • Do not take a tub bath  • Keep all your dressings in a clean, covered container at home to avoid dust and contamination.  • Discard used dressings in a plastic bag or covered trash container.  • Check your wound and the surrounding skin at each dressing change for redness, warmth, swelling, increased pain, foul odor, fever, pus or abnormal drainage or discharge.  • Notify W[facility] Healing Center if any of these changes occur - Dept: [phone].        Nutrition  • Eat a well, balanced diet with adequate protein to support wound healing.   • Take a multivitamin every day. Adequate nutrition supports healing and new tissue growth.  • All diabetic patients should strive to keep blood sugars within a normal, practical range.   • Elevated blood sugars can delay your wound healing.        ACTIVITY     Elevate legs above level of heart   Normal activity then rest and elevation  May excercise  May walk    MOBILITY     boot

## 2019-09-18 NOTE — PROGRESS NOTES
Subjective      Patient ID: Harvey Lucero Jr. is a 59 y.o. male.    HPI    The following have been reviewed and updated as appropriate in this visit:     Patient seen status post osteomyelitis and surgical wound left foot.  Patient status several weeks Apligraf aspect of the left foot.  Patient denies any fever chills or night sweats.  Patient relates she is been very active at work.  He is noticed some increased pain and discomfort in the posterior aspect of his legs near his Achilles.  He states he is working 8 to 10 hours a day over time.  He denies any current fever chills night sweats but he still continue taking amoxicillin.    Past Medical History: He  has a past medical history of Anxiety; Chronic osteomyelitis (CMS/[facility]) ([facility]); Dyslipidemia; GERD (gastroesophageal reflux disease); History of CVA (cerebrovascular accident); Hypertension; Lipid disorder; Open wound; TIA (transient ischemic attack); and Type 2 diabetes mellitus (CMS/[facility]) ([facility]). He also has no past medical history of Depression.  Past Surgical History: He  has a past surgical history that includes [place] tooth extraction; Other surgical history; Incision and drainage of wound (Left); Wound debridement (Left); and Foot amputation through metatarsal (Left).  Medication: He has a current medication list which includes the following prescription(s): acetaminophen, amoxicillin, blood sugar diagnostic, blood-glucose meter, clopidogrel, furosemide, insulin glargine, lancets, lisinopril, lorazepam, meloxicam, metformin, pen needle, diabetic, pen needle, diabetic, rosuvastatin, and sitagliptin.  Allergies: He is allergic to no known allergies.  Social History: He  reports that he has quit smoking. His smoking use included Cigarettes. He has never used smokeless tobacco. He reports that he does not drink alcohol or use drugs.    Review of Systems:  Review of Systems      Objective Organs decreased eyes decreased that there is no bone exposure all  tendons and soft tissue is covered.  No other inflammatory spots were noted in the area.  Pain in the area where the tendo Achilles was lengthened.  Is a palpable swelling in the area.  There is pain to the posterior calf.  There is negative signs of Homans sign are negative press test note the area no other area measures were noted in the area.  No signs of DVT in the area.  Foot Exam    Assessment/Plan   Problem List Items Addressed This Visit     Acute hematogenous osteomyelitis of left foot (CMS/HCC) ([facility])    Necrotizing fasciitis (CMS/HCC)    Type 2 diabetes mellitus with foot ulcer, with long-term current use of insulin (CMS/HCC) - Primary    RESOLVED: Osteomyelitis of left foot (CMS/HCC) (HCC)    Leg wound, left        Diabetes mellitus with ulceration left foot  2.  Diabetes mellitus osteomyelitis  which is chronic.  3.  Diabetes mellitus with neuropathy.  4 .severe neuropathy   Pain is a 6 out of 10.  Plan.  1. Pt was fitted for forefoot filler and mafo graphite  brace and will continue with  Current treatment course. and maintain similar activity level.Continue with current wpound care treatment and follow up 1 week spoke the patient length is still healing very very attentive to because of the the transfer of muscles to different swing phase and stance phase this is causing increased pressure to the intrinsic muscles legs.  They are working hard this will pass over time.  Will place patient on meloxicam 15 mg 1 tab daily.  To help reduce inflammation and reduce swelling.  We will also do Tubigrip Angle dry sterile dressing.  Continue elevate offload.  I told her to continue to be on the foot swelling is normal.  We will hopefully transition to a Moffa brace as stated by the orthotist.  Will add lasix 20mg once a day to the leg on working day swelling is greatly reduced at this visit.  At this visit patient will require a newly molded Moffa brace with a filler and there is been a huge decrease in volume  secondary to muscle dysfunction the current brace now is causing severe pressure on the outside of the leg.  There is mild edema/reduction of improving this is medically necessary at this point time a new brace will help patient ambulate prevent limb loss also not create friction in the area and again is probably due to severe volume and edema reduction secondary to  Fabricio Tee DPM

## 2019-09-25 ENCOUNTER — OFFICE VISIT (OUTPATIENT)
Dept: WOUND CARE | Facility: HOSPITAL | Age: 59
End: 2019-09-25
Attending: PODIATRIST
Payer: COMMERCIAL

## 2019-09-25 VITALS — TEMPERATURE: 97.8 F

## 2019-09-25 DIAGNOSIS — R60.0 LOCALIZED EDEMA: ICD-10-CM

## 2019-09-25 DIAGNOSIS — E11.8 TYPE 2 DIABETES MELLITUS WITH COMPLICATION, WITH LONG-TERM CURRENT USE OF INSULIN (CMS/HCC): ICD-10-CM

## 2019-09-25 DIAGNOSIS — S91.309A OPEN WOUND OF ANKLE AND FOOT: ICD-10-CM

## 2019-09-25 DIAGNOSIS — Z79.4 TYPE 2 DIABETES MELLITUS WITH DIABETIC PERIPHERAL ANGIOPATHY AND GANGRENE, WITH LONG-TERM CURRENT USE OF INSULIN (CMS/HCC): Primary | ICD-10-CM

## 2019-09-25 DIAGNOSIS — L97.509 TYPE 2 DIABETES MELLITUS WITH FOOT ULCER, WITH LONG-TERM CURRENT USE OF INSULIN (CMS/HCC): ICD-10-CM

## 2019-09-25 DIAGNOSIS — M86.372 CHRONIC MULTIFOCAL OSTEOMYELITIS, LEFT ANKLE AND FOOT (CMS/HCC): ICD-10-CM

## 2019-09-25 DIAGNOSIS — M86.372 CHRONIC MULTIFOCAL OSTEOMYELITIS OF LEFT FOOT (CMS/HCC): ICD-10-CM

## 2019-09-25 DIAGNOSIS — A48.0 GAS GANGRENE (CMS/HCC): ICD-10-CM

## 2019-09-25 DIAGNOSIS — D50.8 OTHER IRON DEFICIENCY ANEMIA: ICD-10-CM

## 2019-09-25 DIAGNOSIS — E11.52 TYPE 2 DIABETES MELLITUS WITH DIABETIC PERIPHERAL ANGIOPATHY AND GANGRENE, WITH LONG-TERM CURRENT USE OF INSULIN (CMS/HCC): Primary | ICD-10-CM

## 2019-09-25 DIAGNOSIS — Z79.4 TYPE 2 DIABETES MELLITUS WITH COMPLICATION, WITH LONG-TERM CURRENT USE OF INSULIN (CMS/HCC): ICD-10-CM

## 2019-09-25 DIAGNOSIS — S81.802D WOUND OF LEFT LOWER EXTREMITY, SUBSEQUENT ENCOUNTER: ICD-10-CM

## 2019-09-25 DIAGNOSIS — Z79.4 TYPE 2 DIABETES MELLITUS WITH FOOT ULCER, WITH LONG-TERM CURRENT USE OF INSULIN (CMS/HCC): ICD-10-CM

## 2019-09-25 DIAGNOSIS — M86.672 OSTEOMYELITIS, CHRONIC, ANKLE OR FOOT, LEFT (CMS/HCC): ICD-10-CM

## 2019-09-25 DIAGNOSIS — S91.009A OPEN WOUND OF ANKLE AND FOOT: ICD-10-CM

## 2019-09-25 DIAGNOSIS — E11.621 TYPE 2 DIABETES MELLITUS WITH FOOT ULCER, WITH LONG-TERM CURRENT USE OF INSULIN (CMS/HCC): ICD-10-CM

## 2019-09-25 PROCEDURE — 11042 DBRDMT SUBQ TIS 1ST 20SQCM/<: CPT | Performed by: PODIATRIST

## 2019-09-25 PROCEDURE — 0JDR0ZZ EXTRACTION OF LEFT FOOT SUBCUTANEOUS TISSUE AND FASCIA, OPEN APPROACH: ICD-10-PCS | Performed by: PODIATRIST

## 2019-09-25 NOTE — PROGRESS NOTES
Subjective      Patient ID: Harvey Lucero Jr. is a 59 y.o. male.    HPI    The following have been reviewed and updated as appropriate in this visit:     Patient seen status post osteomyelitis and surgical wound left foot.  Patient status several weeks Apligraf aspect of the left foot.  Patient denies any fever chills or night sweats.  Patient relates she is been very active at work.  He is noticed some increased pain and discomfort in the posterior aspect of his legs near his Achilles.  He states he is working 8 to 10 hours a day over time.  He denies any current fever chills night sweats but he still continue taking amoxicillin.    Past Medical History: He  has a past medical history of Anxiety; Chronic osteomyelitis (CMS/[facility]) ([facility]); Dyslipidemia; GERD (gastroesophageal reflux disease); History of CVA (cerebrovascular accident); Hypertension; Lipid disorder; Open wound; TIA (transient ischemic attack); and Type 2 diabetes mellitus (CMS/[facility]) ([facility]). He also has no past medical history of Depression.  Past Surgical History: He  has a past surgical history that includes [place] tooth extraction; Other surgical history; Incision and drainage of wound (Left); Wound debridement (Left); and Foot amputation through metatarsal (Left).  Medication: He has a current medication list which includes the following prescription(s): acetaminophen, amoxicillin, blood sugar diagnostic, blood-glucose meter, clopidogrel, furosemide, insulin glargine, lancets, lisinopril, lorazepam, meloxicam, metformin, pen needle, diabetic, pen needle, diabetic, sitagliptin, and rosuvastatin.  Allergies: He is allergic to no known allergies.  Social History: He  reports that he has quit smoking. His smoking use included Cigarettes. He has never used smokeless tobacco. He reports that he does not drink alcohol or use drugs.    Review of Systems:  Review of Systems      Objective Organs decreased eyes decreased that there is no bone exposure all  tendons and soft tissue is covered.  No other inflammatory spots were noted in the area.  Pain in the area where the tendo Achilles was lengthened.  Is a palpable swelling in the area.  There is pain to the posterior calf.  There is negative signs of Homans sign are negative press test note the area no other area measures were noted in the area.  No signs of DVT in the area.  Foot Exam    Assessment/Plan   Problem List Items Addressed This Visit     Open wound of ankle and foot    Localized edema    Type 2 diabetes mellitus with diabetic peripheral angiopathy and gangrene, with long-term current use of insulin (CMS/HCC) - Primary    Type 2 diabetes mellitus with foot ulcer, with long-term current use of insulin (CMS/HCC)    RESOLVED: Osteomyelitis, chronic, ankle or foot, left (CMS/HCC) ([facility])    RESOLVED: Osteomyelitis of left foot (CMS/HCC) ([facility])    Leg wound, left        Diabetes mellitus with ulceration left foot  2.  Diabetes mellitus osteomyelitis  which is chronic.  3.  Diabetes mellitus with neuropathy.  4 .severe neuropathy   Pain is a 6 out of 10.  Plan.  1. Pt was fitted for forefoot filler and mafo graphite  brace and will continue with  Current treatment course. and maintain similar activity level.Continue with current wpound care treatment and follow up 1 week spoke the patient length is still healing very very attentive to because of the the transfer of muscles to different swing phase and stance phase this is causing increased pressure to the intrinsic muscles legs.  They are working hard this will pass over time.  Will place patient on meloxicam 15 mg 1 tab daily.  To help reduce inflammation and reduce swelling.  We will also do Tubigrip Angle dry sterile dressing.  Continue elevate offload.  I told her to continue to be on the foot swelling is normal.  We will hopefully transition to a Moffa brace as stated by the orthotist.  Will add lasix 20mg once a day to the leg on working day swelling is  greatly reduced at this visit.  At this visit patient will require a newly molded Moffa brace with a filler and there is been a huge decrease in volume secondary to muscle dysfunction the current brace now is causing severe pressure on the outside of the leg.  There is mild edema/reduction of improving this is medically necessary at this point time a new brace will help patient ambulate prevent limb loss also not create friction in the area and again is probably due to severe volume and edema reduction secondary to will recommend double upright brace if area does not continue to heal at this visit patient as such a gross reduction in the swelling patient is mandatory when he any double brace patient is also starting to invert and we need to control the frontal plane motion both inversion and eversion Moffa brace will not be enough and patient will be at high risk for limb loss I do feel double brace test issue initially would be of benefit along with a locking mechanism to increase dorsiflexion as needed.  Without this device he patient is going need a below-knee amputation or even above patient is a high risk for limb loss moving forward please feel free to contact me with questions or concerns.  Patient be reevaluated  Fabricio Tee DPM

## 2019-09-25 NOTE — PATIENT INSTRUCTIONS
Wound Healing Center Instructions    MEDICATIONS     Medication Note  Continue present medications as prescribed by the [facility]aling Center or other physicians you see. To avoid any problems keep the [facility]aling Center informed each visit of any medications changes that occur.     WOUND CARE     Clean Wound with: Saline Solution   Treatment 1   Location: left foot/leg  Dressing: Apply yessi, adaptic, with dry dressing & ace.   Aquacel to medial & lateral wounds.  Use amlactin on dried skin areas   Dressing Care Frequency: r daily  Care Provider: family       Basic Principles      • Wash your hands thoroughly with soap and water and after each dressing change. If someone other than the patient changes the dressing, it’s best to wear disposable gloves.   • Do not get the wound or dressing wet.   • To shower: remove the dressing, shower with soap and water (including washing the wound - do not use a washcloth), air dry, then redress the wound.  • Do not take a tub bath  • Keep all your dressings in a clean, covered container at home to avoid dust and contamination.  • Discard used dressings in a plastic bag or covered trash container.  • Check your wound and the surrounding skin at each dressing change for redness, warmth, swelling, increased pain, foul odor, fever, pus or abnormal drainage or discharge.  • Notify Wound Healing Center if any of these changes occur - Dept: [phone].        Nutrition  • Eat a well, balanced diet with adequate protein to support wound healing.   • Take a multivitamin every day. Adequate nutrition supports healing and new tissue growth.  • All diabetic patients should strive to keep blood sugars within a normal, practical range.   • Elevated blood sugars can delay your wound healing.        ACTIVITY     Elevate legs above level of heart   Normal activity then rest and elevation  May excercise  May walk    MOBILITY     boot    W[facility] Healing Center Instructions    MEDICATIONS      Medication Note  Continue present medications as prescribed by the [facility]aling Center or other physicians you see. To avoid any problems keep the [facility]aling Center informed each visit of any medications changes that occur.     WOUND CARE     Clean Wound with: Saline Solution   Treatment 1   Location: left foot/leg  Dressing: Apply yessi, adaptic, with dry dressing & ace.  Use amlactin on dried skin areas   Dressing Care Frequency: r daily  Care Provider: family       Basic Principles      • Wash your hands thoroughly with soap and water and after each dressing change. If someone other than the patient changes the dressing, it’s best to wear disposable gloves.   • Do not get the wound or dressing wet.   • To shower: remove the dressing, shower with soap and water (including washing the wound - do not use a washcloth), air dry, then redress the wound.  • Do not take a tub bath  • Keep all your dressings in a clean, covered container at home to avoid dust and contamination.  • Discard used dressings in a plastic bag or covered trash container.  • Check your wound and the surrounding skin at each dressing change for redness, warmth, swelling, increased pain, foul odor, fever, pus or abnormal drainage or discharge.  • Notify Wound Healing Center if any of these changes occur - Dept: [phone].        Nutrition  • Eat a well, balanced diet with adequate protein to support wound healing.   • Take a multivitamin every day. Adequate nutrition supports healing and new tissue growth.  • All diabetic patients should strive to keep blood sugars within a normal, practical range.   • Elevated blood sugars can delay your wound healing.        ACTIVITY     Elevate legs above level of heart   Normal activity then rest and elevation  May excercise  May walk    MOBILITY     boot

## 2019-10-02 ENCOUNTER — OFFICE VISIT (OUTPATIENT)
Dept: WOUND CARE | Facility: HOSPITAL | Age: 59
End: 2019-10-02
Attending: PODIATRIST
Payer: COMMERCIAL

## 2019-10-02 VITALS — TEMPERATURE: 97.7 F

## 2019-10-02 DIAGNOSIS — S91.009A OPEN WOUND OF ANKLE AND FOOT: ICD-10-CM

## 2019-10-02 DIAGNOSIS — Z87.39 HISTORY OF NECROTIZING FASCIITIS: ICD-10-CM

## 2019-10-02 DIAGNOSIS — M86.372 CHRONIC MULTIFOCAL OSTEOMYELITIS, LEFT ANKLE AND FOOT (CMS/HCC): ICD-10-CM

## 2019-10-02 DIAGNOSIS — S81.802D WOUND OF LEFT LOWER EXTREMITY, SUBSEQUENT ENCOUNTER: ICD-10-CM

## 2019-10-02 DIAGNOSIS — Z79.4 TYPE 2 DIABETES MELLITUS WITH DIABETIC PERIPHERAL ANGIOPATHY AND GANGRENE, WITH LONG-TERM CURRENT USE OF INSULIN (CMS/HCC): ICD-10-CM

## 2019-10-02 DIAGNOSIS — S91.309A OPEN WOUND OF ANKLE AND FOOT: ICD-10-CM

## 2019-10-02 DIAGNOSIS — M72.6 NECROTIZING FASCIITIS (CMS/HCC): Primary | ICD-10-CM

## 2019-10-02 DIAGNOSIS — L03.032 CELLULITIS AND ABSCESS OF TOE OF LEFT FOOT: ICD-10-CM

## 2019-10-02 DIAGNOSIS — E11.52 TYPE 2 DIABETES MELLITUS WITH DIABETIC PERIPHERAL ANGIOPATHY AND GANGRENE, WITH LONG-TERM CURRENT USE OF INSULIN (CMS/HCC): ICD-10-CM

## 2019-10-02 DIAGNOSIS — L02.612 CELLULITIS AND ABSCESS OF TOE OF LEFT FOOT: ICD-10-CM

## 2019-10-02 PROCEDURE — 0JDR0ZZ EXTRACTION OF LEFT FOOT SUBCUTANEOUS TISSUE AND FASCIA, OPEN APPROACH: ICD-10-PCS | Performed by: PODIATRIST

## 2019-10-02 PROCEDURE — 11042 DBRDMT SUBQ TIS 1ST 20SQCM/<: CPT | Performed by: PODIATRIST

## 2019-10-02 NOTE — PATIENT INSTRUCTIONS
Wound Healing Center Instructions    MEDICATIONS     Medication Note  Continue present medications as prescribed by the [facility]aling Center or other physicians you see. To avoid any problems keep the [facility]aling Center informed each visit of any medications changes that occur.     WOUND CARE     Clean Wound with: Saline Solution   Treatment 1   Location: left foot/leg  Dressing: Apply yessi, adaptic, with dry dressing & ace.   Aquacel to medial & lateral wounds.  Use amlactin on dried skin areas   Dressing Care Frequency: r daily  Care Provider: family       Basic Principles      • Wash your hands thoroughly with soap and water and after each dressing change. If someone other than the patient changes the dressing, it’s best to wear disposable gloves.   • Do not get the wound or dressing wet.   • To shower: remove the dressing, shower with soap and water (including washing the wound - do not use a washcloth), air dry, then redress the wound.  • Do not take a tub bath  • Keep all your dressings in a clean, covered container at home to avoid dust and contamination.  • Discard used dressings in a plastic bag or covered trash container.  • Check your wound and the surrounding skin at each dressing change for redness, warmth, swelling, increased pain, foul odor, fever, pus or abnormal drainage or discharge.  • Notify [facility] Healing Center if any of these changes occur - Dept: [phone].        Nutrition  • Eat a well, balanced diet with adequate protein to support wound healing.   • Take a multivitamin every day. Adequate nutrition supports healing and new tissue growth.  • All diabetic patients should strive to keep blood sugars within a normal, practical range.   • Elevated blood sugars can delay your wound healing.        ACTIVITY     Elevate legs above level of heart   Normal activity then rest and elevation  May excercise  May walk    MOBILITY     boot    W[facility]aling Center Instructions    MEDICATIONS      Medication Note  Continue present medications as prescribed by the [facility]aling Center or other physicians you see. To avoid any problems keep the [facility]aling Center informed each visit of any medications changes that occur.     WOUND CARE     Clean Wound with: Saline Solution   Treatment 1   Location: left foot/leg  Dressing: Apply yessi  with dry dressing & ace to anterior foot.  Apply aquacel pressure dressings to medial & lateral wounds  Use amlactin on dried skin areas   Dressing Care Frequency: r daily  Care Provider: family       Basic Principles      • Wash your hands thoroughly with soap and water and after each dressing change. If someone other than the patient changes the dressing, it’s best to wear disposable gloves.   • Do not get the wound or dressing wet.   • To shower: remove the dressing, shower with soap and water (including washing the wound - do not use a washcloth), air dry, then redress the wound.  • Do not take a tub bath  • Keep all your dressings in a clean, covered container at home to avoid dust and contamination.  • Discard used dressings in a plastic bag or covered trash container.  • Check your wound and the surrounding skin at each dressing change for redness, warmth, swelling, increased pain, foul odor, fever, pus or abnormal drainage or discharge.  • Notify W[facility] Healing Center if any of these changes occur - Dept: [phone].        Nutrition  • Eat a well, balanced diet with adequate protein to support wound healing.   • Take a multivitamin every day. Adequate nutrition supports healing and new tissue growth.  • All diabetic patients should strive to keep blood sugars within a normal, practical range.   • Elevated blood sugars can delay your wound healing.        ACTIVITY     Elevate legs above level of heart   Normal activity then rest and elevation  May excercise  May walk    MOBILITY     boot

## 2019-10-02 NOTE — LETTER
October 2, 2019     Samuel Bryan DPM  [address]  [address]  [place] [facility] [place] [address]    Patient: Harvey Lucero Jr.  YOB: 1960  Date of Visit: 10/2/2019      Dear Dr. Bryan:    Thank you for referring Harvey Lucero Jr. to me for evaluation. Below are my notes for this consultation.    If you have questions, please do not hesitate to call me. I look forward to following your patient along with you.         Sincerely,        Fabricio Tee DPM        CC: No Recipients  Fabricio Tee DPM  10/2/2019  9:39 AM  Signed  Subjective      Patient ID: Harvey Lucero Jr. is a 59 y.o. male.    HPI    The following have been reviewed and updated as appropriate in this visit:     Patient seen status post osteomyelitis and surgical wound left foot.  Patient status several weeks Apligraf aspect of the left foot.  Patient denies any fever chills or night sweats.  Patient relates she is been very active at work.  He is noticed some increased pain and discomfort in the posterior aspect of his legs near his Achilles.  He states he is working 8 to 10 hours a day over time.  He denies any current fever chills night sweats but he still continue taking amoxicillin.    Past Medical History: He  has a past medical history of Anxiety; Chronic osteomyelitis (CMS/HCC); Dyslipidemia; GERD (gastroesophageal reflux disease); History of CVA (cerebrovascular accident); Hypertension; Lipid disorder; Open wound; TIA (transient ischemic attack); and Type 2 diabetes mellitus (CMS/HCC). He also has no past medical history of Depression.  Past Surgical History: He  has a past surgical history that includes [place] tooth extraction; Other surgical history; Incision and drainage of wound (Left); Wound debridement (Left); and Foot amputation through metatarsal (Left).  Medication: He has a current medication list which includes the following prescription(s): acetaminophen, amoxicillin, blood sugar diagnostic, blood-glucose meter,  clopidogrel, furosemide, insulin glargine, lancets, lisinopril, meloxicam, metformin, pen needle, diabetic, pen needle, diabetic, rosuvastatin, and sitagliptin.  Allergies: He is allergic to no known allergies.  Social History: He  reports that he has quit smoking. His smoking use included Cigarettes. He has never used smokeless tobacco. He reports that he does not drink alcohol or use drugs.    Review of Systems:  Review of Systems      Objective Organs decreased eyes decreased that there is no bone exposure all tendons and soft tissue is covered.  No other inflammatory spots were noted in the area.  Pain in the area where the tendo Achilles was lengthened.  Is a palpable swelling in the area.  There is pain to the posterior calf.  There is negative signs of Homans sign are negative press test note the area no other area measures were noted in the area.  No signs of DVT in the area.  Foot Exam    Assessment/Plan   Problem List Items Addressed This Visit     RESOLVED: Cellulitis and abscess of toe of left foot    Necrotizing fasciitis (CMS/HCC) - Primary    Open wound of ankle and foot    History of necrotizing fasciitis    RESOLVED: Chronic multifocal osteomyelitis, left ankle and foot (CMS/HCC)    Leg wound, left        Diabetes mellitus with ulceration left foot  2.  Diabetes mellitus osteomyelitis  which is chronic.  3.  Diabetes mellitus with neuropathy.  4 .severe neuropathy   Pain is a 6 out of 10.  Plan.  1. Pt was fitted for forefoot filler and mafo graphite  brace and will continue with  Current treatment course. and maintain similar activity level.Continue with current wpound care treatment and follow up 1 week spoke the patient length is still healing very very attentive to because of the the transfer of muscles to different swing phase and stance phase this is causing increased pressure to the intrinsic muscles legs.  They are working hard this will pass over time.  Will place patient on meloxicam 15 mg  1 tab daily.  To help reduce inflammation and reduce swelling.  We will also do Tubigrip Angle dry sterile dressing.  Continue elevate offload.  I told her to continue to be on the foot swelling is normal.  We will hopefully transition to a Moffa brace as stated by the orthotist.  Will add lasix 20mg once a day to the leg on working day swelling is greatly reduced at this visit.  At this visit patient will require a newly molded Moffa brace with a filler and there is been a huge decrease in volume secondary to muscle dysfunction the current brace now is causing severe pressure on the outside of the leg.  There is mild edema/reduction of improving this is medically necessary at this point time a new brace will help patient ambulate prevent limb loss also not create friction in the area and again is probably due to severe volume and edema reduction secondary to will recommend double upright brace if area does not continue to heal at this visit patient as such a gross reduction in the swelling patient is mandatory when he any double brace patient is also starting to invert and we need to control the frontal plane motion both inversion and eversion Moffa brace will not be enough and patient will be at high risk for limb loss I do feel double brace test issue initially would be of benefit along with a locking mechanism to increase dorsiflexion as needed.  Without this device he patient is going need a below-knee amputation or even above patient is a high risk for limb loss moving forward please feel free to contact me with questions or concerns.  Patient be reevaluated  Fabricio Tee DPM

## 2019-10-02 NOTE — PROGRESS NOTES
Subjective      Patient ID: Harvey Lucero Jr. is a 59 y.o. male.    HPI    The following have been reviewed and updated as appropriate in this visit:     Patient seen status post osteomyelitis and surgical wound left foot.  Patient status several weeks Apligraf aspect of the left foot.  Patient denies any fever chills or night sweats.  Patient relates she is been very active at work.  He is noticed some increased pain and discomfort in the posterior aspect of his legs near his Achilles.  He states he is working 8 to 10 hours a day over time.  He denies any current fever chills night sweats but he still continue taking amoxicillin.    Past Medical History: He  has a past medical history of Anxiety; Chronic osteomyelitis (CMS/[facility]); Dyslipidemia; GERD (gastroesophageal reflux disease); History of CVA (cerebrovascular accident); Hypertension; Lipid disorder; Open wound; TIA (transient ischemic attack); and Type 2 diabetes mellitus (CMS/[facility]). He also has no past medical history of Depression.  Past Surgical History: He  has a past surgical history that includes [place] tooth extraction; Other surgical history; Incision and drainage of wound (Left); Wound debridement (Left); and Foot amputation through metatarsal (Left).  Medication: He has a current medication list which includes the following prescription(s): acetaminophen, amoxicillin, blood sugar diagnostic, blood-glucose meter, clopidogrel, furosemide, insulin glargine, lancets, lisinopril, meloxicam, metformin, pen needle, diabetic, pen needle, diabetic, rosuvastatin, and sitagliptin.  Allergies: He is allergic to no known allergies.  Social History: He  reports that he has quit smoking. His smoking use included Cigarettes. He has never used smokeless tobacco. He reports that he does not drink alcohol or use drugs.    Review of Systems:  Review of Systems      Objective Organs decreased eyes decreased that there is no bone exposure all tendons and soft tissue is  covered.  No other inflammatory spots were noted in the area.  Pain in the area where the tendo Achilles was lengthened.  Is a palpable swelling in the area.  There is pain to the posterior calf.  There is negative signs of Homans sign are negative press test note the area no other area measures were noted in the area.  No signs of DVT in the area.  Foot Exam    Assessment/Plan   Problem List Items Addressed This Visit     RESOLVED: Cellulitis and abscess of toe of left foot    Necrotizing fasciitis (CMS/HCC) - Primary    Open wound of ankle and foot    History of necrotizing fasciitis    RESOLVED: Chronic multifocal osteomyelitis, left ankle and foot (CMS/HCC)    Leg wound, left        Diabetes mellitus with ulceration left foot  2.  Diabetes mellitus osteomyelitis  which is chronic.  3.  Diabetes mellitus with neuropathy.  4 .severe neuropathy   Pain is a 6 out of 10.  Plan.  1. Pt was fitted for forefoot filler and mafo graphite  brace and will continue with  Current treatment course. and maintain similar activity level.Continue with current wpound care treatment and follow up 1 week spoke the patient length is still healing very very attentive to because of the the transfer of muscles to different swing phase and stance phase this is causing increased pressure to the intrinsic muscles legs.  They are working hard this will pass over time.  Will place patient on meloxicam 15 mg 1 tab daily.  To help reduce inflammation and reduce swelling.  We will also do Tubigrip Angle dry sterile dressing.  Continue elevate offload.  I told her to continue to be on the foot swelling is normal.  We will hopefully transition to a Moffa brace as stated by the orthotist.  Will add lasix 20mg once a day to the leg on working day swelling is greatly reduced at this visit.  At this visit patient will require a newly molded Moffa brace with a filler and there is been a huge decrease in volume secondary to muscle dysfunction the  current brace now is causing severe pressure on the outside of the leg.  There is mild edema/reduction of improving this is medically necessary at this point time a new brace will help patient ambulate prevent limb loss also not create friction in the area and again is probably due to severe volume and edema reduction secondary to will recommend double upright brace if area does not continue to heal at this visit patient as such a gross reduction in the swelling patient is mandatory when he any double brace patient is also starting to invert and we need to control the frontal plane motion both inversion and eversion Moffa brace will not be enough and patient will be at high risk for limb loss I do feel double brace test issue initially would be of benefit along with a locking mechanism to increase dorsiflexion as needed.  Without this device he patient is going need a below-knee amputation or even above patient is a high risk for limb loss moving forward please feel free to contact me with questions or concerns.  Patient be reevaluated  Fabricio Tee DPM

## 2019-10-09 ENCOUNTER — OFFICE VISIT (OUTPATIENT)
Dept: WOUND CARE | Facility: HOSPITAL | Age: 59
End: 2019-10-09
Attending: PODIATRIST
Payer: COMMERCIAL

## 2019-10-09 VITALS — TEMPERATURE: 98.2 F

## 2019-10-09 DIAGNOSIS — Z79.4 TYPE 2 DIABETES MELLITUS WITH FOOT ULCER, WITH LONG-TERM CURRENT USE OF INSULIN (CMS/HCC): ICD-10-CM

## 2019-10-09 DIAGNOSIS — E11.621 TYPE 2 DIABETES MELLITUS WITH FOOT ULCER, WITH LONG-TERM CURRENT USE OF INSULIN (CMS/HCC): ICD-10-CM

## 2019-10-09 DIAGNOSIS — L97.509 TYPE 2 DIABETES MELLITUS WITH FOOT ULCER, WITH LONG-TERM CURRENT USE OF INSULIN (CMS/HCC): ICD-10-CM

## 2019-10-09 DIAGNOSIS — M72.6 NECROTIZING FASCIITIS (CMS/HCC): ICD-10-CM

## 2019-10-09 DIAGNOSIS — Z79.4 TYPE 2 DIABETES MELLITUS WITH DIABETIC PERIPHERAL ANGIOPATHY AND GANGRENE, WITH LONG-TERM CURRENT USE OF INSULIN (CMS/HCC): Primary | ICD-10-CM

## 2019-10-09 DIAGNOSIS — E11.52 TYPE 2 DIABETES MELLITUS WITH DIABETIC PERIPHERAL ANGIOPATHY AND GANGRENE, WITH LONG-TERM CURRENT USE OF INSULIN (CMS/HCC): Primary | ICD-10-CM

## 2019-10-09 DIAGNOSIS — M86.372 CHRONIC MULTIFOCAL OSTEOMYELITIS, LEFT ANKLE AND FOOT (CMS/HCC): ICD-10-CM

## 2019-10-09 DIAGNOSIS — S81.802D WOUND OF LEFT LOWER EXTREMITY, SUBSEQUENT ENCOUNTER: ICD-10-CM

## 2019-10-09 PROCEDURE — 27200105 HC AQUA CELL 4X4

## 2019-10-09 PROCEDURE — 27200114 HC PROMOGRAN MATRIX SMALL

## 2019-10-09 PROCEDURE — 11042 DBRDMT SUBQ TIS 1ST 20SQCM/<: CPT | Performed by: PODIATRIST

## 2019-10-09 PROCEDURE — 0JDR0ZZ EXTRACTION OF LEFT FOOT SUBCUTANEOUS TISSUE AND FASCIA, OPEN APPROACH: ICD-10-PCS | Performed by: PODIATRIST

## 2019-10-09 NOTE — PROGRESS NOTES
Subjective      Patient ID: Harvey Lucero Jr. is a 59 y.o. male.    HPI    The following have been reviewed and updated as appropriate in this visit:     Patient seen status post osteomyelitis and surgical wound left foot.  Patient status several weeks Apligraf aspect of the left foot.  Patient denies any fever chills or night sweats.  Patient relates she is been very active at work.  He is noticed some increased pain and discomfort in the posterior aspect of his legs near his Achilles.  He states he is working 8 to 10 hours a day over time.  He denies any current fever chills night sweats but he still continue taking amoxicillin.    Past Medical History: He  has a past medical history of Anxiety; Chronic osteomyelitis (CMS/[facility]); Dyslipidemia; GERD (gastroesophageal reflux disease); History of CVA (cerebrovascular accident); Hypertension; Lipid disorder; Open wound; TIA (transient ischemic attack); and Type 2 diabetes mellitus (CMS/[facility]). He also has no past medical history of Depression.  Past Surgical History: He  has a past surgical history that includes [place] tooth extraction; Other surgical history; Incision and drainage of wound (Left); Wound debridement (Left); and Foot amputation through metatarsal (Left).  Medication: He has a current medication list which includes the following prescription(s): acetaminophen, amoxicillin, blood sugar diagnostic, blood-glucose meter, clopidogrel, furosemide, insulin glargine, lancets, lisinopril, meloxicam, metformin, pen needle, diabetic, pen needle, diabetic, rosuvastatin, and sitagliptin.  Allergies: He is allergic to no known allergies.  Social History: He  reports that he has quit smoking. His smoking use included Cigarettes. He has never used smokeless tobacco. He reports that he does not drink alcohol or use drugs.    Review of Systems:  Review of Systems      Objective Organs decreased eyes decreased that there is no bone exposure all tendons and soft tissue is  covered.  No other inflammatory spots were noted in the area.  Pain in the area where the tendo Achilles was lengthened.  Is a palpable swelling in the area.  There is pain to the posterior calf.  There is negative signs of Homans sign are negative press test note the area no other area measures were noted in the area.  No signs of DVT in the area.  Foot Exam    Assessment/Plan   Problem List Items Addressed This Visit     Necrotizing fasciitis (CMS/HCC)    Type 2 diabetes mellitus with diabetic peripheral angiopathy and gangrene, with long-term current use of insulin (CMS/HCC) - Primary    Type 2 diabetes mellitus with foot ulcer, with long-term current use of insulin (CMS/HCC)    Chronic multifocal osteomyelitis, left ankle and foot (CMS/HCC)    Leg wound, left        Diabetes mellitus with ulceration left foot  2.  Diabetes mellitus osteomyelitis  which is chronic.  3.  Diabetes mellitus with neuropathy.  4 .severe neuropathy   Pain is a 6 out of 10.  Plan.  1. Pt was fitted for forefoot filler and mafo graphite  brace and will continue with  Current treatment course. and maintain similar activity level.Continue with current wpound care treatment and follow up 1 week spoke the patient length is still healing very very attentive to because of the the transfer of muscles to different swing phase and stance phase this is causing increased pressure to the intrinsic muscles legs.  They are working hard this will pass over time.  Will place patient on meloxicam 15 mg 1 tab daily.  To help reduce inflammation and reduce swelling.  We will also do Tubigrip Angle dry sterile dressing.  Continue elevate offload.  I told her to continue to be on the foot swelling is normal.  We will hopefully transition to a Moffa brace as stated by the orthotist.  Will add lasix 20mg once a day to the leg on working day swelling is greatly reduced at this visit.  At this visit patient will require a newly molded Moffa brace with a filler  and there is been a huge decrease in volume secondary to muscle dysfunction the current brace now is causing severe pressure on the outside of the leg.  There is mild edema/reduction of improving this is medically necessary at this point time a new brace will help patient ambulate prevent limb loss also not create friction in the area and again is probably due to severe volume and edema reduction secondary to will recommend double upright brace if area does not continue to heal at this visit patient as such a gross reduction in the swelling patient is mandatory when he any double brace patient is also starting to invert and we need to control the frontal plane motion both inversion and eversion Moffa brace will not be enough and patient will be at high risk for limb loss I do feel double brace test issue initially would be of benefit along with a locking mechanism to increase dorsiflexion as needed.  Without this device he patient is going need a below-knee amputation or even above patient is a high risk for limb loss moving forward please feel free to contact me with questions or concerns.  Patient be reevaluatedAt this visit a suture was removed fronm lateral left foot and medial and lateral wounds were degtranukated with the use if the hyphercater to cauterize the area.  Fabricio Tee, [facility]

## 2019-10-09 NOTE — PROCEDURES
Procedures  Procedure note patient was placed in the operative table in supine position after left lateral ankle was prepped and draped in normal aseptic fashion using 15 blade 1 to  the debris was taken through skin and subcutaneous tissue wound itself measures 3.6 x 3.4 x 0.2 had a mixed fiber granular base.  Areas dressed with Angle compression dry sterile dressing.  Patient taught procedure well without complications.

## 2019-10-16 ENCOUNTER — OFFICE VISIT (OUTPATIENT)
Dept: WOUND CARE | Facility: HOSPITAL | Age: 59
End: 2019-10-16
Attending: PODIATRIST
Payer: COMMERCIAL

## 2019-10-16 VITALS — TEMPERATURE: 98.3 F

## 2019-10-16 DIAGNOSIS — E11.52 TYPE 2 DIABETES MELLITUS WITH DIABETIC PERIPHERAL ANGIOPATHY AND GANGRENE, WITH LONG-TERM CURRENT USE OF INSULIN (CMS/HCC): Primary | ICD-10-CM

## 2019-10-16 DIAGNOSIS — L97.509 TYPE 2 DIABETES MELLITUS WITH FOOT ULCER, WITH LONG-TERM CURRENT USE OF INSULIN (CMS/HCC): ICD-10-CM

## 2019-10-16 DIAGNOSIS — Z79.4 TYPE 2 DIABETES MELLITUS WITH DIABETIC PERIPHERAL ANGIOPATHY AND GANGRENE, WITH LONG-TERM CURRENT USE OF INSULIN (CMS/HCC): Primary | ICD-10-CM

## 2019-10-16 DIAGNOSIS — M86.072 ACUTE HEMATOGENOUS OSTEOMYELITIS OF LEFT FOOT (CMS/HCC): ICD-10-CM

## 2019-10-16 DIAGNOSIS — E11.621 TYPE 2 DIABETES MELLITUS WITH FOOT ULCER, WITH LONG-TERM CURRENT USE OF INSULIN (CMS/HCC): ICD-10-CM

## 2019-10-16 DIAGNOSIS — Z79.4 TYPE 2 DIABETES MELLITUS WITH FOOT ULCER, WITH LONG-TERM CURRENT USE OF INSULIN (CMS/HCC): ICD-10-CM

## 2019-10-16 DIAGNOSIS — M72.6 NECROTIZING FASCIITIS (CMS/HCC): ICD-10-CM

## 2019-10-16 DIAGNOSIS — S81.802D WOUND OF LEFT LOWER EXTREMITY, SUBSEQUENT ENCOUNTER: ICD-10-CM

## 2019-10-16 DIAGNOSIS — A48.0 GAS GANGRENE (CMS/HCC): ICD-10-CM

## 2019-10-16 PROCEDURE — 27200105 HC AQUA CELL 4X4

## 2019-10-16 PROCEDURE — 11042 DBRDMT SUBQ TIS 1ST 20SQCM/<: CPT | Performed by: PODIATRIST

## 2019-10-16 PROCEDURE — 27200114 HC PROMOGRAN MATRIX SMALL

## 2019-10-16 NOTE — PROGRESS NOTES
Subjective      Patient ID: Harvey Lucero Jr. is a 59 y.o. male.    HPI    The following have been reviewed and updated as appropriate in this visit:     Patient seen status post osteomyelitis and surgical wound left foot.  Patient status several weeks Apligraf aspect of the left foot.  Patient denies any fever chills or night sweats.  Patient relates she is been very active at work.  He is noticed some increased pain and discomfort in the posterior aspect of his legs near his Achilles.  He states he is working 8 to 10 hours a day over time.  He denies any current fever chills night sweats but he still continue taking amoxicillin.    Past Medical History: He  has a past medical history of Anxiety, Chronic osteomyelitis (CMS/[facility]), Dyslipidemia, GERD (gastroesophageal reflux disease), History of CVA (cerebrovascular accident), Hypertension, Lipid disorder, Open wound, TIA (transient ischemic attack), and Type 2 diabetes mellitus (CMS/[facility]). He also has no past medical history of Depression.  Past Surgical History: He  has a past surgical history that includes [place] tooth extraction; Other surgical history; Incision and drainage of wound (Left); Wound debridement (Left); and Foot amputation through metatarsal (Left).  Medication: He has a current medication list which includes the following prescription(s): acetaminophen, amoxicillin, blood sugar diagnostic, blood-glucose meter, clopidogrel, furosemide, insulin glargine, lancets, lisinopril, meloxicam, metformin, pen needle, diabetic, pen needle, diabetic, rosuvastatin, and sitagliptin.  Allergies: He is allergic to no known allergies.  Social History: He  reports that he has quit smoking. His smoking use included cigarettes. He has never used smokeless tobacco. He reports that he does not drink alcohol or use drugs.    Review of Systems:  Review of Systems      Objective Organs decreased eyes decreased that there is no bone exposure all tendons and soft tissue is  covered.  No other inflammatory spots were noted in the area.  Pain in the area where the tendo Achilles was lengthened.  Is a palpable swelling in the area.  There is pain to the posterior calf.  There is negative signs of Homans sign are negative press test note the area no other area measures were noted in the area.  No signs of DVT in the area.  Foot Exam    Assessment/Plan   Problem List Items Addressed This Visit     None        Diabetes mellitus with ulceration left foot  2.  Diabetes mellitus osteomyelitis  which is chronic.  3.  Diabetes mellitus with neuropathy.  4 .severe neuropathy   Pain is a 6 out of 10.  Plan.  1. Pt was fitted for forefoot filler and mafo graphite  brace and will continue with  Current treatment course. and maintain similar activity level.Continue with current wpound care treatment and follow up 1 week spoke the patient length is still healing very very attentive to because of the the transfer of muscles to different swing phase and stance phase this is causing increased pressure to the intrinsic muscles legs.  They are working hard this will pass over time.  Will place patient on meloxicam 15 mg 1 tab daily.  To help reduce inflammation and reduce swelling.  We will also do Tubigrip Angle dry sterile dressing.  Continue elevate offload.  I told her to continue to be on the foot swelling is normal.  We will hopefully transition to a Moffa brace as stated by the orthotist.  Will add lasix 20mg once a day to the leg on working day swelling is greatly reduced at this visit.  At this visit patient will require a newly molded Moffa brace with a filler and there is been a huge decrease in volume secondary to muscle dysfunction the current brace now is causing severe pressure on the outside of the leg.  There is mild edema/reduction of improving this is medically necessary at this point time a new brace will help patient ambulate prevent limb loss also not create friction in the area and  again is probably due to severe volume and edema reduction secondary to will recommend double upright brace if area does not continue to heal at this visit patient as such a gross reduction in the swelling patient is mandatory when he any double brace patient is also starting to invert and we need to control the frontal plane motion both inversion and eversion Moffa brace will not be enough and patient will be at high risk for limb loss I do feel double brace test issue initially would be of benefit along with a locking mechanism to increase dorsiflexion as needed.  Without this device he patient is going need a below-knee amputation or even above patient is a high risk for limb loss moving forward please feel free to contact me with questions or concerns.  Patient be reevaluatedAt this visit a suture was removed fronm lateral left foot and medial and lateral wounds were degtranukated with the use if the hyphercater to cauterize the area.  Pt was fitted for the shoe with the double uprigth brace   Fabricio Tee DPM

## 2019-10-23 ENCOUNTER — OFFICE VISIT (OUTPATIENT)
Dept: WOUND CARE | Facility: HOSPITAL | Age: 59
End: 2019-10-23
Attending: PODIATRIST
Payer: COMMERCIAL

## 2019-10-23 VITALS — TEMPERATURE: 98.1 F | HEART RATE: 82 BPM | RESPIRATION RATE: 18 BRPM

## 2019-10-23 DIAGNOSIS — M86.072 ACUTE HEMATOGENOUS OSTEOMYELITIS OF LEFT FOOT (CMS/HCC): ICD-10-CM

## 2019-10-23 DIAGNOSIS — E11.621 TYPE 2 DIABETES MELLITUS WITH FOOT ULCER, WITH LONG-TERM CURRENT USE OF INSULIN (CMS/HCC): Primary | ICD-10-CM

## 2019-10-23 DIAGNOSIS — Z79.4 TYPE 2 DIABETES MELLITUS WITH FOOT ULCER, WITH LONG-TERM CURRENT USE OF INSULIN (CMS/HCC): Primary | ICD-10-CM

## 2019-10-23 DIAGNOSIS — M72.6 NECROTIZING FASCIITIS (CMS/HCC): ICD-10-CM

## 2019-10-23 DIAGNOSIS — L97.509 TYPE 2 DIABETES MELLITUS WITH FOOT ULCER, WITH LONG-TERM CURRENT USE OF INSULIN (CMS/HCC): Primary | ICD-10-CM

## 2019-10-23 DIAGNOSIS — S81.802D WOUND OF LEFT LOWER EXTREMITY, SUBSEQUENT ENCOUNTER: ICD-10-CM

## 2019-10-23 PROCEDURE — 27200114 HC PROMOGRAN MATRIX SMALL

## 2019-10-23 PROCEDURE — 11042 DBRDMT SUBQ TIS 1ST 20SQCM/<: CPT | Performed by: PODIATRIST

## 2019-10-23 PROCEDURE — 27200105 HC AQUA CELL 4X4

## 2019-10-23 NOTE — PATIENT INSTRUCTIONS
[facility] Instructions    MEDICATIONS     Medication Note  Continue present medications as prescribed by the [facility] or other physicians you see. To avoid any problems keep the [facility]aling Center informed each visit of any medications changes that occur.     WOUND CARE     Clean Wound with: Saline Solution   Treatment 1   Location: left foot/leg  Dressing: Apply yessi, adaptic, with dry dressing & ace.   Aquacel to medial & lateral wounds.  Use amlactin on dried skin areas   Dressing Care Frequency: r daily  Care Provider: family       Basic Principles      • Wash your hands thoroughly with soap and water and after each dressing change. If someone other than the patient changes the dressing, it’s best to wear disposable gloves.   • Do not get the wound or dressing wet.   • To shower: remove the dressing, shower with soap and water (including washing the wound - do not use a washcloth), air dry, then redress the wound.  • Do not take a tub bath  • Keep all your dressings in a clean, covered container at home to avoid dust and contamination.  • Discard used dressings in a plastic bag or covered trash container.  • Check your wound and the surrounding skin at each dressing change for redness, warmth, swelling, increased pain, foul odor, fever, pus or abnormal drainage or discharge.  • Notify W[facility] Healing Center if any of these changes occur - Dept: [phone].        Nutrition  • Eat a well, balanced diet with adequate protein to support wound healing.   • Take a multivitamin every day. Adequate nutrition supports healing and new tissue growth.  • All diabetic patients should strive to keep blood sugars within a normal, practical range.   • Elevated blood sugars can delay your wound healing.        ACTIVITY     Elevate legs above level of heart   Normal activity then rest and elevation  May excercise  May walk    MOBILITY     boot    [facility]aling Center Instructions    MEDICATIONS      Medication Note  Continue present medications as prescribed by the [facility] or other physicians you see. To avoid any problems keep the [facility] informed each visit of any medications changes that occur.     WOUND CARE     Clean Wound with: Saline Solution   Treatment 1   Location: left foot/leg  Dressing: Apply yessi  with dry dressing & ace to anterior foot.  Apply aquacel pressure dressings to medial & lateral wounds ABD to lateral ankle/ foot ( cut as a donut pad ). Use amlactin on dried skin areas   Dressing Care Frequency: r daily  Care Provider: family       Basic Principles      • Wash your hands thoroughly with soap and water and after each dressing change. If someone other than the patient changes the dressing, it’s best to wear disposable gloves.   • Do not get the wound or dressing wet.   • To shower: remove the dressing, shower with soap and water (including washing the wound - do not use a washcloth), air dry, then redress the wound.  • Do not take a tub bath  • Keep all your dressings in a clean, covered container at home to avoid dust and contamination.  • Discard used dressings in a plastic bag or covered trash container.  • Check your wound and the surrounding skin at each dressing change for redness, warmth, swelling, increased pain, foul odor, fever, pus or abnormal drainage or discharge.  • Notify [facility]aling Center if any of these changes occur - Dept: [phone].        Nutrition  • Eat a well, balanced diet with adequate protein to support wound healing.   • Take a multivitamin every day. Adequate nutrition supports healing and new tissue growth.  • All diabetic patients should strive to keep blood sugars within a normal, practical range.   • Elevated blood sugars can delay your wound healing.        ACTIVITY     Elevate legs above level of heart   Normal activity then rest and elevation  May excercise  May walk    MOBILITY     boot

## 2019-10-23 NOTE — PROGRESS NOTES
Subjective      Patient ID: Harvey Lucero Jr. is a 59 y.o. male.    HPI    The following have been reviewed and updated as appropriate in this visit:  Problems     Patient seen status post osteomyelitis and surgical wound left foot.  Patient status several weeks Apligraf aspect of the left foot.  Patient denies any fever chills or night sweats.  Patient relates she is been very active at work.  He is noticed some increased pain and discomfort in the posterior aspect of his legs near his Achilles.  He states he is working 8 to 10 hours a day over time.  He denies any current fever chills night sweats but he still continue taking amoxicillin.    Past Medical History: He  has a past medical history of Anxiety, Chronic osteomyelitis (CMS/[facility]), Dyslipidemia, GERD (gastroesophageal reflux disease), History of CVA (cerebrovascular accident), Hypertension, Lipid disorder, Open wound, TIA (transient ischemic attack), and Type 2 diabetes mellitus (CMS/[facility]). He also has no past medical history of Depression.  Past Surgical History: He  has a past surgical history that includes [place] tooth extraction; Other surgical history; Incision and drainage of wound (Left); Wound debridement (Left); and Foot amputation through metatarsal (Left).  Medication: He has a current medication list which includes the following prescription(s): acetaminophen, amoxicillin, blood sugar diagnostic, blood-glucose meter, clopidogrel, furosemide, insulin glargine, lancets, lisinopril, meloxicam, metformin, pen needle, diabetic, pen needle, diabetic, rosuvastatin, and sitagliptin.  Allergies: He is allergic to no known allergies.  Social History: He  reports that he has quit smoking. His smoking use included cigarettes. He has never used smokeless tobacco. He reports that he does not drink alcohol or use drugs.    Review of Systems:  Review of Systems      Objective Organs decreased eyes decreased that there is no bone exposure all tendons and soft  tissue is covered.  No other inflammatory spots were noted in the area.  Pain in the area where the tendo Achilles was lengthened.  Is a palpable swelling in the area.  There is pain to the posterior calf.  There is negative signs of Homans sign are negative press test note the area no other area measures were noted in the area.  No signs of DVT in the area.  Foot Exam    Assessment/Plan   Problem List Items Addressed This Visit     None        Diabetes mellitus with ulceration left foot  2.  Diabetes mellitus osteomyelitis  which is chronic.  3.  Diabetes mellitus with neuropathy.  4 .severe neuropathy   Pain is a 6 out of 10.  Plan.  1. Pt was fitted for forefoot filler and mafo graphite  brace and will continue with  Current treatment course. and maintain similar activity level.Continue with current wpound care treatment and follow up 1 week spoke the patient length is still healing very very attentive to because of the the transfer of muscles to different swing phase and stance phase this is causing increased pressure to the intrinsic muscles legs.  They are working hard this will pass over time.  Will place patient on meloxicam 15 mg 1 tab daily.  To help reduce inflammation and reduce swelling.  We will also do Tubigrip Angle dry sterile dressing.  Continue elevate offload.  I told her to continue to be on the foot swelling is normal.  We will hopefully transition to a Moffa brace as stated by the orthotist.  Will add lasix 20mg once a day to the leg on working day swelling is greatly reduced at this visit.  At this visit patient will require a newly molded Moffa brace with a filler and there is been a huge decrease in volume secondary to muscle dysfunction the current brace now is causing severe pressure on the outside of the leg.  There is mild edema/reduction of improving this is medically necessary at this point time a new brace will help patient ambulate prevent limb loss also not create friction in the  area and again is probably due to severe volume and edema reduction secondary to will recommend double upright brace if area does not continue to heal at this visit patient as such a gross reduction in the swelling patient is mandatory when he any double brace patient is also starting to invert and we need to control the frontal plane motion both inversion and eversion Moffa brace will not be enough and patient will be at high risk for limb loss I do feel double brace test issue initially would be of benefit along with a locking mechanism to increase dorsiflexion as needed.  Without this device he patient is going need a below-knee amputation or even above patient is a high risk for limb loss moving forward please feel free to contact me with questions or concerns.  Patient be reevaluatedAt this visit a suture was removed fronm lateral left foot and medial and lateral wounds were degtranukated with the use if the hyphercater to cauterize the area.  Pt was fitted for the shoe with the double uprigth brace at this time there is a small area of friction on the lateral aspect because of fluids in a very position.  Heel it was placed into bring patient even and to try to keep the malleolus from clearing the last of the shoe.  Patient will call if there is any rubbing or friction increased bleeding in the area.  We will follow closely and patient still high risk for limb loss.  Fabricio Tee DPM

## 2019-10-30 ENCOUNTER — OFFICE VISIT (OUTPATIENT)
Dept: WOUND CARE | Facility: HOSPITAL | Age: 59
End: 2019-10-30
Attending: PODIATRIST
Payer: COMMERCIAL

## 2019-10-30 DIAGNOSIS — L97.509 TYPE 2 DIABETES MELLITUS WITH FOOT ULCER, WITH LONG-TERM CURRENT USE OF INSULIN (CMS/HCC): ICD-10-CM

## 2019-10-30 DIAGNOSIS — Z79.4 TYPE 2 DIABETES MELLITUS WITH FOOT ULCER, WITH LONG-TERM CURRENT USE OF INSULIN (CMS/HCC): ICD-10-CM

## 2019-10-30 DIAGNOSIS — S81.802D WOUND OF LEFT LOWER EXTREMITY, SUBSEQUENT ENCOUNTER: ICD-10-CM

## 2019-10-30 DIAGNOSIS — Z79.4 TYPE 2 DIABETES MELLITUS WITH DIABETIC PERIPHERAL ANGIOPATHY AND GANGRENE, WITH LONG-TERM CURRENT USE OF INSULIN (CMS/HCC): Primary | ICD-10-CM

## 2019-10-30 DIAGNOSIS — R60.0 LOCALIZED EDEMA: ICD-10-CM

## 2019-10-30 DIAGNOSIS — E11.52 TYPE 2 DIABETES MELLITUS WITH DIABETIC PERIPHERAL ANGIOPATHY AND GANGRENE, WITH LONG-TERM CURRENT USE OF INSULIN (CMS/HCC): Primary | ICD-10-CM

## 2019-10-30 DIAGNOSIS — E11.621 TYPE 2 DIABETES MELLITUS WITH FOOT ULCER, WITH LONG-TERM CURRENT USE OF INSULIN (CMS/HCC): ICD-10-CM

## 2019-10-30 PROCEDURE — 27200105 HC AQUA CELL 4X4

## 2019-10-30 PROCEDURE — 11042 DBRDMT SUBQ TIS 1ST 20SQCM/<: CPT | Performed by: PODIATRIST

## 2019-10-30 PROCEDURE — 27200114 HC PROMOGRAN MATRIX SMALL

## 2019-10-30 NOTE — PATIENT INSTRUCTIONS
[facility]aling Center Instructions    MEDICATIONS     Medication Note  Continue present medications as prescribed by the [facility]aling Center or other physicians you see. To avoid any problems keep the [facility]aling Center informed each visit of any medications changes that occur.     WOUND CARE     Clean Wound with: Saline Solution   Treatment 1   Location: left foot/leg  Dressing: Apply yessi, adaptic, with dry dressing & ace.   Aquacel to medial & lateral wounds.  Use amlactin on dried skin areas   Dressing Care Frequency: r daily  Care Provider: family       Basic Principles      • Wash your hands thoroughly with soap and water and after each dressing change. If someone other than the patient changes the dressing, it’s best to wear disposable gloves.   • Do not get the wound or dressing wet.   • To shower: remove the dressing, shower with soap and water (including washing the wound - do not use a washcloth), air dry, then redress the wound.  • Do not take a tub bath  • Keep all your dressings in a clean, covered container at home to avoid dust and contamination.  • Discard used dressings in a plastic bag or covered trash container.  • Check your wound and the surrounding skin at each dressing change for redness, warmth, swelling, increased pain, foul odor, fever, pus or abnormal drainage or discharge.  • Notify [facility]aling Center if any of these changes occur - Dept: [phone].        Nutrition  • Eat a well, balanced diet with adequate protein to support wound healing.   • Take a multivitamin every day. Adequate nutrition supports healing and new tissue growth.  • All diabetic patients should strive to keep blood sugars within a normal, practical range.   • Elevated blood sugars can delay your wound healing.        ACTIVITY     Elevate legs above level of heart   Normal activity then rest and elevation  May excercise  May walk    MOBILITY     boot    [facility]aling Center Instructions    MEDICATIONS      Medication Note  Continue present medications as prescribed by the [facility]aling Center or other physicians you see. To avoid any problems keep the [facility]aling Center informed each visit of any medications changes that occur.     WOUND CARE     Clean Wound with: Saline Solution   Treatment 1   Location: left foot/leg  Dressing: Apply yessi  with dry dressing & ace to anterior foot.  Apply aquacel pressure dressings to medial & lateral wounds ABD or 4x4 to lateral ankle/ foot (). Use amlactin on dried skin areas   Dressing Care Frequency: every other day  Care Provider: family       Basic Principles      • Wash your hands thoroughly with soap and water and after each dressing change. If someone other than the patient changes the dressing, it’s best to wear disposable gloves.   • Do not get the wound or dressing wet.   • To shower: remove the dressing, shower with soap and water (including washing the wound - do not use a washcloth), air dry, then redress the wound.  • Do not take a tub bath  • Keep all your dressings in a clean, covered container at home to avoid dust and contamination.  • Discard used dressings in a plastic bag or covered trash container.  • Check your wound and the surrounding skin at each dressing change for redness, warmth, swelling, increased pain, foul odor, fever, pus or abnormal drainage or discharge.  • Notify W[facility] Healing Center if any of these changes occur - Dept: [phone].        Nutrition  • Eat a well, balanced diet with adequate protein to support wound healing.   • Take a multivitamin every day. Adequate nutrition supports healing and new tissue growth.  • All diabetic patients should strive to keep blood sugars within a normal, practical range.   • Elevated blood sugars can delay your wound healing.        ACTIVITY     Elevate legs above level of heart   Normal activity then rest and elevation  May excercise  May walk    MOBILITY     boot

## 2019-10-30 NOTE — PROGRESS NOTES
Subjective      Patient ID: Harvey Lucero Jr. is a 59 y.o. male.    HPI    The following have been reviewed and updated as appropriate in this visit:     Patient seen status post osteomyelitis and surgical wound left foot.  Patient status several weeks Apligraf aspect of the left foot.  Patient denies any fever chills or night sweats.  Patient relates she is been very active at work.  He is noticed some increased pain and discomfort in the posterior aspect of his legs near his Achilles.  He states he is working 8 to 10 hours a day over time.  He denies any current fever chills night sweats but he still continue taking amoxicillin.    Past Medical History: He  has a past medical history of Anxiety, Chronic osteomyelitis (CMS/[facility]), Dyslipidemia, GERD (gastroesophageal reflux disease), History of CVA (cerebrovascular accident), Hypertension, Lipid disorder, Open wound, TIA (transient ischemic attack), and Type 2 diabetes mellitus (CMS/[facility]). He also has no past medical history of Depression.  Past Surgical History: He  has a past surgical history that includes [place] tooth extraction; Other surgical history; Incision and drainage of wound (Left); Wound debridement (Left); and Foot amputation through metatarsal (Left).  Medication: He has a current medication list which includes the following prescription(s): acetaminophen, amoxicillin, blood sugar diagnostic, blood-glucose meter, clopidogrel, furosemide, insulin glargine, lancets, lisinopril, meloxicam, metformin, pen needle, diabetic, pen needle, diabetic, rosuvastatin, and sitagliptin.  Allergies: He is allergic to no known allergies.  Social History: He  reports that he has quit smoking. His smoking use included cigarettes. He has never used smokeless tobacco. He reports that he does not drink alcohol or use drugs.    Review of Systems:  Review of Systems      Objective Organs decreased eyes decreased that there is no bone exposure all tendons and soft tissue is  covered.  No other inflammatory spots were noted in the area.  Pain in the area where the tendo Achilles was lengthened.  Is a palpable swelling in the area.  There is pain to the posterior calf.  There is negative signs of Homans sign are negative press test note the area no other area measures were noted in the area.  No signs of DVT in the area.  Foot Exam    Assessment/Plan   Problem List Items Addressed This Visit     None        Diabetes mellitus with ulceration left foot  2.  Diabetes mellitus osteomyelitis  which is chronic.  3.  Diabetes mellitus with neuropathy.  4 .severe neuropathy   Pain is a 6 out of 10.  Plan.  1. Pt was fitted for forefoot filler and mafo graphite  brace and will continue with  Current treatment course. and maintain similar activity level.Continue with current wpound care treatment and follow up 1 week spoke the patient length is still healing very very attentive to because of the the transfer of muscles to different swing phase and stance phase this is causing increased pressure to the intrinsic muscles legs.  They are working hard this will pass over time.  Will place patient on meloxicam 15 mg 1 tab daily.  To help reduce inflammation and reduce swelling.  We will also do Tubigrip Angle dry sterile dressing.  Continue elevate offload.  I told her to continue to be on the foot swelling is normal.  We will hopefully transition to a Moffa brace as stated by the orthotist.  Will add lasix 20mg once a day to the leg on working day swelling is greatly reduced at this visit.  At this visit patient will require a newly molded Moffa brace with a filler and there is been a huge decrease in volume secondary to muscle dysfunction the current brace now is causing severe pressure on the outside of the leg.  There is mild edema/reduction of improving this is medically necessary at this point time a new brace will help patient ambulate prevent limb loss also not create friction in the area and  again is probably due to severe volume and edema reduction secondary to will recommend double upright brace if area does not continue to heal at this visit patient as such a gross reduction in the swelling patient is mandatory when he any double brace patient is also starting to invert and we need to control the frontal plane motion both inversion and eversion Moffa brace will not be enough and patient will be at high risk for limb loss I do feel double brace test issue initially would be of benefit along with a locking mechanism to increase dorsiflexion as needed.  Without this device he patient is going need a below-knee amputation or even above patient is a high risk for limb loss moving forward please feel free to contact me with questions or concerns.  Patient be reevaluatedAt this visit a suture was removed fronm lateral left foot and medial and lateral wounds were degtranukated with the use if the hyphercater to cauterize the area.  Pt was fitted for the shoe with the double uprigth brace at this time there is a small area of friction on the lateral aspect because of fluids in a very position.  Heel it was placed into bring patient even and to try to keep the malleolus from clearing the last of the shoe.  Patient will call if there is any rubbing or friction increased bleeding in the area.  We will follow closely and patient still high risk for limb loss. u pads were fashioned to offload in the double upright brace  Fabricio Tee DPM

## 2019-11-06 ENCOUNTER — OFFICE VISIT (OUTPATIENT)
Dept: WOUND CARE | Facility: HOSPITAL | Age: 59
End: 2019-11-06
Attending: PODIATRIST
Payer: COMMERCIAL

## 2019-11-06 VITALS — TEMPERATURE: 97.7 F

## 2019-11-06 DIAGNOSIS — M72.6 NECROTIZING FASCIITIS (CMS/HCC): ICD-10-CM

## 2019-11-06 DIAGNOSIS — E11.621 TYPE 2 DIABETES MELLITUS WITH FOOT ULCER, WITH LONG-TERM CURRENT USE OF INSULIN (CMS/HCC): ICD-10-CM

## 2019-11-06 DIAGNOSIS — M86.372 CHRONIC MULTIFOCAL OSTEOMYELITIS OF LEFT FOOT (CMS/HCC): ICD-10-CM

## 2019-11-06 DIAGNOSIS — A48.0 GAS GANGRENE (CMS/HCC): ICD-10-CM

## 2019-11-06 DIAGNOSIS — Z79.4 TYPE 2 DIABETES MELLITUS WITH DIABETIC PERIPHERAL ANGIOPATHY AND GANGRENE, WITH LONG-TERM CURRENT USE OF INSULIN (CMS/HCC): Primary | ICD-10-CM

## 2019-11-06 DIAGNOSIS — E11.52 TYPE 2 DIABETES MELLITUS WITH DIABETIC PERIPHERAL ANGIOPATHY AND GANGRENE, WITH LONG-TERM CURRENT USE OF INSULIN (CMS/HCC): Primary | ICD-10-CM

## 2019-11-06 DIAGNOSIS — L97.509 TYPE 2 DIABETES MELLITUS WITH FOOT ULCER, WITH LONG-TERM CURRENT USE OF INSULIN (CMS/HCC): ICD-10-CM

## 2019-11-06 DIAGNOSIS — Z79.4 TYPE 2 DIABETES MELLITUS WITH FOOT ULCER, WITH LONG-TERM CURRENT USE OF INSULIN (CMS/HCC): ICD-10-CM

## 2019-11-06 DIAGNOSIS — M86.672 OSTEOMYELITIS, CHRONIC, ANKLE OR FOOT, LEFT (CMS/HCC): ICD-10-CM

## 2019-11-06 PROCEDURE — 11042 DBRDMT SUBQ TIS 1ST 20SQCM/<: CPT | Performed by: PODIATRIST

## 2019-11-06 PROCEDURE — 27200105 HC AQUA CELL 4X4

## 2019-11-06 PROCEDURE — 27200114 HC PROMOGRAN MATRIX SMALL

## 2019-11-06 NOTE — PROGRESS NOTES
Subjective      Patient ID: Harvey Lucero Jr. is a 59 y.o. male.    HPI    The following have been reviewed and updated as appropriate in this visit:     Patient seen status post osteomyelitis and surgical wound left foot.  Patient status several weeks Apligraf aspect of the left foot.  Patient denies any fever chills or night sweats.  Patient relates she is been very active at work.  He is noticed some increased pain and discomfort in the posterior aspect of his legs near his Achilles.  He states he is working 8 to 10 hours a day over time.  He denies any current fever chills night sweats but he still continue taking amoxicillin.    Past Medical History: He  has a past medical history of Anxiety, Chronic osteomyelitis (CMS/[facility] [place]), Dyslipidemia, GERD (gastroesophageal reflux disease), History of CVA (cerebrovascular accident), Hypertension, Lipid disorder, Open wound, TIA (transient ischemic attack), and Type 2 diabetes mellitus (CMS/[facility] [place]). He also has no past medical history of Depression.  Past Surgical History: He  has a past surgical history that includes [place] tooth extraction; Other surgical history; Incision and drainage of wound (Left); Wound debridement (Left); and Foot amputation through metatarsal (Left).  Medication: He has a current medication list which includes the following prescription(s): acetaminophen, amoxicillin, blood sugar diagnostic, blood-glucose meter, clopidogrel, furosemide, insulin glargine, lancets, lisinopril, meloxicam, metformin, pen needle, diabetic, pen needle, diabetic, rosuvastatin, and sitagliptin.  Allergies: He is allergic to no known allergies.  Social History: He  reports that he has quit smoking. His smoking use included cigarettes. He has never used smokeless tobacco. He reports that he does not drink alcohol or use drugs.    Review of Systems:  Review of Systems      Objective Organs decreased eyes decreased that there is no bone exposure all tendons and soft tissue is  covered.  No other inflammatory spots were noted in the area.  Pain in the area where the tendo Achilles was lengthened.  Is a palpable swelling in the area.  There is pain to the posterior calf.  There is negative signs of Homans sign are negative press test note the area no other area measures were noted in the area.  No signs of DVT in the area.  Foot Exam    Assessment/Plan   Problem List Items Addressed This Visit        Circulatory    Type 2 diabetes mellitus with diabetic peripheral angiopathy and gangrene, with long-term current use of insulin (CMS/HCC) - Primary       Musculoskeletal    Necrotizing fasciitis (CMS/HCC)    Type 2 diabetes mellitus with foot ulcer, with long-term current use of insulin (CMS/HCC)    RESOLVED: Gas gangrene (CMS/HCC)    RESOLVED: Osteomyelitis, chronic, ankle or foot, left (CMS/HCC)    RESOLVED: Osteomyelitis of left foot (CMS/HCC)        Diabetes mellitus with ulceration left foot  2.  Diabetes mellitus osteomyelitis  which is chronic.  3.  Diabetes mellitus with neuropathy.  4 .severe neuropathy   Pain is a 6 out of 10.  Plan.  1. Pt was fitted for forefoot filler and mafo graphite  brace and will continue with  Current treatment course. and maintain similar activity level.Continue with current wpound care treatment and follow up 1 week spoke the patient length is still healing very very attentive to because of the the transfer of muscles to different swing phase and stance phase this is causing increased pressure to the intrinsic muscles legs.  They are working hard this will pass over time.  Will place patient on meloxicam 15 mg 1 tab daily.  To help reduce inflammation and reduce swelling.  We will also do Tubigrip Angle dry sterile dressing.  Continue elevate offload.  I told her to continue to be on the foot swelling is normal.  We will hopefully transition to a Moffa brace as stated by the orthotist.  Will add lasix 20mg once a day to the leg on working day swelling is  greatly reduced at this visit.  At this visit patient will require a newly molded Moffa brace with a filler and there is been a huge decrease in volume secondary to muscle dysfunction the current brace now is causing severe pressure on the outside of the leg.  There is mild edema/reduction of improving this is medically necessary at this point time a new brace will help patient ambulate prevent limb loss also not create friction in the area and again is probably due to severe volume and edema reduction secondary to will recommend double upright brace if area does not continue to heal at this visit patient as such a gross reduction in the swelling patient is mandatory when he any double brace patient is also starting to invert and we need to control the frontal plane motion both inversion and eversion Moffa brace will not be enough and patient will be at high risk for limb loss I do feel double brace test issue initially would be of benefit along with a locking mechanism to increase dorsiflexion as needed.  Without this device he patient is going need a below-knee amputation or even above patient is a high risk for limb loss moving forward please feel free to contact me with questions or concerns.  Patient be reevaluatedAt this visit a suture was removed fronm lateral left foot and medial and lateral wounds were degtranukated with the use if the hyphercater to cauterize the area.  Pt was fitted for the shoe with the double uprigth brace at this time there is a small area of friction on the lateral aspect because of fluids in a very position.  Heel it was placed into bring patient even and to try to keep the malleolus from clearing the last of the shoe.  Patient will call if there is any rubbing or friction increased bleeding in the area.  We will follow closely and patient still high risk for limb loss. u pads were fashioned to offload in the double upright brace  Fabricio Tee DPM

## 2019-11-06 NOTE — PATIENT INSTRUCTIONS
W[facility] Healing Center Instructions    MEDICATIONS     Medication Note  Continue present medications as prescribed by the Wound Healing Center or other physicians you see. To avoid any problems keep the Wound Healing Center informed each visit of any medications changes that occur.     WOUND CARE     Clean Wound with: Saline Solution   Treatment 1   Location: left foot/leg  Dressing: Apply yessi, adaptic, with dry dressing & ace.   Aquacel to medial & lateral wounds.  Use amlactin on dried skin areas   Dressing Care Frequency: r daily  Care Provider: family       Basic Principles      • Wash your hands thoroughly with soap and water and after each dressing change. If someone other than the patient changes the dressing, it’s best to wear disposable gloves.   • Do not get the wound or dressing wet.   • To shower: remove the dressing, shower with soap and water (including washing the wound - do not use a washcloth), air dry, then redress the wound.  • Do not take a tub bath  • Keep all your dressings in a clean, covered container at home to avoid dust and contamination.  • Discard used dressings in a plastic bag or covered trash container.  • Check your wound and the surrounding skin at each dressing change for redness, warmth, swelling, increased pain, foul odor, fever, pus or abnormal drainage or discharge.  • Notify Wound Healing Center if any of these changes occur - Dept: [phone].        Nutrition  • Eat a well, balanced diet with adequate protein to support wound healing.   • Take a multivitamin every day. Adequate nutrition supports healing and new tissue growth.  • All diabetic patients should strive to keep blood sugars within a normal, practical range.   • Elevated blood sugars can delay your wound healing.        ACTIVITY     Elevate legs above level of heart   Normal activity then rest and elevation  May excercise  May walk    MOBILITY     boot    Wound Healing Center Instructions    MEDICATIONS      Medication Note  Continue present medications as prescribed by the [facility]ound Healing Center or other physicians you see. To avoid any problems keep the [facility] Healing Center informed each visit of any medications changes that occur.     WOUND CARE     Clean Wound with: Saline Solution   Treatment 1   Location: left foot/leg  Dressing: Apply yessi  with dry dressing & ace to anterior foot.  Apply aquacel pressure dressings to medial & lateral wounds ABD or 4x4 to lateral ankle/ foot (). Use amlactin on dried skin areas   Dressing Care Frequency: every other day  Care Provider: family       Basic Principles      • Wash your hands thoroughly with soap and water and after each dressing change. If someone other than the patient changes the dressing, it’s best to wear disposable gloves.   • Do not get the wound or dressing wet.   • To shower: remove the dressing, shower with soap and water (including washing the wound - do not use a washcloth), air dry, then redress the wound.  • Do not take a tub bath  • Keep all your dressings in a clean, covered container at home to avoid dust and contamination.  • Discard used dressings in a plastic bag or covered trash container.  • Check your wound and the surrounding skin at each dressing change for redness, warmth, swelling, increased pain, foul odor, fever, pus or abnormal drainage or discharge.  • Notify Wound Healing Center if any of these changes occur - Dept: [phone].        Nutrition  • Eat a well, balanced diet with adequate protein to support wound healing.   • Take a multivitamin every day. Adequate nutrition supports healing and new tissue growth.  • All diabetic patients should strive to keep blood sugars within a normal, practical range.   • Elevated blood sugars can delay your wound healing.        ACTIVITY     Elevate legs above level of heart   Normal activity then rest and elevation  May excercise  May walk    MOBILITY     boot

## 2019-11-13 ENCOUNTER — OFFICE VISIT (OUTPATIENT)
Dept: WOUND CARE | Facility: HOSPITAL | Age: 59
End: 2019-11-13
Attending: PODIATRIST
Payer: COMMERCIAL

## 2019-11-13 VITALS — TEMPERATURE: 98.2 F

## 2019-11-13 DIAGNOSIS — S91.009A OPEN WOUND OF ANKLE AND FOOT: ICD-10-CM

## 2019-11-13 DIAGNOSIS — M86.072 ACUTE HEMATOGENOUS OSTEOMYELITIS OF LEFT FOOT (CMS/HCC): ICD-10-CM

## 2019-11-13 DIAGNOSIS — M72.6 NECROTIZING FASCIITIS (CMS/HCC): ICD-10-CM

## 2019-11-13 DIAGNOSIS — Z79.4 TYPE 2 DIABETES MELLITUS WITH FOOT ULCER, WITH LONG-TERM CURRENT USE OF INSULIN (CMS/HCC): ICD-10-CM

## 2019-11-13 DIAGNOSIS — R60.0 LOCALIZED EDEMA: ICD-10-CM

## 2019-11-13 DIAGNOSIS — A48.0 GAS GANGRENE (CMS/HCC): ICD-10-CM

## 2019-11-13 DIAGNOSIS — E11.621 TYPE 2 DIABETES MELLITUS WITH FOOT ULCER, WITH LONG-TERM CURRENT USE OF INSULIN (CMS/HCC): ICD-10-CM

## 2019-11-13 DIAGNOSIS — L97.509 TYPE 2 DIABETES MELLITUS WITH FOOT ULCER, WITH LONG-TERM CURRENT USE OF INSULIN (CMS/HCC): ICD-10-CM

## 2019-11-13 DIAGNOSIS — Z79.4 TYPE 2 DIABETES MELLITUS WITH DIABETIC PERIPHERAL ANGIOPATHY AND GANGRENE, WITH LONG-TERM CURRENT USE OF INSULIN (CMS/HCC): Primary | ICD-10-CM

## 2019-11-13 DIAGNOSIS — E11.52 TYPE 2 DIABETES MELLITUS WITH DIABETIC PERIPHERAL ANGIOPATHY AND GANGRENE, WITH LONG-TERM CURRENT USE OF INSULIN (CMS/HCC): Primary | ICD-10-CM

## 2019-11-13 DIAGNOSIS — S81.802D WOUND OF LEFT LOWER EXTREMITY, SUBSEQUENT ENCOUNTER: ICD-10-CM

## 2019-11-13 DIAGNOSIS — S91.309A OPEN WOUND OF ANKLE AND FOOT: ICD-10-CM

## 2019-11-13 PROCEDURE — 27200105 HC AQUA CELL 4X4

## 2019-11-13 PROCEDURE — 27200114 HC PROMOGRAN MATRIX SMALL

## 2019-11-13 PROCEDURE — 11042 DBRDMT SUBQ TIS 1ST 20SQCM/<: CPT | Performed by: PODIATRIST

## 2019-11-13 NOTE — PROGRESS NOTES
Subjective      Patient ID: Harvey Lucero Jr. is a 59 y.o. male.    HPI    The following have been reviewed and updated as appropriate in this visit:     Patient seen status post osteomyelitis and surgical wound left foot.  Patient status several weeks Apligraf aspect of the left foot.  Patient denies any fever chills or night sweats.  Patient relates she is been very active at work.  He is noticed some increased pain and discomfort in the posterior aspect of his legs near his Achilles.  He states he is working 8 to 10 hours a day over time.  He denies any current fever chills night sweats but he still continue taking amoxicillin.    Past Medical History: He  has a past medical history of Anxiety, Chronic osteomyelitis (CMS/[facility]), Dyslipidemia, GERD (gastroesophageal reflux disease), History of CVA (cerebrovascular accident), Hypertension, Lipid disorder, Open wound, TIA (transient ischemic attack), and Type 2 diabetes mellitus (CMS/[facility]). He also has no past medical history of Depression.  Past Surgical History: He  has a past surgical history that includes [place] tooth extraction; Other surgical history; Incision and drainage of wound (Left); Wound debridement (Left); and Foot amputation through metatarsal (Left).  Medication: He has a current medication list which includes the following prescription(s): acetaminophen, amoxicillin, blood sugar diagnostic, blood-glucose meter, clopidogrel, furosemide, insulin glargine, lancets, lisinopril, meloxicam, metformin, pen needle, diabetic, pen needle, diabetic, rosuvastatin, and sitagliptin.  Allergies: He is allergic to no known allergies.  Social History: He  reports that he has quit smoking. His smoking use included cigarettes. He has never used smokeless tobacco. He reports that he does not drink alcohol or use drugs.    Review of Systems:  Review of Systems      Objective Organs decreased eyes decreased that there is no bone exposure all tendons and soft tissue is  covered.  No other inflammatory spots were noted in the area.  Pain in the area where the tendo Achilles was lengthened.  Is a palpable swelling in the area.  There is pain to the posterior calf.  There is negative signs of Homans sign are negative press test note the area no other area measures were noted in the area.  No signs of DVT in the area.  Foot Exam    Assessment/Plan   Problem List Items Addressed This Visit        Circulatory    Type 2 diabetes mellitus with diabetic peripheral angiopathy and gangrene, with long-term current use of insulin (CMS/HCC) - Primary       Musculoskeletal    Acute hematogenous osteomyelitis of left foot (CMS/HCC)    Gas gangrene (CMS/HCC)    Necrotizing fasciitis (CMS/HCC)    Type 2 diabetes mellitus with foot ulcer, with long-term current use of insulin (CMS/HCC)    Leg wound, left       Other    Open wound of ankle and foot        Diabetes mellitus with ulceration left foot  2.  Diabetes mellitus osteomyelitis  which is chronic.  3.  Diabetes mellitus with neuropathy.  4 .severe neuropathy   Pain is a 6 out of 10.  Plan.  1. Pt was fitted for forefoot filler and mafo graphite  brace and will continue with  Current treatment course. and maintain similar activity level.Continue with current wpound care treatment and follow up 1 week spoke the patient length is still healing very very attentive to because of the the transfer of muscles to different swing phase and stance phase this is causing increased pressure to the intrinsic muscles legs.  They are working hard this will pass over time.  Will place patient on meloxicam 15 mg 1 tab daily.  To help reduce inflammation and reduce swelling.  We will also do Tubigrip Angle dry sterile dressing.  Continue elevate offload.  I told her to continue to be on the foot swelling is normal.  We will hopefully transition to a Moffa brace as stated by the orthotist.  Will add lasix 20mg once a day to the leg on working day swelling is  greatly reduced at this visit.  At this visit patient will require a newly molded Moffa brace with a filler and there is been a huge decrease in volume secondary to muscle dysfunction the current brace now is causing severe pressure on the outside of the leg.  There is mild edema/reduction of improving this is medically necessary at this point time a new brace will help patient ambulate prevent limb loss also not create friction in the area and again is probably due to severe volume and edema reduction secondary to will recommend double upright brace if area does not continue to heal at this visit patient as such a gross reduction in the swelling patient is mandatory when he any double brace patient is also starting to invert and we need to control the frontal plane motion both inversion and eversion Moffa brace will not be enough and patient will be at high risk for limb loss I do feel double brace test issue initially would be of benefit along with a locking mechanism to increase dorsiflexion as needed.  Without this device he patient is going need a below-knee amputation or even above patient is a high risk for limb loss moving forward please feel free to contact me with questions or concerns.  Patient be reevaluatedAt this visit a suture was removed fronm lateral left foot and medial and lateral wounds were degtranukated with the use if the hyphercater to cauterize the area.  Pt was fitted for the shoe with the double uprigth brace at this time there is a small area of friction on the lateral aspect because of fluids in a very position.  Heel it was placed into bring patient even and to try to keep the malleolus from clearing the last of the shoe.  Patient will call if there is any rubbing or friction increased bleeding in the area.  We will follow closely and patient still high risk for limb loss. u pads were fashioned to offload in the double upright brace  Fabricio Tee DPM

## 2019-11-13 NOTE — PATIENT INSTRUCTIONS
Wound Healing Center Instructions    MEDICATIONS     Medication Note  Continue present medications as prescribed by the Wound Healing Center or other physicians you see. To avoid any problems keep the Wound Healing Center informed each visit of any medications changes that occur.     WOUND CARE     Clean Wound with: Saline Solution   Treatment 1   Location: left foot/leg  Dressing: Apply yessi, adaptic, with dry dressing & ace.   Aquacel to medial & lateral wounds.  Use amlactin on dried skin areas   Dressing Care Frequency: r daily  Care Provider: family       Basic Principles      • Wash your hands thoroughly with soap and water and after each dressing change. If someone other than the patient changes the dressing, it’s best to wear disposable gloves.   • Do not get the wound or dressing wet.   • To shower: remove the dressing, shower with soap and water (including washing the wound - do not use a washcloth), air dry, then redress the wound.  • Do not take a tub bath  • Keep all your dressings in a clean, covered container at home to avoid dust and contamination.  • Discard used dressings in a plastic bag or covered trash container.  • Check your wound and the surrounding skin at each dressing change for redness, warmth, swelling, increased pain, foul odor, fever, pus or abnormal drainage or discharge.  • Notify Wound Healing Center if any of these changes occur - Dept: [phone].        Nutrition  • Eat a well, balanced diet with adequate protein to support wound healing.   • Take a multivitamin every day. Adequate nutrition supports healing and new tissue growth.  • All diabetic patients should strive to keep blood sugars within a normal, practical range.   • Elevated blood sugars can delay your wound healing.        ACTIVITY     Elevate legs above level of heart   Normal activity then rest and elevation  May excercise  May walk    MOBILITY     boot    Wound Healing Center Instructions    MEDICATIONS      Medication Note  Continue present medications as prescribed by the Wound Healing Center or other physicians you see. To avoid any problems keep the Wound Healing Center informed each visit of any medications changes that occur.     WOUND CARE     Clean Wound with: Saline Solution   Treatment 1   Location: left foot/leg  Dressing: Apply yessi  with dry dressing & ace to anterior foot.  Apply aquacel pressure dressings to medial & lateral wounds (yessi with lateral wound ) . ABD or 4x4 to lateral ankle/ foot . Use amlactin on dried skin areas   Dressing Care Frequency: every other day  Care Provider: family       Basic Principles      • Wash your hands thoroughly with soap and water and after each dressing change. If someone other than the patient changes the dressing, it’s best to wear disposable gloves.   • Do not get the wound or dressing wet.   • To shower: remove the dressing, shower with soap and water (including washing the wound - do not use a washcloth), air dry, then redress the wound.  • Do not take a tub bath  • Keep all your dressings in a clean, covered container at home to avoid dust and contamination.  • Discard used dressings in a plastic bag or covered trash container.  • Check your wound and the surrounding skin at each dressing change for redness, warmth, swelling, increased pain, foul odor, fever, pus or abnormal drainage or discharge.  • Notify Wound Healing Center if any of these changes occur - Dept: [phone].        Nutrition  • Eat a well, balanced diet with adequate protein to support wound healing.   • Take a multivitamin every day. Adequate nutrition supports healing and new tissue growth.  • All diabetic patients should strive to keep blood sugars within a normal, practical range.   • Elevated blood sugars can delay your wound healing.        ACTIVITY     Elevate legs above level of heart   Normal activity then rest and elevation  May excercise  May walk    MOBILITY      boot

## 2019-11-20 ENCOUNTER — OFFICE VISIT (OUTPATIENT)
Dept: WOUND CARE | Facility: HOSPITAL | Age: 59
End: 2019-11-20
Attending: PODIATRIST
Payer: COMMERCIAL

## 2019-11-20 VITALS — TEMPERATURE: 98.1 F

## 2019-11-20 DIAGNOSIS — M86.072 ACUTE HEMATOGENOUS OSTEOMYELITIS OF LEFT FOOT (CMS/HCC): ICD-10-CM

## 2019-11-20 DIAGNOSIS — S81.802D WOUND OF LEFT LOWER EXTREMITY, SUBSEQUENT ENCOUNTER: ICD-10-CM

## 2019-11-20 DIAGNOSIS — E11.621 TYPE 2 DIABETES MELLITUS WITH FOOT ULCER, WITH LONG-TERM CURRENT USE OF INSULIN (CMS/HCC): ICD-10-CM

## 2019-11-20 DIAGNOSIS — Z79.4 TYPE 2 DIABETES MELLITUS WITH DIABETIC PERIPHERAL ANGIOPATHY AND GANGRENE, WITH LONG-TERM CURRENT USE OF INSULIN (CMS/HCC): Primary | ICD-10-CM

## 2019-11-20 DIAGNOSIS — Z79.4 TYPE 2 DIABETES MELLITUS WITH FOOT ULCER, WITH LONG-TERM CURRENT USE OF INSULIN (CMS/HCC): ICD-10-CM

## 2019-11-20 DIAGNOSIS — A48.0 GAS GANGRENE (CMS/HCC): ICD-10-CM

## 2019-11-20 DIAGNOSIS — L97.509 TYPE 2 DIABETES MELLITUS WITH FOOT ULCER, WITH LONG-TERM CURRENT USE OF INSULIN (CMS/HCC): ICD-10-CM

## 2019-11-20 DIAGNOSIS — E11.52 TYPE 2 DIABETES MELLITUS WITH DIABETIC PERIPHERAL ANGIOPATHY AND GANGRENE, WITH LONG-TERM CURRENT USE OF INSULIN (CMS/HCC): Primary | ICD-10-CM

## 2019-11-20 PROCEDURE — 11042 DBRDMT SUBQ TIS 1ST 20SQCM/<: CPT | Performed by: PODIATRIST

## 2019-11-20 PROCEDURE — 27200114 HC PROMOGRAN MATRIX SMALL

## 2019-11-20 PROCEDURE — 27200105 HC AQUA CELL 4X4

## 2019-11-20 NOTE — PROGRESS NOTES
Subjective      Patient ID: Harvey Lucero Jr. is a 59 y.o. male.    HPI    The following have been reviewed and updated as appropriate in this visit:     Patient seen status post osteomyelitis and surgical wound left foot.  Patient status several weeks Apligraf aspect of the left foot.  Patient denies any fever chills or night sweats.  Patient relates she is been very active at work.  He is noticed some increased pain and discomfort in the posterior aspect of his legs near his Achilles.  He states he is working 8 to 10 hours a day over time.  He denies any current fever chills night sweats but he still continue taking amoxicillin.    Past Medical History: He  has a past medical history of Anxiety, Chronic osteomyelitis (CMS/[facility]), Dyslipidemia, GERD (gastroesophageal reflux disease), History of CVA (cerebrovascular accident), Hypertension, Lipid disorder, Open wound, TIA (transient ischemic attack), and Type 2 diabetes mellitus (CMS/[facility]). He also has no past medical history of Depression.  Past Surgical History: He  has a past surgical history that includes [place] tooth extraction; Other surgical history; Incision and drainage of wound (Left); Wound debridement (Left); and Foot amputation through metatarsal (Left).  Medication: He has a current medication list which includes the following prescription(s): acetaminophen, amoxicillin, blood sugar diagnostic, blood-glucose meter, clopidogrel, furosemide, insulin glargine, lancets, lisinopril, meloxicam, metformin, pen needle, diabetic, pen needle, diabetic, rosuvastatin, and sitagliptin.  Allergies: He is allergic to no known allergies.  Social History: He  reports that he has quit smoking. His smoking use included cigarettes. He has never used smokeless tobacco. He reports that he does not drink alcohol or use drugs.    Review of Systems:  Review of Systems      Objective Organs decreased eyes decreased that there is no bone exposure all tendons and soft tissue is  covered.  No other inflammatory spots were noted in the area.  Pain in the area where the tendo Achilles was lengthened.  Is a palpable swelling in the area.  There is pain to the posterior calf.  There is negative signs of Homans sign are negative press test note the area no other area measures were noted in the area.  No signs of DVT in the area.  Foot Exam    Assessment/Plan   Problem List Items Addressed This Visit     None        Diabetes mellitus with ulceration left foot  2.  Diabetes mellitus osteomyelitis  which is chronic.  3.  Diabetes mellitus with neuropathy.  4 .severe neuropathy   Pain is a 6 out of 10.  Plan.  1. Pt was fitted for forefoot filler and mafo graphite  brace and will continue with  Current treatment course. and maintain similar activity level.Continue with current wpound care treatment and follow up 1 week spoke the patient length is still healing very very attentive to because of the the transfer of muscles to different swing phase and stance phase this is causing increased pressure to the intrinsic muscles legs.  They are working hard this will pass over time.  Will place patient on meloxicam 15 mg 1 tab daily.  To help reduce inflammation and reduce swelling.  We will also do Tubigrip Angle dry sterile dressing.  Continue elevate offload.  I told her to continue to be on the foot swelling is normal.  We will hopefully transition to a Moffa brace as stated by the orthotist.  Will add lasix 20mg once a day to the leg on working day swelling is greatly reduced at this visit.  At this visit patient will require a newly molded Moffa brace with a filler and there is been a huge decrease in volume secondary to muscle dysfunction the current brace now is causing severe pressure on the outside of the leg.  There is mild edema/reduction of improving this is medically necessary at this point time a new brace will help patient ambulate prevent limb loss also not create friction in the area and  again is probably due to severe volume and edema reduction secondary to will recommend double upright brace if area does not continue to heal at this visit patient as such a gross reduction in the swelling patient is mandatory when he any double brace patient is also starting to invert and we need to control the frontal plane motion both inversion and eversion Moffa brace will not be enough and patient will be at high risk for limb loss I do feel double brace test issue initially would be of benefit along with a locking mechanism to increase dorsiflexion as needed.  Without this device he patient is going need a below-knee amputation or even above patient is a high risk for limb loss moving forward please feel free to contact me with questions or concerns.  Patient be reevaluatedAt this visit a suture was removed fronm lateral left foot and medial and lateral wounds were degtranukated with the use if the hyphercater to cauterize the area.  Pt was fitted for the shoe with the double uprigth brace at this time there is a small area of friction on the lateral aspect because of fluids in a very position.  Heel it was placed into bring patient even and to try to keep the malleolus from clearing the last of the shoe.  Patient will call if there is any rubbing or friction increased bleeding in the area.  We will follow closely and patient still high risk for limb loss. u pads were fashioned to offload in the double upright brace  Fabricio Tee DPM

## 2019-11-27 ENCOUNTER — OFFICE VISIT (OUTPATIENT)
Dept: WOUND CARE | Facility: HOSPITAL | Age: 59
End: 2019-11-27
Attending: PODIATRIST
Payer: COMMERCIAL

## 2019-11-27 DIAGNOSIS — Z79.4 TYPE 2 DIABETES MELLITUS WITH FOOT ULCER, WITH LONG-TERM CURRENT USE OF INSULIN (CMS/HCC): ICD-10-CM

## 2019-11-27 DIAGNOSIS — S91.309A OPEN WOUND OF ANKLE AND FOOT: ICD-10-CM

## 2019-11-27 DIAGNOSIS — E11.621 TYPE 2 DIABETES MELLITUS WITH FOOT ULCER, WITH LONG-TERM CURRENT USE OF INSULIN (CMS/HCC): ICD-10-CM

## 2019-11-27 DIAGNOSIS — Z87.39 HISTORY OF NECROTIZING FASCIITIS: ICD-10-CM

## 2019-11-27 DIAGNOSIS — E11.52 TYPE 2 DIABETES MELLITUS WITH DIABETIC PERIPHERAL ANGIOPATHY AND GANGRENE, WITH LONG-TERM CURRENT USE OF INSULIN (CMS/HCC): Primary | ICD-10-CM

## 2019-11-27 DIAGNOSIS — L97.509 TYPE 2 DIABETES MELLITUS WITH FOOT ULCER, WITH LONG-TERM CURRENT USE OF INSULIN (CMS/HCC): ICD-10-CM

## 2019-11-27 DIAGNOSIS — Z79.4 TYPE 2 DIABETES MELLITUS WITH DIABETIC PERIPHERAL ANGIOPATHY AND GANGRENE, WITH LONG-TERM CURRENT USE OF INSULIN (CMS/HCC): Primary | ICD-10-CM

## 2019-11-27 DIAGNOSIS — R60.0 LOCALIZED EDEMA: ICD-10-CM

## 2019-11-27 DIAGNOSIS — S91.009A OPEN WOUND OF ANKLE AND FOOT: ICD-10-CM

## 2019-11-27 DIAGNOSIS — M86.072 ACUTE HEMATOGENOUS OSTEOMYELITIS OF LEFT FOOT (CMS/HCC): ICD-10-CM

## 2019-11-27 PROCEDURE — 11043 DBRDMT MUSC&/FSCA 1ST 20/<: CPT | Performed by: PODIATRIST

## 2019-11-27 PROCEDURE — 27200105 HC AQUA CELL 4X4

## 2019-11-27 NOTE — PATIENT INSTRUCTIONS
W[facility] Healing Center Instructions    MEDICATIONS     Medication Note  Continue present medications as prescribed by the Wound Healing Center or other physicians you see. To avoid any problems keep the Wound Healing Center informed each visit of any medications changes that occur.     WOUND CARE     Clean Wound with: Saline Solution   Treatment 1   Location: left foot/leg  Dressing: Apply yessi, adaptic, with dry dressing & ace.   Aquacel to medial & lateral wounds.  Use amlactin on dried skin areas   Dressing Care Frequency: r daily  Care Provider: family       Basic Principles      • Wash your hands thoroughly with soap and water and after each dressing change. If someone other than the patient changes the dressing, it’s best to wear disposable gloves.   • Do not get the wound or dressing wet.   • To shower: remove the dressing, shower with soap and water (including washing the wound - do not use a washcloth), air dry, then redress the wound.  • Do not take a tub bath  • Keep all your dressings in a clean, covered container at home to avoid dust and contamination.  • Discard used dressings in a plastic bag or covered trash container.  • Check your wound and the surrounding skin at each dressing change for redness, warmth, swelling, increased pain, foul odor, fever, pus or abnormal drainage or discharge.  • Notify Wound Healing Center if any of these changes occur - Dept: [phone].        Nutrition  • Eat a well, balanced diet with adequate protein to support wound healing.   • Take a multivitamin every day. Adequate nutrition supports healing and new tissue growth.  • All diabetic patients should strive to keep blood sugars within a normal, practical range.   • Elevated blood sugars can delay your wound healing.        ACTIVITY     Elevate legs above level of heart   Normal activity then rest and elevation  May excercise  May walk    MOBILITY     boot    Wound Healing Center Instructions    MEDICATIONS      Medication Note  Continue present medications as prescribed by the [facility]ound [facility]aling Center or other physicians you see. To avoid any problems keep the [facility]aling Center informed each visit of any medications changes that occur.

## 2019-11-27 NOTE — PROGRESS NOTES
Subjective      Patient ID: Harvey Lucero Jr. is a 59 y.o. male.    HPI    The following have been reviewed and updated as appropriate in this visit:     Patient seen status post osteomyelitis and surgical wound left foot.  Patient status several weeks Apligraf aspect of the left foot.  Patient denies any fever chills or night sweats.  Patient relates she is been very active at work.  He is noticed some increased pain and discomfort in the posterior aspect of his legs near his Achilles.  He states he is working 8 to 10 hours a day over time.  He denies any current fever chills night sweats but he still continue taking amoxicillin.    Past Medical History: He  has a past medical history of Anxiety, Chronic osteomyelitis (CMS/[facility]), Dyslipidemia, GERD (gastroesophageal reflux disease), History of CVA (cerebrovascular accident), Hypertension, Lipid disorder, Open wound, TIA (transient ischemic attack), and Type 2 diabetes mellitus (CMS/[facility]). He also has no past medical history of Depression.  Past Surgical History: He  has a past surgical history that includes [place] tooth extraction; Other surgical history; Incision and drainage of wound (Left); Wound debridement (Left); and Foot amputation through metatarsal (Left).  Medication: He has a current medication list which includes the following prescription(s): acetaminophen, amoxicillin, blood sugar diagnostic, blood-glucose meter, clopidogrel, furosemide, insulin glargine, lancets, lisinopril, meloxicam, metformin, pen needle, diabetic, pen needle, diabetic, rosuvastatin, and sitagliptin.  Allergies: He is allergic to no known allergies.  Social History: He  reports that he has quit smoking. His smoking use included cigarettes. He has never used smokeless tobacco. He reports that he does not drink alcohol or use drugs.    Review of Systems:  Review of Systems      Objective Organs decreased eyes decreased that there is no bone exposure all tendons and soft tissue is  covered.  No other inflammatory spots were noted in the area.  Pain in the area where the tendo Achilles was lengthened.  Is a palpable swelling in the area.  There is pain to the posterior calf.  There is negative signs of Homans sign are negative press test note the area no other area measures were noted in the area.  No signs of DVT in the area.  Foot Exam    Assessment/Plan   Problem List Items Addressed This Visit     None        Diabetes mellitus with ulceration left foot  2.  Diabetes mellitus osteomyelitis  which is chronic.  3.  Diabetes mellitus with neuropathy.  4 .severe neuropathy   Pain is a 6 out of 10.  Plan.  1. Pt was fitted for forefoot filler and mafo graphite  brace and will continue with  Current treatment course. and maintain similar activity level.Continue with current wpound care treatment and follow up 1 week spoke the patient length is still healing very very attentive to because of the the transfer of muscles to different swing phase and stance phase this is causing increased pressure to the intrinsic muscles legs.  They are working hard this will pass over time.  Will place patient on meloxicam 15 mg 1 tab daily.  To help reduce inflammation and reduce swelling.  We will also do Tubigrip Angle dry sterile dressing.  Continue elevate offload.  I told her to continue to be on the foot swelling is normal.  We will hopefully transition to a Moffa brace as stated by the orthotist.  Will add lasix 20mg once a day to the leg on working day swelling is greatly reduced at this visit.  At this visit patient will require a newly molded Moffa brace with a filler and there is been a huge decrease in volume secondary to muscle dysfunction the current brace now is causing severe pressure on the outside of the leg.  There is mild edema/reduction of improving this is medically necessary at this point time a new brace will help patient ambulate prevent limb loss also not create friction in the area and  again is probably due to severe volume and edema reduction secondary to will recommend double upright brace if area does not continue to heal at this visit patient as such a gross reduction in the swelling patient is mandatory when he any double brace patient is also starting to invert and we need to control the frontal plane motion both inversion and eversion Moffa brace will not be enough and patient will be at high risk for limb loss I do feel double brace test issue initially would be of benefit along with a locking mechanism to increase dorsiflexion as needed.  Without this device he patient is going need a below-knee amputation or even above patient is a high risk for limb loss moving forward please feel free to contact me with questions or concerns.  Patient be reevaluatedAt this visit a suture was removed fronm lateral left foot and medial and lateral wounds were degtranukated with the use if the hyphercater to cauterize the area.  Pt was fitted for the shoe with the double uprigth brace at this time there is a small area of friction on the lateral aspect because of fluids in a very position.  Heel it was placed into bring patient even and to try to keep the malleolus from clearing the last of the shoe.  Patient will call if there is any rubbing or friction increased bleeding in the area.  We will follow closely and patient still high risk for limb loss. u pads were fashioned to offload in the double upright brace  Fabricio Tee DPM

## 2019-12-04 ENCOUNTER — OFFICE VISIT (OUTPATIENT)
Dept: WOUND CARE | Facility: HOSPITAL | Age: 59
End: 2019-12-04
Attending: PODIATRIST
Payer: COMMERCIAL

## 2019-12-04 VITALS — TEMPERATURE: 98.2 F

## 2019-12-04 DIAGNOSIS — E11.621 TYPE 2 DIABETES MELLITUS WITH FOOT ULCER, WITH LONG-TERM CURRENT USE OF INSULIN (CMS/HCC): ICD-10-CM

## 2019-12-04 DIAGNOSIS — S81.802D WOUND OF LEFT LOWER EXTREMITY, SUBSEQUENT ENCOUNTER: ICD-10-CM

## 2019-12-04 DIAGNOSIS — E11.52 TYPE 2 DIABETES MELLITUS WITH DIABETIC PERIPHERAL ANGIOPATHY AND GANGRENE, WITH LONG-TERM CURRENT USE OF INSULIN (CMS/HCC): ICD-10-CM

## 2019-12-04 DIAGNOSIS — L97.509 TYPE 2 DIABETES MELLITUS WITH FOOT ULCER, WITH LONG-TERM CURRENT USE OF INSULIN (CMS/HCC): ICD-10-CM

## 2019-12-04 DIAGNOSIS — Z79.4 TYPE 2 DIABETES MELLITUS WITH DIABETIC PERIPHERAL ANGIOPATHY AND GANGRENE, WITH LONG-TERM CURRENT USE OF INSULIN (CMS/HCC): ICD-10-CM

## 2019-12-04 DIAGNOSIS — Z79.4 TYPE 2 DIABETES MELLITUS WITH FOOT ULCER, WITH LONG-TERM CURRENT USE OF INSULIN (CMS/HCC): ICD-10-CM

## 2019-12-04 DIAGNOSIS — R60.0 LOCALIZED EDEMA: ICD-10-CM

## 2019-12-04 DIAGNOSIS — M72.6 NECROTIZING FASCIITIS (CMS/HCC): Primary | ICD-10-CM

## 2019-12-04 PROCEDURE — 11042 DBRDMT SUBQ TIS 1ST 20SQCM/<: CPT | Performed by: PODIATRIST

## 2019-12-04 PROCEDURE — 27200114 HC PROMOGRAN MATRIX SMALL

## 2019-12-04 NOTE — PATIENT INSTRUCTIONS
Wound Healing Center Instructions    MEDICATIONS     Medication Note  Continue present medications as prescribed by the [facility]ound Healing Center or other physicians you see. To avoid any problems keep the [facility]ound Healing Center informed each visit of any medications changes that occur.     WOUND CARE     Clean Wound with: Saline Solution   Treatment 1   Location: left foot/leg  Dressing: Apply yessi, adaptic, with dry dressing & ace.   Aquacel to medial & lateral wounds.  Use amlactin on dried skin areas   Dressing Care Frequency: r daily  Care Provider: family       Basic Principles      • Wash your hands thoroughly with soap and water and after each dressing change. If someone other than the patient changes the dressing, it’s best to wear disposable gloves.   • Do not get the wound or dressing wet.   • To shower: remove the dressing, shower with soap and water (including washing the wound - do not use a washcloth), air dry, then redress the wound.  • Do not take a tub bath  • Keep all your dressings in a clean, covered container at home to avoid dust and contamination.  • Discard used dressings in a plastic bag or covered trash container.  • Check your wound and the surrounding skin at each dressing change for redness, warmth, swelling, increased pain, foul odor, fever, pus or abnormal drainage or discharge.  • Notify Wound Healing Center if any of these changes occur - Dept: [phone].        Nutrition  • Eat a well, balanced diet with adequate protein to support wound healing.   • Take a multivitamin every day. Adequate nutrition supports healing and new tissue growth.  • All diabetic patients should strive to keep blood sugars within a normal, practical range.   • Elevated blood sugars can delay your wound healing.        ACTIVITY     Elevate legs above level of heart   Normal activity then rest and elevation  May excercise  May walk    MOBILITY     boot    Wound Healing Center Instructions    MEDICATIONS      Medication Note  Continue present medications as prescribed by the [facility]ound [facility]aling Center or other physicians you see. To avoid any problems keep the Wound Healing Center informed each visit of any medications changes that occur.

## 2019-12-04 NOTE — PROGRESS NOTES
Subjective      Patient ID: Harvey Lucero Jr. is a 59 y.o. male.    HPI    The following have been reviewed and updated as appropriate in this visit:  Problems     Patient seen status post osteomyelitis and surgical wound left foot.  Patient status several weeks Apligraf aspect of the left foot.  Patient denies any fever chills or night sweats.  Patient relates she is been very active at work.  He is noticed some increased pain and discomfort in the posterior aspect of his legs near his Achilles.  He states he is working 8 to 10 hours a day over time.  He denies any current fever chills night sweats but he still continue taking amoxicillin.    Past Medical History: He  has a past medical history of Anxiety, Chronic osteomyelitis (CMS/[facility]), Dyslipidemia, GERD (gastroesophageal reflux disease), History of CVA (cerebrovascular accident), Hypertension, Lipid disorder, Open wound, TIA (transient ischemic attack), and Type 2 diabetes mellitus (CMS/[facility]). He also has no past medical history of Depression.  Past Surgical History: He  has a past surgical history that includes [place] tooth extraction; Other surgical history; Incision and drainage of wound (Left); Wound debridement (Left); and Foot amputation through metatarsal (Left).  Medication: He has a current medication list which includes the following prescription(s): acetaminophen, amoxicillin, blood sugar diagnostic, blood-glucose meter, clopidogrel, furosemide, insulin glargine, lancets, lisinopril, meloxicam, metformin, pen needle, diabetic, pen needle, diabetic, rosuvastatin, and sitagliptin.  Allergies: He is allergic to no known allergies.  Social History: He  reports that he has quit smoking. His smoking use included cigarettes. He has never used smokeless tobacco. He reports that he does not drink alcohol or use drugs.    Review of Systems:  Review of Systems      Objective Organs decreased eyes decreased that there is no bone exposure all tendons and soft  tissue is covered.  No other inflammatory spots were noted in the area.  Pain in the area where the tendo Achilles was lengthened.  Is a palpable swelling in the area.  There is pain to the posterior calf.  There is negative signs of Homans sign are negative press test note the area no other area measures were noted in the area.  No signs of DVT in the area.  Foot Exam    Assessment/Plan   Problem List Items Addressed This Visit        Circulatory    Type 2 diabetes mellitus with diabetic peripheral angiopathy and gangrene, with long-term current use of insulin (CMS/HCC)       Musculoskeletal    Necrotizing fasciitis (CMS/HCC) - Primary    Type 2 diabetes mellitus with foot ulcer, with long-term current use of insulin (CMS/HCC)    Leg wound, left       Other    Localized edema        Diabetes mellitus with ulceration left foot  2.  Diabetes mellitus osteomyelitis  which is chronic.  3.  Diabetes mellitus with neuropathy.  4 .severe neuropathy   Pain is a 6 out of 10.  Plan.  1. Pt was fitted for forefoot filler and mafo graphite  brace and will continue with  Current treatment course. and maintain similar activity level.Continue with current wpound care treatment and follow up 1 week spoke the patient length is still healing very very attentive to because of the the transfer of muscles to different swing phase and stance phase this is causing increased pressure to the intrinsic muscles legs.  They are working hard this will pass over time.  Will place patient on meloxicam 15 mg 1 tab daily.  To help reduce inflammation and reduce swelling.  We will also do Tubigrip Angle dry sterile dressing.  Continue elevate offload.  I told her to continue to be on the foot swelling is normal.  We will hopefully transition to a Moffa brace as stated by the orthotist.  Will add lasix 20mg once a day to the leg on working day swelling is greatly reduced at this visit.  At this visit patient will require a newly molded Moffa brace  with a filler and there is been a huge decrease in volume secondary to muscle dysfunction the current brace now is causing severe pressure on the outside of the leg.  There is mild edema/reduction of improving this is medically necessary at this point time a new brace will help patient ambulate prevent limb loss also not create friction in the area and again is probably due to severe volume and edema reduction secondary to will recommend double upright brace if area does not continue to heal at this visit patient as such a gross reduction in the swelling patient is mandatory when he any double brace patient is also starting to invert and we need to control the frontal plane motion both inversion and eversion Moffa brace will not be enough and patient will be at high risk for limb loss I do feel double brace test issue initially would be of benefit along with a locking mechanism to increase dorsiflexion as needed.  Without this device he patient is going need a below-knee amputation or even above patient is a high risk for limb loss moving forward please feel free to contact me with questions or concerns.  Patient be reevaluatedAt this visit a suture was removed fronm lateral left foot and medial and lateral wounds were degtranukated with the use if the hyphercater to cauterize the area.  Pt was fitted for the shoe with the double uprigth brace at this time there is a small area of friction on the lateral aspect because of fluids in a very position.  Heel it was placed into bring patient even and to try to keep the malleolus from clearing the last of the shoe.  Patient will call if there is any rubbing or friction increased bleeding in the area.  We will follow closely and patient still high risk for limb loss. u pads were fashioned to offload in the double upright brace  Fabricio Tee DPM

## 2019-12-11 ENCOUNTER — OFFICE VISIT (OUTPATIENT)
Dept: WOUND CARE | Facility: HOSPITAL | Age: 59
End: 2019-12-11
Attending: PODIATRIST
Payer: COMMERCIAL

## 2019-12-11 VITALS — TEMPERATURE: 98.6 F

## 2019-12-11 DIAGNOSIS — A48.0 GAS GANGRENE (CMS/HCC): ICD-10-CM

## 2019-12-11 DIAGNOSIS — L97.509 TYPE 2 DIABETES MELLITUS WITH FOOT ULCER, WITH LONG-TERM CURRENT USE OF INSULIN (CMS/HCC): Primary | ICD-10-CM

## 2019-12-11 DIAGNOSIS — S81.802D WOUND OF LEFT LOWER EXTREMITY, SUBSEQUENT ENCOUNTER: ICD-10-CM

## 2019-12-11 DIAGNOSIS — Z79.4 TYPE 2 DIABETES MELLITUS WITH FOOT ULCER, WITH LONG-TERM CURRENT USE OF INSULIN (CMS/HCC): Primary | ICD-10-CM

## 2019-12-11 DIAGNOSIS — E11.621 TYPE 2 DIABETES MELLITUS WITH FOOT ULCER, WITH LONG-TERM CURRENT USE OF INSULIN (CMS/HCC): Primary | ICD-10-CM

## 2019-12-11 PROCEDURE — 27200105 HC AQUA CELL 4X4

## 2019-12-11 PROCEDURE — 11042 DBRDMT SUBQ TIS 1ST 20SQCM/<: CPT | Performed by: PODIATRIST

## 2019-12-11 PROCEDURE — 27200114 HC PROMOGRAN MATRIX SMALL

## 2019-12-11 NOTE — PROGRESS NOTES
Subjective      Patient ID: Harvey Lucero Jr. is a 59 y.o. male.    HPI    The following have been reviewed and updated as appropriate in this visit:     Patient seen status post osteomyelitis and surgical wound left foot.  Patient status several weeks Apligraf aspect of the left foot.  Patient denies any fever chills or night sweats.  Patient relates she is been very active at work.  He is noticed some increased pain and discomfort in the posterior aspect of his legs near his Achilles.  He states he is working 8 to 10 hours a day over time.  He denies any current fever chills night sweats but he still continue taking amoxicillin.    Past Medical History: He  has a past medical history of Anxiety, Chronic osteomyelitis (CMS/[facility]), Dyslipidemia, GERD (gastroesophageal reflux disease), History of CVA (cerebrovascular accident), Hypertension, Lipid disorder, Open wound, TIA (transient ischemic attack), and Type 2 diabetes mellitus (CMS/[facility]). He also has no past medical history of Depression.  Past Surgical History: He  has a past surgical history that includes [place] tooth extraction; Other surgical history; Incision and drainage of wound (Left); Wound debridement (Left); and Foot amputation through metatarsal (Left).  Medication: He has a current medication list which includes the following prescription(s): acetaminophen, amoxicillin, blood sugar diagnostic, blood-glucose meter, clopidogrel, furosemide, insulin glargine, lancets, lisinopril, meloxicam, metformin, pen needle, diabetic, pen needle, diabetic, rosuvastatin, and sitagliptin.  Allergies: He is allergic to no known allergies.  Social History: He  reports that he has quit smoking. His smoking use included cigarettes. He has never used smokeless tobacco. He reports that he does not drink alcohol or use drugs.    Review of Systems:  Review of Systems      Objective Organs decreased eyes decreased that there is no bone exposure all tendons and soft tissue is  covered.  No other inflammatory spots were noted in the area.  Pain in the area where the tendo Achilles was lengthened.  Is a palpable swelling in the area.  There is pain to the posterior calf.  There is negative signs of Homans sign are negative press test note the area no other area measures were noted in the area.  No signs of DVT in the area.  Foot Exam    Assessment/Plan   Problem List Items Addressed This Visit     None        Diabetes mellitus with ulceration left foot  2.  Diabetes mellitus osteomyelitis  which is chronic.  3.  Diabetes mellitus with neuropathy.  4 .severe neuropathy   Pain is a 6 out of 10.  Plan.  1. Pt was fitted for forefoot filler and mafo graphite  brace and will continue with  Current treatment course. and maintain similar activity level.Continue with current wpound care treatment and follow up 1 week spoke the patient length is still healing very very attentive to because of the the transfer of muscles to different swing phase and stance phase this is causing increased pressure to the intrinsic muscles legs.  They are working hard this will pass over time.  Will place patient on meloxicam 15 mg 1 tab daily.  To help reduce inflammation and reduce swelling.  We will also do Tubigrip Angle dry sterile dressing.  Continue elevate offload.  I told her to continue to be on the foot swelling is normal.  We will hopefully transition to a Moffa brace as stated by the orthotist.  Will add lasix 20mg once a day to the leg on working day swelling is greatly reduced at this visit.  At this visit patient will require a newly molded Moffa brace with a filler and there is been a huge decrease in volume secondary to muscle dysfunction the current brace now is causing severe pressure on the outside of the leg.  There is mild edema/reduction of improving this is medically necessary at this point time a new brace will help patient ambulate prevent limb loss also not create friction in the area and  again is probably due to severe volume and edema reduction secondary to will recommend double upright brace if area does not continue to heal at this visit patient as such a gross reduction in the swelling patient is mandatory when he any double brace patient is also starting to invert and we need to control the frontal plane motion both inversion and eversion Moffa brace will not be enough and patient will be at high risk for limb loss I do feel double brace test issue initially would be of benefit along with a locking mechanism to increase dorsiflexion as needed.  Without this device he patient is going need a below-knee amputation or even above patient is a high risk for limb loss moving forward please feel free to contact me with questions or concerns.  Patient be reevaluatedAt this visit a suture was removed fronm lateral left foot and medial and lateral wounds were degtranukated with the use if the hyphercater to cauterize the area.  Pt was fitted for the shoe with the double uprigth brace at this time there is a small area of friction on the lateral aspect because of fluids in a very position.  Heel it was placed into bring patient even and to try to keep the malleolus from clearing the last of the shoe.  Patient will call if there is any rubbing or friction increased bleeding in the area.  We will follow closely and patient still high risk for limb loss. u pads were fashioned to offload in the double upright brace  Fabricio Tee DPM

## 2019-12-11 NOTE — PATIENT INSTRUCTIONS
[facility] Instructions    MEDICATIONS     Medication Note  Continue present medications as prescribed by the [facility]aling Center or other physicians you see. To avoid any problems keep the [facility] Healing Center informed each visit of any medications changes that occur.     WOUND CARE     Clean Wound with: Saline Solution   Treatment 1   Location: left foot/leg  Dressing: Apply yessi, adaptic, with dry dressing & ace.   Aquacel to medial & lateral wounds.  Use amlactin on dried skin areas   Dressing Care Frequency: r daily  Care Provider: family       Basic Principles      • Wash your hands thoroughly with soap and water and after each dressing change. If someone other than the patient changes the dressing, it’s best to wear disposable gloves.   • Do not get the wound or dressing wet.   • To shower: remove the dressing, shower with soap and water (including washing the wound - do not use a washcloth), air dry, then redress the wound.  • Do not take a tub bath  • Keep all your dressings in a clean, covered container at home to avoid dust and contamination.  • Discard used dressings in a plastic bag or covered trash container.  • Check your wound and the surrounding skin at each dressing change for redness, warmth, swelling, increased pain, foul odor, fever, pus or abnormal drainage or discharge.  • Notify Wound Healing Center if any of these changes occur - Dept: [phone].        Nutrition  • Eat a well, balanced diet with adequate protein to support wound healing.   • Take a multivitamin every day. Adequate nutrition supports healing and new tissue growth.  • All diabetic patients should strive to keep blood sugars within a normal, practical range.   • Elevated blood sugars can delay your wound healing.        ACTIVITY     Elevate legs above level of heart   Normal activity then rest and elevation  May excercise  May walk    MOBILITY     boot    W[facility] Healing Center Instructions    MEDICATIONS      Medication Note  Continue present medications as prescribed by the [facility]aling Center or other physicians you see. To avoid any problems keep the [facility]aling Center informed each visit of any medications changes that occur.

## 2020-12-01 NOTE — PROGRESS NOTES
Subjective      Patient ID: Harvey Lucero Jr. is a 59 y.o. male.    HPI    The following have been reviewed and updated as appropriate in this visit:     Patient seen status post osteomyelitis and surgical wound left foot.  Patient status several weeks Apligraf aspect of the left foot.  Patient denies any fever chills or night sweats.  Patient relates she is been very active at work.  He is noticed some increased pain and discomfort in the posterior aspect of his legs near his Achilles.  He states he is working 8 to 10 hours a day over time.  He denies any current fever chills night sweats but he still continue taking amoxicillin.    Past Medical History: He  has a past medical history of Anxiety; Chronic osteomyelitis (CMS/[facility]) ([facility]); Dyslipidemia; GERD (gastroesophageal reflux disease); History of CVA (cerebrovascular accident); Hypertension; Lipid disorder; Open wound; TIA (transient ischemic attack); and Type 2 diabetes mellitus (CMS/[facility]) ([facility]). He also has no past medical history of Depression.  Past Surgical History: He  has a past surgical history that includes [place] tooth extraction; Other surgical history; Incision and drainage of wound (Left); Wound debridement (Left); and Foot amputation through metatarsal (Left).  Medication: He has a current medication list which includes the following prescription(s): acetaminophen, amoxicillin, blood sugar diagnostic, blood-glucose meter, clopidogrel, furosemide, insulin glargine, lancets, lisinopril, lorazepam, meloxicam, metformin, pen needle, diabetic, pen needle, diabetic, rosuvastatin, and sitagliptin.  Allergies: He is allergic to no known allergies.  Social History: He  reports that he has quit smoking. His smoking use included Cigarettes. He has never used smokeless tobacco. He reports that he does not drink alcohol or use drugs.    Review of Systems:  Review of Systems      Objective Organs decreased eyes decreased that there is no bone exposure all  tendons and soft tissue is covered.  No other inflammatory spots were noted in the area.  Pain in the area where the tendo Achilles was lengthened.  Is a palpable swelling in the area.  There is pain to the posterior calf.  There is negative signs of Homans sign are negative press test note the area no other area measures were noted in the area.  No signs of DVT in the area.  Foot Exam    Assessment/Plan   Problem List Items Addressed This Visit     Necrotizing fasciitis (CMS/HCC)    Open wound of ankle and foot    Localized edema    Type 2 diabetes mellitus with diabetic peripheral angiopathy and gangrene, with long-term current use of insulin (CMS/HCC) - Primary    Type 2 diabetes mellitus with foot ulcer, with long-term current use of insulin (CMS/HCC)    Chronic multifocal osteomyelitis, left ankle and foot (CMS/HCC) (HCC)    RESOLVED: Osteomyelitis of left foot (CMS/HCC) (HCC)    Leg wound, left        Diabetes mellitus with ulceration left foot  2.  Diabetes mellitus osteomyelitis  which is chronic.  3.  Diabetes mellitus with neuropathy.  4 .severe neuropathy   Pain is a 6 out of 10.  Plan.  1. Pt was fitted for forefoot filler and mafo brace and will continue with  Current treatment course. and maintain similar activity level.Continue with current wpound care treatment and follow up 1 week spoke the patient length is still healing very very attentive to because of the the transfer of muscles to different swing phase and stance phase this is causing increased pressure to the intrinsic muscles legs.  They are working hard this will pass over time.  Will place patient on meloxicam 15 mg 1 tab daily.  To help reduce inflammation and reduce swelling.  We will also do Tubigrip Angle dry sterile dressing.  Continue elevate offload.  I told her to continue to be on the foot swelling is normal.  We will hopefully transition to a Moffa brace as stated by the orthotist.  Will add lasix 20mg once a day to the leg on  working day swelling is greatly reduced at this visit.  At this visit patient will require a newly molded Moffa brace with a filler and there is been a huge decrease in volume secondary to muscle dysfunction the current brace now is causing severe pressure on the outside of the leg.  There is mild edema/reduction of improving this is medically necessary at this point time a new brace will help patient ambulate prevent limb loss also not create friction in the area and again is probably due to severe volume and edema reduction secondary to  Fabricio Tee DPM   Detail Level: Detailed Hide Additional Notes?: No

## 2021-04-15 DIAGNOSIS — Z23 ENCOUNTER FOR IMMUNIZATION: ICD-10-CM

## 2022-04-05 ENCOUNTER — HOSPITAL ENCOUNTER (OUTPATIENT)
Dept: RADIOLOGY | Age: 62
Discharge: HOME | End: 2022-04-05
Attending: NURSE PRACTITIONER
Payer: COMMERCIAL

## 2022-04-05 DIAGNOSIS — N18.30 STAGE 3 CHRONIC KIDNEY DISEASE, UNSPECIFIED WHETHER STAGE 3A OR 3B CKD (CMS/HCC): ICD-10-CM

## 2022-04-05 PROCEDURE — 76775 US EXAM ABDO BACK WALL LIM: CPT

## 2023-09-19 ENCOUNTER — APPOINTMENT (INPATIENT)
Dept: RADIOLOGY | Facility: HOSPITAL | Age: 63
DRG: 629 | End: 2023-09-19
Payer: COMMERCIAL

## 2023-09-19 ENCOUNTER — HOSPITAL ENCOUNTER (INPATIENT)
Facility: HOSPITAL | Age: 63
LOS: 7 days | Discharge: HOME HEALTH | DRG: 629 | End: 2023-09-26
Attending: PODIATRIST | Admitting: PODIATRIST
Payer: COMMERCIAL

## 2023-09-19 DIAGNOSIS — I10 ESSENTIAL HYPERTENSION: ICD-10-CM

## 2023-09-19 DIAGNOSIS — S81.802D WOUND OF LEFT LOWER EXTREMITY, SUBSEQUENT ENCOUNTER: ICD-10-CM

## 2023-09-19 DIAGNOSIS — N17.9 AKI (ACUTE KIDNEY INJURY) (CMS/HCC): Primary | ICD-10-CM

## 2023-09-19 DIAGNOSIS — L08.9 INFECTION OF LEFT FOOT: ICD-10-CM

## 2023-09-19 PROBLEM — Z79.899 MEDICATION MANAGEMENT: Status: ACTIVE | Noted: 2019-01-07

## 2023-09-19 PROBLEM — N18.9 CKD (CHRONIC KIDNEY DISEASE): Status: ACTIVE | Noted: 2023-09-19

## 2023-09-19 PROBLEM — Z01.818 PRE-OP EVALUATION: Status: ACTIVE | Noted: 2023-09-19

## 2023-09-19 LAB
ABO + RH BLD: NORMAL
ANION GAP SERPL CALC-SCNC: 11 MEQ/L (ref 3–15)
APTT PPP: 32 SEC (ref 23–35)
BLD GP AB SCN SERPL QL: NEGATIVE
BUN SERPL-MCNC: 45 MG/DL (ref 7–25)
CALCIUM SERPL-MCNC: 8.2 MG/DL (ref 8.6–10.3)
CHLORIDE SERPL-SCNC: 101 MEQ/L (ref 98–107)
CO2 SERPL-SCNC: 21 MEQ/L (ref 21–31)
CREAT SERPL-MCNC: 3.4 MG/DL (ref 0.7–1.3)
CRP SERPL-MCNC: 138 MG/L
D AG BLD QL: POSITIVE
ERYTHROCYTE [DISTWIDTH] IN BLOOD BY AUTOMATED COUNT: 16.4 % (ref 11.6–14.4)
ERYTHROCYTE [SEDIMENTATION RATE] IN BLOOD BY WESTERGREN METHOD: 111 MM/HR
GFR SERPL CREATININE-BSD FRML MDRD: 18.5 ML/MIN/1.73M*2
GLUCOSE BLD-MCNC: 226 MG/DL (ref 70–99)
GLUCOSE SERPL-MCNC: 210 MG/DL (ref 70–99)
HCT VFR BLDCO AUTO: 23.3 % (ref 40.1–51)
HCT VFR BLDCO AUTO: 23.3 % (ref 40.1–51)
HGB BLD-MCNC: 7.3 G/DL (ref 13.7–17.5)
HGB BLD-MCNC: 7.3 G/DL (ref 13.7–17.5)
INR PPP: 1.3
LABORATORY COMMENT REPORT: NORMAL
MCH RBC QN AUTO: 24.2 PG (ref 28–33.2)
MCHC RBC AUTO-ENTMCNC: 31.3 G/DL (ref 32.2–36.5)
MCV RBC AUTO: 77.2 FL (ref 83–98)
PDW BLD AUTO: 11.7 FL (ref 9.4–12.4)
PLATELET # BLD AUTO: 187 K/UL (ref 150–350)
POCT TEST: ABNORMAL
POTASSIUM SERPL-SCNC: 3.9 MEQ/L (ref 3.5–5.1)
PROTHROMBIN TIME: 16 SEC (ref 12.2–14.5)
RBC # BLD AUTO: 3.02 M/UL (ref 4.5–5.8)
SODIUM SERPL-SCNC: 133 MEQ/L (ref 136–145)
SPECIMEN EXP DATE BLD: NORMAL
WBC # BLD AUTO: 14.05 K/UL (ref 3.8–10.5)

## 2023-09-19 PROCEDURE — 73630 X-RAY EXAM OF FOOT: CPT | Mod: LT

## 2023-09-19 PROCEDURE — 25000000 HC PHARMACY GENERAL

## 2023-09-19 PROCEDURE — 86140 C-REACTIVE PROTEIN: CPT

## 2023-09-19 PROCEDURE — 63600000 HC DRUGS/DETAIL CODE

## 2023-09-19 PROCEDURE — 63700000 HC SELF-ADMINISTRABLE DRUG

## 2023-09-19 PROCEDURE — 87077 CULTURE AEROBIC IDENTIFY: CPT

## 2023-09-19 PROCEDURE — 85027 COMPLETE CBC AUTOMATED: CPT

## 2023-09-19 PROCEDURE — 12000000 HC ROOM AND CARE MED/SURG

## 2023-09-19 PROCEDURE — 86923 COMPATIBILITY TEST ELECTRIC: CPT

## 2023-09-19 PROCEDURE — 63600000 HC DRUGS/DETAIL CODE: Performed by: INTERNAL MEDICINE

## 2023-09-19 PROCEDURE — 25800000 HC PHARMACY IV SOLUTIONS

## 2023-09-19 PROCEDURE — 93005 ELECTROCARDIOGRAM TRACING: CPT | Performed by: INTERNAL MEDICINE

## 2023-09-19 PROCEDURE — 99223 1ST HOSP IP/OBS HIGH 75: CPT | Performed by: INTERNAL MEDICINE

## 2023-09-19 PROCEDURE — 85610 PROTHROMBIN TIME: CPT

## 2023-09-19 PROCEDURE — 87040 BLOOD CULTURE FOR BACTERIA: CPT

## 2023-09-19 PROCEDURE — 73610 X-RAY EXAM OF ANKLE: CPT | Mod: LT

## 2023-09-19 PROCEDURE — 25800000 HC PHARMACY IV SOLUTIONS: Performed by: INTERNAL MEDICINE

## 2023-09-19 PROCEDURE — 85652 RBC SED RATE AUTOMATED: CPT

## 2023-09-19 PROCEDURE — 36415 COLL VENOUS BLD VENIPUNCTURE: CPT

## 2023-09-19 PROCEDURE — 85014 HEMATOCRIT: CPT

## 2023-09-19 PROCEDURE — 86900 BLOOD TYPING SEROLOGIC ABO: CPT

## 2023-09-19 PROCEDURE — 83036 HEMOGLOBIN GLYCOSYLATED A1C: CPT

## 2023-09-19 PROCEDURE — 80048 BASIC METABOLIC PNL TOTAL CA: CPT

## 2023-09-19 PROCEDURE — 85730 THROMBOPLASTIN TIME PARTIAL: CPT

## 2023-09-19 PROCEDURE — 87186 SC STD MICRODIL/AGAR DIL: CPT

## 2023-09-19 RX ORDER — LISINOPRIL 40 MG/1
40 TABLET ORAL
Status: DISCONTINUED | OUTPATIENT
Start: 2023-09-19 | End: 2023-09-26

## 2023-09-19 RX ORDER — CLOPIDOGREL BISULFATE 75 MG/1
75 TABLET ORAL DAILY
Status: DISCONTINUED | OUTPATIENT
Start: 2023-09-19 | End: 2023-09-26 | Stop reason: HOSPADM

## 2023-09-19 RX ORDER — ALOGLIPTIN 6.25 MG/1
12.5 TABLET, FILM COATED ORAL
Status: DISCONTINUED | OUTPATIENT
Start: 2023-09-19 | End: 2023-09-19

## 2023-09-19 RX ORDER — ROSUVASTATIN CALCIUM 10 MG/1
10 TABLET, COATED ORAL EVERY MORNING
Status: DISCONTINUED | OUTPATIENT
Start: 2023-09-20 | End: 2023-09-26 | Stop reason: HOSPADM

## 2023-09-19 RX ORDER — MELOXICAM 7.5 MG/1
15 TABLET ORAL DAILY
Status: DISCONTINUED | OUTPATIENT
Start: 2023-09-19 | End: 2023-09-19

## 2023-09-19 RX ORDER — SODIUM CHLORIDE 9 MG/ML
INJECTION, SOLUTION INTRAVENOUS CONTINUOUS
Status: DISCONTINUED | OUTPATIENT
Start: 2023-09-20 | End: 2023-09-20

## 2023-09-19 RX ORDER — INSULIN GLARGINE 100 [IU]/ML
26 INJECTION, SOLUTION SUBCUTANEOUS NIGHTLY
Status: DISCONTINUED | OUTPATIENT
Start: 2023-09-19 | End: 2023-09-19

## 2023-09-19 RX ORDER — DEXTROSE 40 %
15-30 GEL (GRAM) ORAL AS NEEDED
Status: DISCONTINUED | OUTPATIENT
Start: 2023-09-19 | End: 2023-09-26 | Stop reason: HOSPADM

## 2023-09-19 RX ORDER — DEXTROSE 50 % IN WATER (D50W) INTRAVENOUS SYRINGE
25 AS NEEDED
Status: DISCONTINUED | OUTPATIENT
Start: 2023-09-19 | End: 2023-09-26 | Stop reason: HOSPADM

## 2023-09-19 RX ORDER — ROSUVASTATIN CALCIUM 20 MG/1
20 TABLET, COATED ORAL EVERY MORNING
Status: DISCONTINUED | OUTPATIENT
Start: 2023-09-19 | End: 2023-09-19

## 2023-09-19 RX ORDER — INSULIN LISPRO 100 [IU]/ML
3-5 INJECTION, SOLUTION INTRAVENOUS; SUBCUTANEOUS
Status: DISCONTINUED | OUTPATIENT
Start: 2023-09-19 | End: 2023-09-26 | Stop reason: HOSPADM

## 2023-09-19 RX ORDER — IBUPROFEN 200 MG
16-32 TABLET ORAL AS NEEDED
Status: DISCONTINUED | OUTPATIENT
Start: 2023-09-19 | End: 2023-09-26 | Stop reason: HOSPADM

## 2023-09-19 RX ORDER — VANCOMYCIN 2 GRAM/500 ML IN 0.9 % SODIUM CHLORIDE INTRAVENOUS
20 ONCE
Qty: 500 ML | Refills: 0 | Status: COMPLETED | OUTPATIENT
Start: 2023-09-19 | End: 2023-09-20

## 2023-09-19 RX ORDER — INSULIN GLARGINE 100 [IU]/ML
28 INJECTION, SOLUTION SUBCUTANEOUS NIGHTLY
Status: DISCONTINUED | OUTPATIENT
Start: 2023-09-20 | End: 2023-09-20

## 2023-09-19 RX ORDER — OXYCODONE HYDROCHLORIDE 5 MG/1
5 TABLET ORAL EVERY 4 HOURS PRN
Status: DISCONTINUED | OUTPATIENT
Start: 2023-09-19 | End: 2023-09-26 | Stop reason: HOSPADM

## 2023-09-19 RX ORDER — ENOXAPARIN SODIUM 100 MG/ML
30 INJECTION SUBCUTANEOUS
Status: DISCONTINUED | OUTPATIENT
Start: 2023-09-19 | End: 2023-09-26 | Stop reason: HOSPADM

## 2023-09-19 RX ORDER — HYDROMORPHONE HYDROCHLORIDE 1 MG/ML
0.25 INJECTION, SOLUTION INTRAMUSCULAR; INTRAVENOUS; SUBCUTANEOUS EVERY 4 HOURS PRN
Status: DISCONTINUED | OUTPATIENT
Start: 2023-09-19 | End: 2023-09-26 | Stop reason: HOSPADM

## 2023-09-19 RX ORDER — ACETAMINOPHEN 325 MG/1
650 TABLET ORAL EVERY 4 HOURS PRN
Status: DISCONTINUED | OUTPATIENT
Start: 2023-09-19 | End: 2023-09-26 | Stop reason: HOSPADM

## 2023-09-19 RX ADMIN — CLOPIDOGREL BISULFATE 75 MG: 75 TABLET ORAL at 23:09

## 2023-09-19 RX ADMIN — ENOXAPARIN SODIUM 30 MG: 30 INJECTION SUBCUTANEOUS at 23:11

## 2023-09-19 RX ADMIN — VANCOMYCIN HYDROCHLORIDE 2000 MG: 500 INJECTION, POWDER, LYOPHILIZED, FOR SOLUTION INTRAVENOUS at 22:59

## 2023-09-19 RX ADMIN — SODIUM CHLORIDE: 9 INJECTION, SOLUTION INTRAVENOUS at 23:29

## 2023-09-19 RX ADMIN — INSULIN LISPRO 3 UNITS: 100 INJECTION, SOLUTION INTRAVENOUS; SUBCUTANEOUS at 23:15

## 2023-09-19 RX ADMIN — INSULIN GLARGINE 26 UNITS: 100 INJECTION, SOLUTION SUBCUTANEOUS at 23:11

## 2023-09-19 RX ADMIN — LISINOPRIL 40 MG: 40 TABLET ORAL at 23:09

## 2023-09-19 ASSESSMENT — COGNITIVE AND FUNCTIONAL STATUS - GENERAL
MOVING TO AND FROM BED TO CHAIR: 4 - NONE
WALKING IN HOSPITAL ROOM: 4 - NONE
CLIMB 3 TO 5 STEPS WITH RAILING: 3 - A LITTLE
STANDING UP FROM CHAIR USING ARMS: 4 - NONE

## 2023-09-19 NOTE — H&P
"Podiatry H&P     Subjective      Harvey Lucero Jr. is a 63 y.o. male who was admitted for Infection of left foot    Wound of left lower extremity, subsequent encounter.  Patient is known to Dr. Tee and has been following him many years with regard to his left lower extremity wounds.  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  With the result of a proximal foot amputation the procedures were successful and the patient was left with multiple left lower extremity wounds that have been tended to over the years.  He has undergone various skin grafts and wound care visits in an attempt to get the wounds closed.  He reports that he has had great improvement recently with significant decrease in the wound size is.  However, about sometime last week he reports that he began feeling \"sick\".  Over the last week he has been experiencing bouts of subjective fevers and rigors and overall fatigue.  He reports that there are some days where he completely finds and then the cycle continues.  He never took his temperature so he is unsure of what they have gotten up to.  He reports having trying Augmentin and most treat recently azithromycin without success.  He was seen in the office for continued wound care but due to the raised concern and previous history he was admitted to the hospital for further work-up and evaluation.        Review of Systems:   Constitutional: Subjective fevers and chills  Cardiovascular: Negative for chest pain, SOB   Gastrointestinal: Negative for nausea and vomiting.   Musculoskeletal: Chopart amputation of the left lower extremity  Skin:  Chronic left lower extremity wound      Medical History:   Past Medical History:   Diagnosis Date    Anxiety     Chronic osteomyelitis (CMS/HCC)     Dyslipidemia     GERD (gastroesophageal reflux disease)     History of CVA " (cerebrovascular accident)     Hypertension     Lipid disorder     Open wound     LEFT FOOT BOTTOM    TIA (transient ischemic attack)     2012    Type 2 diabetes mellitus (CMS/[facility])        Surgical History:   Past Surgical History:   Procedure Laterality Date    FOOT AMPUTATION THROUGH METATARSAL Left     LITTLE TOE    INCISION AND DRAINAGE OF WOUND Left     OTHER SURGICAL HISTORY      Loop recorder    WISDOM TOOTH EXTRACTION      WOUND DEBRIDEMENT Left        Social History:   Social History     Socioeconomic History    Marital status:     Number of children: 3   Occupational History    Occupation: Business Owner - cigar shop   Tobacco Use    Smoking status: Former     Types: Cigarettes    Smokeless tobacco: Never    Tobacco comments:     35 years ago; Ex-smoker   Substance and Sexual Activity    Alcohol use: No    Drug use: No   Social History Narrative    Patient is  and had 3 children - 2 living and 1 dead    Patient utilizes a walker to remain steady.       Family History: No family history on file.    Allergies:   Allergies   Allergen Reactions    No Known Allergies        Home Medications:    acetaminophen, Take 1,000 mg by mouth every 6 (six) hours as needed for mild pain.    amoxicillin, Take 1 capsule (500 mg total) by mouth 2 (two) times a day.    blood sugar diagnostic, for blood glucose monitoring 3-4x day    blood-glucose meter, for blood glucose monitoring    clopidogreL, TAKE 1 TABLET BY MOUTH EVERY DAY    furosemide, Take 1 tablet (20 mg total) by mouth daily.    insulin glargine U-100, 26 units at bedtime    lancets, for blood glucose monitoring 3x day    lisinopriL, Take 40 mg by mouth once daily.    meloxicam, Take 1 tablet (15 mg total) by mouth daily.    metFORMIN, take 1 tablet by oral route 2 times every day with morning and evening meals    pen needle, diabetic, For blood sugar monitoring 3x day Dx:  E11.9    pen needle, diabetic, For insulin  administration once daily    rosuvastatin, 20 mg.      SITagliptin phosphate, Take 1 tablet (100 mg total) by mouth daily.    Current Medications:    acetaminophen, 650 mg, oral, q4h PRN    alogliptin, 12.5 mg, oral, Daily with dinner    clopidogreL, 75 mg, oral, Daily    glucose, 16-32 g of dextrose, oral, PRN **OR** dextrose, 15-30 g of dextrose, oral, PRN **OR** glucagon, 1 mg, intramuscular, PRN **OR** dextrose 50 % in water (D50), 25 mL, intravenous, PRN    enoxaparin, 40 mg, subcutaneous, Daily (6p)    oxyCODONE, 5 mg, oral, q4h PRN **AND** HYDROmorphone, 0.25 mg, intravenous, q4h PRN    insulin glargine U-100, 26 Units, subcutaneous, Nightly    lisinopriL, 40 mg, oral, Daily (8a)    meloxicam, 15 mg, oral, Daily    piperacillin-tazobactam, 3.375 g, intravenous, q8h INT    rosuvastatin, 20 mg, oral, q AM    vancomycin, 15-20 mg/kg, intravenous, PRN **AND** [] IV Vancomycin Therapy by Pharmacy Protocol, , , Once      Last documented Vital Signs:  Vitals    23 1800   BP: (!) 147/81   Pulse: 78   Resp: 18   Temp: 37.7 °C (99.8 °F)   SpO2: 99%       Labs:  CBC Results       19     0452 1138 1003    WBC 9.36 -- 11.22    RBC 3.36 -- 3.02    HGB 8.4 6.8 7.2    HCT 26.5 22.0 23.0    MCV 78.9 -- 76.2    MCH 25.0 -- 23.8    MCHC 31.7 -- 31.3     -- 319         Comment for HGB at 1138 on 19: RESULT CHECKED. This result has been called to KIKO CALIXTO by Erika Vuong on 19 at 11:58, and has been read back.         Microbiology Results     ** No results found for the last 720 hours. **            Imaging:  I have reviewed the imaging from the last 24 hours      Physicial Exam    General appearance: alert, appears stated age and cooperative  Head: normocephalic, without obvious abnormality, atraumatic  Eyes: conjunctivae/corneas clear. PERRL  Lungs: clear to auscultation bilaterally  Heart: regular rate and rhythm, S1, S2 normal, no murmur, click, rub  or gallop  Abdomen: soft, non-tender; bowel sounds normal; no masses, no organomegaly  Neurologic: Grossly normal    Objective:   -Vascular: DP pulses are palpable B/L. PT pulses are palpable B/L. Capillary refill time is less than 3 seconds B/L. Skin temperature is warm to warm from leg to toes, however a slight increase can be appreciated to the left lower extremity compared to the contralateral limb.  -Ortho: Left lower extremity Chopart amputation appreciated with MMT 5/5 of the ankle.  Tenderness upon palpation to the lateral lower extremity periwound. No pain noted in the posterior calf upon palpation.   -Neuro:Light touch and protective sensation is intact     -Derm: Multiple hyperkeratotic islands and xerosis noted to the left distal leg.     Wound#1  To the anterior aspect of the medial ankle there is a full-thickness fibrotic wound measuring approximately 4 cm x 3 cm.  Fibronecrotic tissue noted to the 6 o'clock position.  Wound edges are intact with probing appreciated to the 6 o'clock position.  Wound probes deep.  Slight serous drainage but no purulence noted with manual expression.  Periwound erythema appreciated with no lymphangitic streaking.  Mild malodor.  No underlying fluctuance or crepitus.    Wound#2  To the posterior aspect of the ankle there is a full-thickness fibrotic wound with a granular periphery measuring approximately 4.5 cm x 2 cm.  Stable eschar overlying the posterior wound.  Wound edges are intact with no tunneling or undermining noted.  Serous drainage appreciated.  No purulence.  No underlying fluctuance or crepitus.  Periwound erythema with no lymphangitic streaking.  Mild malodor.  No underlying fluctuance or crepitus.    Wound#3  Wound to the plantar aspect of the distal lateral stump.  Wound is fibrogranular measuring approximately 4.5 cm x 3 cm.Wound edges are intact with no tunneling or undermining noted.  Fibrotic plug noted to the periphery of the wound.  Serous drainage  appreciated.  No purulence.  No underlying fluctuance or crepitus.  Periwound erythema with no lymphangitic streaking.  Mild malodor.  No underlying fluctuance or crepitus.                  -Assessment and Plan: Patient presents with chronic left lower extremity wounds with overlying soft tissue infection    - Patient examined, evaluated, and treated. All questions and concerns addressed  - Admitting to the service of Dr. Tee  - Patient was dressed with adaptic, betadine, DSD, ABDs, Terri  -Patient should keep pressure off of the left lower extremity and be nonweightbearing with the use of walker, crutches, wheelchair as needed.   - [place] [facility] [place] management and preoperative risk assessment - recommendations appreciated. We appreciate you stating in your note that the patient is risk stratified/assessed for surgery. Thank you!   - Consulted Infectious Disease   -Vancomycin and Zosyn was started. Antibiotic recommendations per ID appreciated. We appreciate your assistance with appropriate abx course for the patient while pt is inhouse as well as for d/c.Thank you!  - Consulted CM/SW for discharge planning. We appreciate the help.   - DVT prophylaxis Lovenox 40mg   - Pain control on board as patient needs.   -Outpatient x rays show No signs of emphysema.   -In house x-rays ordered  - Wound cultures taken. Orders placed.   - Blood cultures : pending  -Patient likely to go to the OR on Thursday for debridement.  - Please contact on call podiatry resident with any questions or concerns.     Jerome Randhawa PGY2  Pager #[phone]        Code Status: Full Code

## 2023-09-19 NOTE — PROGRESS NOTES
Pt arrived to the unit via transport by family. Unit orientation provided.  Attending assigned paged.

## 2023-09-20 PROBLEM — N17.9 AKI (ACUTE KIDNEY INJURY) (CMS/HCC): Status: ACTIVE | Noted: 2023-09-20

## 2023-09-20 LAB
ANION GAP SERPL CALC-SCNC: 8 MEQ/L (ref 3–15)
ATRIAL RATE: 70
BACTERIA URNS QL MICRO: ABNORMAL /HPF
BILIRUB UR QL STRIP.AUTO: NEGATIVE MG/DL
BUN SERPL-MCNC: 42 MG/DL (ref 7–25)
CALCIUM SERPL-MCNC: 8.3 MG/DL (ref 8.6–10.3)
CHLORIDE SERPL-SCNC: 104 MEQ/L (ref 98–107)
CLARITY UR REFRACT.AUTO: CLEAR
CO2 SERPL-SCNC: 23 MEQ/L (ref 21–31)
COLLECT DATE: NORMAL
COLOR UR AUTO: YELLOW
CREAT SERPL-MCNC: 3 MG/DL (ref 0.7–1.3)
CREAT UR-MCNC: 95.9 MG/DL
ERYTHROCYTE [DISTWIDTH] IN BLOOD BY AUTOMATED COUNT: 16.2 % (ref 11.6–14.4)
EST. AVERAGE GLUCOSE BLD GHB EST-MCNC: 226 MG/DL
GFR SERPL CREATININE-BSD FRML MDRD: 21.4 ML/MIN/1.73M*2
GLUCOSE BLD-MCNC: 207 MG/DL (ref 70–99)
GLUCOSE BLD-MCNC: 208 MG/DL (ref 70–99)
GLUCOSE BLD-MCNC: 217 MG/DL (ref 70–99)
GLUCOSE BLD-MCNC: 246 MG/DL (ref 70–99)
GLUCOSE SERPL-MCNC: 179 MG/DL (ref 70–99)
GLUCOSE UR STRIP.AUTO-MCNC: ABNORMAL MG/DL
HBA1C MFR BLD: 9.5 %
HCT VFR BLDCO AUTO: 26.6 % (ref 40.1–51)
HEMOCCULT SP1 STL QL: NEGATIVE
HGB BLD-MCNC: 8.2 G/DL (ref 13.7–17.5)
HGB UR QL STRIP.AUTO: 1
HYALINE CASTS #/AREA URNS LPF: ABNORMAL /LPF
KETONES UR STRIP.AUTO-MCNC: NEGATIVE MG/DL
LEUKOCYTE ESTERASE UR QL STRIP.AUTO: NEGATIVE
MCH RBC QN AUTO: 24.3 PG (ref 28–33.2)
MCHC RBC AUTO-ENTMCNC: 30.8 G/DL (ref 32.2–36.5)
MCV RBC AUTO: 78.9 FL (ref 83–98)
NITRITE UR QL STRIP.AUTO: NEGATIVE
P AXIS: -13
PDW BLD AUTO: 10.4 FL (ref 9.4–12.4)
PH UR STRIP.AUTO: 6 [PH]
PLATELET # BLD AUTO: 249 K/UL (ref 150–350)
POCT TEST: ABNORMAL
POTASSIUM SERPL-SCNC: 3.8 MEQ/L (ref 3.5–5.1)
PR INTERVAL: 172
PROT UR QL STRIP.AUTO: 3
PROT UR-MCNC: 339 MG/DL
PROT/CREAT UR: 3.53 MG/G CREAT
QRS DURATION: 114
QT INTERVAL: 410
QTC CALCULATION(BAZETT): 442
R AXIS: 9
RBC # BLD AUTO: 3.37 M/UL (ref 4.5–5.8)
RBC #/AREA URNS HPF: ABNORMAL /HPF
SODIUM SERPL-SCNC: 135 MEQ/L (ref 136–145)
SP GR UR REFRACT.AUTO: 1.01
SQUAMOUS URNS QL MICRO: ABNORMAL /HPF
T WAVE AXIS: 32
UROBILINOGEN UR STRIP-ACNC: 0.2 EU/DL
VANCOMYCIN SERPL-MCNC: 12.9 UG/ML (ref 10–40)
VENTRICULAR RATE: 70
WBC # BLD AUTO: 11 K/UL (ref 3.8–10.5)
WBC #/AREA URNS HPF: ABNORMAL /HPF

## 2023-09-20 PROCEDURE — 63700000 HC SELF-ADMINISTRABLE DRUG: Performed by: HOSPITALIST

## 2023-09-20 PROCEDURE — 81003 URINALYSIS AUTO W/O SCOPE: CPT | Performed by: STUDENT IN AN ORGANIZED HEALTH CARE EDUCATION/TRAINING PROGRAM

## 2023-09-20 PROCEDURE — 63700000 HC SELF-ADMINISTRABLE DRUG

## 2023-09-20 PROCEDURE — 82570 ASSAY OF URINE CREATININE: CPT | Performed by: STUDENT IN AN ORGANIZED HEALTH CARE EDUCATION/TRAINING PROGRAM

## 2023-09-20 PROCEDURE — 25000000 HC PHARMACY GENERAL

## 2023-09-20 PROCEDURE — 85027 COMPLETE CBC AUTOMATED: CPT | Performed by: INTERNAL MEDICINE

## 2023-09-20 PROCEDURE — 82043 UR ALBUMIN QUANTITATIVE: CPT | Performed by: STUDENT IN AN ORGANIZED HEALTH CARE EDUCATION/TRAINING PROGRAM

## 2023-09-20 PROCEDURE — 99232 SBSQ HOSP IP/OBS MODERATE 35: CPT | Performed by: HOSPITALIST

## 2023-09-20 PROCEDURE — 82272 OCCULT BLD FECES 1-3 TESTS: CPT | Performed by: INTERNAL MEDICINE

## 2023-09-20 PROCEDURE — 80048 BASIC METABOLIC PNL TOTAL CA: CPT | Performed by: INTERNAL MEDICINE

## 2023-09-20 PROCEDURE — 63600000 HC DRUGS/DETAIL CODE: Mod: JZ | Performed by: STUDENT IN AN ORGANIZED HEALTH CARE EDUCATION/TRAINING PROGRAM

## 2023-09-20 PROCEDURE — 12000000 HC ROOM AND CARE MED/SURG

## 2023-09-20 PROCEDURE — 80202 ASSAY OF VANCOMYCIN: CPT

## 2023-09-20 PROCEDURE — 63600000 HC DRUGS/DETAIL CODE

## 2023-09-20 PROCEDURE — 25800000 HC PHARMACY IV SOLUTIONS

## 2023-09-20 PROCEDURE — 82310 ASSAY OF CALCIUM: CPT | Performed by: INTERNAL MEDICINE

## 2023-09-20 PROCEDURE — 36415 COLL VENOUS BLD VENIPUNCTURE: CPT | Performed by: INTERNAL MEDICINE

## 2023-09-20 RX ORDER — VANCOMYCIN/0.9 % SOD CHLORIDE 1.5G/250ML
1500 PLASTIC BAG, INJECTION (ML) INTRAVENOUS ONCE
Status: COMPLETED | OUTPATIENT
Start: 2023-09-20 | End: 2023-09-21

## 2023-09-20 RX ORDER — SODIUM CHLORIDE, SODIUM LACTATE, POTASSIUM CHLORIDE, CALCIUM CHLORIDE 600; 310; 30; 20 MG/100ML; MG/100ML; MG/100ML; MG/100ML
INJECTION, SOLUTION INTRAVENOUS CONTINUOUS
Status: DISCONTINUED | OUTPATIENT
Start: 2023-09-20 | End: 2023-09-22

## 2023-09-20 RX ORDER — AMLODIPINE BESYLATE 5 MG/1
5 TABLET ORAL DAILY
Status: DISCONTINUED | OUTPATIENT
Start: 2023-09-20 | End: 2023-09-22

## 2023-09-20 RX ORDER — INSULIN GLARGINE 100 [IU]/ML
14 INJECTION, SOLUTION SUBCUTANEOUS NIGHTLY
Status: DISCONTINUED | OUTPATIENT
Start: 2023-09-20 | End: 2023-09-22

## 2023-09-20 RX ADMIN — PIPERACILLIN AND TAZOBACTAM 2.25 G: 2; .25 INJECTION, POWDER, LYOPHILIZED, FOR SOLUTION INTRAVENOUS; PARENTERAL at 14:41

## 2023-09-20 RX ADMIN — ROSUVASTATIN CALCIUM 10 MG: 10 TABLET, FILM COATED ORAL at 08:53

## 2023-09-20 RX ADMIN — INSULIN LISPRO 3 UNITS: 100 INJECTION, SOLUTION INTRAVENOUS; SUBCUTANEOUS at 11:54

## 2023-09-20 RX ADMIN — SODIUM CHLORIDE, POTASSIUM CHLORIDE, SODIUM LACTATE AND CALCIUM CHLORIDE: 600; 310; 30; 20 INJECTION, SOLUTION INTRAVENOUS at 14:40

## 2023-09-20 RX ADMIN — INSULIN LISPRO 3 UNITS: 100 INJECTION, SOLUTION INTRAVENOUS; SUBCUTANEOUS at 17:17

## 2023-09-20 RX ADMIN — INSULIN LISPRO 3 UNITS: 100 INJECTION, SOLUTION INTRAVENOUS; SUBCUTANEOUS at 21:17

## 2023-09-20 RX ADMIN — CLOPIDOGREL BISULFATE 75 MG: 75 TABLET ORAL at 08:52

## 2023-09-20 RX ADMIN — PIPERACILLIN AND TAZOBACTAM 2.25 G: 2; .25 INJECTION, POWDER, LYOPHILIZED, FOR SOLUTION INTRAVENOUS; PARENTERAL at 02:07

## 2023-09-20 RX ADMIN — PIPERACILLIN AND TAZOBACTAM 2.25 G: 2; .25 INJECTION, POWDER, LYOPHILIZED, FOR SOLUTION INTRAVENOUS; PARENTERAL at 09:59

## 2023-09-20 RX ADMIN — VANCOMYCIN HYDROCHLORIDE 1500 MG: 500 INJECTION, POWDER, LYOPHILIZED, FOR SOLUTION INTRAVENOUS at 21:58

## 2023-09-20 RX ADMIN — INSULIN GLARGINE 14 UNITS: 100 INJECTION, SOLUTION SUBCUTANEOUS at 21:17

## 2023-09-20 RX ADMIN — PIPERACILLIN AND TAZOBACTAM 2.25 G: 2; .25 INJECTION, POWDER, LYOPHILIZED, FOR SOLUTION INTRAVENOUS; PARENTERAL at 20:23

## 2023-09-20 RX ADMIN — AMLODIPINE BESYLATE 5 MG: 5 TABLET ORAL at 13:28

## 2023-09-20 RX ADMIN — INSULIN LISPRO 3 UNITS: 100 INJECTION, SOLUTION INTRAVENOUS; SUBCUTANEOUS at 08:59

## 2023-09-20 ASSESSMENT — COGNITIVE AND FUNCTIONAL STATUS - GENERAL
CLIMB 3 TO 5 STEPS WITH RAILING: 3 - A LITTLE
STANDING UP FROM CHAIR USING ARMS: 4 - NONE
WALKING IN HOSPITAL ROOM: 4 - NONE
WALKING IN HOSPITAL ROOM: 4 - NONE
MOVING TO AND FROM BED TO CHAIR: 4 - NONE
MOVING TO AND FROM BED TO CHAIR: 4 - NONE
CLIMB 3 TO 5 STEPS WITH RAILING: 3 - A LITTLE
STANDING UP FROM CHAIR USING ARMS: 4 - NONE

## 2023-09-20 NOTE — PLAN OF CARE
Problem: Adult Inpatient Plan of Care  Goal: Plan of Care Review  Outcome: Progressing  Flowsheets (Taken 9/20/2023 0252)  Progress: improving  Outcome Evaluation: Patient direct adm to the unit. orieted the unit.  Patient AAOx4 and denied any pain. Wound care done by Podiatry. Strated on IV abx and IVF. Nephrology and ID consulted. Vitals stable. patient NWB to LLE. Blood culture sent. call bell on reach.  Plan of Care Reviewed With: patient   Plan of Care Review  Plan of Care Reviewed With: patient  Progress: improving  Outcome Evaluation: Patient direct adm to the unit. orieted the unit.  Patient AAOx4 and denied any pain. Wound care done by Podiatry. Strated on IV abx and IVF. Nephrology and ID consulted. Vitals stable. patient NWB to LLE. Blood culture sent. call bell on reach.

## 2023-09-20 NOTE — PROGRESS NOTES
Hospital Medicine Service -  Daily Progress Note       SUBJECTIVE   Interval History: Patient seen and examined at bedside. Denies CP, SOB, nausea, vomiting. Denies pain of foot.     OBJECTIVE      Vital signs in last 24 hours:  Temp:  [36.9 °C (98.4 °F)-37.7 °C (99.8 °F)] 36.9 °C (98.4 °F)  Heart Rate:  [67-78] 67  Resp:  [16-18] 16  BP: (147-168)/(70-90) 154/90  No intake or output data in the 24 hours ending 09/20/23 1326    PHYSICAL EXAMINATION      Physical Exam  General: NAD, calm, pleasant  HEENT: atraumatic  Cardiovascular: RRR, no murmurs; S1/S2+  Pulmonary: CTAB; no rales, rhonchi or wheezing  Gi: Soft, nontender, nondistended  Ext: media noted of wounds of LLE  Neuro: Alert and oriented x3; no sensory or motor deficits  Psych: Calm, appropriate answers     LINES, CATHETERS, DRAINS, AIRWAYS, AND WOUNDS   Lines, Drains, and Airways:  Wounds (agree with documentation and present on admission):  Peripheral IV (Adult) 09/19/23 Distal;Posterior;Right Forearm (Active)   Number of days: 1         Comments:      LABS / IMAGING / TELE      Labs  Results from last 7 days   Lab Units 09/20/23  0515   WBC K/uL 11.00*   HEMOGLOBIN g/dL 8.2*   HEMATOCRIT % 26.6*   PLATELETS K/uL 249     Results from last 7 days   Lab Units 09/20/23  0515   SODIUM mEQ/L 135*   POTASSIUM mEQ/L 3.8   CHLORIDE mEQ/L 104   CO2 mEQ/L 23   BUN mg/dL 42*   CREATININE mg/dL 3.0*   GLUCOSE mg/dL 179*   CALCIUM mg/dL 8.3*         Imaging  I have independently reviewed the pertinent imaging from the last 24 hrs.      ASSESSMENT AND PLAN      * Infection of left foot  Assessment & Plan  -Events noted  -Continue care per podiatry. Plan for debridement, bone biopsy tomorrow  -Continue vancomycin/zosyn for now. ID consulted  -Follow cultures  -Continue supportive care    CKD (chronic kidney disease)  Assessment & Plan  -Creatinine noted  -Does have history of CKD, stage 3  -Consider nephrology evaluation for further recommendations    Pre-op  evaluation  Assessment & Plan  -Patient with CKD (creatinine >2), DM on insulin, and history of CVA  -This presents a >11% risk for cardiac complications for noncardiac surgery / high risk    Anemia  Assessment & Plan  -Stable  -Continue to monitor    Hypertension  Assessment & Plan  -Discrepancy with medications noted  -Hold lisinopril for now. Started amlodipine 5mg daily  -Continue to monitor pressures    Type 2 diabetes mellitus (CMS/[facility])  Assessment & Plan  -Home regimen is glargine 28 units nightly and Januvia 100mg daily  -Continue SSI and accuchecks  -Will decrease glargine dose to 14 units in anticipation of OR plan for tomorrow    History of CVA (cerebrovascular accident)  Assessment & Plan  -Continue plavix and statin         VTE Assessment: Padua    VTE Prophylaxis:  Current anticoagulants:  [Provider Managed Hold] enoxaparin (LOVENOX) syringe 30 mg, subcutaneous, Daily (6p)      Code Status: Full Code      Estimated Discharge Date: 9/21/2023     Disposition Planning: Per podiatry     Bella Jane DO  9/20/2023

## 2023-09-20 NOTE — CONSULTS
Nephrology Consult Note    Subjective     Harvey Lucero Jr. is a 63 y.o. male who was admitted 9/19 for Infection of left foot [L08.9].     Nephrology was consulted by Podiatry for evaluation and management of ANURADHA . PMHx L foot TMA, DM2, HTN, as below. On evaluation he denies any acute complaints. He reports taking ibuprofen 2-3 x per day for about a week. Had an episode of vomitting last week. VS- afebrile, HR 65, RR 16, 131/63, 100% spo2 on room air. IOs not charted.     His renal function is currently with a serum creatinine of 3.0, down from 3.4 yesterday.   It was 2.6 in 03/2022 but previously 0.8-1.0 .   He reports that he does not follow with a nephrologist.     EGFR 21 ml/min.   Na 135, ca 8.3, alb 3.5 .   A1C 9.5% .   Wbc 11, hgb 8.2, plts. 249.     He is receiving NS at 80 cc/hr.     SANGEETHA 03/2022:   The right kidney measures 11.6 x 5.7 x 4.9 cm.  The left kidney measures 12.3 x 5.0 x 5.6 cm.  No shadowing calculus, hydronephrosis, or solid renal mass.      Home meds include prn ibuprofen, lisinopril.     Outside records reviewed. Pertinent radiology results reviewed. Pertinent lab results reviewed.    Medical History:   Past Medical History:   Diagnosis Date    Anxiety     Chronic kidney disease     Chronic osteomyelitis (CMS/HCC)     Dyslipidemia     GERD (gastroesophageal reflux disease)     History of CVA (cerebrovascular accident)     Hypertension     Lipid disorder     Open wound     LEFT FOOT BOTTOM    TIA (transient ischemic attack)     2012    Type 2 diabetes mellitus (CMS/HCC)    .medical    Surgical History:   Past Surgical History:   Procedure Laterality Date    FOOT AMPUTATION THROUGH METATARSAL Left     LITTLE TOE    INCISION AND DRAINAGE OF WOUND Left     OTHER SURGICAL HISTORY      Loop recorder    WISDOM TOOTH EXTRACTION      WOUND DEBRIDEMENT Left        Social History:   Social History     Socioeconomic History    Marital status:      Spouse name: None    Number  of children: 3    Years of education: None    Highest education level: None   Occupational History    Occupation: Business Owner - cigar shop   Tobacco Use    Smoking status: Former     Types: Cigarettes    Smokeless tobacco: Never    Tobacco comments:     35 years ago; Ex-smoker   Substance and Sexual Activity    Alcohol use: No    Drug use: No   Social History Narrative    Patient is  and had 3 children - 2 living and 1 dead    Patient utilizes a walker to remain steady.     Social Determinants of Health     Financial Resource Strain: Low Risk  (9/20/2023)    Overall Financial Resource Strain (CARDIA)     Difficulty of Paying Living Expenses: Not hard at all   Transportation Needs: No Transportation Needs (9/20/2023)    PRAPARE - Transportation     Lack of Transportation (Medical): No     Lack of Transportation (Non-Medical): No   Housing Stability: Low Risk  (9/20/2023)    Housing Stability Vital Sign     Unable to Pay for Housing in the Last Year: No     Number of Places Lived in the Last Year: 1     Unstable Housing in the Last Year: No   .soch    Family History: History reviewed. No pertinent family history.    Allergies: No known allergies.    Current Inpatient Medications   Medication Dose Route Frequency Provider Last Rate Last Admin    acetaminophen (TYLENOL) tablet 650 mg  650 mg oral q4h PRN Kali Randhawa DPM        amLODIPine (NORVASC) tablet 5 mg  5 mg oral Daily Bella Jane, DO   5 mg at 09/20/23 1328    clopidogreL (PLAVIX) tablet 75 mg  75 mg oral Daily Kali Randhawa DPM   75 mg at 09/20/23 0852    glucose chewable tablet 16-32 g of dextrose  16-32 g of dextrose oral PRN Kali Randhawa DPM        Or    dextrose 40 % oral gel 15-30 g of dextrose  15-30 g of dextrose oral PRN Kali Randhawa DPM        Or    glucagon (GLUCAGEN) injection 1 mg  1 mg intramuscular PRN Kali Randhawa DPM        Or    dextrose 50 %  in water (D50) injection 12.5 g  25 mL intravenous PRN Klai Randhawa DPM        [Provider Managed Hold] enoxaparin (LOVENOX) syringe 30 mg  30 mg subcutaneous Daily (6p) Kali Randhawa DPM   30 mg at 09/19/23 2311    oxyCODONE (ROXICODONE) immediate release tablet 5 mg  5 mg oral q4h PRN Kali Randhawa DPM        And    HYDROmorphone (DILAUDID) injection 0.25 mg  0.25 mg intravenous q4h PRN Kali Randhawa DPM        insulin glargine U-100 (LANTUS/BASAGLAR) pen 14 Units  14 Units subcutaneous Nightly Bella Jane DO        insulin lispro U-100 (HumaLOG) pen 3-5 Units  3-5 Units subcutaneous With meals & nightly Kristie Retana DO   3 Units at 09/20/23 1154    lactated ringer's infusion   intravenous Continuous Nickolas Post MD        [Provider Managed Hold] lisinopriL (PRINIVIL) tablet 40 mg  40 mg oral Daily (8a) Kali Randhawa DPM   40 mg at 09/19/23 2309    piperacillin-tazobactam (ZOSYN) 2.25 g in 100 mL NSS vial in bag  2.25 g intravenous q6h INT Kali Randhawa DPM   Stopped at 09/20/23 1054    rosuvastatin (CRESTOR) tablet 10 mg  10 mg oral q AM Kali Randhawa DPM   10 mg at 09/20/23 0853    vancomycin (VANCOCIN) IV therapy by pharmacy protocol  15-20 mg/kg intravenous PRN Kali Randhawa DPM       .cme    Review of Systems:  A complete review of systems was performed and aside from as mentioned above, was otherwise negative.    Objective     Labs     Recent Results (from the past 24 hour(s))   CBC    Collection Time: 09/19/23  9:07 PM   Result Value Ref Range    WBC 14.05 (H) 3.80 - 10.50 K/uL    RBC 3.02 (L) 4.50 - 5.80 M/uL    Hemoglobin 7.3 (L) 13.7 - 17.5 g/dL    Hematocrit 23.3 (L) 40.1 - 51.0 %    MCV 77.2 (L) 83.0 - 98.0 fL    MCH 24.2 (L) 28.0 - 33.2 pg    MCHC 31.3 (L) 32.2 - 36.5 g/dL    RDW 16.4 (H) 11.6 - 14.4 %    Platelets 187 150 - 350 K/uL    MPV 11.7 9.4 - 12.4 fL   Basic metabolic  panel    Collection Time: 09/19/23  9:07 PM   Result Value Ref Range    Sodium 133 (L) 136 - 145 mEQ/L    Potassium 3.9 3.5 - 5.1 mEQ/L    Chloride 101 98 - 107 mEQ/L    CO2 21 21 - 31 mEQ/L    BUN 45 (H) 7 - 25 mg/dL    Creatinine 3.4 (H) 0.7 - 1.3 mg/dL    Glucose 210 (H) 70 - 99 mg/dL    Calcium 8.2 (L) 8.6 - 10.3 mg/dL    eGFR 18.5 (L) >=60.0 mL/min/1.73m*2    Anion Gap 11 3 - 15 mEQ/L   Protime-INR    Collection Time: 09/19/23  9:07 PM   Result Value Ref Range    PT 16.0 (H) 12.2 - 14.5 sec    INR 1.3     PTT    Collection Time: 09/19/23  9:07 PM   Result Value Ref Range    PTT 32 23 - 35 sec   Hemoglobin and hematocrit, blood    Collection Time: 09/19/23  9:07 PM   Result Value Ref Range    Hemoglobin 7.3 (L) 13.7 - 17.5 g/dL    Hematocrit 23.3 (L) 40.1 - 51.0 %   Type and screen    Collection Time: 09/19/23  9:07 PM   Result Value Ref Range    Specimen Expiration 09/22/2023     Antibody Screen Negative     ABO A     Rh Factor Positive     History Check Previous type on file    C-reactive protein    Collection Time: 09/19/23  9:07 PM   Result Value Ref Range    .00 (H) <7.00 mg/L   Sedimentation rate    Collection Time: 09/19/23  9:07 PM   Result Value Ref Range    Sed Rate 111 (H) 0 - 20 mm/hr   Hemoglobin A1c    Collection Time: 09/19/23  9:07 PM   Result Value Ref Range    Hemoglobin A1C 9.5 (H) <5.7 %    Estimated Ave Glucose 226 mg/dL   Anaerobic Culture / Smear (includes Aerobic Culture) Foot, Left    Collection Time: 09/19/23  9:18 PM    Specimen: Foot, Left; Wound Swab   Result Value Ref Range    Culture **Positive Culture** (A)     Culture 1+ Proteus vulgaris     Culture 1+ Enterococcus faecalis     Gram Stain Result 3+ WBC     Gram Stain Result 4+ Gram positive cocci in pairs     Gram Stain Result 2+ Gram negative bacilli    POCT Glucose    Collection Time: 09/19/23 11:13 PM   Result Value Ref Range    POCT Bedside Glucose 226 (H) 70 - 99 mg/dL    POC Test POC    ECG 12 lead    Collection  Time: 09/19/23 11:23 PM   Result Value Ref Range    Ventricular rate 70     Atrial rate 70     MT Interval 172     QRS duration 114     QT Interval 410     QTC Calculation(Bazett) 442     P Axis -13     R Axis 9     T Wave Axis 32    CBC    Collection Time: 09/20/23  5:15 AM   Result Value Ref Range    WBC 11.00 (H) 3.80 - 10.50 K/uL    RBC 3.37 (L) 4.50 - 5.80 M/uL    Hemoglobin 8.2 (L) 13.7 - 17.5 g/dL    Hematocrit 26.6 (L) 40.1 - 51.0 %    MCV 78.9 (L) 83.0 - 98.0 fL    MCH 24.3 (L) 28.0 - 33.2 pg    MCHC 30.8 (L) 32.2 - 36.5 g/dL    RDW 16.2 (H) 11.6 - 14.4 %    Platelets 249 150 - 350 K/uL    MPV 10.4 9.4 - 12.4 fL   Basic metabolic panel    Collection Time: 09/20/23  5:15 AM   Result Value Ref Range    Sodium 135 (L) 136 - 145 mEQ/L    Potassium 3.8 3.5 - 5.1 mEQ/L    Chloride 104 98 - 107 mEQ/L    CO2 23 21 - 31 mEQ/L    BUN 42 (H) 7 - 25 mg/dL    Creatinine 3.0 (H) 0.7 - 1.3 mg/dL    Glucose 179 (H) 70 - 99 mg/dL    Calcium 8.3 (L) 8.6 - 10.3 mg/dL    eGFR 21.4 (L) >=60.0 mL/min/1.73m*2    Anion Gap 8 3 - 15 mEQ/L   Occult blood x 1, stool Per Rectum    Collection Time: 09/20/23  5:24 AM    Specimen: Per Rectum; Stool   Result Value Ref Range    Fecal Occult Bld Negative Negative    Collection Date 1 09/20/2023    POCT Glucose    Collection Time: 09/20/23  7:34 AM   Result Value Ref Range    POCT Bedside Glucose 217 (H) 70 - 99 mg/dL    POC Test POC    POCT Glucose    Collection Time: 09/20/23 11:20 AM   Result Value Ref Range    POCT Bedside Glucose 208 (H) 70 - 99 mg/dL    POC Test POC      I have reviewed the pertinent patient's labs.    Imaging  I have reviewed the pertinent patient's imaging results for this admission.     Vital signs in last 24 hours:    Temp:  [36.9 °C (98.4 °F)-37.7 °C (99.8 °F)] 36.9 °C (98.4 °F)  Heart Rate:  [65-78] 65  Resp:  [16-18] 16  BP: (131-168)/(63-90) 131/63    No intake or output data in the 24 hours ending 09/20/23 1430  Intake/Output this shift:  No intake/output data  recorded.      Physical Exam    General: well appearing, NAD, aaox4/4 on room air, family at bedside   HEENT: AT NC PERRL EOMI  Cardiovascular: RR NL S1S2 no m /g/r   Pulmonary/Chest: CTA BL no /w/cr  Abdomen:  Soft, nt, nd   Extremities: warm, trace LLE edema, +L TMA w/wound dressing   Skin: no rash   Neurologic:  CN II -XII intact, no fnd   MSK:  L TMA  Psychiatric: normal     Urine output:  Voids on own   Hemodialysis vascular access: n/a         ASSESSMENT & PLAN         Plan     ANURADHA (acute kidney injury) (CMS/[facility])  Assessment & Plan  Likely pre-renal 2/2 hypovolemia, nsaids, and lisinopril.  Supporting with IVF- LR at 80 cc/hr  No nsaids  Hold lisinopril  Unclear baseline scr, possibly with CKD  Daily BMP, IOs   Avoid nephrotoxics  Treat infection     Hypertension  Assessment & Plan  Monitor while holding lisinopril  Goal SBP <160     Type 2 diabetes mellitus (CMS/[facility])  Assessment & Plan  Needs better long-term glycemic control   Avoid SGLT2i per hx amputation     Checking UA, UACR, UPCR for e/o DKD   He will likely need to establish care as an outpatient for CKD care.     A/P reviewed with Patient.     Nickolas Post MD  [facility]    Department of Medicine   Division of Nephrology     Office:   [facility]   [address]  [address]  [place] [address]   Contact: [phone]    Duration of Encounter (including care coordination, and >50% of time with patient counseling): 90min .

## 2023-09-20 NOTE — ASSESSMENT & PLAN NOTE
-Events noted  -Continue care per podiatry. Plan for debridement, bone biopsy tomorrow  -Continue vancomycin/zosyn for now. ID consulted  -Follow cultures  -Continue supportive care

## 2023-09-20 NOTE — PROGRESS NOTES
Patient: Harvey Lucero .  Location: [facility] 3C 1578  MRN:  [id number]  Today's date:  9/20/2023    Attempted to see patient for therapy. Unable due to patient refused (Educated pt on role of PT and PT POC during hospital stay due to patient's NWB status. Pt reports he is independent and has been independent in room with RW. Pt reports he has RW at home and he does not need PT at this time.).

## 2023-09-20 NOTE — ASSESSMENT & PLAN NOTE
-Discrepancy with medications noted  -Hold lisinopril for now. Started amlodipine 5mg daily  -Continue to monitor pressures

## 2023-09-20 NOTE — PROGRESS NOTES
Vancomycin Dosing by Pharmacy Consult Initiated    Harvey Lucero Jr. is a 63 y.o. male who has been consulted for vancomycin dosing for bacterial skin and skin structure infection and bone/joint infection prescribed by RAMON.    Reviewed relevant clinical data including weight, renal function, previous vancomycin doses, and vancomycin levels:  Creatinine   Date/Time Value Ref Range Status   09/19/2023 2107 3.4 (H) 0.7 - 1.3 mg/dL Final     Vancomycin Administrations (last 96 hours)       Date/Time Action Medication Dose Rate    09/19/23 5425 New Bag    vancomycin (VANCOCIN) 2 g/500 mL IVPB in NSS 2,000 mg 250 mL/hr          Assessment/Plan  The patient is ordered vancomycin dosing by pharmacy.    The dose will be based on:         Wt Readings from Last 1 Encounters:   09/19/23 99.6 kg (219 lb 9.3 oz)         Estimated Creatinine Clearance: 28.8 mL/min by (C-G formula)    Will initiate a loading dose  of 2000 mg IV x1. Pharmacy to dose based on level. No maintenance dose at this time    *random level 9/20 1700*    Target a trough of 15-20 ug/mL per vancomycin dosing per pharmacy order.  Pharmacy will continue to follow the patient's vancomycin dosing daily.    Please call vancomycin levels to the pharmacy.  Vick Benoit, PharmD

## 2023-09-20 NOTE — ASSESSMENT & PLAN NOTE
-Patient with CKD (creatinine >2), DM on insulin, and history of CVA  -This presents a >11% risk for cardiac complications for noncardiac surgery / high risk

## 2023-09-20 NOTE — CONSULTS
Hospital Medicine Service -  Inpatient Consultation         Requesting Physician: Dr. Fabricio Tee   Reason for Consultation: medical management     HISTORY OF PRESENT ILLNESS          This is a 63 y.o. male with Pmhx/o CKD, Osteomyelitis, CVA, HLD, chronic left lower extremity wound, GERD, HTN who is admitted to podiatry service for evaluation and management of LLE wound.    Wife, Marianne, is at bedside.  Patient is upset that multiple people are asking him the same questions including about his home medications.    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   He follows with podiatry.  Reports a week of chills and subjective fevers.   He was put on Augmentin by podiatrist Dr. Tee on 9/13. Last dose was last night.    Denies HA, lightheadedness, dizziness, CP, SOB, cough, abd pain, diarrhea, urinary symptoms.  Reports having n/v this morning.    Denies h/o heart disease  Had CVA in 2012 with no residual deficits  Denies h/o RAFA  Denies h/o DVT/PE.   --    PAST MEDICAL AND SURGICAL HISTORY        Past Medical History:   Diagnosis Date    Anxiety     Chronic kidney disease     Chronic osteomyelitis (CMS/HCC)     Dyslipidemia     GERD (gastroesophageal reflux disease)     History of CVA (cerebrovascular accident)     Hypertension     Lipid disorder     Open wound     LEFT FOOT BOTTOM    TIA (transient ischemic attack)     2012    Type 2 diabetes mellitus (CMS/HCC)        Past Surgical History:   Procedure Laterality Date    FOOT AMPUTATION THROUGH METATARSAL Left     LITTLE TOE    INCISION AND DRAINAGE OF WOUND Left     OTHER SURGICAL HISTORY      Loop recorder    WISDOM TOOTH EXTRACTION      WOUND DEBRIDEMENT Left        PCP: ALLISON Avitia, DO    MEDICATIONS        Home Medications:  Medications Prior to Admission   Medication Sig Dispense Refill Last Dose    acetaminophen (TYLENOL) 500 mg tablet Take 1,000 mg by mouth every 6 (six) hours as needed  "for mild pain.       amoxicillin (AMOXIL) 500 mg capsule Take 1 capsule (500 mg total) by mouth 2 (two) times a day. 60 capsule 3     blood sugar diagnostic (ONETOUCH ULTRA TEST) strip for blood glucose monitoring 3-4x day       blood-glucose meter kit for blood glucose monitoring       clopidogrel (PLAVIX) 75 mg tablet TAKE 1 TABLET BY MOUTH EVERY DAY (Patient taking differently: Take 75 mg by mouth daily.) 90 tablet 0     insulin glargine (BASAGLAR KWIKPEN U-100 INSULIN) 100 unit/mL (3 mL) subcutaneous pen 28 Units nightly.       lancets misc for blood glucose monitoring 3x day       lisinopril (PRINIVIL) 40 mg tablet Take 40 mg by mouth once daily.  3     pen needle, diabetic (BD ULTRA-FINE MICRO PEN NEEDLE) 32 gauge x 1/4\" needle For blood sugar monitoring 3x day  Dx:  E11.9 300 each 3     pen needle, diabetic (NOVOFINE 32) 32 gauge x 1/4\" needle For insulin administration once daily 300 each 3     rosuvastatin (CRESTOR) 20 mg tablet Take 20 mg by mouth daily.       SITagliptin (JANUVIA) 100 mg tablet Take 1 tablet (100 mg total) by mouth daily. (Patient taking differently: Take 100 mg by mouth every morning.) 90 tablet 2        Current inpatient medications were personally reviewed.    ALLERGIES        No known allergies    FAMILY HISTORY        History reviewed. No pertinent family history.    SOCIAL HISTORY        Social History     Socioeconomic History    Marital status:      Spouse name: None    Number of children: 3    Years of education: None    Highest education level: None   Occupational History    Occupation: Business Owner - cigar shop   Tobacco Use    Smoking status: Former     Types: Cigarettes    Smokeless tobacco: Never    Tobacco comments:     35 years ago; Ex-smoker   Substance and Sexual Activity    Alcohol use: No    Drug use: No   Social History Narrative    Patient is  and had 3 children - 2 living and 1 dead    Patient utilizes a walker to remain steady. " "      REVIEW OF SYSTEMS        All other systems reviewed and negative except as noted in HPI    PHYSICAL EXAMINATION        Visit Vitals  BP (!) 147/81 (Patient Position: Lying)   Pulse 78   Temp 37.7 °C (99.8 °F) (Oral)   Resp 18   Ht 1.88 m (6' 2\")   Wt 99.6 kg (219 lb 9.3 oz)   SpO2 99%   BMI 28.19 kg/m²     Body mass index is 28.19 kg/m².  No intake or output data in the 24 hours ending 09/19/23 2250    Physical Exam  Vitals and nursing note reviewed.   Constitutional:       General: He is not in acute distress.     Appearance: He is not ill-appearing or toxic-appearing.   HENT:      Head: Normocephalic and atraumatic.      Mouth/Throat:      Mouth: Mucous membranes are moist.      Pharynx: Oropharynx is clear. No oropharyngeal exudate.   Eyes:      General: No scleral icterus.     Conjunctiva/sclera: Conjunctivae normal.      Pupils: Pupils are equal, round, and reactive to light.   Cardiovascular:      Rate and Rhythm: Normal rate and regular rhythm.      Heart sounds: No murmur heard.  Pulmonary:      Effort: No respiratory distress.      Breath sounds: No wheezing, rhonchi or rales.   Abdominal:      General: Bowel sounds are normal. There is no distension.      Palpations: Abdomen is soft.      Tenderness: There is no abdominal tenderness. There is no guarding or rebound.   Musculoskeletal:      Cervical back: Neck supple. No tenderness.      Right lower leg: No edema.      Left lower leg: Edema present.      Comments: L stump in dressing/bandaged      Skin:     General: Skin is warm.      Comments: LLE- stump bandaged. Lower leg with scars/deformity from previous surgery      Neurological:      General: No focal deficit present.      Mental Status: He is alert and oriented to person, place, and time.      Cranial Nerves: No cranial nerve deficit.                     LABS / EKG        Labs  I have reviewed the patient's pertinent labs.   Labs Reviewed   CBC - Abnormal       Result Value    WBC 14.05 (*)  " "   RBC 3.02 (*)     Hemoglobin 7.3 (*)     Hematocrit 23.3 (*)     MCV 77.2 (*)     MCH 24.2 (*)     MCHC 31.3 (*)     RDW 16.4 (*)     Platelets 187      MPV 11.7     BASIC METABOLIC PANEL - Abnormal    Sodium 133 (*)     Potassium 3.9      Chloride 101      CO2 21      BUN 45 (*)     Creatinine 3.4 (*)     Glucose 210 (*)     Calcium 8.2 (*)     eGFR 18.5 (*)     Anion Gap 11     PROTIME-INR - Abnormal    PT 16.0 (*)     INR 1.3     HGB & HCT - Abnormal    Hemoglobin 7.3 (*)     Hematocrit 23.3 (*)    C-REACTIVE PROTEIN - Abnormal    .00 (*)    PTT - Normal    PTT 32     ANAEROBIC CULTURE / SMEAR (INCLUDES AEROBIC CULTURE)   BLOOD CULTURE   TYPE AND SCREEN    Specimen Expiration 09/22/2023      Antibody Screen Negative      ABO A      Rh Factor Positive      History Check Previous type on file     SEDIMENTATION RATE   HEMOGLOBIN A1C   POCT GLUCOSE         No results found for: \"COVID19\"     ECG/Telemetry  I have independently reviewed the ECG.   Sep 19 2023 23:23P: NSR 70bpm. Incomplete RBBB. Qtc 442ms    Imaging  I have independently reviewed the patient's pertinent imaging for this hospital visit. Xray L ankle and foot: surgical changes L foot. Soft tissue swelling    ASSESSMENT AND RECOMMENDATIONS           Infection of left foot  Assessment & Plan  A:  - subjective fevers and chills x 1 week  - podiatrist concerned for LLE infection at site of previous surgery  - WBC 14K  -   - hemodynamics stable    P:  - management per podiatry   - on IV Vancomycin and Zosyn  - ID consulted      Anemia  Assessment & Plan  - hgb 7.3 ( 8.4 3/13/19)  - no reported bleeding  - ? 2/2 CKD    P:   - consider blood transfusion to keep Hgb >8 for OR  - check heme occult stool    * Pre-op evaluation  Assessment & Plan  - Denies h/o heart disease, RAFA, DVT/PE  - + h/o CVA in 2012- no residual deficits  - + h/o CKD- Cr 3.4 (2.66 3/21/22)  - + h/o DM on insulin therapy  - RCRI: 3 points > 15.0 % 30-day risk of death, MI, or " cardiac arrest  - EKG: sinus. Incomplete RBBB  - hgb 7.3    P:  - consult nephrology with increase in creatinine from previous labs  - consider blood transfusion to keep Hgb >8 prior to surgery    CKD (chronic kidney disease)  Assessment & Plan  -  Cr 3.4 (2.66 3/21/22)  - nephrologist- Dr. Joyner    P:  - IVF  - f/u BMP in AM  - consult nephrology      Medication management  Assessment & Plan  There are discrepancies between recent med fill hx and home med list  I reviewed the home med list with patient and wife and updated it.   Meds in question are Lisinopril and Rosuvastatin.   ? Taking Amlodipine as has recent fill hx but patient denies taking it. Patient reports taking Lisinopril  ? Taking Atorvastastin based on fill hx. Patient reports taking Rosuvastatin    P:  Recommend asking that family in AM to  bring in home meds to verify current home meds    Hypertension  Assessment & Plan  - BP stable  - Patient reports taking Lisinopril but no recent fills. There is a recent fill hx for Amlodipine but patient reports he is not taking it    P:  - hold Lisinopril  - need to clarify medications    Type 2 diabetes mellitus (CMS/[facility])  Assessment & Plan  - On Insulin glargine 28 units nightly and Januvia 100mg daily  - states BS ~ 120's    P:  - continue Insulin glargine 28 units nightly  - hold Januvia  - ISS with glucose checks  - check HgAlc      History of CVA (cerebrovascular accident)  Assessment & Plan  - no residual deficits    P:  - continue Plavix and statin       DVT prophylaxis: Lovenox 30mg SQ daily     Kristie Retana DO  9/19/2023

## 2023-09-20 NOTE — PROGRESS NOTES
Patient: Harvey Miranda Nic Maldonado.  Location: [facility] 8068  MRN:  [id number]  Today's date:  9/20/2023    Attempted to see patient for therapy. Unable due to patient refused (Pt deferring full therapy eval/follow up in acute setting, stating he has been through this before with NWB status and has all the DME he needs. Given pt's deferral, will discontinue OT services. Please reconsult should there be a change in status.).

## 2023-09-20 NOTE — PLAN OF CARE
Plan of Care Review  Plan of Care Reviewed With: patient, spouse  Progress: no change  Outcome Evaluation: VSS. No complaints of pain.  Podiarty doctors in this am for dressing change.  COntniued IV abx. IVF capped. NPO after MN and plan for OR tomorrow. NWB on LLE.  Pt and wife at bedside and aware of plan.

## 2023-09-20 NOTE — ASSESSMENT & PLAN NOTE
Needs better long-term glycemic control   Avoid SGLT2i per hx amputation     He has severe albuminuria, suggestive of DKD  He will likely need to establish care as an outpatient for CKD care.   When dawood resolved, optimal CKD care will include his lisinopril , and consideration of adding finerenone as an outpatient

## 2023-09-20 NOTE — PROGRESS NOTES
Subjective:   Pt seen at bedside for chronic left foot wounds, chronic osteomyelitis      .NAD. No overnight events. Pt denies any pedal pain. Dressing clean, dry, and intact. Denies any N/V/F/C/CP/SOB.        Past Medical/Surgical history, Allergies, Meds reviewed in detail as charted  FH/SH reviewed in detail as charted    ROS:  Head and Neck: No complaints  Chest : No complaints  Abdomen: No Complaints  Constitutional: Unremarkable     Vitals: I have reviewed the patient's pertinent vital signs.     Radiology: Reviewed     Labs:   CBC Results       09/20/23 09/19/23 03/13/19     0515 2107 0452    WBC 11.00 14.05 9.36    RBC 3.37 3.02 3.36    HGB 8.2 7.3 8.4      7.3     HCT 26.6 23.3 26.5      23.3     MCV 78.9 77.2 78.9    MCH 24.3 24.2 25.0    MCHC 30.8 31.3 31.7     187 307        BMP Results       09/20/23 09/19/23 03/21/22     0515 2107 1108     133 138    K 3.8 3.9 5.7    Cl 104 101 107    CO2 23 21 18    Glucose 179 210 140    BUN 42 45 51    Creatinine 3.0 3.4 2.66    Calcium 8.3 8.2 --    Anion Gap 8 11 --    EGFR 21.4 18.5 27            LE Objective:   Objective:   -Vascular: DP pulses are palpable B/L. PT pulses are palpable B/L. Capillary refill time is less than 3 seconds B/L. Skin temperature is warm to warm from leg to toes, however a slight increase can be appreciated to the left lower extremity compared to the contralateral limb.  -Ortho: Left lower extremity Chopart amputation appreciated with MMT 5/5 of the ankle.  Tenderness upon palpation to the lateral lower extremity periwound. No pain noted in the posterior calf upon palpation.   -Neuro:Light touch and protective sensation is intact               -Derm: Multiple hyperkeratotic islands and xerosis noted to the left distal leg.         Wound#1  To the anterior aspect of the medial ankle there is a full-thickness fibrotic wound measuring approximately 4 cm x 3 cm.  Fibronecrotic tissue noted to the 6 o'clock position.  Wound  edges are intact with probing appreciated to the 6 o'clock position.  Wound probes deep.  Slight serous drainage but no purulence noted with manual expression.  Periwound erythema appreciated with no lymphangitic streaking.  Mild malodor.  No underlying fluctuance or crepitus.     Wound#2  To the posterior aspect of the ankle there is a full-thickness fibrotic wound with a granular periphery measuring approximately 4.5 cm x 2 cm.  Stable eschar overlying the posterior wound.  Wound edges are intact with no tunneling or undermining noted.  Serous drainage appreciated.  No purulence.  No underlying fluctuance or crepitus.  Periwound erythema with no lymphangitic streaking.  Mild malodor.  No underlying fluctuance or crepitus.     Wound#3  Wound to the plantar aspect of the distal lateral stump.  Wound is fibrogranular measuring approximately 4.5 cm x 3 cm.Wound edges are intact with no tunneling or undermining noted.  Fibrotic plug noted to the periphery of the wound.  Serous drainage appreciated.  No purulence.  No underlying fluctuance or crepitus.  Periwound erythema with no lymphangitic streaking.  Mild malodor.  No underlying fluctuance or crepitus.      Assessment: 63-year-old male with left chronic wounds, likely underlying chronic osteomyelitis    Plan:     -Patient was rounded on with Dr. Tee  -Plan to go to the OR tomorrow for debridement, bone biopsy  -This patient is not amenable to BKA although it has been offered.  -N.p.o. placed for midnight  -OR time 1:30  -Patient is weightbearing as clinically appropriate  -ID recommendations appreciated currently on Vanco, Zosyn  -DVT prophylaxis Lovenox 40 mg  -Wound culture pending  -Blood cultures pending  -X-ray wet read no soft tissue emphysema  -Wound dressed with DSD  -Pain management on board as needed  Jorge A Badillo PGY-2  #[id number]  Barriers to discharge: Surgery 9/21, bone biopsy results, likely IV antibiotics.  Jorge A Badillo PGY-2  #[id number]

## 2023-09-20 NOTE — ASSESSMENT & PLAN NOTE
There are discrepancies between recent med fill hx and home med list  I reviewed the home med list with patient and wife and updated it.   Meds in question are Lisinopril and Rosuvastatin.   ? Taking Amlodipine as has recent fill hx but patient denies taking it. Patient reports taking Lisinopril  ? Taking Atorvastastin based on fill hx. Patient reports taking Rosuvastatin    P:  Recommend asking that family in AM to  bring in home meds to verify current home meds

## 2023-09-20 NOTE — CONSULTS
Infectious Disease Consult Note    Patient Name: Harevy Lucero Jr.  MR#: [id number]  : 1960  Admission Date: 2023  Consult Date: 23 3:03 PM   Consultant: Severo Dias MD    Reason for Consult: LLE chronic wound infection, hx of nec fasc  Referring Provider:Dr. Tee        Harvey Lucero Jr. is a 63 y.o. male who was admitted on 2023.  This patient has a history of left foot necrotizing fasciitis status post partial amputation of his left foot with chronic wounds underwent skin grafting.  In the setting he comes in with fevers discharge redness and foul smell of his left lower extremity.  He denies any cough or shortness of breath URI symptoms abdominal pain diarrhea dysuria or any other symptoms.  He tried Augmentin and azithromycin without success  Wound cultures are showing Proteus and Enterococcus so far.  Blood cultures was done x1 he has no fever at the moment but his blood work shows leukocytosis of 14,000 CRP of 138 anemia and acute on chronic kidney disease.  X-rays of foot and ankle are concerning for osteomyelitis    Allergies:   Allergies   Allergen Reactions    No Known Allergies        Medical History:   Past Medical History:   Diagnosis Date    Anxiety     Chronic kidney disease     Chronic osteomyelitis (CMS/HCC)     Dyslipidemia     GERD (gastroesophageal reflux disease)     History of CVA (cerebrovascular accident)     Hypertension     Lipid disorder     Open wound     LEFT FOOT BOTTOM    TIA (transient ischemic attack)         Type 2 diabetes mellitus (CMS/HCC)        Surgical History:   Past Surgical History:   Procedure Laterality Date    FOOT AMPUTATION THROUGH METATARSAL Left     LITTLE TOE    INCISION AND DRAINAGE OF WOUND Left     OTHER SURGICAL HISTORY      Loop recorder    WISDOM TOOTH EXTRACTION      WOUND DEBRIDEMENT Left        Social History     Tobacco Use    Smoking status: Former     Types: Cigarettes    Smokeless  tobacco: Never    Tobacco comments:     35 years ago; Ex-smoker   Substance Use Topics    Alcohol use: No    Drug use: No       Family History: History reviewed. No pertinent family history.    Review of Systems    All other systems reviewed and negative except as noted in the HPI.    Medications:    Current IP Meds (From admission, onward)        Frequency     insulin glargine U-100 (LANTUS/BASAGLAR) pen 28 Units  Status:  Discontinued         Nightly     insulin glargine U-100 (LANTUS/BASAGLAR) pen 14 Units         Nightly     lactated ringer's infusion         Continuous     amLODIPine (NORVASC) tablet 5 mg         Daily     rosuvastatin (CRESTOR) tablet 10 mg         Every morning     piperacillin-tazobactam (ZOSYN) 2.25 g in 100 mL NSS vial in bag  (piperacillin-tazobactam (ZOSYN) IV (standard or extended infusion) and consult to infectious disease)         Every 6 hours interval     sodium chloride 0.9 % infusion  Status:  Discontinued         Continuous     vancomycin (VANCOCIN) 2 g/500 mL IVPB in NSS         Once     insulin glargine U-100 (LANTUS/BASAGLAR) pen 26 Units  Status:  Discontinued         Nightly     insulin lispro U-100 (HumaLOG) pen 3-5 Units         4 times daily with meals and nightly     clopidogreL (PLAVIX) tablet 75 mg         Daily     [Provider Managed Hold]  lisinopriL (PRINIVIL) tablet 40 mg        (On hold since yesterday at 2323 until manually unheld; held by Kristie Retana DOHold Reason: Awaiting Consultant inputHold Comments: ? Patient is supposed to take med)   On hold since yesterday at 2323 until manually unheld   Hold reason: Awaiting Consultant input, Hold comment: ? Patient is supposed to take med    Daily (8a)     meloxicam (MOBIC) tablet 15 mg  Status:  Discontinued         Daily     rosuvastatin (CRESTOR) tablet 20 mg  Status:  Discontinued         Every morning     alogliptin (NESINA) tablet 12.5 mg  Status:  Discontinued         Daily with dinner     [Provider Managed  Hold]  enoxaparin (LOVENOX) syringe 30 mg  (enoxaparin (Lovenox) injection - Therapeutic Orders)        (On hold since today at 1058 until tomorrow at 1500; held by Jorge A Badillo DPMHold Reason: Prep for procedure)   On hold since today at 1058 until tomorrow at 1500   Hold reason: Prep for procedure    Daily (6p)     piperacillin-tazobactam (ZOSYN) 3.375 g/100 mL IVPB in NSS  (piperacillin-tazobactam (ZOSYN) IV (standard or extended infusion) and consult to infectious disease)  Status:  Discontinued         Every 8 hours interval     vancomycin (VANCOCIN) IV therapy by pharmacy protocol  (Vancomycin IV therapy by Pharmacy Protocol)        See Hyperspace for full Linked Orders Report.    As needed     acetaminophen (TYLENOL) tablet 650 mg         Every 4 hours PRN     oxyCODONE (ROXICODONE) immediate release tablet 5 mg  (Analgesics - Opiates)        See Hyperspace for full Linked Orders Report.    Every 4 hours PRN     HYDROmorphone (DILAUDID) injection 0.25 mg  (Analgesics - Opiates)        See Hyperspace for full Linked Orders Report.    Every 4 hours PRN     glucose chewable tablet 16-32 g of dextrose  (Hypoglycemia Treatment Protocol and Hyperglycemia Validation Protocol)        See Hyperspace for full Linked Orders Report.    As needed     dextrose 40 % oral gel 15-30 g of dextrose  (Hypoglycemia Treatment Protocol and Hyperglycemia Validation Protocol)        Note to Pharmacy: Pharmacist: [facility] is the depot location for this medication.   See Hyperspace for full Linked Orders Report.    As needed     glucagon (GLUCAGEN) injection 1 mg  (Hypoglycemia Treatment Protocol and Hyperglycemia Validation Protocol)        See Hyperspace for full Linked Orders Report.    As needed     dextrose 50 % in water (D50) injection 12.5 g  (Hypoglycemia Treatment Protocol and Hyperglycemia Validation Protocol)        See Hyperspace for full Linked Orders Report.    As needed          Anti-infectives (From admission, onward)     Start     Dose/Rate Route Frequency Ordered Stop    23 0215  piperacillin-tazobactam (ZOSYN) 2.25 g in 100 mL NSS vial in bag         2.25 g  200 mL/hr over 30 Minutes intravenous Every 6 hours interval 23 2300              Vital Signs:    Temp:  [36.9 °C (98.4 °F)-37.7 °C (99.8 °F)] 36.9 °C (98.4 °F)  Heart Rate:  [65-78] 65  Resp:  [16-18] 16  BP: (131-168)/(63-90) 131/63    Temp (72hrs), Av.2 °C (98.9 °F), Min:36.9 °C (98.4 °F), Max:37.7 °C (99.8 °F)      Physical Exam     Gen: Aox3  HEENT: OP clear  Neck: Supple  LAD: No cervical LAD  Lungs: CTAB  CV: RRR no murmurs  Abd: Soft NTND +BS  Ext: Erythema foul smell warm and tenderness of left foot stump and lower leg  Skin: no rash  Neuro: II-XII intact        Lines, Drains, Airways, Wounds:  Peripheral IV (Adult) 23 Distal;Posterior;Right Forearm (Active)   Number of days: 1       Labs:    Lab Results   Component Value Date    WBC 11.00 (H) 2023    HGB 8.2 (L) 2023    HCT 26.6 (L) 2023    MCV 78.9 (L) 2023     2023     Lab Results   Component Value Date    GLUCOSE 179 (H) 2023    CALCIUM 8.3 (L) 2023     (L) 2023    K 3.8 2023    CO2 23 2023     2023    BUN 42 (H) 2023    CREATININE 3.0 (H) 2023     Lab Results   Component Value Date    ALT 16 2018    AST 11 2018    ALKPHOS 60 2018    BILITOT 0.6 2018     UA Results       22     1108    Protein 169.7        Microbiology Results     Procedure Component Value Units Date/Time    Anaerobic Culture / Smear (includes Aerobic Culture) Foot, Left [[id number]]  (Abnormal) Collected: 23    Specimen: Wound Swab from Foot, Left Updated: 23 1324     Culture **Positive Culture**      1+ Proteus vulgaris      1+ Enterococcus faecalis     Gram Stain Result 3+ WBC      4+ Gram positive cocci in pairs      2+ Gram negative bacilli           Pathology Results     ** No  results found for the last 720 hours. **          Echo:     Cardiac Imaging    TRANSTHORACIC ECHO (TTE) COMPLETE 01/08/2019    Interpretation Summary  · Normal-sized left ventricular cavity. Normal systolic function. Estimated EF = 60- 65%. No regional wall motion abnormalities.  · Mildly dilated left atrium.  · Normal-sized right ventricular cavity with preserved systolic function.  · Small (<10mm) circumferential pericardial effusion.      Imaging:    Radiology Imaging    XR ANKLE 3+ VW LEFT  XR FOOT 3+ VW LEFT    Narrative  CLINICAL HISTORY: r/o gas    COMMENT: 4 views were obtained of the left foot and 4 views were obtained of the  left ankle and compared with 1/17/2019. 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 Periosteal  reaction is present and should be compared with prior imaging as this is likely  from chronic osteomyelitis, ongoing infection not excluded. Soft tissue swelling  is present. Soft tissue air is likely within an overlying wound, clinically  correlate.    Impression  IMPRESSION: See comment.      Patient Active Problem List   Diagnosis Code    History of CVA (cerebrovascular accident) Z86.73    Type 2 diabetes mellitus (CMS/[facility]) E11.9    Lipid disorder E78.9    Hypertension I10    Acute hematogenous osteomyelitis of left foot (CMS/[facility]) M86.072    Anemia D64.9    Leukocytosis D72.829    Hyponatremia E87.1    Elevated serum creatinine R79.89    Bacteremia R78.81    Gas gangrene (CMS/[facility]) A48.0    Medication management Z79.899    Streptococcal sepsis (CMS/[facility]) A40.9    Necrotizing fasciitis (CMS/[facility]) M72.6    Open wound of ankle and foot S91.009A, S91.309A    Hypokalemia E87.6    Localized edema R60.0    Type 2 diabetes mellitus with diabetic peripheral angiopathy and gangrene, with long-term current use of insulin (CMS/[facility]) E11.52, Z79.4    Type 2 diabetes mellitus with foot ulcer, with long-term current use of insulin  (CMS/[facility]) E11.621, L97.509, Z79.4    Essential hypertension I10    Chronic multifocal osteomyelitis, left ankle and foot (CMS/[facility]) M86.372    History of necrotizing fasciitis Z87.39    Lipid disorder E78.9    History of CVA (cerebrovascular accident) Z86.73    Claustrophobia F40.240    CVA (cerebral vascular accident) (CMS/[facility]) I63.9    Type 2 diabetes mellitus with skin complication, with long-term current use of insulin (CMS/[facility]) E11.628, Z79.4    Dyslipidemia E78.5    GERD (gastroesophageal reflux disease) K21.9    Iron deficiency E61.1    Leg wound, left S81.802A    Infection of left foot L08.9    Pre-op evaluation Z01.818    CKD (chronic kidney disease) N18.9    ANURADHA (acute kidney injury) (CMS/[facility]) N17.9     * Infection of left foot  Assessment & Plan  Suspected wound infection with osteomyelitis of the left foot stump  Follow-up wound cultures  Agree with renally adjusted Zosyn and vancomycin  Consider MRI of foot and lower leg to assess the extent of soft tissue and bone infection  Surgical management per podiatry        Severo Dias MD  9/20/2023 3:03 PM

## 2023-09-20 NOTE — PLAN OF CARE
Care Coordination Admission Assessment Note    General Information:  Readmission Within the last 30 days: no previous admission in last 30 days  Does patient have a : No  Patient-Specific Goals (include timeframe):      Living Arrangements:  Arrived From: home  Current Living Arrangements: home  People in Home: child(maeve), adult, spouse  Home Accessibility:    Living Arrangement Comments: 2 SH, MI on 1st floor, bed/bath on 2nd floor    Housing Stability and Financial Resources (SDOH):  In the last 12 months, was there a time when you were not able to pay the mortgage or rent on time?: No  In the last 12 months, how many places have you lived?: 1  In the last 12 months, was there a time when you did not have a steady place to sleep or slept in a shelter (including now)?: No  How hard is it for you to pay for the very basics like food, housing, medical care, and heating?: Not hard at all    Functional Status Prior to Admission:   Assistive Device/Animal Currently Used at Home: none  Functional Status Comments: Pt states he is independent at home and uses no dme  IADL Comments: Pt states he is independent at home and uses no dme     Supports and Services:  Current Outpatient/Agency/Support Group: none  Type of Current Home Care Services:    History of home care episode or rehab stay: Denies hx of HHC/SNF/A[facility]    Discharge Needs Assessment:   Concerns to be Addressed: denies needs/concerns at this time  Current Discharge Risk: physical impairment  Anticipated Changes Related to Illness: none    Patient/Family Anticipated Discharge Plan:  Patient/Family Anticipates Transition To: home with family  Patient/Family Anticipated Services at Transition: none    Connection to Community  Not applicable      Patient Choice:   Offered/Gave Vendor List: no  Patient's Choice of Community Agency(s): Pt denies needs upon dc       Anticipated Discharge Plan:  Met with patient. Provided education and contact information  for Care Coordination services.: yes  Anticipated Discharge Disposition: home without assistance or services     Transportation Needs (SDOH):  Transportation Concerns: none  Transportation Anticipated: family or friend will provide  Is Out of Hospital DNR needed at discharge?: no    In the past 12 months, has lack of transportation kept you from medical appointments or from getting medications?: No  In the past 12 months, has lack of transportation kept you from meetings, work, or from getting things needed for daily living?: No    Concerns - comments: Pt denies needs for C     CC met with pt at bedside to introduce self and role. Above demographics verified. Pt lives at home with his wife and states he is independent with a prosthetic boot at home, that he does not use a walker or cane, and denies all needs upon dc. Pt states his wife, Marianne, can transport him when medically stable. CC to follow.     Dispo- home  Transportation- family

## 2023-09-20 NOTE — ASSESSMENT & PLAN NOTE
ANURADHA is currently stable, overall showing improving from peak scr 3.4   Likely pre-renal 2/2 hypovolemia, nsaids, and lisinopril.  Supporting with IVF- increased LR from 80 to 100 cc/hr  No nsaids  Hold lisinopril  Unclear baseline scr, possibly with CKD  Daily BMP, IOs   Avoid nephrotoxics  Treat infection

## 2023-09-20 NOTE — ASSESSMENT & PLAN NOTE
-Creatinine noted  -Does have history of CKD, stage 3  -Consider nephrology evaluation for further recommendations

## 2023-09-20 NOTE — ASSESSMENT & PLAN NOTE
-Home regimen is glargine 28 units nightly and Januvia 100mg daily  -Continue SSI and accuchecks  -Will decrease glargine dose to 14 units in anticipation of OR plan for tomorrow

## 2023-09-20 NOTE — PLAN OF CARE
Problem: Adult Inpatient Plan of Care  Goal: Plan of Care Review  Flowsheets (Taken 9/20/2023 5119)  Progress: no change  Outcome Evaluation: Pt identified by screening criteria for left lower extremity wounds. Pt with history of chronic, non-healing wounds, osteomyelitis, and L foot TMA. Pt now admitted with chronic LLE wound with overlying soft tissue infection - plan for OR tomorrow with podiatry for debridement and bone biopsy. met with pt and spouse at bedside - pt reports good appetite and intake at baseline, no acute changes. Pt does admit to taste changes 2/2 antibiotics which is usual for him, continues to eat the same. Pt denies any recent weight loss, confirms UBW around 220#. No N/V/C/D at this time. Noted pt's diabetes is not well-controlled as evidenced by A1c 9.5% (226). Pt did not seem open to any diet education, did encourage protein intake for wound healing. pt will likely benefit from tight glycemic control to aid in wound healing as well- continues NCS modifier.  Plan of Care Reviewed With: patient, spouse    Goals:  Pt to consume % of estimated nutrient needs via meals  Glycemic control    Recommendation/Interventions:  Agree with NCS modifier  If BG >180 mg/dL, consider consistent carbohydrate 2200 diet restriction  Encourage protein intake at all meals given increased needs for wound healing  Monitor labs/lytes (Na, BUN/Cr, Glu), daily weights, %PO intake, wound healing         CHECK ONBASE FOR ATTACHMENT: [email or web address]    Please click on link to see image.

## 2023-09-21 ENCOUNTER — ANESTHESIA EVENT (INPATIENT)
Dept: OPERATING ROOM | Facility: HOSPITAL | Age: 63
DRG: 629 | End: 2023-09-21
Payer: COMMERCIAL

## 2023-09-21 ENCOUNTER — ANESTHESIA (INPATIENT)
Dept: OPERATING ROOM | Facility: HOSPITAL | Age: 63
DRG: 629 | End: 2023-09-21
Payer: COMMERCIAL

## 2023-09-21 PROBLEM — N17.9 ACUTE KIDNEY INJURY SUPERIMPOSED ON CKD (CMS/HCC)  (CMS/HCC): Status: ACTIVE | Noted: 2023-09-19

## 2023-09-21 LAB
ANION GAP SERPL CALC-SCNC: 7 MEQ/L (ref 3–15)
BUN SERPL-MCNC: 35 MG/DL (ref 7–25)
CALCIUM SERPL-MCNC: 8.4 MG/DL (ref 8.6–10.3)
CHLORIDE SERPL-SCNC: 107 MEQ/L (ref 98–107)
CO2 SERPL-SCNC: 24 MEQ/L (ref 21–31)
CREAT SERPL-MCNC: 2.9 MG/DL (ref 0.7–1.3)
CREAT UR-MCNC: 98.4 MG/DL
ERYTHROCYTE [DISTWIDTH] IN BLOOD BY AUTOMATED COUNT: 16.1 % (ref 11.6–14.4)
GFR SERPL CREATININE-BSD FRML MDRD: 22.2 ML/MIN/1.73M*2
GLUCOSE BLD-MCNC: 145 MG/DL (ref 70–99)
GLUCOSE BLD-MCNC: 154 MG/DL (ref 70–99)
GLUCOSE BLD-MCNC: 161 MG/DL (ref 70–99)
GLUCOSE BLD-MCNC: 170 MG/DL (ref 70–99)
GLUCOSE BLD-MCNC: 252 MG/DL (ref 70–99)
GLUCOSE SERPL-MCNC: 160 MG/DL (ref 70–99)
HCT VFR BLDCO AUTO: 25.2 % (ref 40.1–51)
HGB BLD-MCNC: 7.6 G/DL (ref 13.7–17.5)
MCH RBC QN AUTO: 24.1 PG (ref 28–33.2)
MCHC RBC AUTO-ENTMCNC: 30.2 G/DL (ref 32.2–36.5)
MCV RBC AUTO: 79.7 FL (ref 83–98)
MICROALBUMIN UR-MCNC: 1523.7 MG/L
MICROALBUMIN/CREAT UR: 1548.5 UG/MG
PDW BLD AUTO: 11.7 FL (ref 9.4–12.4)
PLATELET # BLD AUTO: 213 K/UL (ref 150–350)
POCT TEST: ABNORMAL
POTASSIUM SERPL-SCNC: 4.4 MEQ/L (ref 3.5–5.1)
RBC # BLD AUTO: 3.16 M/UL (ref 4.5–5.8)
SODIUM SERPL-SCNC: 138 MEQ/L (ref 136–145)
VANCOMYCIN SERPL-MCNC: 17.8 UG/ML (ref 10–40)
WBC # BLD AUTO: 7.69 K/UL (ref 3.8–10.5)

## 2023-09-21 PROCEDURE — 63700000 HC SELF-ADMINISTRABLE DRUG

## 2023-09-21 PROCEDURE — 85027 COMPLETE CBC AUTOMATED: CPT

## 2023-09-21 PROCEDURE — 0JUR0JZ SUPPLEMENT OF LEFT FOOT SUBCUTANEOUS TISSUE AND FASCIA WITH SYNTHETIC SUBSTITUTE, OPEN APPROACH: ICD-10-PCS | Performed by: PODIATRIST

## 2023-09-21 PROCEDURE — 87205 SMEAR GRAM STAIN: CPT | Performed by: PODIATRIST

## 2023-09-21 PROCEDURE — P9016 RBC LEUKOCYTES REDUCED: HCPCS

## 2023-09-21 PROCEDURE — 63600000 HC DRUGS/DETAIL CODE: Mod: JZ

## 2023-09-21 PROCEDURE — 36430 TRANSFUSION BLD/BLD COMPNT: CPT | Performed by: ANESTHESIOLOGY

## 2023-09-21 PROCEDURE — 36000002 HC OR LEVEL 2 INITIAL 30MIN: Performed by: PODIATRIST

## 2023-09-21 PROCEDURE — 63600000 HC DRUGS/DETAIL CODE: Mod: JZ | Performed by: ANESTHESIOLOGY

## 2023-09-21 PROCEDURE — 71000011 HC PACU PHASE 1 EA ADDL MIN: Performed by: PODIATRIST

## 2023-09-21 PROCEDURE — 63600000 HC DRUGS/DETAIL CODE: Mod: JZ | Performed by: STUDENT IN AN ORGANIZED HEALTH CARE EDUCATION/TRAINING PROGRAM

## 2023-09-21 PROCEDURE — 63700000 HC SELF-ADMINISTRABLE DRUG: Performed by: HOSPITALIST

## 2023-09-21 PROCEDURE — 0LBP0ZZ EXCISION OF LEFT LOWER LEG TENDON, OPEN APPROACH: ICD-10-PCS | Performed by: PODIATRIST

## 2023-09-21 PROCEDURE — 88307 TISSUE EXAM BY PATHOLOGIST: CPT | Performed by: PODIATRIST

## 2023-09-21 PROCEDURE — 25800000 HC PHARMACY IV SOLUTIONS

## 2023-09-21 PROCEDURE — 0LUP0JZ SUPPLEMENT LEFT LOWER LEG TENDON WITH SYNTHETIC SUBSTITUTE, OPEN APPROACH: ICD-10-PCS | Performed by: PODIATRIST

## 2023-09-21 PROCEDURE — 80202 ASSAY OF VANCOMYCIN: CPT

## 2023-09-21 PROCEDURE — 80048 BASIC METABOLIC PNL TOTAL CA: CPT

## 2023-09-21 PROCEDURE — 12000000 HC ROOM AND CARE MED/SURG

## 2023-09-21 PROCEDURE — 63600000 HC DRUGS/DETAIL CODE: Performed by: ANESTHESIOLOGY

## 2023-09-21 PROCEDURE — 36000012 HC OR LEVEL 2 EA ADDL MIN: Performed by: PODIATRIST

## 2023-09-21 PROCEDURE — 0QBM3ZX EXCISION OF LEFT TARSAL, PERCUTANEOUS APPROACH, DIAGNOSTIC: ICD-10-PCS | Performed by: PODIATRIST

## 2023-09-21 PROCEDURE — 25000000 HC PHARMACY GENERAL: Performed by: PODIATRIST

## 2023-09-21 PROCEDURE — 25800000 HC PHARMACY IV SOLUTIONS: Performed by: ANESTHESIOLOGY

## 2023-09-21 PROCEDURE — 36415 COLL VENOUS BLD VENIPUNCTURE: CPT

## 2023-09-21 PROCEDURE — 37000001 HC ANESTHESIA GENERAL: Performed by: PODIATRIST

## 2023-09-21 PROCEDURE — 71000001 HC PACU PHASE 1 INITIAL 30MIN: Performed by: PODIATRIST

## 2023-09-21 PROCEDURE — 25000000 HC PHARMACY GENERAL: Performed by: ANESTHESIOLOGY

## 2023-09-21 PROCEDURE — 99232 SBSQ HOSP IP/OBS MODERATE 35: CPT | Performed by: HOSPITALIST

## 2023-09-21 DEVICE — GRAFT HUMAN TISSUE 3X6CM STRAVIX: Type: IMPLANTABLE DEVICE | Status: FUNCTIONAL

## 2023-09-21 RX ORDER — ONDANSETRON HYDROCHLORIDE 2 MG/ML
INJECTION, SOLUTION INTRAVENOUS AS NEEDED
Status: DISCONTINUED | OUTPATIENT
Start: 2023-09-21 | End: 2023-09-21 | Stop reason: SURG

## 2023-09-21 RX ORDER — LABETALOL HCL 20 MG/4 ML
5 SYRINGE (ML) INTRAVENOUS AS NEEDED
Status: COMPLETED | OUTPATIENT
Start: 2023-09-21 | End: 2023-09-21

## 2023-09-21 RX ORDER — HYDROMORPHONE HYDROCHLORIDE 1 MG/ML
0.5 INJECTION, SOLUTION INTRAMUSCULAR; INTRAVENOUS; SUBCUTANEOUS
Status: DISCONTINUED | OUTPATIENT
Start: 2023-09-21 | End: 2023-09-21 | Stop reason: HOSPADM

## 2023-09-21 RX ORDER — MIDAZOLAM HYDROCHLORIDE 2 MG/2ML
INJECTION, SOLUTION INTRAMUSCULAR; INTRAVENOUS AS NEEDED
Status: DISCONTINUED | OUTPATIENT
Start: 2023-09-21 | End: 2023-09-21 | Stop reason: SURG

## 2023-09-21 RX ORDER — DEXTROSE 40 %
15-30 GEL (GRAM) ORAL AS NEEDED
Status: DISCONTINUED | OUTPATIENT
Start: 2023-09-21 | End: 2023-09-21 | Stop reason: HOSPADM

## 2023-09-21 RX ORDER — EPHEDRINE SULFATE 50 MG/ML
INJECTION, SOLUTION INTRAVENOUS AS NEEDED
Status: DISCONTINUED | OUTPATIENT
Start: 2023-09-21 | End: 2023-09-21 | Stop reason: SURG

## 2023-09-21 RX ORDER — ONDANSETRON HYDROCHLORIDE 2 MG/ML
4 INJECTION, SOLUTION INTRAVENOUS
Status: DISCONTINUED | OUTPATIENT
Start: 2023-09-21 | End: 2023-09-21 | Stop reason: HOSPADM

## 2023-09-21 RX ORDER — ROCURONIUM BROMIDE 10 MG/ML
INJECTION, SOLUTION INTRAVENOUS AS NEEDED
Status: DISCONTINUED | OUTPATIENT
Start: 2023-09-21 | End: 2023-09-21 | Stop reason: SURG

## 2023-09-21 RX ORDER — LIDOCAINE HYDROCHLORIDE 10 MG/ML
INJECTION, SOLUTION INFILTRATION; PERINEURAL AS NEEDED
Status: DISCONTINUED | OUTPATIENT
Start: 2023-09-21 | End: 2023-09-21 | Stop reason: SURG

## 2023-09-21 RX ORDER — IBUPROFEN 200 MG
16-32 TABLET ORAL AS NEEDED
Status: DISCONTINUED | OUTPATIENT
Start: 2023-09-21 | End: 2023-09-21 | Stop reason: HOSPADM

## 2023-09-21 RX ORDER — FENTANYL CITRATE 50 UG/ML
50 INJECTION, SOLUTION INTRAMUSCULAR; INTRAVENOUS EVERY 5 MIN PRN
Status: DISCONTINUED | OUTPATIENT
Start: 2023-09-21 | End: 2023-09-21 | Stop reason: HOSPADM

## 2023-09-21 RX ORDER — HYDRALAZINE HYDROCHLORIDE 20 MG/ML
5 INJECTION INTRAMUSCULAR; INTRAVENOUS
Status: DISCONTINUED | OUTPATIENT
Start: 2023-09-21 | End: 2023-09-21 | Stop reason: HOSPADM

## 2023-09-21 RX ORDER — SODIUM CHLORIDE, SODIUM GLUCONATE, SODIUM ACETATE, POTASSIUM CHLORIDE AND MAGNESIUM CHLORIDE 30; 37; 368; 526; 502 MG/100ML; MG/100ML; MG/100ML; MG/100ML; MG/100ML
INJECTION, SOLUTION INTRAVENOUS CONTINUOUS PRN
Status: DISCONTINUED | OUTPATIENT
Start: 2023-09-21 | End: 2023-09-21 | Stop reason: SURG

## 2023-09-21 RX ORDER — DEXTROSE 50 % IN WATER (D50W) INTRAVENOUS SYRINGE
25 AS NEEDED
Status: DISCONTINUED | OUTPATIENT
Start: 2023-09-21 | End: 2023-09-21 | Stop reason: HOSPADM

## 2023-09-21 RX ORDER — FENTANYL CITRATE 50 UG/ML
INJECTION, SOLUTION INTRAMUSCULAR; INTRAVENOUS AS NEEDED
Status: DISCONTINUED | OUTPATIENT
Start: 2023-09-21 | End: 2023-09-21 | Stop reason: SURG

## 2023-09-21 RX ORDER — BUPIVACAINE HYDROCHLORIDE 5 MG/ML
INJECTION, SOLUTION EPIDURAL; INTRACAUDAL
Status: DISCONTINUED | OUTPATIENT
Start: 2023-09-21 | End: 2023-09-21 | Stop reason: HOSPADM

## 2023-09-21 RX ORDER — PHENYLEPHRINE HCL IN 0.9% NACL 1 MG/10 ML
SYRINGE (ML) INTRAVENOUS AS NEEDED
Status: DISCONTINUED | OUTPATIENT
Start: 2023-09-21 | End: 2023-09-21 | Stop reason: SURG

## 2023-09-21 RX ORDER — SODIUM CHLORIDE 9 MG/ML
INJECTION, SOLUTION INTRAVENOUS CONTINUOUS PRN
Status: DISCONTINUED | OUTPATIENT
Start: 2023-09-21 | End: 2023-09-21 | Stop reason: SURG

## 2023-09-21 RX ORDER — PROPOFOL 10 MG/ML
INJECTION, EMULSION INTRAVENOUS AS NEEDED
Status: DISCONTINUED | OUTPATIENT
Start: 2023-09-21 | End: 2023-09-21 | Stop reason: SURG

## 2023-09-21 RX ADMIN — VANCOMYCIN HYDROCHLORIDE 1000 MG: 1 INJECTION, POWDER, LYOPHILIZED, FOR SOLUTION INTRAVENOUS at 21:15

## 2023-09-21 RX ADMIN — LABETALOL HYDROCHLORIDE 5 MG: 5 INJECTION, SOLUTION INTRAVENOUS at 15:59

## 2023-09-21 RX ADMIN — INSULIN LISPRO 3 UNITS: 100 INJECTION, SOLUTION INTRAVENOUS; SUBCUTANEOUS at 21:14

## 2023-09-21 RX ADMIN — PIPERACILLIN AND TAZOBACTAM 2.25 G: 2; .25 INJECTION, POWDER, LYOPHILIZED, FOR SOLUTION INTRAVENOUS; PARENTERAL at 20:04

## 2023-09-21 RX ADMIN — ROSUVASTATIN CALCIUM 10 MG: 10 TABLET, FILM COATED ORAL at 10:50

## 2023-09-21 RX ADMIN — SODIUM CHLORIDE, POTASSIUM CHLORIDE, SODIUM LACTATE AND CALCIUM CHLORIDE: 600; 310; 30; 20 INJECTION, SOLUTION INTRAVENOUS at 05:15

## 2023-09-21 RX ADMIN — MIDAZOLAM HYDROCHLORIDE 2 MG: 1 INJECTION, SOLUTION INTRAMUSCULAR; INTRAVENOUS at 13:50

## 2023-09-21 RX ADMIN — CLOPIDOGREL BISULFATE 75 MG: 75 TABLET ORAL at 18:11

## 2023-09-21 RX ADMIN — Medication 100 MCG: at 14:28

## 2023-09-21 RX ADMIN — SODIUM CHLORIDE: 0.9 INJECTION, SOLUTION INTRAVENOUS at 13:52

## 2023-09-21 RX ADMIN — LIDOCAINE HYDROCHLORIDE 7 ML: 10 INJECTION, SOLUTION INFILTRATION; PERINEURAL at 14:08

## 2023-09-21 RX ADMIN — SODIUM CHLORIDE, SODIUM GLUCONATE, SODIUM ACETATE, POTASSIUM CHLORIDE AND MAGNESIUM CHLORIDE: 526; 502; 368; 37; 30 INJECTION, SOLUTION INTRAVENOUS at 14:13

## 2023-09-21 RX ADMIN — FENTANYL CITRATE 100 MCG: 50 INJECTION INTRAMUSCULAR; INTRAVENOUS at 14:08

## 2023-09-21 RX ADMIN — INSULIN GLARGINE 14 UNITS: 100 INJECTION, SOLUTION SUBCUTANEOUS at 21:14

## 2023-09-21 RX ADMIN — ROCURONIUM BROMIDE 65 MG: 10 INJECTION, SOLUTION INTRAVENOUS at 14:10

## 2023-09-21 RX ADMIN — SUGAMMADEX 400 MG: 100 INJECTION, SOLUTION INTRAVENOUS at 14:44

## 2023-09-21 RX ADMIN — AMLODIPINE BESYLATE 5 MG: 5 TABLET ORAL at 10:50

## 2023-09-21 RX ADMIN — ONDANSETRON 4 MG: 2 INJECTION INTRAMUSCULAR; INTRAVENOUS at 14:39

## 2023-09-21 RX ADMIN — LABETALOL HYDROCHLORIDE 5 MG: 5 INJECTION, SOLUTION INTRAVENOUS at 15:43

## 2023-09-21 RX ADMIN — PIPERACILLIN AND TAZOBACTAM 2.25 G: 2; .25 INJECTION, POWDER, LYOPHILIZED, FOR SOLUTION INTRAVENOUS; PARENTERAL at 01:15

## 2023-09-21 RX ADMIN — SODIUM CHLORIDE, POTASSIUM CHLORIDE, SODIUM LACTATE AND CALCIUM CHLORIDE: 600; 310; 30; 20 INJECTION, SOLUTION INTRAVENOUS at 20:04

## 2023-09-21 RX ADMIN — PROPOFOL 150 MG: 10 INJECTION, EMULSION INTRAVENOUS at 14:09

## 2023-09-21 RX ADMIN — Medication 100 MCG: at 14:13

## 2023-09-21 RX ADMIN — Medication 50 MCG: at 14:36

## 2023-09-21 RX ADMIN — PIPERACILLIN AND TAZOBACTAM 2.25 G: 2; .25 INJECTION, POWDER, LYOPHILIZED, FOR SOLUTION INTRAVENOUS; PARENTERAL at 09:21

## 2023-09-21 RX ADMIN — MIDAZOLAM HYDROCHLORIDE 2 MG: 1 INJECTION, SOLUTION INTRAMUSCULAR; INTRAVENOUS at 14:04

## 2023-09-21 RX ADMIN — EPHEDRINE SULFATE 10 MG: 50 INJECTION, SOLUTION INTRAVENOUS at 14:13

## 2023-09-21 ASSESSMENT — COGNITIVE AND FUNCTIONAL STATUS - GENERAL
WALKING IN HOSPITAL ROOM: 4 - NONE
STANDING UP FROM CHAIR USING ARMS: 4 - NONE
CLIMB 3 TO 5 STEPS WITH RAILING: 3 - A LITTLE
MOVING TO AND FROM BED TO CHAIR: 4 - NONE
MOVING TO AND FROM BED TO CHAIR: 4 - NONE
STANDING UP FROM CHAIR USING ARMS: 4 - NONE
CLIMB 3 TO 5 STEPS WITH RAILING: 3 - A LITTLE
WALKING IN HOSPITAL ROOM: 4 - NONE

## 2023-09-21 NOTE — ANESTHESIA PROCEDURE NOTES
Airway  Urgency: elective    Start Time: 9/21/2023 2:11 PM  Airway not difficult    General Information and Staff    Patient location during procedure: OR  Anesthesiologist: Haven Murphy MD  Performed: anesthesiologist   Performed by: Haven Murphy MD  Authorized by: Haven Murphy MD      Indications and Patient Condition  Indications for airway management: anesthesia  Sedation level: deep  Preoxygenated: yes  Patient position: sniffing  Mask difficulty assessment: 2 - vent by mask + OA or adjuvant +/- NMBA    Final Airway Details  Final airway type: endotracheal airway      Successful airway: ETT  Cuffed: yes   Successful intubation technique: video laryngoscopy  Facilitating devices/methods: intubating stylet  Endotracheal tube insertion site: oral  Blade type: glide.  Blade size: #4  ETT size (mm): 8.0  Cormack-Lehane Classification: grade I - full view of glottis  Placement verified by: chest auscultation and capnometry   Measured from: teeth  Number of attempts at approach: 1  Number of other approaches attempted: 0  Atraumatic airway insertion

## 2023-09-21 NOTE — PLAN OF CARE
Plan of Care Review  Plan of Care Reviewed With: patient  Progress: no change  Outcome Evaluation: No complaints of pain. LLE dressing changed per patients request. IVF and IV abx continued. NPO at midnight for OR today. NWB on LL extremity.

## 2023-09-21 NOTE — OR SURGEON
Pre-Procedure patient identification:  I am the primary operating surgeon/proceduralist and I have reviewed the applicable pathology reports and radiology studies for this procedure. I have identified the patient and confirmed laterality is {left foot is correct foot on 09/21/23 at 12:59 PM Fabricio Tee DPM  Phone Number: [phone]

## 2023-09-21 NOTE — PROGRESS NOTES
Infectious Disease Progress Note    Patient Name: Harvey Lucero Jr.  MR#: [id number]  : 1960  Admission Date: 2023  Date: 23   Time: 12:37 PM   Author: Severo Dias MD    OBJECTIVE:  No fevers  Cultures show Proteus and Enterococcus the Proteus is resistant to ampicillin cefazolin ceftriaxone cefuroxime minocycline and tetracycline    Antibiotics:    Anti-infectives (From admission, onward)    Start     Dose/Rate Route Frequency Ordered Stop    23 0215  piperacillin-tazobactam (ZOSYN) 2.25 g in 100 mL NSS vial in bag         2.25 g  200 mL/hr over 30 Minutes intravenous Every 6 hours interval 23 2300            ROS  Negative  Vital Signs:    Temp:  [36.6 °C (97.9 °F)-36.8 °C (98.2 °F)] 36.8 °C (98.2 °F)  Heart Rate:  [65-69] 69  Resp:  [18] 18  BP: (131-180)/(63-86) 180/79    Temp (72hrs), Av.9 °C (98.5 °F), Min:36.6 °C (97.9 °F), Max:37.7 °C (99.8 °F)      Physical Exam    Gen: Aox3  HEENT: OP clear  Neck: Supple  LAD: No cervical LAD  Lungs: CTAB  CV: RRR no murmurs  Abd: Soft NTND +BS  Ext: Foot dressing unchanged  Skin: no rash  Neuro: II-XII intact        Lines, Drains, Airways, Wounds:  Peripheral IV (Adult) 23 Anterior;Left Forearm (Active)   Number of days: 1       Labs:    Lab Results   Component Value Date    WBC 7.69 2023    HGB 7.6 (L) 2023    HCT 25.2 (L) 2023    MCV 79.7 (L) 2023     2023     Lab Results   Component Value Date    GLUCOSE 160 (H) 2023    CALCIUM 8.4 (L) 2023     2023    K 4.4 2023    CO2 24 2023     2023    BUN 35 (H) 2023    CREATININE 2.9 (H) 2023     Lab Results   Component Value Date    ALT 16 2018    AST 11 2018    ALKPHOS 60 2018    BILITOT 0.6 2018         Patient Active Problem List   Diagnosis Code    History of CVA (cerebrovascular accident) Z86.73    Type 2 diabetes mellitus (CMS/HCC) E11.9    Lipid  disorder E78.9    Hypertension I10    Acute hematogenous osteomyelitis of left foot (CMS/[facility]) M86.072    Anemia D64.9    Leukocytosis D72.829    Hyponatremia E87.1    Elevated serum creatinine R79.89    Bacteremia R78.81    Gas gangrene (CMS/HCC) A48.0    Medication management Z79.899    Streptococcal sepsis (CMS/HCC) A40.9    Necrotizing fasciitis (CMS/HCC) M72.6    Open wound of ankle and foot S91.009A, S91.309A    Hypokalemia E87.6    Localized edema R60.0    Type 2 diabetes mellitus with diabetic peripheral angiopathy and gangrene, with long-term current use of insulin (CMS/HCC) E11.52, Z79.4    Type 2 diabetes mellitus with foot ulcer, with long-term current use of insulin (CMS/HCC) E11.621, L97.509, Z79.4    Essential hypertension I10    Chronic multifocal osteomyelitis, left ankle and foot (CMS/HCC) M86.372    History of necrotizing fasciitis Z87.39    Lipid disorder E78.9    History of CVA (cerebrovascular accident) Z86.73    Claustrophobia F40.240    CVA (cerebral vascular accident) (CMS/HCC) I63.9    Type 2 diabetes mellitus with skin complication, with long-term current use of insulin (CMS/HCC) E11.628, Z79.4    Dyslipidemia E78.5    GERD (gastroesophageal reflux disease) K21.9    Iron deficiency E61.1    Leg wound, left S81.802A    Infection of left foot L08.9    Pre-op evaluation Z01.818    CKD (chronic kidney disease) N18.9    ANURADHA (acute kidney injury) (CMS/[facility]) N17.9     * Infection of left foot  Assessment & Plan  Suspected osteomyelitis  Podiatry planning to take him to the OR for debridement and bone biopsy  Final antibiotic recommendation pending bone biopsy results and cultures  Continue with Zosyn for now          Severo Dias MD  9/21/202312:37 PM

## 2023-09-21 NOTE — ANESTHESIOLOGIST PRE-PROCEDURE ATTESTATION
Pre-Procedure Patient Identification:  I am the Primary Anesthesiologist and have identified the patient on 09/21/23 at 1:34 PM.   I have confirmed the procedure(s) will be performed by the following surgeon/proceduralist Fabricio Tee DPM.

## 2023-09-21 NOTE — ANESTHESIA PREPROCEDURE EVALUATION
Relevant Problems   CARDIOVASCULAR   (+) Dyslipidemia   (+) Essential hypertension   (+) Hypertension   (+) Type 2 diabetes mellitus with diabetic peripheral angiopathy and gangrene, with long-term current use of insulin (CMS/HCC)      GASTROINTESTINAL   (+) GERD (gastroesophageal reflux disease)      HEMATOLOGY   (+) Anemia      NEUROLOGY   (+) CVA (cerebral vascular accident) (CMS/HCC)   (+) Claustrophobia      URINARY SYSTEM   (+) Hypokalemia   (+) Hyponatremia      Other   (+) Acute hematogenous osteomyelitis of left foot (CMS/HCC)   (+) Gas gangrene (CMS/HCC)   (+) Infection of left foot   (+) Necrotizing fasciitis (CMS/HCC)   (+) Type 2 diabetes mellitus (CMS/[facility])   (+) Type 2 diabetes mellitus with diabetic peripheral angiopathy and gangrene, with long-term current use of insulin (CMS/[facility])   (+) Type 2 diabetes mellitus with foot ulcer, with long-term current use of insulin (CMS/[facility])   (+) Type 2 diabetes mellitus with skin complication, with long-term current use of insulin (CMS/[facility])        64 y/o M h/o CVA, HTN, HLD, CKD, DM2, GERD, anxiety, left foot necrotizing fasciitis s/p partial amputation of his left foot with chronic wounds and skin grafting admitted for fevers, osteomyelitis of LLE.        Anesthesia ROS/MED HX    Anesthesia History    Previous anesthetics  No family history of anesthetic complications  No history of anesthetic complications  Pulmonary - neg  Neuro/Psych    TIA   CVA   Anxiety  Cardiovascular   dyslipidemia   hypertension  Hematological    anemia  GI/Hepatic   GERD  Renal Disease   chronic renal insufficiency  Endo/Other   Diabetes   Infectious disease  Body Habitus: Overweight       Past Surgical History:   Procedure Laterality Date    FOOT AMPUTATION THROUGH METATARSAL Left     LITTLE TOE    INCISION AND DRAINAGE OF WOUND Left     OTHER SURGICAL HISTORY      Loop recorder    WISDOM TOOTH EXTRACTION      WOUND DEBRIDEMENT Left        Physical Exam    Airway   Mallampati:  III   TM distance: >3 FB   Neck ROM: full  Cardiovascular    Rhythm: regular   Rate: normalPulmonary - normal   clear to auscultation  Dental    Teeth Problems: chipped         9/19/2023 ECG  Normal sinus rhythm   Incomplete right bundle branch block     IMAGING:    Cardiac Imaging    TRANSTHORACIC ECHO (TTE) COMPLETE 01/08/2019    Interpretation Summary  · Normal-sized left ventricular cavity. Normal systolic function. Estimated EF = 60- 65%. No regional wall motion abnormalities.  · Mildly dilated left atrium.  · Normal-sized right ventricular cavity with preserved systolic function.  · Small (<10mm) circumferential pericardial effusion.      LABS:    CBC Results       09/21/23 09/20/23 09/19/23     1059 0515 2107    WBC 7.69 11.00 14.05    RBC 3.16 3.37 3.02    HGB 7.6 8.2 7.3       7.3    HCT 25.2 26.6 23.3       23.3    MCV 79.7 78.9 77.2    MCH 24.1 24.3 24.2    MCHC 30.2 30.8 31.3     249 187        BMP Results       09/21/23 09/20/23 09/19/23     1059 0515 2107     135 133    K 4.4 3.8 3.9    Cl 107 104 101    CO2 24 23 21    Glucose 160 179 210    BUN 35 42 45    Creatinine 2.9 3.0 3.4    Calcium 8.4 8.3 8.2    Anion Gap 7 8 11    EGFR 22.2 21.4 18.5        PT/PTT Results       09/19/23 03/13/19 02/08/19     2107 0452 1342    PT 16.0 15.7 16.2    INR 1.3 1.3 1.4    PTT 32 36 41         Comment for INR at 2107 on 09/19/23: Moderate Intensity Anticoagulation = 2.0 to 3.0, High Intensity = 2.5 to 3.5    Comment for INR at 0452 on 03/13/19:   Moderate Intensity Anticoagulation = 2.0 to 3.0, High Intensity = 2.5 to 3.5    Comment for INR at 1342 on 02/08/19:   Moderate Intensity Anticoagulation = 2.0 to 3.0, High Intensity = 2.5 to 3.5    Comment for PTT at 0452 on 03/13/19:   The Standard Therapeutic Range for Heparin is 68 to 101 seconds.    Comment for PTT at 1342 on 02/08/19:   The Standard Therapeutic Range for Heparin is 68 to 101 seconds.                Lab Results   Component Value Date     LABANTI Negative 09/19/2023    ABO A 09/19/2023    LABRH Positive 09/19/2023    HISTCK Previous type on file 09/19/2023    HISTCK Previous type on file 03/11/2019    HISTCK Previous type on file 02/08/2019           Anesthesia Plan    Plan: general    Technique: general endotracheal     Lines and Monitors: PIV     Airway: oral intubation       patient did not smoke on day of surgery   3 ASA  Anesthetic plan and risks discussed with: patient  Induction:    intravenous   Postop Plan:   Patient Disposition: inpatient floor planned admission

## 2023-09-21 NOTE — ASSESSMENT & PLAN NOTE
-Events noted  -Continue care per podiatry. Plan for debridement, bone biopsy today  -Continue zosyn for now. ID on board  -Follow cultures  -Continue supportive care

## 2023-09-21 NOTE — NURSING NOTE
BP elevated. Pt asymptomatic. Dr. Nancy IRVIN From anesthesia aware. BP meds ordered. Pt ok for transfer to floor when BP under sbp less then 165.

## 2023-09-21 NOTE — PROGRESS NOTES
A/P: 63 y.o. patient POD#0 s/p left foot incision and drainage, bone biopsy and graft application  -Patient tolerated the procedure well, stable in PACU. Will return to floor when ready per PACU protocol  -OR findings: Necrotic tissue debrided. Mild purulence expressed. Graft applied to dorsal wound and posterior wound.   -OR specimen:    Bone to pathology: pending   -Abx per Id  -NPO discontinued, diet reordered.   -Patient is to be NWB to the LLE with the use of crutches, walker, or wheelchair.   -Offload, elevate  -Recommend IV antibiotics pending pathology results   -Pain medication on board as patient needs.   -Pain management per primary team.   -DVT prophylaxsis restart tomorrow  -Please contact on call podiatry resident with any questions or concerns     Lu Restrepo DPM,  PGY2  Pager #[phone]           61.2

## 2023-09-21 NOTE — PLAN OF CARE
Plan of Care Review  Plan of Care Reviewed With: patient, spouse  Progress: improving  Outcome Evaluation: pt reports no complaints of pain throughout shift, BP elevated IV abx administered, pt went to OR today for I&D and bone bx of LLE, tolerated surgery well, received 1 unit PRBCs in OR, pt returned to unit, LLE has ace bandage wrap c/d/i, pt diet changed and tolerating regular diet, NWB on LLE, wife at bedside

## 2023-09-21 NOTE — PROGRESS NOTES
Hospital Medicine Service -  Daily Progress Note       SUBJECTIVE   Interval History: Patient seen and examined at bedside. Denies CP, SOB, nausea, vomiting. OR today.     OBJECTIVE      Vital signs in last 24 hours:  Temp:  [36.6 °C (97.9 °F)-36.8 °C (98.3 °F)] 36.8 °C (98.3 °F)  Heart Rate:  [67-74] 74  Resp:  [15-18] 15  BP: (160-180)/(72-86) 179/75    Intake/Output Summary (Last 24 hours) at 9/21/2023 1436  Last data filed at 9/21/2023 1419  Gross per 24 hour   Intake 335 ml   Output 400 ml   Net -65 ml       PHYSICAL EXAMINATION      Physical Exam  General: NAD, calm, pleasant  HEENT: atraumatic  Cardiovascular: RRR, no murmurs; S1/S2+  Pulmonary: CTAB; no rales, rhonchi or wheezing  Gi: Soft, nontender, nondistended  Ext: media noted of wounds of LLE  Neuro: Alert and oriented x3; no sensory or motor deficits  Psych: Calm, appropriate answers   LINES, CATHETERS, DRAINS, AIRWAYS, AND WOUNDS   Lines, Drains, and Airways:  Wounds (agree with documentation and present on admission):  Peripheral IV (Adult) 09/20/23 Anterior;Left Forearm (Active)   Number of days: 1         Comments:      LABS / IMAGING / TELE      Labs  Results from last 7 days   Lab Units 09/21/23  1059   WBC K/uL 7.69   HEMOGLOBIN g/dL 7.6*   HEMATOCRIT % 25.2*   PLATELETS K/uL 213     Results from last 7 days   Lab Units 09/21/23  1059   SODIUM mEQ/L 138   POTASSIUM mEQ/L 4.4   CHLORIDE mEQ/L 107   CO2 mEQ/L 24   BUN mg/dL 35*   CREATININE mg/dL 2.9*   GLUCOSE mg/dL 160*   CALCIUM mg/dL 8.4*         Imaging  I have independently reviewed the pertinent imaging from the last 24 hrs.      ASSESSMENT AND PLAN      * Infection of left foot  Assessment & Plan  -Events noted  -Continue care per podiatry. Plan for debridement, bone biopsy today  -Continue zosyn for now. ID on board  -Follow cultures  -Continue supportive care    Acute kidney injury superimposed on CKD (CMS/[facility])  Assessment & Plan  -Creatinine noted  -Likely with ANURADHA on CKD  -Nephro  on board. Continue IVF. Follow recommendations    Anemia  Assessment & Plan  -Stable  -Continue to monitor    Hypertension  Assessment & Plan  -Discrepancy with medications noted  -Hold lisinopril for now. Started amlodipine 5mg daily  -Continue to monitor pressures    Type 2 diabetes mellitus (CMS/[facility])  Assessment & Plan  -Home regimen is glargine 28 units nightly and Januvia 100mg daily  -Continue SSI and accuchecks    History of CVA (cerebrovascular accident)  Assessment & Plan  -Continue plavix and statin         VTE Assessment: Padua    VTE Prophylaxis:  Current anticoagulants:  [Provider Managed Hold] enoxaparin (LOVENOX) syringe 30 mg, subcutaneous, Daily (6p)      Code Status: Full Code      Estimated Discharge Date: 9/21/2023       Disposition Planning: per podiatry     Bella Jane DO  9/21/2023

## 2023-09-21 NOTE — ASSESSMENT & PLAN NOTE
-Creatinine noted  -Likely with ANURADHA on CKD  -Nephro on board. Continue IVF. Follow recommendations

## 2023-09-21 NOTE — PROGRESS NOTES
Subjective/Interval:    NAC  Continues on LR at 80 cc/hr   ANURADHA is showing improvement     Hypertensive    For podiatric surgery today     Objective:    Vital signs in last 24 hours:    Temp:  [36.6 °C (97.9 °F)-36.8 °C (98.3 °F)] 36.8 °C (98.3 °F)  Heart Rate:  [67-74] 74  Resp:  [15-18] 15  BP: (160-180)/(72-86) 179/75      Intake/Output Summary (Last 24 hours) at 9/21/2023 1354  Last data filed at 9/21/2023 0800  Gross per 24 hour   Intake 10 ml   Output 400 ml   Net -390 ml       Intake/Output this shift:    I/O this shift:  In: 10 [I.V.:10]  Out: -     Physical Exam:    General: well appearing, NAD, aaox4/4 on room air, family at bedside   HEENT: AT NC PERRL EOMI  Cardiovascular: RR NL S1S2 no m /g/r   Pulmonary/Chest: CTA BL no /w/cr  Abdomen:  Soft, nt, nd   Extremities: warm, trace LLE edema, +L TMA w/wound dressing   Skin: no rash   Neurologic:  CN II -XII intact, no fnd   MSK:  L TMA  Psychiatric: normal      Urine output:  Voids on own   Hemodialysis vascular access: n/a        Labs:    Results from last 7 days   Lab Units 09/21/23  1059 09/20/23 0515 09/19/23 2107   SODIUM mEQ/L 138 135* 133*   POTASSIUM mEQ/L 4.4 3.8 3.9   CHLORIDE mEQ/L 107 104 101   CO2 mEQ/L 24 23 21   GLUCOSE mg/dL 160* 179* 210*   BUN mg/dL 35* 42* 45*   CREATININE mg/dL 2.9* 3.0* 3.4*   CALCIUM mg/dL 8.4* 8.3* 8.2*   ANION GAP mEQ/L 7 8 11   EGFR mL/min/1.73m*2 22.2* 21.4* 18.5*       Results from last 7 days   Lab Units 09/21/23  1059 09/20/23  0515 09/19/23  2107   WBC K/uL 7.69 11.00* 14.05*   RBC M/uL 3.16* 3.37* 3.02*   HEMOGLOBIN g/dL 7.6* 8.2* 7.3*  7.3*   HEMATOCRIT % 25.2* 26.6* 23.3*  23.3*   MCV fL 79.7* 78.9* 77.2*   MCH pg 24.1* 24.3* 24.2*   MCHC g/dL 30.2* 30.8* 31.3*   PLATELETS K/uL 213 249 187       Assessments/Plan:      ANURADHA (acute kidney injury) (CMS/[facility])  Assessment & Plan  Likely pre-renal 2/2 hypovolemia, nsaids, and lisinopril.  Supporting with IVF- c/w LR at 80 cc/hr  No nsaids  Hold lisinopril  Unclear  baseline scr, possibly with CKD  Daily BMP, IOs   Avoid nephrotoxics  Treat infection     ANURADHA is showing improvement - scr 2.9 , from peak 3.4 x48hrs     Hypertension  Assessment & Plan  Monitor while holding lisinopril  Goal SBP <160   Amlodipine 5 mg qd     Type 2 diabetes mellitus (CMS/HCC)  Assessment & Plan  Needs better long-term glycemic control   Avoid SGLT2i per hx amputation     He has severe albuminuria, suggestive of DKD  He will likely need to establish care as an outpatient for CKD care.   When anuradha resolved, optimal CKD care will include his lisinopril , and consideration of adding finerenone as an outpatient     A/P reviewed with Patient.     Nickolas Post MD  Main [facility]    Department of Medicine   Division of Nephrology     Office:   [facility]   [address]  [address]  [place] [address]   Contact: [phone]    Duration of Encounter (including care coordination, and >50% of time with patient counseling): 60 min .

## 2023-09-21 NOTE — PLAN OF CARE
Care Coordination Discharge Plan Note     Discharge Needs Assessment  Concerns to be Addressed: denies needs/concerns at this time  Current Discharge Risk: physical impairment    Anticipated Discharge Plan  Anticipated Discharge Disposition: home with home health  Type of Home Care Services: infusion therapy (IV abx)        Patient Choice  Offered/Gave Vendor List: no  Patient's Choice of Community Agency(s): Pt denies needs upon dc         ---------------------------------------------------------------------------------------------------------------------    Interdisciplinary Discharge Plan Review:  Participants:social work/services, physician    Concerns Comments: Will need 6 weeks of IV abx upon dc    Discharge Plan:   Disposition/Destination:   /    Discharge Facility:    Community Resources:      Discharge Transportation:  Is Out of Hospital DNR needed at Discharge: no  Does patient need discharge transport? No           CC notified by podiatry that pt is going to OR where they will get bone biopsy. If biopsy is positive for osteomyelitis, pt will need 6 weeks of IV abx.    CC contacted Mira with [facility] who informs pt's insurance will cover 100% of iv abx. CC to follow.      Dispo- home with [facility]

## 2023-09-22 LAB
ANION GAP SERPL CALC-SCNC: 8 MEQ/L (ref 3–15)
BUN SERPL-MCNC: 32 MG/DL (ref 7–25)
CALCIUM SERPL-MCNC: 8 MG/DL (ref 8.6–10.3)
CASE RPRT: NORMAL
CHLORIDE SERPL-SCNC: 109 MEQ/L (ref 98–107)
CLINICAL INFO: NORMAL
CO2 SERPL-SCNC: 22 MEQ/L (ref 21–31)
CREAT SERPL-MCNC: 2.9 MG/DL (ref 0.7–1.3)
ERYTHROCYTE [DISTWIDTH] IN BLOOD BY AUTOMATED COUNT: 16.2 % (ref 11.6–14.4)
GFR SERPL CREATININE-BSD FRML MDRD: 22.2 ML/MIN/1.73M*2
GLUCOSE BLD-MCNC: 166 MG/DL (ref 70–99)
GLUCOSE BLD-MCNC: 173 MG/DL (ref 70–99)
GLUCOSE BLD-MCNC: 187 MG/DL (ref 70–99)
GLUCOSE BLD-MCNC: 207 MG/DL (ref 70–99)
GLUCOSE SERPL-MCNC: 210 MG/DL (ref 70–99)
HCT VFR BLDCO AUTO: 26.6 % (ref 40.1–51)
HGB BLD-MCNC: 8 G/DL (ref 13.7–17.5)
MCH RBC QN AUTO: 24.4 PG (ref 28–33.2)
MCHC RBC AUTO-ENTMCNC: 30.1 G/DL (ref 32.2–36.5)
MCV RBC AUTO: 81.1 FL (ref 83–98)
PATH REPORT.FINAL DX SPEC: NORMAL
PATH REPORT.FINAL DX SPEC: NORMAL
PATH REPORT.GROSS SPEC: NORMAL
PDW BLD AUTO: 11.5 FL (ref 9.4–12.4)
PLATELET # BLD AUTO: 221 K/UL (ref 150–350)
POCT TEST: ABNORMAL
POTASSIUM SERPL-SCNC: 4.2 MEQ/L (ref 3.5–5.1)
RBC # BLD AUTO: 3.28 M/UL (ref 4.5–5.8)
SODIUM SERPL-SCNC: 139 MEQ/L (ref 136–145)
VANCOMYCIN SERPL-MCNC: 22.9 UG/ML (ref 10–40)
WBC # BLD AUTO: 7.83 K/UL (ref 3.8–10.5)

## 2023-09-22 PROCEDURE — 12000000 HC ROOM AND CARE MED/SURG

## 2023-09-22 PROCEDURE — 36415 COLL VENOUS BLD VENIPUNCTURE: CPT

## 2023-09-22 PROCEDURE — 63600000 HC DRUGS/DETAIL CODE: Mod: JZ | Performed by: INTERNAL MEDICINE

## 2023-09-22 PROCEDURE — 63700000 HC SELF-ADMINISTRABLE DRUG

## 2023-09-22 PROCEDURE — 25800000 HC PHARMACY IV SOLUTIONS

## 2023-09-22 PROCEDURE — 63700000 HC SELF-ADMINISTRABLE DRUG: Performed by: HOSPITALIST

## 2023-09-22 PROCEDURE — 99232 SBSQ HOSP IP/OBS MODERATE 35: CPT | Performed by: HOSPITALIST

## 2023-09-22 PROCEDURE — 80202 ASSAY OF VANCOMYCIN: CPT

## 2023-09-22 PROCEDURE — 80048 BASIC METABOLIC PNL TOTAL CA: CPT

## 2023-09-22 PROCEDURE — 63600000 HC DRUGS/DETAIL CODE: Mod: JZ | Performed by: STUDENT IN AN ORGANIZED HEALTH CARE EDUCATION/TRAINING PROGRAM

## 2023-09-22 PROCEDURE — 85027 COMPLETE CBC AUTOMATED: CPT

## 2023-09-22 PROCEDURE — 63600000 HC DRUGS/DETAIL CODE: Mod: JZ

## 2023-09-22 PROCEDURE — 25800000 HC PHARMACY IV SOLUTIONS: Performed by: INTERNAL MEDICINE

## 2023-09-22 RX ORDER — HYDRALAZINE HYDROCHLORIDE 25 MG/1
25 TABLET, FILM COATED ORAL EVERY 8 HOURS
Status: DISCONTINUED | OUTPATIENT
Start: 2023-09-22 | End: 2023-09-23

## 2023-09-22 RX ORDER — AMLODIPINE BESYLATE 10 MG/1
10 TABLET ORAL DAILY
Status: DISCONTINUED | OUTPATIENT
Start: 2023-09-22 | End: 2023-09-26 | Stop reason: HOSPADM

## 2023-09-22 RX ORDER — INSULIN GLARGINE 100 [IU]/ML
28 INJECTION, SOLUTION SUBCUTANEOUS NIGHTLY
Status: DISCONTINUED | OUTPATIENT
Start: 2023-09-22 | End: 2023-09-26 | Stop reason: HOSPADM

## 2023-09-22 RX ORDER — TRIAMCINOLONE ACETONIDE 1 MG/G
CREAM TOPICAL 2 TIMES DAILY PRN
Status: DISCONTINUED | OUTPATIENT
Start: 2023-09-22 | End: 2023-09-26 | Stop reason: HOSPADM

## 2023-09-22 RX ORDER — SODIUM CHLORIDE, SODIUM LACTATE, POTASSIUM CHLORIDE, CALCIUM CHLORIDE 600; 310; 30; 20 MG/100ML; MG/100ML; MG/100ML; MG/100ML
INJECTION, SOLUTION INTRAVENOUS CONTINUOUS
Status: ACTIVE | OUTPATIENT
Start: 2023-09-22 | End: 2023-09-23

## 2023-09-22 RX ADMIN — SODIUM CHLORIDE, POTASSIUM CHLORIDE, SODIUM LACTATE AND CALCIUM CHLORIDE: 600; 310; 30; 20 INJECTION, SOLUTION INTRAVENOUS at 13:03

## 2023-09-22 RX ADMIN — CLOPIDOGREL BISULFATE 75 MG: 75 TABLET ORAL at 09:39

## 2023-09-22 RX ADMIN — TRIAMCINOLONE ACETONIDE: 1 CREAM TOPICAL at 13:03

## 2023-09-22 RX ADMIN — AMPICILLIN SODIUM AND SULBACTAM SODIUM 3 G: 2; 1 INJECTION, POWDER, FOR SOLUTION INTRAMUSCULAR; INTRAVENOUS at 15:59

## 2023-09-22 RX ADMIN — INSULIN GLARGINE 28 UNITS: 100 INJECTION, SOLUTION SUBCUTANEOUS at 21:25

## 2023-09-22 RX ADMIN — AMLODIPINE BESYLATE 10 MG: 10 TABLET ORAL at 09:39

## 2023-09-22 RX ADMIN — HYDRALAZINE HYDROCHLORIDE 25 MG: 25 TABLET ORAL at 21:45

## 2023-09-22 RX ADMIN — ROSUVASTATIN CALCIUM 10 MG: 10 TABLET, FILM COATED ORAL at 09:39

## 2023-09-22 RX ADMIN — SODIUM CHLORIDE, POTASSIUM CHLORIDE, SODIUM LACTATE AND CALCIUM CHLORIDE: 600; 310; 30; 20 INJECTION, SOLUTION INTRAVENOUS at 11:41

## 2023-09-22 RX ADMIN — INSULIN LISPRO 3 UNITS: 100 INJECTION, SOLUTION INTRAVENOUS; SUBCUTANEOUS at 09:39

## 2023-09-22 RX ADMIN — HYDRALAZINE HYDROCHLORIDE 25 MG: 25 TABLET ORAL at 13:03

## 2023-09-22 RX ADMIN — AMPICILLIN SODIUM AND SULBACTAM SODIUM 3 G: 2; 1 INJECTION, POWDER, FOR SOLUTION INTRAMUSCULAR; INTRAVENOUS at 20:16

## 2023-09-22 RX ADMIN — PIPERACILLIN AND TAZOBACTAM 2.25 G: 2; .25 INJECTION, POWDER, LYOPHILIZED, FOR SOLUTION INTRAVENOUS; PARENTERAL at 09:40

## 2023-09-22 RX ADMIN — PIPERACILLIN AND TAZOBACTAM 2.25 G: 2; .25 INJECTION, POWDER, LYOPHILIZED, FOR SOLUTION INTRAVENOUS; PARENTERAL at 01:15

## 2023-09-22 RX ADMIN — INSULIN LISPRO 3 UNITS: 100 INJECTION, SOLUTION INTRAVENOUS; SUBCUTANEOUS at 17:31

## 2023-09-22 ASSESSMENT — COGNITIVE AND FUNCTIONAL STATUS - GENERAL
CLIMB 3 TO 5 STEPS WITH RAILING: 3 - A LITTLE
CLIMB 3 TO 5 STEPS WITH RAILING: 3 - A LITTLE
STANDING UP FROM CHAIR USING ARMS: 4 - NONE
STANDING UP FROM CHAIR USING ARMS: 4 - NONE
WALKING IN HOSPITAL ROOM: 4 - NONE
MOVING TO AND FROM BED TO CHAIR: 4 - NONE
WALKING IN HOSPITAL ROOM: 4 - NONE
MOVING TO AND FROM BED TO CHAIR: 4 - NONE

## 2023-09-22 NOTE — PROGRESS NOTES
Subjective/Interval:    NAC  S/p podiatric surgery yesterday  ANURAHDA is stable  Increased LR from 80 to 100 cc/hr to support volume repletion     Objective:    Vital signs in last 24 hours:    Temp:  [36.3 °C (97.4 °F)-36.9 °C (98.5 °F)] 36.9 °C (98.5 °F)  Heart Rate:  [68-80] 70  Resp:  [16-18] 18  BP: (152-192)/(62-88) 182/70      Intake/Output Summary (Last 24 hours) at 9/22/2023 1741  Last data filed at 9/22/2023 1400  Gross per 24 hour   Intake 120 ml   Output 800 ml   Net -680 ml       Intake/Output this shift:    I/O this shift:  In: 120 [P.O.:120]  Out: 800 [Urine:800]    Physical Exam:    General: well appearing, NAD, aaox4/4 on room air   HEENT: AT NC PERRL EOMI  Cardiovascular: RR NL S1S2 no m /g/r   Pulmonary/Chest: CTA BL no /w/cr  Abdomen:  Soft, nt, nd   Extremities: warm, trace LLE edema, +L TMA w/wound dressing   Skin: no rash   Neurologic:  CN II -XII intact, no fnd   MSK:  L TMA  Psychiatric: normal      Urine output:  Voids on own   Hemodialysis vascular access: n/a        Labs:    Results from last 7 days   Lab Units 09/22/23 0455 09/21/23 1059 09/20/23  0515 09/19/23  2107   SODIUM mEQ/L 139 138 135* 133*   POTASSIUM mEQ/L 4.2 4.4 3.8 3.9   CHLORIDE mEQ/L 109* 107 104 101   CO2 mEQ/L 22 24 23 21   GLUCOSE mg/dL 210* 160* 179* 210*   BUN mg/dL 32* 35* 42* 45*   CREATININE mg/dL 2.9* 2.9* 3.0* 3.4*   CALCIUM mg/dL 8.0* 8.4* 8.3* 8.2*   ANION GAP mEQ/L 8 7 8 11   EGFR mL/min/1.73m*2 22.2* 22.2* 21.4* 18.5*       Results from last 7 days   Lab Units 09/22/23 0455 09/21/23  1059 09/20/23  0515 09/19/23  2107   WBC K/uL 7.83 7.69 11.00* 14.05*   RBC M/uL 3.28* 3.16* 3.37* 3.02*   HEMOGLOBIN g/dL 8.0* 7.6* 8.2* 7.3*  7.3*   HEMATOCRIT % 26.6* 25.2* 26.6* 23.3*  23.3*   MCV fL 81.1* 79.7* 78.9* 77.2*   MCH pg 24.4* 24.1* 24.3* 24.2*   MCHC g/dL 30.1* 30.2* 30.8* 31.3*   PLATELETS K/uL 221 213 249 187       Assessments/Plan:      ANURADHA (acute kidney injury) (CMS/[facility])  Assessment & Plan  ANURADHA is currently  stable, overall showing improving from peak scr 3.4   Likely pre-renal 2/2 hypovolemia, nsaids, and lisinopril.  Supporting with IVF- increased LR from 80 to 100 cc/hr  No nsaids  Hold lisinopril  Unclear baseline scr, possibly with CKD  Daily BMP, IOs   Avoid nephrotoxics  Treat infection         Hypertension  Assessment & Plan  Monitor while holding lisinopril  Goal SBP <160   Amlodipine 10 mg qd  Hydralazine 25 mg tid      Type 2 diabetes mellitus (CMS/[facility])  Assessment & Plan  Needs better long-term glycemic control   Avoid SGLT2i per hx amputation     He has severe albuminuria, suggestive of DKD  He will likely need to establish care as an outpatient for CKD care.   When dawood resolved, optimal CKD care will include his lisinopril , and consideration of adding finerenone as an outpatient     A/P reviewed with Patient.     Nickolas Post MD  Main [facility]    Department of Medicine   Division of Nephrology     Office:   [facility]   [address]  [address]  [place] [address]   Contact: [phone]    Duration of Encounter (including care coordination, and >50% of time with patient counseling): 60 min .

## 2023-09-22 NOTE — ANESTHESIA POSTPROCEDURE EVALUATION
Patient: Harvey Lucero Jr.    Procedure Summary     Date: 09/21/23 Room / Location: [facility] OR 03 / [facility] OR [facility]    Anesthesia Start: 1357 Anesthesia Stop: 1507    Procedure: INCISION & DRAINAGE/KNEE/LEG/ FOOT/ANKLE, STRAVIX GRAFT/BONE BIOPSY (Left: Foot) Diagnosis:       Infection of left foot      Wound of left lower extremity, subsequent encounter      (Infection of left foot [L08.9])      (Wound of left lower extremity, subsequent encounter [S81.802D])    Surgeons: Fabricio Tee DPM Responsible Provider: Haven Murphy MD    Anesthesia Type: general ASA Status: 3          Anesthesia Type: general  PACU Vitals  9/21/2023 1456 - 9/21/2023 1556      9/21/2023  1500 9/21/2023  1501 9/21/2023  1515 9/21/2023  1530    BP: 193/80 169/72 169/75 177/83    Temp: -- 36.5 °C (97.7 °F) -- --    Pulse: 85 77 70 75    Resp: 13 16 11 11    SpO2: -- 99 % -- --              9/21/2023  1545             BP: 169/79       Temp: --       Pulse: 70       Resp: 11       SpO2: 100 %               Anesthesia Post Evaluation    Pain management: adequate  Patient location during evaluation: PACU  Patient participation: complete - patient participated  Level of consciousness: awake and alert  Cardiovascular status: acceptable  Airway Patency: adequate  Respiratory status: acceptable  Hydration status: acceptable  Anesthetic complications: no

## 2023-09-22 NOTE — OP NOTE
Blank Op Note    9/21/2023  Hospital: [facility]  Medical Record Number:  [id number]  Patient Name:  Harvey Lucero Jr.  YOB: 1960   Date of Operation:  9/21/2023  Primary Surgeon:  Fabricio Tee DPM  First Assistant: Lu Restrepo DPM    Preoperative Diagnosis:  Left foot stump with chronic wound    Postoperative Diagnosis: Same    Procedure:    1. Left foot incision and drainage   2. Bone biopsy   3. Graft application      Anesthesia: General , 10 cc of 0.5% marcaine plain      Hemostasis:  anatomical dissection    Operative Findings:  Necrotic tissue debrided. Mild purulence expressed. Graft applied to dorsal wound and posterior wound.    Estimated Blood Loss: Minimal    Specimens: Bone to pathology    Implants:   Implant Name Type Inv. Item Serial No.  Lot No. LRB No. Used Action   GRAFT HUMAN TISSUE 3X6CM STRAVIX - [id number] - [id number]  GRAFT HUMAN TISSUE 3X6CM STRAVIX [id number] [employer] [id number] / [id number] Left 2 Implanted       Injectables: None         Complications:  None; patient tolerated the procedure well.           Disposition: PACU - hemodynamically stable.           Condition: stable    Operative Note:   Summary: Patient presents to [facility] for surgical intervention of his left foot.  Pt has failed conservative modalities and has opted for surgical intervention at this time. In pre-op the patients vital signs are stable and neurovascular status is intact.     The patient was transferred to the operating room and placed on the operating room table in the supine position.  The correct surgical site was identified which is the left foot. Once anesthesia was achieved, the above mentioned blocked was administered. The foot and ankle were sterilely prepped and draped in the usual aseptic technique and a timeout was performed before the beginning of the procedure.     Procedure:    Attention was brought to the left foot with 3  chronic wounds were noted, 1 at the dorsal foot, 1 at the most distal aspect of the plantar foot, and 1 around the Achilles tendon.  With a #15 blade all 3 wounds were debrided, excising all necrotic and liquefied nonviable tissue until a granular base was noted. A bone biopsy was then performed with a Jamshidi through the plantar wound.  The bone sample was removed from the surgical field and sent for pathology.  The wound sites were copiously irrigated using sterile saline and a pulse lavage.  A 3 x 6 Stravix graft was stapled to the wound at the Achilles tendon.  A 2 x 2 Stravix graft was stapled to the wound at the plantar foot.     A dressing consisting of Adaptic, sterile 4 x 4 gauze, ABDs, Kerlix, Coban was used to dress the surgical site.     The patient tolerated the procedure well with all vital signs stable and neurovascular status intact. Once aroused from anesthesia the patient was transferred from the operating room to PACU and transported back to his room.      Fabricio Tee DPM  Phone Number: [phone]

## 2023-09-22 NOTE — PROGRESS NOTES
Infectious Disease Progress Note    Patient Name: Harvey Lucero Jr.  MR#: [id number]  : 1960  Admission Date: 2023  Date: 23   Time: 11:31 AM   Author: Severo Dias MD    OBJECTIVE:  Wound cultures with Streptococcus constellatus Enterococcus faecalis susceptible to ampicillin and Proteus vulgaris susceptible to amoxicillin/clavulanate    Antibiotics:    Anti-infectives (From admission, onward)    Start     Dose/Rate Route Frequency Ordered Stop    23 1215  ampicillin-sulbactam (UNASYN) IVPB 3 g/100 mL NSS         3 g  200 mL/hr over 30 Minutes intravenous Every 8 hours interval 23 1118            ROS  Negative  Vital Signs:    Temp:  [36.1 °C (97 °F)-36.8 °C (98.3 °F)] 36.3 °C (97.4 °F)  Heart Rate:  [69-85] 71  Resp:  [8-18] 18  BP: (145-193)/(62-91) 192/87    Temp (72hrs), Av.7 °C (98.1 °F), Min:36.1 °C (97 °F), Max:37.7 °C (99.8 °F)      Physical Exam    Gen: Aox3  HEENT: OP clear  Neck: Supple  LAD: No cervical LAD  Lungs: CTAB  CV: RRR no murmurs  Abd: Soft NTND +BS  Ext: Foot dressing unchanged  Skin: no rash  Neuro: II-XII intact        Lines, Drains, Airways, Wounds:  Peripheral IV (Adult) 23 Anterior;Left Forearm (Active)   Number of days: 1       Labs:    Lab Results   Component Value Date    WBC 7.83 2023    HGB 8.0 (L) 2023    HCT 26.6 (L) 2023    MCV 81.1 (L) 2023     2023     Lab Results   Component Value Date    GLUCOSE 210 (H) 2023    CALCIUM 8.0 (L) 2023     2023    K 4.2 2023    CO2 22 2023     (H) 2023    BUN 32 (H) 2023    CREATININE 2.9 (H) 2023     Lab Results   Component Value Date    ALT 16 2018    AST 11 2018    ALKPHOS 60 2018    BILITOT 0.6 2018         Patient Active Problem List   Diagnosis Code    History of CVA (cerebrovascular accident) Z86.73    Type 2 diabetes mellitus (CMS/HCC) E11.9    Lipid disorder E78.9     Hypertension I10    Acute hematogenous osteomyelitis of left foot (CMS/HCC) M86.072    Anemia D64.9    Leukocytosis D72.829    Hyponatremia E87.1    Elevated serum creatinine R79.89    Bacteremia R78.81    Gas gangrene (CMS/HCC) A48.0    Medication management Z79.899    Streptococcal sepsis (CMS/HCC) A40.9    Necrotizing fasciitis (CMS/HCC) M72.6    Open wound of ankle and foot S91.009A, S91.309A    Hypokalemia E87.6    Localized edema R60.0    Type 2 diabetes mellitus with diabetic peripheral angiopathy and gangrene, with long-term current use of insulin (CMS/HCC) E11.52, Z79.4    Type 2 diabetes mellitus with foot ulcer, with long-term current use of insulin (CMS/HCC) E11.621, L97.509, Z79.4    Essential hypertension I10    Chronic multifocal osteomyelitis, left ankle and foot (CMS/HCC) M86.372    History of necrotizing fasciitis Z87.39    Lipid disorder E78.9    History of CVA (cerebrovascular accident) Z86.73    Claustrophobia F40.240    CVA (cerebral vascular accident) (CMS/HCC) I63.9    Type 2 diabetes mellitus with skin complication, with long-term current use of insulin (CMS/HCC) E11.628, Z79.4    Dyslipidemia E78.5    GERD (gastroesophageal reflux disease) K21.9    Iron deficiency E61.1    Leg wound, left S81.802A    Infection of left foot L08.9    Pre-op evaluation Z01.818    Acute kidney injury superimposed on CKD (CMS/[facility]) N17.9, N18.9    ANURADHA (acute kidney injury) (CMS/HCC) N17.9     * Infection of left foot  Assessment & Plan  DC Zosyn and vancomycin and start Unasyn 3 g IV every 8 hours          Severo Dias MD  9/22/202311:31 AM

## 2023-09-22 NOTE — PROGRESS NOTES
Hospital Medicine Service -  Daily Progress Note       SUBJECTIVE   Interval History: Patient seen and examined at bedside. Denies CP, SOB, nausea, vomiting, foot pain.     OBJECTIVE      Vital signs in last 24 hours:  Temp:  [36.1 °C (97 °F)-36.8 °C (98.3 °F)] 36.7 °C (98.1 °F)  Heart Rate:  [68-85] 68  Resp:  [8-18] 18  BP: (145-193)/(62-91) 183/80    Intake/Output Summary (Last 24 hours) at 9/22/2023 1303  Last data filed at 9/22/2023 0929  Gross per 24 hour   Intake 1125 ml   Output 805 ml   Net 320 ml       PHYSICAL EXAMINATION      Physical Exam  General: NAD, calm, pleasant  HEENT: atraumatic  Cardiovascular: RRR, no murmurs; S1/S2+  Pulmonary: CTAB; no rales, rhonchi or wheezing  Gi: Soft, nontender, nondistended  Ext: LLE wrapped  Neuro: Alert and oriented x3; no sensory or motor deficits  Psych: Calm, appropriate answers     LINES, CATHETERS, DRAINS, AIRWAYS, AND WOUNDS   Lines, Drains, and Airways:  Wounds (agree with documentation and present on admission):  Peripheral IV (Adult) 09/20/23 Anterior;Left Forearm (Active)   Number of days: 2       Peripheral IV (Adult) 09/21/23 Left Hand (Active)   Number of days: 1       Wound Surgical Left Foot (Active)   Number of days: 1647       Wound Venous Ulcer Left;Lateral Foot (Active)   Number of days: 1486       Surgical Incision Foot (Active)   Number of days: 1         Comments:      LABS / IMAGING / TELE      Labs  Results from last 7 days   Lab Units 09/22/23  0455   WBC K/uL 7.83   HEMOGLOBIN g/dL 8.0*   HEMATOCRIT % 26.6*   PLATELETS K/uL 221     Results from last 7 days   Lab Units 09/22/23  0455   SODIUM mEQ/L 139   POTASSIUM mEQ/L 4.2   CHLORIDE mEQ/L 109*   CO2 mEQ/L 22   BUN mg/dL 32*   CREATININE mg/dL 2.9*   GLUCOSE mg/dL 210*   CALCIUM mg/dL 8.0*         Imaging  I have independently reviewed the pertinent imaging from the last 24 hrs.      ASSESSMENT AND PLAN      * Infection of left foot  Assessment & Plan  -Events noted  -Continue care per  podiatry. S/P debridement, bone biopsy t  -Continue unasyn per ID  -Follow cultures  -Continue supportive care    Acute kidney injury superimposed on CKD (CMS/[facility])  Assessment & Plan  -Creatinine noted  -Likely with ANURADHA on CKD. improving  -Nephro on board. Continue IVF. Follow recommendations    Anemia  Assessment & Plan  -Stable  -Continue to monitor    Hypertension  Assessment & Plan  -Uncontrolled  -Increased amlodipine. Added hydralazine  -Continue to monitor pressures    Type 2 diabetes mellitus (CMS/[facility])  Assessment & Plan  -Home regimen is glargine 28 units nightly and Januvia 100mg daily  -Continue SSI and accuchecks    History of CVA (cerebrovascular accident)  Assessment & Plan  -Continue plavix and statin         VTE Assessment: Padua    VTE Prophylaxis:  Current anticoagulants:  [MAR Hold] enoxaparin (LOVENOX) syringe 30 mg, subcutaneous, Daily (6p)      Code Status: Full Code      Estimated Discharge Date: 9/21/2023       Disposition Planning: per podiatry     Bella Jane DO  9/22/2023

## 2023-09-22 NOTE — PLAN OF CARE
Care Coordination Discharge Plan Note     Discharge Needs Assessment  Concerns to be Addressed: discharge planning  Current Discharge Risk: physical impairment    Anticipated Discharge Plan  Anticipated Discharge Disposition: home with assistance (may need home IV abx upon dc)       Patient Choice  Offered/Gave Vendor List: no  Patient's Choice of Community Agency(s): Pt denies needs upon dc         ---------------------------------------------------------------------------------------------------------------------    Interdisciplinary Discharge Plan Review:  Participants:advanced practice provider, nursing, patient, social work/services    Concerns Comments: Pt may need IV abx upon dc.    Discharge Plan:   Disposition/Destination: Home  / Home  Discharge Facility:    Community Resources:      Discharge Transportation:  Is Out of Hospital DNR needed at Discharge: no  Does patient need discharge transport? No (family to transport)           Per podiatry, pt may need 6 weeks of IV abx pending biopsy.     CC met with pt at bedside and pt is amenable to home infusion if IV abx are needed. Pt does not want to go to a SNF. [facility] is following, pt's insurance covers at 100%. CC to follow.     Dispo- home, likely iv abx

## 2023-09-22 NOTE — PROGRESS NOTES
Patient: Harvey Miranda Nic Maldonado.  Location: [facility] 3[facility] 4116  MRN:  [id number]  Today's date:  9/22/2023    Attempted to see patient for therapy. Unable due to patient refused (Pt again deferring therapy eval/follow up in acute setting, stating he has been through this before with NWB status and has all the DME he needs. Given pt's second deferral following re-consult, will discontinue OT services.).

## 2023-09-22 NOTE — PLAN OF CARE
Plan of Care Review  Plan of Care Reviewed With: patient  Progress: improving  Outcome Evaluation: no complaints of pain. IVF and IV abx continued. LLE ace bandages CDI. NWB on LLE. oob with walker.

## 2023-09-22 NOTE — PROGRESS NOTES
Vancomycin Dosing by Pharmacy Consult Discontinued    IV Vancomycin Dosing by Pharmacy Protocol was discontinued.  Pharmacy will sign off and no longer dose vancomycin for this patient.    Hamilton SanchezD

## 2023-09-22 NOTE — ASSESSMENT & PLAN NOTE
-Creatinine noted  -Likely with ANURADHA on CKD. improving  -Nephro on board. Continue IVF. Follow recommendations

## 2023-09-22 NOTE — PLAN OF CARE
Problem: Adult Inpatient Plan of Care  Goal: Plan of Care Review  Outcome: Progressing  Flowsheets (Taken 9/22/2023 6449)  Progress: improving  Outcome Evaluation: Pain controlled. LLE NWB .  Dressing CDI and changed by podiatry. Continue ABX. Pt aware of care plan  Plan of Care Reviewed With: patient   Plan of Care Review  Plan of Care Reviewed With: patient  Progress: improving  Outcome Evaluation: Pain controlled. LLE NWB .  Dressing CDI and changed by podiatry. Continue ABX. Pt aware of care plan

## 2023-09-22 NOTE — PROGRESS NOTES
Subjective:   Pt seen at bedside for POD#1 s/p left foot incision and drainage, bone biopsy and graft application      .NAD. No overnight events. Pt denies any pedal pain. Dressing clean, dry, and intact. Denies any N/V/F/C/CP/SOB.        Past Medical/Surgical history, Allergies, Meds reviewed in detail as charted  FH/SH reviewed in detail as charted    ROS:  Head and Neck: No complaints  Chest : No complaints  Abdomen: No Complaints  Constitutional: Unremarkable     Vitals: I have reviewed the patient's pertinent vital signs.     Radiology: Reviewed     Labs:   CBC Results       09/22/23 09/21/23 09/20/23     0455 1059 0515    WBC 7.83 7.69 11.00    RBC 3.28 3.16 3.37    HGB 8.0 7.6 8.2    HCT 26.6 25.2 26.6    MCV 81.1 79.7 78.9    MCH 24.4 24.1 24.3    MCHC 30.1 30.2 30.8     213 249        BMP Results       09/22/23 09/21/23 09/20/23     0455 1059 0515     138 135    K 4.2 4.4 3.8    Cl 109 107 104    CO2 22 24 23    Glucose 210 160 179    BUN 32 35 42    Creatinine 2.9 2.9 3.0    Calcium 8.0 8.4 8.3    Anion Gap 8 7 8    EGFR 22.2 22.2 21.4            LE Objective:   LE Exam  Vascular: Dressings remained intact.  No strike through noted.         Capillary refill time <3 seconds.  MSK: +DP/PF ankle intact.   No pain on palpation to the posterior legs B/L. No signs of clinical DVT B/L.   Derm: Dressings C/D/I. No strike through.  Neuro: Light touch and protective sensation is intact.       Assessment: 63-year-old male POD#1 s/p left foot incision and drainage, bone biopsy and graft application    Plan:     -Patient was seen alongside Dr. Tee  -Dressings remain intact  -Waiting for bone biopsy pathology.  Patient will likely require minimum 6 weeks of IV antibiotics.  -Plan for dressing change tomorrow.  -Patient be nonweightbearing to left lower extremity  -PT/OT consults pending  --OR findings: Necrotic tissue debrided. Mild purulence expressed. Graft applied to dorsal wound and posterior  wound.   -OR specimen:               Bone to pathology: pending   -ABX currently Vanco/Zosyn  -Pain modalities as board as needed  Jorge A Badillo PGY-2  #[phone]

## 2023-09-22 NOTE — ASSESSMENT & PLAN NOTE
-Events noted  -Continue care per podiatry. S/P debridement, bone biopsy t  -Continue unasyn per ID  -Follow cultures  -Continue supportive care

## 2023-09-23 LAB
ANION GAP SERPL CALC-SCNC: 8 MEQ/L (ref 3–15)
BACTERIA URNS QL MICRO: ABNORMAL /HPF
BILIRUB UR QL STRIP.AUTO: NEGATIVE MG/DL
BUN SERPL-MCNC: 23 MG/DL (ref 7–25)
CALCIUM SERPL-MCNC: 8.2 MG/DL (ref 8.6–10.3)
CHLORIDE SERPL-SCNC: 109 MEQ/L (ref 98–107)
CLARITY UR REFRACT.AUTO: ABNORMAL
CO2 SERPL-SCNC: 23 MEQ/L (ref 21–31)
COLOR UR AUTO: COLORLESS
CREAT SERPL-MCNC: 3.2 MG/DL (ref 0.7–1.3)
CROSSMATCH: NORMAL
EOSINOPHIL URNS QL MICRO: POSITIVE
ERYTHROCYTE [DISTWIDTH] IN BLOOD BY AUTOMATED COUNT: 16.2 % (ref 11.6–14.4)
GFR SERPL CREATININE-BSD FRML MDRD: 19.8 ML/MIN/1.73M*2
GLUCOSE BLD-MCNC: 107 MG/DL (ref 70–99)
GLUCOSE BLD-MCNC: 122 MG/DL (ref 70–99)
GLUCOSE BLD-MCNC: 140 MG/DL (ref 70–99)
GLUCOSE BLD-MCNC: 98 MG/DL (ref 70–99)
GLUCOSE SERPL-MCNC: 102 MG/DL (ref 70–99)
GLUCOSE UR STRIP.AUTO-MCNC: ABNORMAL MG/DL
HCT VFR BLDCO AUTO: 28.2 % (ref 40.1–51)
HGB BLD-MCNC: 8.5 G/DL (ref 13.7–17.5)
HGB UR QL STRIP.AUTO: 3
HYALINE CASTS #/AREA URNS LPF: ABNORMAL /LPF
ISBT CODE: 6200
KETONES UR STRIP.AUTO-MCNC: NEGATIVE MG/DL
LEUKOCYTE ESTERASE UR QL STRIP.AUTO: NEGATIVE
MCH RBC QN AUTO: 24.4 PG (ref 28–33.2)
MCHC RBC AUTO-ENTMCNC: 30.1 G/DL (ref 32.2–36.5)
MCV RBC AUTO: 80.8 FL (ref 83–98)
NITRITE UR QL STRIP.AUTO: NEGATIVE
PDW BLD AUTO: 11.8 FL (ref 9.4–12.4)
PH UR STRIP.AUTO: 6.5 [PH]
PLATELET # BLD AUTO: 206 K/UL (ref 150–350)
POCT TEST: ABNORMAL
POCT TEST: NORMAL
POTASSIUM SERPL-SCNC: 3.9 MEQ/L (ref 3.5–5.1)
PRODUCT CODE: NORMAL
PRODUCT STATUS: NORMAL
PROT UR QL STRIP.AUTO: 3
RBC # BLD AUTO: 3.49 M/UL (ref 4.5–5.8)
RBC #/AREA URNS HPF: ABNORMAL /HPF
SODIUM SERPL-SCNC: 140 MEQ/L (ref 136–145)
SP GR UR REFRACT.AUTO: 1.01
SPECIMEN EXP DATE BLD: NORMAL
SQUAMOUS URNS QL MICRO: ABNORMAL /HPF
UNIT ABO: NORMAL
UNIT ID: NORMAL
UNIT RH: POSITIVE
UROBILINOGEN UR STRIP-ACNC: 0.2 EU/DL
WBC # BLD AUTO: 9.18 K/UL (ref 3.8–10.5)
WBC #/AREA URNS HPF: ABNORMAL /HPF

## 2023-09-23 PROCEDURE — 86160 COMPLEMENT ANTIGEN: CPT | Performed by: SPECIALIST

## 2023-09-23 PROCEDURE — 87205 SMEAR GRAM STAIN: CPT | Performed by: SPECIALIST

## 2023-09-23 PROCEDURE — 36415 COLL VENOUS BLD VENIPUNCTURE: CPT

## 2023-09-23 PROCEDURE — 63700000 HC SELF-ADMINISTRABLE DRUG: Performed by: HOSPITALIST

## 2023-09-23 PROCEDURE — 99232 SBSQ HOSP IP/OBS MODERATE 35: CPT | Performed by: HOSPITALIST

## 2023-09-23 PROCEDURE — 85027 COMPLETE CBC AUTOMATED: CPT

## 2023-09-23 PROCEDURE — 81001 URINALYSIS AUTO W/SCOPE: CPT | Performed by: SPECIALIST

## 2023-09-23 PROCEDURE — 63600000 HC DRUGS/DETAIL CODE: Performed by: HOSPITALIST

## 2023-09-23 PROCEDURE — 63600000 HC DRUGS/DETAIL CODE: Mod: JZ | Performed by: INTERNAL MEDICINE

## 2023-09-23 PROCEDURE — 25800000 HC PHARMACY IV SOLUTIONS: Performed by: INTERNAL MEDICINE

## 2023-09-23 PROCEDURE — 63600000 HC DRUGS/DETAIL CODE: Mod: JZ | Performed by: STUDENT IN AN ORGANIZED HEALTH CARE EDUCATION/TRAINING PROGRAM

## 2023-09-23 PROCEDURE — 12000000 HC ROOM AND CARE MED/SURG

## 2023-09-23 PROCEDURE — 63700000 HC SELF-ADMINISTRABLE DRUG

## 2023-09-23 PROCEDURE — 80048 BASIC METABOLIC PNL TOTAL CA: CPT

## 2023-09-23 RX ORDER — LABETALOL HYDROCHLORIDE 5 MG/ML
10 INJECTION, SOLUTION INTRAVENOUS ONCE
Status: COMPLETED | OUTPATIENT
Start: 2023-09-23 | End: 2023-09-23

## 2023-09-23 RX ORDER — HYDRALAZINE HYDROCHLORIDE 50 MG/1
50 TABLET, FILM COATED ORAL EVERY 8 HOURS
Status: DISCONTINUED | OUTPATIENT
Start: 2023-09-23 | End: 2023-09-26 | Stop reason: HOSPADM

## 2023-09-23 RX ORDER — ALUMINUM HYDROXIDE, MAGNESIUM HYDROXIDE, AND SIMETHICONE 1200; 120; 1200 MG/30ML; MG/30ML; MG/30ML
30 SUSPENSION ORAL EVERY 6 HOURS PRN
Status: DISCONTINUED | OUTPATIENT
Start: 2023-09-23 | End: 2023-09-26 | Stop reason: HOSPADM

## 2023-09-23 RX ADMIN — ALUMINUM HYDROXIDE, MAGNESIUM HYDROXIDE, AND DIMETHICONE 30 ML: 200; 20; 200 SUSPENSION ORAL at 17:49

## 2023-09-23 RX ADMIN — LABETALOL HYDROCHLORIDE 10 MG: 5 INJECTION INTRAVENOUS at 12:13

## 2023-09-23 RX ADMIN — HYDRALAZINE HYDROCHLORIDE 50 MG: 50 TABLET, FILM COATED ORAL at 21:16

## 2023-09-23 RX ADMIN — HYDRALAZINE HYDROCHLORIDE 25 MG: 25 TABLET ORAL at 05:07

## 2023-09-23 RX ADMIN — AMPICILLIN SODIUM AND SULBACTAM SODIUM 3 G: 2; 1 INJECTION, POWDER, FOR SOLUTION INTRAMUSCULAR; INTRAVENOUS at 04:58

## 2023-09-23 RX ADMIN — HYDRALAZINE HYDROCHLORIDE 50 MG: 50 TABLET, FILM COATED ORAL at 14:57

## 2023-09-23 RX ADMIN — CLOPIDOGREL BISULFATE 75 MG: 75 TABLET ORAL at 09:00

## 2023-09-23 RX ADMIN — AMPICILLIN SODIUM AND SULBACTAM SODIUM 3 G: 2; 1 INJECTION, POWDER, FOR SOLUTION INTRAMUSCULAR; INTRAVENOUS at 19:51

## 2023-09-23 RX ADMIN — AMLODIPINE BESYLATE 10 MG: 10 TABLET ORAL at 09:00

## 2023-09-23 RX ADMIN — LABETALOL HYDROCHLORIDE 10 MG: 5 INJECTION INTRAVENOUS at 17:22

## 2023-09-23 RX ADMIN — SODIUM CHLORIDE, POTASSIUM CHLORIDE, SODIUM LACTATE AND CALCIUM CHLORIDE: 600; 310; 30; 20 INJECTION, SOLUTION INTRAVENOUS at 09:00

## 2023-09-23 RX ADMIN — AMPICILLIN SODIUM AND SULBACTAM SODIUM 3 G: 2; 1 INJECTION, POWDER, FOR SOLUTION INTRAMUSCULAR; INTRAVENOUS at 12:15

## 2023-09-23 RX ADMIN — INSULIN GLARGINE 28 UNITS: 100 INJECTION, SOLUTION SUBCUTANEOUS at 21:17

## 2023-09-23 RX ADMIN — ROSUVASTATIN CALCIUM 10 MG: 10 TABLET, FILM COATED ORAL at 09:00

## 2023-09-23 ASSESSMENT — COGNITIVE AND FUNCTIONAL STATUS - GENERAL
STANDING UP FROM CHAIR USING ARMS: 3 - A LITTLE
STANDING UP FROM CHAIR USING ARMS: 3 - A LITTLE
WALKING IN HOSPITAL ROOM: 3 - A LITTLE
WALKING IN HOSPITAL ROOM: 3 - A LITTLE
CLIMB 3 TO 5 STEPS WITH RAILING: 3 - A LITTLE
CLIMB 3 TO 5 STEPS WITH RAILING: 3 - A LITTLE
MOVING TO AND FROM BED TO CHAIR: 3 - A LITTLE
MOVING TO AND FROM BED TO CHAIR: 3 - A LITTLE

## 2023-09-23 NOTE — PROGRESS NOTES
Subjective:   Patient seen at bedside for POD#2 s/p left foot incision and drainage, bone biopsy, and graft application. NAD. No overnight events. Dressing clean, dry, and intact.  Patient states he is not feeling any pain.  Patient is under impression that he is getting discharged today.  Discussed that his micro results from the OR have not finalized and that infectious disease will not be able to give final recommendations until they are finalized.  He expressed frustration about his discharge plans.  Explained that he will likely need PICC placement and IV antibiotics upon discharge.  Patient was dismissive at bedside and insisted on contacting Dr. Tee for plan clarification. Denies any N/V/F/C/CP/SOB.     ROS:   Past Medical/Surgical history, Allergies, Meds reviewed in detail as charted  FH/SH reviewed in detail as charted  Review of Systems:  Head and Neck: No complaints  Chest : No complaints  Abdomen: No Complaints  Constitutional: Unremarkable     Vitals: I have reviewed the patient's current vital signs as documented in the patient's EMR.    Radiology: No new Radiology.    Labs:   CBC Results       09/23/23 09/22/23 09/21/23     0524 0455 1059    WBC 9.18 7.83 7.69    RBC 3.49 3.28 3.16    HGB 8.5 8.0 7.6    HCT 28.2 26.6 25.2    MCV 80.8 81.1 79.7    MCH 24.4 24.4 24.1    MCHC 30.1 30.1 30.2     221 213           Objective:     -Vascular: DP/PT pulses are palpable B/L.  CRT is less than 3 seconds to all digits.  Skin temperature is warm from leg to digits bilaterally, slightly warmer to left lower extremity.  -Ortho: Left lower extremity Chopart amputation noted.  No pain noted on palpation left lower extremity.  -Neuro: Light touch and epicritic sensation is intact.  -Derm: Grafts to posterior ankle and distal stump are intact and secured with staples.  Grafts appear to be well adhered.  Tissue underlying grafts appears to be fibrogranular in nature, but difficult to appreciate the full  wound base as there is presence of overlying graft.  There are multiple hyperkeratotic skin islands noted to the left lower extremity.    Assessment:  Harvey Lucero Jr. is a 63 y.o. male presents POD#2 s/p left foot incision and drainage, bone biopsy, and graft application.    Plan:  -Patient examined, evaluated, and treated. All questions and concerns addressed  -Dr. Tee spoke to patient at bedside this morning reemphasizing that he will need to stay until cultures are finalized.  -Dressing from surgery was removed and replaced with Adaptic overlying the grafts, gauze, ABD, Kerlix, and Ace bandage for compression.  -OR findings: Necrotic tissue debrided. Mild purulence expressed. Graft applied to dorsal wound and posterior wound.   -OR specimen:               Bone to pathology: Acute osteomyelitis   Bone to microbiology: Proteus vulgaris, not finalized  -Blood cultures: No growth to date  -Vancomycin and Zosyn were discontinued and Unasyn was started per ID recommendation.  -Patient is nonweightbearing to left lower extremity.  -Pain modalities on board as needed  -Patient is likely to require at minimum 6 weeks of IV antibiotics.  -Discharge pending final antibiotic recommendations.  -Patient is to follow-up with Dr. Tee outpatient for continued management.  -Rest of plan pending discussion with attending  -Please contact the on call Podiatry resident with any questions or concerns.     Adia Purdy, PGY-1  Pager #[phone]

## 2023-09-23 NOTE — ASSESSMENT & PLAN NOTE
-Creatinine 3.9 today compared to 3.8 yesterday  -Likely with ANURADHA on CKD  -Nephro on board: appreciate recs - Might be related to gunner infectious GN. Also eosinophils in urine so antibiotics changed from PCN  - ensure adequate hydration  - avoid nephrotoxins  - monitor urine outpt

## 2023-09-23 NOTE — ASSESSMENT & PLAN NOTE
-Events noted  -Continue care per podiatry. S/P debridement, bone biopsy   -Continue abx per ID  -Follow cultures  -Continue supportive care

## 2023-09-23 NOTE — ASSESSMENT & PLAN NOTE
-Home regimen is glargine 28 units nightly and Januvia 100mg daily  -Continue SSI and accuchecks  - NCS diet  -Discontinue Januvia at discharge per nephrology recommendations

## 2023-09-23 NOTE — ASSESSMENT & PLAN NOTE
Uncontrolled previously. Now improved BP  -Cont amlodipine and hydralazine as ordered.  Now also on labetalol - increased dose to 200 mg bid today  -Holding ACE inhibitor for ANURADHA  -Continue to monitor pressures  -nephrology following  - optimize pain control

## 2023-09-23 NOTE — PROGRESS NOTES
Subjective:      Patient denies any complaints.  Pain is well controlled.  He is post op day #2 post left foot incision and drainage, bone biopsy, graft application. high blood pressure noted.    Objective:    Vital signs in last 24 hours:    Temp:  [36.9 °C (98.4 °F)-37.1 °C (98.7 °F)] 36.9 °C (98.5 °F)  Heart Rate:  [70-86] 77  Resp:  [12-18] 16  BP: (169-217)/(64-93) 210/78      Intake/Output Summary (Last 24 hours) at 9/23/2023 1201  Last data filed at 9/22/2023 1400  Gross per 24 hour   Intake 120 ml   Output --   Net 120 ml       Intake/Output this shift:    No intake/output data recorded.    Physical Exam:     General: well appearing, NAD, aaox4/4 on room air   HEENT: AT NC PERRL EOMI  Cardiovascular: RR NL S1S2 no m /g/r   Pulmonary/Chest: CTA BL no /w/cr  Abdomen:  Soft, nt, nd   Extremities: warm, trace LLE edema, +L TMA w/wound dressing   Skin: no rash   Neurologic:  CN II -XII intact, no fnd   MSK:  L TMA, leg is swollen, dressing is clean, dry, intact  Psychiatric: normal          Labs:    Results from last 7 days   Lab Units 09/23/23  0524 09/22/23  0455 09/21/23  1059 09/20/23  0515 09/19/23  2107   SODIUM mEQ/L 140 139 138 135* 133*   POTASSIUM mEQ/L 3.9 4.2 4.4 3.8 3.9   CHLORIDE mEQ/L 109* 109* 107 104 101   CO2 mEQ/L 23 22 24 23 21   GLUCOSE mg/dL 102* 210* 160* 179* 210*   BUN mg/dL 23 32* 35* 42* 45*   CREATININE mg/dL 3.2* 2.9* 2.9* 3.0* 3.4*   CALCIUM mg/dL 8.2* 8.0* 8.4* 8.3* 8.2*   ANION GAP mEQ/L 8 8 7 8 11   EGFR mL/min/1.73m*2 19.8* 22.2* 22.2* 21.4* 18.5*       Results from last 7 days   Lab Units 09/23/23  0524 09/22/23  0455 09/21/23  1059 09/20/23  0515 09/19/23  2107   WBC K/uL 9.18 7.83 7.69 11.00* 14.05*   RBC M/uL 3.49* 3.28* 3.16* 3.37* 3.02*   HEMOGLOBIN g/dL 8.5* 8.0* 7.6* 8.2* 7.3*  7.3*   HEMATOCRIT % 28.2* 26.6* 25.2* 26.6* 23.3*  23.3*   MCV fL 80.8* 81.1* 79.7* 78.9* 77.2*   MCH pg 24.4* 24.4* 24.1* 24.3* 24.2*   MCHC g/dL 30.1* 30.1* 30.2* 30.8* 31.3*   PLATELETS K/uL  206 221 213 249 187       Assessments/Plan:      ANURADHA (acute kidney injury) (CMS/[facility])  Assessment & Plan  Renal function is relatively stable.  Acute kidney injury is likely pre-renal 2/2 hypovolemia, nsaids, and lisinopril.  Supporting with IVF- increased LR from 80 to 100 cc/hr  No nsaids  Hold lisinopril  Will check urinalysis and urine for eosinophils to rule out interstitial nephritis secondary to antibiotics.  We will check complements level to rule out perinfectious glomerulonephritis.    Nephrotic syndrome  Most likely result of diabetic nephropathy.  Management as outpatient.    Hypertension  Assessment & Plan  Monitor while holding lisinopril  Goal SBP <160   Amlodipine 10 mg qd  Agree with increasing hydralazine dose.  Titrate as needed.    Type 2 diabetes mellitus (CMS/[facility])  Assessment & Plan  Needs better long-term glycemic control   Avoid SGLT2i per hx amputation     He has severe albuminuria, suggestive of DKD  He will likely need to establish care as an outpatient for CKD care.   When anuradha resolved, optimal CKD care will include his lisinopril , and consideration of adding finerenone as an outpatient           Duration of encounter 60 minutes    Jackie Joyner M.D.

## 2023-09-23 NOTE — NURSING NOTE
Patient with high blood [pressure, no c/o chest pain, palpitation or any pain any where else, Dr Whaley aware , New order received. New medications teaching discussed with patient and wife who is at bedside, Print out with meds, info given, Patient verbalized understanding, however will continue to moniitor pts status.      Patient still with slightly improved high blood pressure, two doses of labetolol given along with scheduled BP meds. NSR on the monitor. Patient denies pain in  all locations. Dr Whaley update, Wife at bedsidede with patient. Will continue to monitor pts status.

## 2023-09-23 NOTE — PROGRESS NOTES
Hospital Medicine Service -  Daily Progress Note       SUBJECTIVE   Interval History: Patient seen and examined at bedside. Denies CP, SOB, nausea, vomiting, foot pain.     OBJECTIVE      Vital signs in last 24 hours:  Temp:  [36.9 °C (98.4 °F)-37.1 °C (98.7 °F)] 36.9 °C (98.5 °F)  Heart Rate:  [70-86] 77  Resp:  [12-18] 16  BP: 169/71    Intake/Output Summary (Last 24 hours) at 9/23/2023 1130  Last data filed at 9/22/2023 1400  Gross per 24 hour   Intake 120 ml   Output --   Net 120 ml       PHYSICAL EXAMINATION      Physical Exam  General: NAD, calm, pleasant  HEENT: atraumatic and normocephalic  Eyes:) Normal  Cardiovascular: RRR, no murmurs; S1/S2+  Pulmonary: CTAB; no rales, rhonchi or wheezing  Gi: Soft, nontender, nondistended  Ext: LLE wrapped  Neuro: Alert and oriented x3; no sensory or motor deficits  Psych: Calm, appropriate answers     LINES, CATHETERS, DRAINS, AIRWAYS, AND WOUNDS   Lines, Drains, and Airways:  Wounds (agree with documentation and present on admission):  Peripheral IV (Adult) 09/20/23 Anterior;Left Forearm (Active)   Number of days: 2       Peripheral IV (Adult) 09/21/23 Left Hand (Active)   Number of days: 1       Wound Surgical Left Foot (Active)   Number of days: 1647       Wound Venous Ulcer Left;Lateral Foot (Active)   Number of days: 1486       Surgical Incision Foot (Active)   Number of days: 1         Comments:      LABS / IMAGING / TELE      Labs  Results from last 7 days   Lab Units 09/23/23  0524   WBC K/uL 9.18   HEMOGLOBIN g/dL 8.5*   HEMATOCRIT % 28.2*   PLATELETS K/uL 206     Results from last 7 days   Lab Units 09/23/23  0524   SODIUM mEQ/L 140   POTASSIUM mEQ/L 3.9   CHLORIDE mEQ/L 109*   CO2 mEQ/L 23   BUN mg/dL 23   CREATININE mg/dL 3.2*   GLUCOSE mg/dL 102*   CALCIUM mg/dL 8.2*         Imaging  I have independently reviewed the pertinent imaging from the last 24 hrs.      ASSESSMENT AND PLAN      Anemia  Assessment & Plan  -Stable  -Continue to monitor    Type 2  diabetes mellitus (CMS/[facility])  Assessment & Plan  -Home regimen is glargine 28 units nightly and Januvia 100mg daily  -Continue SSI and accuchecks  - NCS diet    Acute kidney injury superimposed on CKD (CMS/[facility])  Assessment & Plan  -Creatinine 3.2 from 2.9 yesterday  -Likely with ANURADHA on CKD. improving  -Nephro on board.   - ensure adequate hydration  - avoid nephrotoxins    Hypertension  Assessment & Plan  -Uncontrolled: BP remains high  -Amlodipine dose was increased and hydralazine added yesterday.  As BP remains high will increase hydralazine dose today  -Holding ACE inhibitor for ANURADHA  -Continue to monitor pressures  -nephrology following  - optimize pain control    History of CVA (cerebrovascular accident)  Assessment & Plan  -Continue plavix and statin    * Infection of left foot  Assessment & Plan  -Events noted  -Continue care per podiatry. S/P debridement, bone biopsy t  -Continue unasyn per ID  -Follow cultures  -Continue supportive care       VTE Assessment: Padua    VTE Prophylaxis:  Current anticoagulants:  [MAR Hold] enoxaparin (LOVENOX) syringe 30 mg, subcutaneous, Daily (6p)      Code Status: Full Code      Estimated Discharge Date: 9/21/2023       Disposition Planning: per podiatry     Vasu Whaley MD  9/23/2023

## 2023-09-24 LAB
ANION GAP SERPL CALC-SCNC: 10 MEQ/L (ref 3–15)
BUN SERPL-MCNC: 22 MG/DL (ref 7–25)
CALCIUM SERPL-MCNC: 8.3 MG/DL (ref 8.6–10.3)
CHLORIDE SERPL-SCNC: 107 MEQ/L (ref 98–107)
CO2 SERPL-SCNC: 23 MEQ/L (ref 21–31)
CREAT SERPL-MCNC: 3.4 MG/DL (ref 0.7–1.3)
ERYTHROCYTE [DISTWIDTH] IN BLOOD BY AUTOMATED COUNT: 16.1 % (ref 11.6–14.4)
GFR SERPL CREATININE-BSD FRML MDRD: 18.5 ML/MIN/1.73M*2
GLUCOSE BLD-MCNC: 102 MG/DL (ref 70–99)
GLUCOSE BLD-MCNC: 110 MG/DL (ref 70–99)
GLUCOSE BLD-MCNC: 121 MG/DL (ref 70–99)
GLUCOSE BLD-MCNC: 81 MG/DL (ref 70–99)
GLUCOSE SERPL-MCNC: 75 MG/DL (ref 70–99)
GRAM STN SPEC: ABNORMAL
HCT VFR BLDCO AUTO: 29.3 % (ref 40.1–51)
HGB BLD-MCNC: 8.7 G/DL (ref 13.7–17.5)
MCH RBC QN AUTO: 24.2 PG (ref 28–33.2)
MCHC RBC AUTO-ENTMCNC: 29.7 G/DL (ref 32.2–36.5)
MCV RBC AUTO: 81.4 FL (ref 83–98)
MICROORGANISM SPEC CULT: ABNORMAL
PDW BLD AUTO: 10.4 FL (ref 9.4–12.4)
PLATELET # BLD AUTO: 340 K/UL (ref 150–350)
POCT TEST: ABNORMAL
POCT TEST: NORMAL
POTASSIUM SERPL-SCNC: 3.8 MEQ/L (ref 3.5–5.1)
RBC # BLD AUTO: 3.6 M/UL (ref 4.5–5.8)
SODIUM SERPL-SCNC: 140 MEQ/L (ref 136–145)
WBC # BLD AUTO: 7.87 K/UL (ref 3.8–10.5)

## 2023-09-24 PROCEDURE — 99232 SBSQ HOSP IP/OBS MODERATE 35: CPT | Performed by: HOSPITALIST

## 2023-09-24 PROCEDURE — 63700000 HC SELF-ADMINISTRABLE DRUG

## 2023-09-24 PROCEDURE — 63600000 HC DRUGS/DETAIL CODE: Mod: JZ

## 2023-09-24 PROCEDURE — 80048 BASIC METABOLIC PNL TOTAL CA: CPT

## 2023-09-24 PROCEDURE — 12000000 HC ROOM AND CARE MED/SURG

## 2023-09-24 PROCEDURE — 63600000 HC DRUGS/DETAIL CODE: Mod: JZ | Performed by: INTERNAL MEDICINE

## 2023-09-24 PROCEDURE — 25800000 HC PHARMACY IV SOLUTIONS

## 2023-09-24 PROCEDURE — 85027 COMPLETE CBC AUTOMATED: CPT

## 2023-09-24 PROCEDURE — 36415 COLL VENOUS BLD VENIPUNCTURE: CPT

## 2023-09-24 PROCEDURE — 63700000 HC SELF-ADMINISTRABLE DRUG: Performed by: HOSPITALIST

## 2023-09-24 PROCEDURE — 25800000 HC PHARMACY IV SOLUTIONS: Performed by: INTERNAL MEDICINE

## 2023-09-24 RX ORDER — ONDANSETRON 4 MG/1
4 TABLET, ORALLY DISINTEGRATING ORAL EVERY 8 HOURS PRN
Status: DISCONTINUED | OUTPATIENT
Start: 2023-09-24 | End: 2023-09-26 | Stop reason: HOSPADM

## 2023-09-24 RX ORDER — LABETALOL 100 MG/1
100 TABLET, FILM COATED ORAL EVERY 12 HOURS
Status: DISCONTINUED | OUTPATIENT
Start: 2023-09-24 | End: 2023-09-26

## 2023-09-24 RX ORDER — ONDANSETRON HYDROCHLORIDE 2 MG/ML
4 INJECTION, SOLUTION INTRAVENOUS EVERY 8 HOURS PRN
Status: DISCONTINUED | OUTPATIENT
Start: 2023-09-24 | End: 2023-09-26 | Stop reason: HOSPADM

## 2023-09-24 RX ADMIN — AMPICILLIN SODIUM AND SULBACTAM SODIUM 3 G: 2; 1 INJECTION, POWDER, FOR SOLUTION INTRAMUSCULAR; INTRAVENOUS at 21:37

## 2023-09-24 RX ADMIN — INSULIN GLARGINE 28 UNITS: 100 INJECTION, SOLUTION SUBCUTANEOUS at 21:27

## 2023-09-24 RX ADMIN — ALUMINUM HYDROXIDE, MAGNESIUM HYDROXIDE, AND DIMETHICONE 30 ML: 200; 20; 200 SUSPENSION ORAL at 18:54

## 2023-09-24 RX ADMIN — AMPICILLIN SODIUM AND SULBACTAM SODIUM 3 G: 2; 1 INJECTION, POWDER, FOR SOLUTION INTRAMUSCULAR; INTRAVENOUS at 13:13

## 2023-09-24 RX ADMIN — LABETALOL HYDROCHLORIDE 100 MG: 100 TABLET, FILM COATED ORAL at 21:29

## 2023-09-24 RX ADMIN — CLOPIDOGREL BISULFATE 75 MG: 75 TABLET ORAL at 09:29

## 2023-09-24 RX ADMIN — HYDRALAZINE HYDROCHLORIDE 50 MG: 50 TABLET, FILM COATED ORAL at 21:28

## 2023-09-24 RX ADMIN — HYDRALAZINE HYDROCHLORIDE 50 MG: 50 TABLET, FILM COATED ORAL at 16:44

## 2023-09-24 RX ADMIN — AMLODIPINE BESYLATE 10 MG: 10 TABLET ORAL at 09:28

## 2023-09-24 RX ADMIN — LABETALOL HYDROCHLORIDE 100 MG: 100 TABLET, FILM COATED ORAL at 09:29

## 2023-09-24 RX ADMIN — HYDRALAZINE HYDROCHLORIDE 50 MG: 50 TABLET, FILM COATED ORAL at 05:38

## 2023-09-24 RX ADMIN — AMPICILLIN SODIUM AND SULBACTAM SODIUM 3 G: 2; 1 INJECTION, POWDER, FOR SOLUTION INTRAMUSCULAR; INTRAVENOUS at 03:34

## 2023-09-24 RX ADMIN — ROSUVASTATIN CALCIUM 10 MG: 10 TABLET, FILM COATED ORAL at 09:29

## 2023-09-24 RX ADMIN — ENOXAPARIN SODIUM 30 MG: 30 INJECTION SUBCUTANEOUS at 17:38

## 2023-09-24 ASSESSMENT — COGNITIVE AND FUNCTIONAL STATUS - GENERAL
WALKING IN HOSPITAL ROOM: 3 - A LITTLE
STANDING UP FROM CHAIR USING ARMS: 3 - A LITTLE
CLIMB 3 TO 5 STEPS WITH RAILING: 3 - A LITTLE
MOVING TO AND FROM BED TO CHAIR: 3 - A LITTLE

## 2023-09-24 NOTE — PLAN OF CARE
Plan of Care Review  Plan of Care Reviewed With: patient  Progress: improving  Outcome Evaluation: The patient assists x1 OOB with RW to the bathroom and NWB on the LLE. IVPB Unasyn maintains. ATC Hydralazine controls BP. He denies pain during this shift. According to the monitor room, the patient had 4 bts of SVT, HR 83, asymptomatic. Dr. Hernandez notified and no n/o at this time and continue to monitor. The plan for this patient is to wait for the final abx and follow up with Dr. Tee outpatient.

## 2023-09-24 NOTE — PROGRESS NOTES
Subjective:      Patient denies any complaints.  Pain is well controlled.  He is post op day #3 post left foot incision and drainage, bone biopsy, graft application.  blood pressure has normalized.    Objective:    Vital signs in last 24 hours:    Temp:  [36.4 °C (97.6 °F)-37 °C (98.6 °F)] 37 °C (98.6 °F)  Heart Rate:  [70-80] 74  Resp:  [16-20] 16  BP: (133-206)/(60-90) 133/60      Intake/Output Summary (Last 24 hours) at 9/24/2023 1310  Last data filed at 9/23/2023 1334  Gross per 24 hour   Intake 1700 ml   Output 300 ml   Net 1400 ml       Intake/Output this shift:    No intake/output data recorded.    Physical Exam:     General: well appearing, NAD, aaox4/4 on room air   HEENT: AT NC PERRL EOMI  Cardiovascular: RR NL S1S2 no m /g/r   Pulmonary/Chest: CTA BL no /w/cr  Abdomen:  Soft, nt, nd   Extremities: warm, trace LLE edema, +L TMA w/wound dressing   Skin: no rash   Neurologic:  CN II -XII intact, no fnd   MSK:  L TMA, leg is swollen, dressing is clean, dry, intact  Psychiatric: normal          Labs:    Results from last 7 days   Lab Units 09/24/23  0342 09/23/23  0524 09/22/23  0455 09/21/23  1059 09/20/23  0515 09/19/23  2107   SODIUM mEQ/L 140 140 139 138 135* 133*   POTASSIUM mEQ/L 3.8 3.9 4.2 4.4 3.8 3.9   CHLORIDE mEQ/L 107 109* 109* 107 104 101   CO2 mEQ/L 23 23 22 24 23 21   GLUCOSE mg/dL 75 102* 210* 160* 179* 210*   BUN mg/dL 22 23 32* 35* 42* 45*   CREATININE mg/dL 3.4* 3.2* 2.9* 2.9* 3.0* 3.4*   CALCIUM mg/dL 8.3* 8.2* 8.0* 8.4* 8.3* 8.2*   ANION GAP mEQ/L 10 8 8 7 8 11   EGFR mL/min/1.73m*2 18.5* 19.8* 22.2* 22.2* 21.4* 18.5*       Results from last 7 days   Lab Units 09/24/23  0342 09/23/23  0524 09/22/23  0455 09/21/23  1059 09/20/23  0515 09/19/23  2107   WBC K/uL 7.87 9.18 7.83 7.69 11.00* 14.05*   RBC M/uL 3.60* 3.49* 3.28* 3.16* 3.37* 3.02*   HEMOGLOBIN g/dL 8.7* 8.5* 8.0* 7.6* 8.2* 7.3*  7.3*   HEMATOCRIT % 29.3* 28.2* 26.6* 25.2* 26.6* 23.3*  23.3*   MCV fL 81.4* 80.8* 81.1* 79.7* 78.9*  77.2*   MCH pg 24.2* 24.4* 24.4* 24.1* 24.3* 24.2*   MCHC g/dL 29.7* 30.1* 30.1* 30.2* 30.8* 31.3*   PLATELETS K/uL 340 206 221 213 249 187       Assessments/Plan:      ANURADHA (acute kidney injury) (CMS/[facility])  Assessment & Plan  Renal function is  slightly worse.  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  Continue antibiotics.  Complements level are pending.  Discussed with patient.  Explained that if renal function continues to decline he might need dialysis.  Wife is present at bedside.    Nephrotic syndrome  Most likely result of diabetic nephropathy.  Management as outpatient.    Hypertension  Assessment & Plan  Blood pressure has improved today.  Continue holding lisinopril.  Continue hydralazine.    Type 2 diabetes mellitus (CMS/[facility])  Assessment & Plan  Needs better long-term glycemic control   Avoid SGLT2i per hx amputation     He has severe albuminuria, suggestive of DKD  He will likely need to establish care as an outpatient for CKD care.   When anuradha resolved, optimal CKD care will include his lisinopril , and consideration of adding finerenone as an outpatient           Duration of encounter 60 minutes    Jackie Joyner M.D.

## 2023-09-24 NOTE — PROGRESS NOTES
Hospital Medicine Service -  Daily Progress Note       SUBJECTIVE   Interval History: Patient seen and examined earlier this morning.   Denies any CP or SOB. No abdominal pain.    OBJECTIVE      Vital signs in last 24 hours:  Temp:  [36.9 °C (98.4 °F)-37.1 °C (98.7 °F)] 36.9 °C (98.5 °F)  Heart Rate:  [70-86] 77  Resp:  [12-18] 16  BP: 169/71    Intake/Output Summary (Last 24 hours) at 9/24/2023 0912  Last data filed at 9/23/2023 1334  Gross per 24 hour   Intake 1700 ml   Output 700 ml   Net 1000 ml       PHYSICAL EXAMINATION      Physical Exam  General: NAD, calm, pleasant  HEENT: atraumatic and normocephalic  Eyes:) Normal  Cardiovascular: RRR, no murmurs; S1/S2+  Pulmonary: CTAB; no rales, rhonchi or wheezing  Gi: Soft, nontender, nondistended  Ext: LLE wrapped  Neuro: Alert and oriented x3; no sensory or motor deficits  Psych: Calm, appropriate answers     LINES, CATHETERS, DRAINS, AIRWAYS, AND WOUNDS   Lines, Drains, and Airways:  Wounds (agree with documentation and present on admission):  Peripheral IV (Adult) 09/20/23 Anterior;Left Forearm (Active)   Number of days: 2       Peripheral IV (Adult) 09/21/23 Left Hand (Active)   Number of days: 1       Wound Surgical Left Foot (Active)   Number of days: 1647       Wound Venous Ulcer Left;Lateral Foot (Active)   Number of days: 1486       Surgical Incision Foot (Active)   Number of days: 1         Comments:      LABS / IMAGING / TELE      Labs  Results from last 7 days   Lab Units 09/24/23  0342   WBC K/uL 7.87   HEMOGLOBIN g/dL 8.7*   HEMATOCRIT % 29.3*   PLATELETS K/uL 340     Results from last 7 days   Lab Units 09/24/23  0342   SODIUM mEQ/L 140   POTASSIUM mEQ/L 3.8   CHLORIDE mEQ/L 107   CO2 mEQ/L 23   BUN mg/dL 22   CREATININE mg/dL 3.4*   GLUCOSE mg/dL 75   CALCIUM mg/dL 8.3*         Imaging  I have independently reviewed the pertinent imaging from the last 24 hrs.      ASSESSMENT AND PLAN      Anemia  Assessment & Plan  -Stable  -Continue to  monitor    Type 2 diabetes mellitus (CMS/[facility])  Assessment & Plan  -Home regimen is glargine 28 units nightly and Januvia 100mg daily  -Continue SSI and accuchecks  - NCS diet    Acute kidney injury superimposed on CKD (CMS/[facility])  Assessment & Plan  -Creatinine 3.4 today from 3.2 yesterday  -Likely with ANURADHA on CKD  -Nephro on board: appreciate recs  - ensure adequate hydration  - avoid nephrotoxins    Hypertension  Assessment & Plan  Uncontrolled: improved BP today  -Cont amlodipine and hydralazine as ordered.  Added labetalol p.o. this morning  -Holding ACE inhibitor for ANURADHA  -Continue to monitor pressures  -nephrology following  - optimize pain control    History of CVA (cerebrovascular accident)  Assessment & Plan  -Continue plavix and statin    * Infection of left foot  Assessment & Plan  -Events noted  -Continue care per podiatry. S/P debridement, bone biopsy   -Continue unasyn per ID  -Follow cultures  -Continue supportive care       VTE Assessment: Padua    VTE Prophylaxis:  Current anticoagulants:  enoxaparin (LOVENOX) syringe 30 mg, subcutaneous, Daily (6p)      Code Status: Full Code      Estimated Discharge Date: 9/21/2023       Disposition Planning: per podiatry     Vasu Whaley MD  9/24/2023

## 2023-09-24 NOTE — PROGRESS NOTES
Subjective:   Patient seen at bedside for  POD#3 s/p left foot incision and drainage, bone biopsy, and graft application. NAD. No overnight events. Dressing clean, dry, and intact.  Patient was seen at bedside with Dr. Tee.  Discussed that we are still waiting on finalized microbiology cultures and final infectious disease recommendations. Denies any N/V/F/C/CP/SOB.     ROS:   Past Medical/Surgical history, Allergies, Meds reviewed in detail as charted  FH/SH reviewed in detail as charted  Review of Systems:  Head and Neck: No complaints  Chest : No complaints  Abdomen: No Complaints  Constitutional: Unremarkable     Vitals: I have reviewed the patient's current vital signs as documented in the patient's EMR.    Radiology: No new Radiology.    Labs:   CBC Results       09/24/23 09/23/23 09/22/23     0342 0524 0455    WBC 7.87 9.18 7.83    RBC 3.60 3.49 3.28    HGB 8.7 8.5 8.0    HCT 29.3 28.2 26.6    MCV 81.4 80.8 81.1    MCH 24.2 24.4 24.4    MCHC 29.7 30.1 30.1     206 221           Objective:   -Vascular: DP/PT pulses are palpable B/L.  CRT is less than 3 seconds to all digits.  Skin temperature is warm from leg to digits bilaterally, slightly warmer to left lower extremity.  -Ortho: Left lower extremity Chopart amputation noted.  No pain noted on palpation left lower extremity.  -Neuro: Light touch and epicritic sensation is intact.  -Derm: Grafts to posterior ankle and distal stump are intact and secured with staples.  Grafts appear to be well adhered.  Tissue underlying grafts appears to be fibrogranular in nature, but difficult to appreciate the full wound base as there is presence of overlying graft.  There are multiple hyperkeratotic skin islands noted to the left lower extremity.    Assessment:  Harvey Lucero Jr. is a 63 y.o. male presents POD#3 s/p left foot incision and drainage, bone biopsy, and graft application.    Plan:  -Patient was seen at bedside with Dr. Tee  -Patient  examined, evaluated, and treated. All questions and concerns addressed  -Dressing includes Adaptic overlying the grafts, gauze, ABD, Kerlix, and Ace bandage for compression.  -OR findings: Necrotic tissue debrided. Mild purulence expressed. Graft applied to dorsal wound and posterior wound.   -OR specimen:               Bone to pathology: Acute osteomyelitis              Bone to microbiology: Proteus vulgaris, Streptococcus constellatus,  Enterococcus faecalis  -Blood cultures: No growth to date  -Vancomycin and Zosyn were discontinued and Unasyn was started per ID recommendation.  -Patient is nonweightbearing to left lower extremity.  -Pain modalities on board as needed  -Patient is likely to require at minimum 6 weeks of IV antibiotics.  -Discharge pending finalized microbiology cultures and final antibiotic recommendations.  -Patient is to follow-up with Dr. Tee outpatient for continued management.  -Please contact the on call Podiatry resident with any questions or concerns.     Adia Purdy, PGY-1  Pager #[phone]

## 2023-09-24 NOTE — PLAN OF CARE
Plan of Care Review  Plan of Care Reviewed With: patient  Progress: improving  Outcome Evaluation: pt is Ax1 with walker, NWB on LLE, LLE dressing c/d/i, pt has no complaints of pain during shift, IVPB unasyn given per order, NSR on monitor, wife at bedside, pt and wife aware of plan of care

## 2023-09-25 LAB
ANION GAP SERPL CALC-SCNC: 10 MEQ/L (ref 3–15)
BACTERIA BLD CULT: NORMAL
BUN SERPL-MCNC: 24 MG/DL (ref 7–25)
C3 SERPL-MCNC: 104 MG/DL (ref 78–175)
C4 SERPL-MCNC: 19 MG/DL (ref 12.9–39.2)
CALCIUM SERPL-MCNC: 8.3 MG/DL (ref 8.6–10.3)
CHLORIDE SERPL-SCNC: 105 MEQ/L (ref 98–107)
CO2 SERPL-SCNC: 23 MEQ/L (ref 21–31)
CREAT SERPL-MCNC: 3.8 MG/DL (ref 0.7–1.3)
ERYTHROCYTE [DISTWIDTH] IN BLOOD BY AUTOMATED COUNT: 16.6 % (ref 11.6–14.4)
GFR SERPL CREATININE-BSD FRML MDRD: 16.3 ML/MIN/1.73M*2
GLUCOSE BLD-MCNC: 117 MG/DL (ref 70–99)
GLUCOSE BLD-MCNC: 130 MG/DL (ref 70–99)
GLUCOSE BLD-MCNC: 156 MG/DL (ref 70–99)
GLUCOSE BLD-MCNC: 82 MG/DL (ref 70–99)
GLUCOSE SERPL-MCNC: 80 MG/DL (ref 70–99)
HCT VFR BLDCO AUTO: 28.5 % (ref 40.1–51)
HGB BLD-MCNC: 8.9 G/DL (ref 13.7–17.5)
MCH RBC QN AUTO: 24.8 PG (ref 28–33.2)
MCHC RBC AUTO-ENTMCNC: 31.2 G/DL (ref 32.2–36.5)
MCV RBC AUTO: 79.4 FL (ref 83–98)
PDW BLD AUTO: 10.7 FL (ref 9.4–12.4)
PLATELET # BLD AUTO: 292 K/UL (ref 150–350)
POCT TEST: ABNORMAL
POCT TEST: NORMAL
POTASSIUM SERPL-SCNC: 3.8 MEQ/L (ref 3.5–5.1)
RBC # BLD AUTO: 3.59 M/UL (ref 4.5–5.8)
SODIUM SERPL-SCNC: 138 MEQ/L (ref 136–145)
WBC # BLD AUTO: 8.77 K/UL (ref 3.8–10.5)

## 2023-09-25 PROCEDURE — 63700000 HC SELF-ADMINISTRABLE DRUG

## 2023-09-25 PROCEDURE — 80048 BASIC METABOLIC PNL TOTAL CA: CPT

## 2023-09-25 PROCEDURE — 63600000 HC DRUGS/DETAIL CODE: Mod: JZ

## 2023-09-25 PROCEDURE — 63700000 HC SELF-ADMINISTRABLE DRUG: Performed by: INTERNAL MEDICINE

## 2023-09-25 PROCEDURE — 85027 COMPLETE CBC AUTOMATED: CPT

## 2023-09-25 PROCEDURE — 20600000 HC ROOM AND CARE INTERMEDIATE/TELEMETRY

## 2023-09-25 PROCEDURE — 25800000 HC PHARMACY IV SOLUTIONS

## 2023-09-25 PROCEDURE — 36415 COLL VENOUS BLD VENIPUNCTURE: CPT

## 2023-09-25 PROCEDURE — 99232 SBSQ HOSP IP/OBS MODERATE 35: CPT | Performed by: HOSPITALIST

## 2023-09-25 RX ORDER — METRONIDAZOLE 500 MG/1
500 TABLET ORAL 3 TIMES DAILY
Status: DISCONTINUED | OUTPATIENT
Start: 2023-09-25 | End: 2023-09-26 | Stop reason: HOSPADM

## 2023-09-25 RX ORDER — CIPROFLOXACIN 250 MG/1
250 TABLET, FILM COATED ORAL 2 TIMES DAILY
Status: DISCONTINUED | OUTPATIENT
Start: 2023-09-25 | End: 2023-09-26

## 2023-09-25 RX ORDER — LINEZOLID 600 MG/1
600 TABLET, FILM COATED ORAL 2 TIMES DAILY
Status: DISCONTINUED | OUTPATIENT
Start: 2023-09-25 | End: 2023-09-26

## 2023-09-25 RX ADMIN — LABETALOL HYDROCHLORIDE 100 MG: 100 TABLET, FILM COATED ORAL at 08:16

## 2023-09-25 RX ADMIN — LINEZOLID 600 MG: 600 TABLET, FILM COATED ORAL at 21:41

## 2023-09-25 RX ADMIN — HYDRALAZINE HYDROCHLORIDE 50 MG: 50 TABLET, FILM COATED ORAL at 05:01

## 2023-09-25 RX ADMIN — HYDRALAZINE HYDROCHLORIDE 50 MG: 50 TABLET, FILM COATED ORAL at 14:56

## 2023-09-25 RX ADMIN — INSULIN GLARGINE 28 UNITS: 100 INJECTION, SOLUTION SUBCUTANEOUS at 21:44

## 2023-09-25 RX ADMIN — METRONIDAZOLE 500 MG: 500 TABLET ORAL at 21:41

## 2023-09-25 RX ADMIN — AMLODIPINE BESYLATE 10 MG: 10 TABLET ORAL at 08:16

## 2023-09-25 RX ADMIN — CIPROFLOXACIN 250 MG: 250 TABLET, FILM COATED ORAL at 21:41

## 2023-09-25 RX ADMIN — ALUMINUM HYDROXIDE, MAGNESIUM HYDROXIDE, AND DIMETHICONE 30 ML: 200; 20; 200 SUSPENSION ORAL at 19:41

## 2023-09-25 RX ADMIN — HYDRALAZINE HYDROCHLORIDE 50 MG: 50 TABLET, FILM COATED ORAL at 21:41

## 2023-09-25 RX ADMIN — AMPICILLIN SODIUM AND SULBACTAM SODIUM 3 G: 2; 1 INJECTION, POWDER, FOR SOLUTION INTRAMUSCULAR; INTRAVENOUS at 04:59

## 2023-09-25 RX ADMIN — ROSUVASTATIN CALCIUM 10 MG: 10 TABLET, FILM COATED ORAL at 08:17

## 2023-09-25 RX ADMIN — LABETALOL HYDROCHLORIDE 100 MG: 100 TABLET, FILM COATED ORAL at 21:41

## 2023-09-25 RX ADMIN — CLOPIDOGREL BISULFATE 75 MG: 75 TABLET ORAL at 08:16

## 2023-09-25 RX ADMIN — ENOXAPARIN SODIUM 30 MG: 30 INJECTION SUBCUTANEOUS at 18:30

## 2023-09-25 NOTE — PLAN OF CARE
Problem: Adult Inpatient Plan of Care  Goal: Plan of Care Review  Outcome: Progressing  Flowsheets (Taken 9/25/2023 0257)  Progress: no change  Outcome Evaluation: AAOX4, no c/o pain through out shift, IV antibx administered, accuchecks ACHS, left foot wound dressings per podiatry, asst. X1 with a walker OOB, NWB on LLE, waiting for micro culture results and ID recs, NSR on tele  Plan of Care Reviewed With: patient   Plan of Care Review  Plan of Care Reviewed With: patient  Progress: no change  Outcome Evaluation: AAOX4, no c/o pain through out shift, IV antibx administered, accuchecks ACHS, left foot wound dressings per podiatry, asst. X1 with a walker OOB, NWB on LLE, waiting for micro culture results and ID recs, NSR on tele

## 2023-09-25 NOTE — PLAN OF CARE
Plan of Care Review  Plan of Care Reviewed With: patient  Progress: no change  Outcome Evaluation: Patient supervision OOB with the walker. Blood sugars checked. No c/o pain. Awaiting cultures for abx recs. L foot wounds dressings per podiatry.

## 2023-09-25 NOTE — PROGRESS NOTES
Subjective:   Patient seen at bedside for  POD#4 s/p left foot incision and drainage, bone biopsy, and graft application. NAD. No overnight events. Dressing clean, dry, and intact.   Discussed that we are still waiting on finalized microbiology cultures and final infectious disease recommendations. Denies any N/V/F/C/CP/SOB.     ROS:   Past Medical/Surgical history, Allergies, Meds reviewed in detail as charted  FH/SH reviewed in detail as charted  Review of Systems:  Head and Neck: No complaints  Chest : No complaints  Abdomen: No Complaints  Constitutional: Unremarkable     Vitals: I have reviewed the patient's current vital signs as documented in the patient's EMR.    Radiology: No new Radiology.    Labs:   CBC Results       09/25/23 09/24/23 09/23/23     0457 0342 0524    WBC 8.77 7.87 9.18    RBC 3.59 3.60 3.49    HGB 8.9 8.7 8.5    HCT 28.5 29.3 28.2    MCV 79.4 81.4 80.8    MCH 24.8 24.2 24.4    MCHC 31.2 29.7 30.1     340 206        BMP Results       09/25/23 09/24/23 09/23/23     0457 0342 0524     140 140    K 3.8 3.8 3.9    Cl 105 107 109    CO2 23 23 23    Glucose 80 75 102    BUN 24 22 23    Creatinine 3.8 3.4 3.2    Calcium 8.3 8.3 8.2    Anion Gap 10 10 8    EGFR 16.3 18.5 19.8        Objective:   -Vascular: DP/PT pulses are palpable B/L.  CRT is less than 3 seconds to all digits.  Skin temperature is warm from leg to digits bilaterally, slightly warmer to left lower extremity.  -Ortho: Left lower extremity Chopart amputation noted.  No pain noted on palpation left lower extremity.  -Neuro: Light touch and epicritic sensation is intact.  -Derm: Grafts to posterior ankle and distal stump are intact and secured with staples.  Grafts appear to be well adhered.  Tissue underlying grafts appears to be fibrogranular in nature, but difficult to appreciate the full wound base as there is presence of overlying graft.  There are multiple hyperkeratotic skin islands noted to the left lower  extremity.    Assessment:  Harvey Lucero Jr. is a 63 y.o. male presents POD#4 s/p left foot incision and drainage, bone biopsy, and graft application.    Plan:  -Patient examined, evaluated, and treated. All questions and concerns addressed  -Dressing includes Adaptic overlying the grafts, gauze, ABD, Kerlix, and Ace bandage for compression.  -OR findings: Necrotic tissue debrided. Mild purulence expressed. Graft applied to dorsal wound and posterior wound.   -OR specimen:               Bone to pathology: Acute osteomyelitis              Bone to microbiology: Proteus vulgaris, Streptococcus constellatus,  Enterococcus faecalis  -Blood cultures: No growth to date  -Vancomycin and Zosyn were discontinued and Unasyn was started per ID recommendation.  -Patient is nonweightbearing to left lower extremity.  -Pain modalities on board as needed  -Patient is likely to require at minimum 6 weeks of IV antibiotics.  -Discharge pending final antibiotic recommendations per ID team.  -Appreciate CM/SW assistance with discharge  -Patient is to follow-up with Dr. Tee outpatient for continued management.  -Please contact the on call Podiatry resident with any questions or concerns.     Iftikhar Jimenes, PGY-1  Pager #[phone]

## 2023-09-25 NOTE — PLAN OF CARE
Care Coordination Discharge Plan Note     Discharge Needs Assessment  Concerns to be Addressed: discharge planning  Current Discharge Risk: physical impairment    Anticipated Discharge Plan  Anticipated Discharge Disposition: home with assistance (may need home IV abx upon dc)       Patient Choice  Offered/Gave Vendor List: no  Patient's Choice of Community Agency(s): Pt denies needs upon dc         ---------------------------------------------------------------------------------------------------------------------    Interdisciplinary Discharge Plan Review:  Participants:advanced practice provider, nursing, patient, social work/services    Concerns Comments: Pt may need IV abx upon dc.    Discharge Plan:   Disposition/Destination: Home  / Home  Discharge Facility:    Community Resources:      Discharge Transportation:  Is Out of Hospital DNR needed at Discharge: no  Does patient need discharge transport? No (family to transport)           Per discharge planning rounds, not anticipating dc today due to labs and awaiting biopsy results. Plan remains for pt to go home with spouse upon dc. Abx plan pending. [facility] following for IV abx needs.     CC updated Marg[facility] of [facility] via secure chat of pt's status. CC to follow.     Dispo- home, pending abx plan

## 2023-09-25 NOTE — PROGRESS NOTES
Hospital Medicine Service -  Daily Progress Note       SUBJECTIVE   Interval History: Patient seen and examined earlier this morning. No acute complaints  Denies any CP or SOB. No abdominal pain.    OBJECTIVE      Vital signs in last 24 hours:  Temp:  [36.9 °C (98.4 °F)-37.1 °C (98.7 °F)] 36.9 °C (98.5 °F)  Heart Rate:  [70-86] 77  Resp:  [12-18] 16  BP: 169/71    Intake/Output Summary (Last 24 hours) at 9/25/2023 0939  Last data filed at 9/24/2023 2000  Gross per 24 hour   Intake 410 ml   Output 525 ml   Net -115 ml       PHYSICAL EXAMINATION      Physical Exam  General: NAD, calm, pleasant  HEENT: atraumatic and normocephalic  Eyes:) Normal  Cardiovascular: RRR, no murmurs; S1/S2+  Pulmonary: CTAB; no rales, rhonchi or wheezing  Gi: Soft, nontender, nondistended  Ext: LLE wrapped  Neuro: Alert and oriented x3; no sensory or motor deficits  Psych: Calm, appropriate answers     LINES, CATHETERS, DRAINS, AIRWAYS, AND WOUNDS   Lines, Drains, and Airways:  Wounds (agree with documentation and present on admission):  Peripheral IV (Adult) 09/20/23 Anterior;Left Forearm (Active)   Number of days: 2       Peripheral IV (Adult) 09/21/23 Left Hand (Active)   Number of days: 1       Wound Surgical Left Foot (Active)   Number of days: 1647       Wound Venous Ulcer Left;Lateral Foot (Active)   Number of days: 1486       Surgical Incision Foot (Active)   Number of days: 1         Comments:      LABS / IMAGING / TELE      Labs  Results from last 7 days   Lab Units 09/25/23  0457   WBC K/uL 8.77   HEMOGLOBIN g/dL 8.9*   HEMATOCRIT % 28.5*   PLATELETS K/uL 292     Results from last 7 days   Lab Units 09/25/23  0457   SODIUM mEQ/L 138   POTASSIUM mEQ/L 3.8   CHLORIDE mEQ/L 105   CO2 mEQ/L 23   BUN mg/dL 24   CREATININE mg/dL 3.8*   GLUCOSE mg/dL 80   CALCIUM mg/dL 8.3*         Imaging  I have independently reviewed the pertinent imaging from the last 24 hrs.      ASSESSMENT AND PLAN      Anemia  Assessment &  Plan  -Stable  -Continue to monitor    Type 2 diabetes mellitus (CMS/[facility])  Assessment & Plan  -Home regimen is glargine 28 units nightly and Januvia 100mg daily  -Continue SSI and accuchecks  - NCS diet  -Discontinue Januvia at discharge per nephrology recommendations    Acute kidney injury superimposed on CKD (CMS/[facility])  Assessment & Plan  -Creatinine worsening 3.8 today  -Likely with ANURADHA on CKD  -Nephro on board: appreciate recs  - ensure adequate hydration  - avoid nephrotoxins  - monitor urine outpt    Hypertension  Assessment & Plan  Uncontrolled previously. Now improved BP  -Cont amlodipine and hydralazine as ordered.  Added labetalol   -Holding ACE inhibitor for ANURADHA  -Continue to monitor pressures  -nephrology following  - optimize pain control    History of CVA (cerebrovascular accident)  Assessment & Plan  -Continue plavix and statin    * Infection of left foot  Assessment & Plan  -Events noted  -Continue care per podiatry. S/P debridement, bone biopsy   -Continue unasyn per ID  -Follow cultures  -Continue supportive care       VTE Assessment: Padua    VTE Prophylaxis:  Current anticoagulants:  enoxaparin (LOVENOX) syringe 30 mg, subcutaneous, Daily (6p)      Code Status: Full Code      Estimated Discharge Date: 9/21/2023       Disposition Planning: per podiatry     Vasu Whaley MD  9/25/2023

## 2023-09-25 NOTE — PROGRESS NOTES
Subjective:      Patient denies any complaints.  Pain is well controlled.  Blood pressure has normalized.    Objective:    Vital signs in last 24 hours:    Temp:  [36.4 °C (97.6 °F)-37 °C (98.6 °F)] 36.4 °C (97.6 °F)  Heart Rate:  [71-83] 83  Resp:  [16-18] 18  BP: (133-166)/(60-75) 145/65      Intake/Output Summary (Last 24 hours) at 9/25/2023 1007  Last data filed at 9/24/2023 2000  Gross per 24 hour   Intake 410 ml   Output 225 ml   Net 185 ml       Intake/Output this shift:    No intake/output data recorded.    Physical Exam:     General: well appearing, NAD, aaox4/4 on room air   HEENT: AT NC PERRL EOMI  Cardiovascular: RR NL S1S2 no m /g/r   Pulmonary/Chest: CTA BL no /w/cr  Abdomen:  Soft, nt, nd   Extremities: warm, trace LLE edema, +L TMA w/wound dressing   Skin: no rash   Neurologic:  CN II -XII intact, no fnd   MSK:  L TMA, leg is swollen, dressing is clean, dry, intact  Psychiatric: normal          Labs:    Results from last 7 days   Lab Units 09/25/23  0457 09/24/23  0342 09/23/23  0524 09/22/23  0455 09/21/23  1059 09/20/23  0515 09/19/23  2107   SODIUM mEQ/L 138 140 140 139 138 135* 133*   POTASSIUM mEQ/L 3.8 3.8 3.9 4.2 4.4 3.8 3.9   CHLORIDE mEQ/L 105 107 109* 109* 107 104 101   CO2 mEQ/L 23 23 23 22 24 23 21   GLUCOSE mg/dL 80 75 102* 210* 160* 179* 210*   BUN mg/dL 24 22 23 32* 35* 42* 45*   CREATININE mg/dL 3.8* 3.4* 3.2* 2.9* 2.9* 3.0* 3.4*   CALCIUM mg/dL 8.3* 8.3* 8.2* 8.0* 8.4* 8.3* 8.2*   ANION GAP mEQ/L 10 10 8 8 7 8 11   EGFR mL/min/1.73m*2 16.3* 18.5* 19.8* 22.2* 22.2* 21.4* 18.5*       Results from last 7 days   Lab Units 09/25/23  0457 09/24/23  0342 09/23/23  0524 09/22/23  0455 09/21/23  1059 09/20/23  0515 09/19/23  2107   WBC K/uL 8.77 7.87 9.18 7.83 7.69 11.00* 14.05*   RBC M/uL 3.59* 3.60* 3.49* 3.28* 3.16* 3.37* 3.02*   HEMOGLOBIN g/dL 8.9* 8.7* 8.5* 8.0* 7.6* 8.2* 7.3*  7.3*   HEMATOCRIT % 28.5* 29.3* 28.2* 26.6* 25.2* 26.6* 23.3*  23.3*   MCV fL 79.4* 81.4* 80.8* 81.1* 79.7*  78.9* 77.2*   MCH pg 24.8* 24.2* 24.4* 24.4* 24.1* 24.3* 24.2*   MCHC g/dL 31.2* 29.7* 30.1* 30.1* 30.2* 30.8* 31.3*   PLATELETS K/uL 292 340 206 221 213 249 187       Assessments/Plan:      ANURADHA (acute kidney injury) (CMS/[facility])  Assessment & Plan  Renal function is  slightly worse.  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  Complements level are pending.  Also noted positive eosinophils in the urine.  Antibiotics will be changed.  Discussed with patient.  Explained that it is not a good idea to be discharged until renal function stabilized.  Patient understands and agrees.      Nephrotic syndrome  Most likely result of diabetic nephropathy.  Management as outpatient.    Hypertension  Assessment & Plan  Blood pressure has improved today.  Continue holding lisinopril.  Continue hydralazine.    Type 2 diabetes mellitus (CMS/[facility])  Assessment & Plan  Needs better long-term glycemic control   Avoid SGLT2i per hx amputation     He has severe albuminuria, suggestive of DKD  He will likely need to establish care as an outpatient for CKD care.   When anuradha resolved, optimal CKD care will include his lisinopril , and consideration of adding finerenone as an outpatient           Duration of encounter 60 minutes    Jackie Joyner M.D.

## 2023-09-26 VITALS
OXYGEN SATURATION: 97 % | WEIGHT: 219 LBS | BODY MASS INDEX: 28.11 KG/M2 | RESPIRATION RATE: 16 BRPM | HEART RATE: 70 BPM | TEMPERATURE: 97.9 F | SYSTOLIC BLOOD PRESSURE: 167 MMHG | HEIGHT: 74 IN | DIASTOLIC BLOOD PRESSURE: 74 MMHG

## 2023-09-26 LAB
ANION GAP SERPL CALC-SCNC: 12 MEQ/L (ref 3–15)
ATRIAL RATE: 70
BUN SERPL-MCNC: 24 MG/DL (ref 7–25)
CALCIUM SERPL-MCNC: 8.3 MG/DL (ref 8.6–10.3)
CHLORIDE SERPL-SCNC: 104 MEQ/L (ref 98–107)
CK SERPL-CCNC: 49 U/L (ref 30–223)
CO2 SERPL-SCNC: 21 MEQ/L (ref 21–31)
CREAT SERPL-MCNC: 3.9 MG/DL (ref 0.7–1.3)
ERYTHROCYTE [DISTWIDTH] IN BLOOD BY AUTOMATED COUNT: 16.7 % (ref 11.6–14.4)
GFR SERPL CREATININE-BSD FRML MDRD: 15.8 ML/MIN/1.73M*2
GLUCOSE BLD-MCNC: 140 MG/DL (ref 70–99)
GLUCOSE BLD-MCNC: 145 MG/DL (ref 70–99)
GLUCOSE SERPL-MCNC: 138 MG/DL (ref 70–99)
GRAM STN SPEC: ABNORMAL
GRAM STN SPEC: ABNORMAL
HCT VFR BLDCO AUTO: 30.2 % (ref 40.1–51)
HGB BLD-MCNC: 9.1 G/DL (ref 13.7–17.5)
MCH RBC QN AUTO: 24.4 PG (ref 28–33.2)
MCHC RBC AUTO-ENTMCNC: 30.1 G/DL (ref 32.2–36.5)
MCV RBC AUTO: 81 FL (ref 83–98)
MICROORGANISM SPEC CULT: ABNORMAL
P AXIS: 12
PDW BLD AUTO: 10.8 FL (ref 9.4–12.4)
PLATELET # BLD AUTO: 270 K/UL (ref 150–350)
POCT TEST: ABNORMAL
POCT TEST: ABNORMAL
POTASSIUM SERPL-SCNC: 4.3 MEQ/L (ref 3.5–5.1)
PR INTERVAL: 174
QRS DURATION: 106
QT INTERVAL: 440
QTC CALCULATION(BAZETT): 475
R AXIS: -4
RBC # BLD AUTO: 3.73 M/UL (ref 4.5–5.8)
SODIUM SERPL-SCNC: 137 MEQ/L (ref 136–145)
T WAVE AXIS: 38
VENTRICULAR RATE: 70
WBC # BLD AUTO: 9.29 K/UL (ref 3.8–10.5)

## 2023-09-26 PROCEDURE — 63600000 HC DRUGS/DETAIL CODE: Mod: JZ | Performed by: HOSPITALIST

## 2023-09-26 PROCEDURE — 63700000 HC SELF-ADMINISTRABLE DRUG: Performed by: HOSPITALIST

## 2023-09-26 PROCEDURE — 93010 ELECTROCARDIOGRAM REPORT: CPT | Performed by: STUDENT IN AN ORGANIZED HEALTH CARE EDUCATION/TRAINING PROGRAM

## 2023-09-26 PROCEDURE — 63700000 HC SELF-ADMINISTRABLE DRUG

## 2023-09-26 PROCEDURE — C1751 CATH, INF, PER/CENT/MIDLINE: HCPCS

## 2023-09-26 PROCEDURE — 63700000 HC SELF-ADMINISTRABLE DRUG: Performed by: INTERNAL MEDICINE

## 2023-09-26 PROCEDURE — 85027 COMPLETE CBC AUTOMATED: CPT

## 2023-09-26 PROCEDURE — 99232 SBSQ HOSP IP/OBS MODERATE 35: CPT | Performed by: HOSPITALIST

## 2023-09-26 PROCEDURE — 93005 ELECTROCARDIOGRAM TRACING: CPT | Performed by: INTERNAL MEDICINE

## 2023-09-26 PROCEDURE — 36415 COLL VENOUS BLD VENIPUNCTURE: CPT

## 2023-09-26 PROCEDURE — 82550 ASSAY OF CK (CPK): CPT | Performed by: INTERNAL MEDICINE

## 2023-09-26 PROCEDURE — 36573 INSJ PICC RS&I 5 YR+: CPT

## 2023-09-26 PROCEDURE — 63600000 HC DRUGS/DETAIL CODE: Mod: JZ | Performed by: INTERNAL MEDICINE

## 2023-09-26 PROCEDURE — 80048 BASIC METABOLIC PNL TOTAL CA: CPT

## 2023-09-26 PROCEDURE — 02HV33Z INSERTION OF INFUSION DEVICE INTO SUPERIOR VENA CAVA, PERCUTANEOUS APPROACH: ICD-10-PCS | Performed by: PODIATRIST

## 2023-09-26 PROCEDURE — 25800000 HC PHARMACY IV SOLUTIONS: Performed by: INTERNAL MEDICINE

## 2023-09-26 RX ORDER — SODIUM CHLORIDE 0.9 % (FLUSH) 0.9 %
10 SYRINGE (ML) INJECTION AS NEEDED
Status: DISCONTINUED | OUTPATIENT
Start: 2023-09-26 | End: 2023-09-26 | Stop reason: HOSPADM

## 2023-09-26 RX ORDER — CIPROFLOXACIN 500 MG/1
500 TABLET ORAL DAILY
Qty: 42 TABLET | Refills: 0 | Status: SHIPPED | OUTPATIENT
Start: 2023-09-27 | End: 2023-11-08

## 2023-09-26 RX ORDER — METRONIDAZOLE 500 MG/1
500 TABLET ORAL 3 TIMES DAILY
Qty: 126 TABLET | Refills: 0 | Status: SHIPPED | OUTPATIENT
Start: 2023-09-26 | End: 2023-11-07

## 2023-09-26 RX ORDER — AMLODIPINE BESYLATE 10 MG/1
10 TABLET ORAL DAILY
Qty: 30 TABLET | Refills: 0 | Status: SHIPPED | OUTPATIENT
Start: 2023-09-27 | End: 2023-10-27

## 2023-09-26 RX ORDER — LABETALOL 200 MG/1
200 TABLET, FILM COATED ORAL EVERY 12 HOURS
Qty: 60 TABLET | Refills: 0 | Status: SHIPPED | OUTPATIENT
Start: 2023-09-26 | End: 2023-10-26

## 2023-09-26 RX ORDER — SODIUM CHLORIDE 0.9 % (FLUSH) 0.9 %
10 SYRINGE (ML) INJECTION
Status: DISCONTINUED | OUTPATIENT
Start: 2023-09-26 | End: 2023-09-26 | Stop reason: HOSPADM

## 2023-09-26 RX ORDER — ROSUVASTATIN CALCIUM 10 MG/1
10 TABLET, COATED ORAL EVERY MORNING
Qty: 30 TABLET | Refills: 0 | Status: SHIPPED | OUTPATIENT
Start: 2023-09-27 | End: 2023-10-27

## 2023-09-26 RX ORDER — CIPROFLOXACIN 500 MG/1
500 TABLET ORAL DAILY
Status: DISCONTINUED | OUTPATIENT
Start: 2023-09-27 | End: 2023-09-26 | Stop reason: HOSPADM

## 2023-09-26 RX ORDER — ONDANSETRON 4 MG/1
4 TABLET, FILM COATED ORAL EVERY 8 HOURS PRN
Qty: 21 TABLET | Refills: 0 | Status: SHIPPED | OUTPATIENT
Start: 2023-09-26 | End: 2023-10-03

## 2023-09-26 RX ORDER — LABETALOL 200 MG/1
200 TABLET, FILM COATED ORAL EVERY 12 HOURS
Status: DISCONTINUED | OUTPATIENT
Start: 2023-09-26 | End: 2023-09-26 | Stop reason: HOSPADM

## 2023-09-26 RX ORDER — HYDRALAZINE HYDROCHLORIDE 50 MG/1
50 TABLET, FILM COATED ORAL EVERY 8 HOURS
Qty: 90 TABLET | Refills: 0 | Status: SHIPPED | OUTPATIENT
Start: 2023-09-26 | End: 2023-10-26

## 2023-09-26 RX ADMIN — AMLODIPINE BESYLATE 10 MG: 10 TABLET ORAL at 09:28

## 2023-09-26 RX ADMIN — ONDANSETRON 4 MG: 2 INJECTION INTRAMUSCULAR; INTRAVENOUS at 01:37

## 2023-09-26 RX ADMIN — CLOPIDOGREL BISULFATE 75 MG: 75 TABLET ORAL at 09:28

## 2023-09-26 RX ADMIN — METRONIDAZOLE 500 MG: 500 TABLET ORAL at 14:59

## 2023-09-26 RX ADMIN — METRONIDAZOLE 500 MG: 500 TABLET ORAL at 09:28

## 2023-09-26 RX ADMIN — LABETALOL HYDROCHLORIDE 200 MG: 100 TABLET, FILM COATED ORAL at 09:28

## 2023-09-26 RX ADMIN — ONDANSETRON 4 MG: 2 INJECTION INTRAMUSCULAR; INTRAVENOUS at 09:31

## 2023-09-26 RX ADMIN — ALUMINUM HYDROXIDE, MAGNESIUM HYDROXIDE, AND DIMETHICONE 30 ML: 200; 20; 200 SUSPENSION ORAL at 13:43

## 2023-09-26 RX ADMIN — CIPROFLOXACIN 250 MG: 250 TABLET, FILM COATED ORAL at 09:28

## 2023-09-26 RX ADMIN — HYDRALAZINE HYDROCHLORIDE 50 MG: 50 TABLET, FILM COATED ORAL at 05:11

## 2023-09-26 RX ADMIN — ROSUVASTATIN CALCIUM 10 MG: 10 TABLET, FILM COATED ORAL at 09:28

## 2023-09-26 RX ADMIN — HYDRALAZINE HYDROCHLORIDE 50 MG: 50 TABLET, FILM COATED ORAL at 14:59

## 2023-09-26 RX ADMIN — LINEZOLID 600 MG: 600 TABLET, FILM COATED ORAL at 09:28

## 2023-09-26 RX ADMIN — DAPTOMYCIN 710 MG: 500 INJECTION, POWDER, LYOPHILIZED, FOR SOLUTION INTRAVENOUS at 12:17

## 2023-09-26 ASSESSMENT — COGNITIVE AND FUNCTIONAL STATUS - GENERAL
MOVING TO AND FROM BED TO CHAIR: 3 - A LITTLE
STANDING UP FROM CHAIR USING ARMS: 3 - A LITTLE
WALKING IN HOSPITAL ROOM: 3 - A LITTLE
CLIMB 3 TO 5 STEPS WITH RAILING: 2 - A LOT

## 2023-09-26 NOTE — PROGRESS NOTES
Subjective:      Patient denies any complaints.  Pain is well controlled.  Blood pressure is elevated today.  Patient is anxious to go home.    Objective:    Vital signs in last 24 hours:    Temp:  [36.7 °C (98 °F)-37 °C (98.6 °F)] 36.7 °C (98 °F)  Heart Rate:  [75-78] 78  Resp:  [16-18] 16  BP: (157-177)/(70-78) 177/78    No intake or output data in the 24 hours ending 09/26/23 1308    Intake/Output this shift:    No intake/output data recorded.    Physical Exam:     General: well appearing, NAD, aaox4/4 on room air   HEENT: AT NC PERRL EOMI  Cardiovascular: RR NL S1S2 no m /g/r   Pulmonary/Chest: CTA BL no /w/cr  Abdomen:  Soft, nt, nd   Extremities: warm, trace LLE edema, +L TMA w/wound dressing   Skin: no rash   Neurologic:  CN II -XII intact, no fnd   MSK:  L TMA, leg is swollen, dressing is clean, dry, intact  Psychiatric: normal          Labs:    Results from last 7 days   Lab Units 09/26/23  0517 09/25/23  0457 09/24/23  0342 09/23/23  0524 09/22/23  0455 09/21/23  1059 09/20/23  0515   SODIUM mEQ/L 137 138 140 140 139 138 135*   POTASSIUM mEQ/L 4.3 3.8 3.8 3.9 4.2 4.4 3.8   CHLORIDE mEQ/L 104 105 107 109* 109* 107 104   CO2 mEQ/L 21 23 23 23 22 24 23   GLUCOSE mg/dL 138* 80 75 102* 210* 160* 179*   BUN mg/dL 24 24 22 23 32* 35* 42*   CREATININE mg/dL 3.9* 3.8* 3.4* 3.2* 2.9* 2.9* 3.0*   CALCIUM mg/dL 8.3* 8.3* 8.3* 8.2* 8.0* 8.4* 8.3*   ANION GAP mEQ/L 12 10 10 8 8 7 8   EGFR mL/min/1.73m*2 15.8* 16.3* 18.5* 19.8* 22.2* 22.2* 21.4*       Results from last 7 days   Lab Units 09/26/23  0517 09/25/23  0457 09/24/23  0342 09/23/23  0524 09/22/23  0455 09/21/23  1059 09/20/23  0515   WBC K/uL 9.29 8.77 7.87 9.18 7.83 7.69 11.00*   RBC M/uL 3.73* 3.59* 3.60* 3.49* 3.28* 3.16* 3.37*   HEMOGLOBIN g/dL 9.1* 8.9* 8.7* 8.5* 8.0* 7.6* 8.2*   HEMATOCRIT % 30.2* 28.5* 29.3* 28.2* 26.6* 25.2* 26.6*   MCV fL 81.0* 79.4* 81.4* 80.8* 81.1* 79.7* 78.9*   MCH pg 24.4* 24.8* 24.2* 24.4* 24.4* 24.1* 24.3*   MCHC g/dL 30.1* 31.2*  29.7* 30.1* 30.1* 30.2* 30.8*   PLATELETS K/uL 270 292 340 206 221 213 249       Assessments/Plan:      ANURADHA (acute kidney injury) (CMS/[facility])  Assessment & Plan  Renal function is  slightly worse.  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  Complements level are normal  Also noted positive eosinophils in the urine.  Antibiotics changed yesterday.  Renal function has stabilized.  Patient will be discharged home PICC line and home antibiotics.  We will follow in the office on Monday with repeat basic metabolic profile.      Nephrotic syndrome  Most likely result of diabetic nephropathy.  Management as outpatient.    Hypertension  Assessment & Plan  Continue holding lisinopril.  Continue hydralazine.    Type 2 diabetes mellitus (CMS/[facility])  Assessment & Plan  Needs better long-term glycemic control   Avoid SGLT2i per hx amputation     He has severe albuminuria, suggestive of DKD  He will likely need to establish care as an outpatient for CKD care.   When anuradha resolved, optimal CKD care will include his lisinopril , and consideration of adding finerenone as an outpatient           Duration of encounter 40 minutes providing direct care to patient, participating in discharge management    Jackie Joyner M.D.

## 2023-09-26 NOTE — PROGRESS NOTES
Infectious Disease Progress Note    Patient Name: Harvey Lucero Jr.  MR#: [id number]  : 1960  Admission Date: 2023  Date: 23   Time: 1:33 PM   Author: Severo Dias MD    OBJECTIVE:  Antibiotics changed yesterday due to concerns for AIN urine had positive eosinophils  Bone cultures show Proteus Streptococcus Enterococcus and mixed anaerobic growth Path was consistent with acute osteomyelitis  Antibiotics:    Anti-infectives (From admission, onward)    Start     Dose/Rate Route Frequency Ordered Stop    23 0900  ciprofloxacin (CIPRO) tablet 500 mg         500 mg oral Daily 23 1000      23 1045  DAPTOmycin 710 mg in sodium chloride 0.9 % 50 mL IVPB         8 mg/kg × 89 kg (Adjusted)  100 mL/hr over 30 Minutes intravenous Every 48 hours interval 23 0959      23  metroNIDAZOLE (FLAGYL) tablet 500 mg         500 mg oral 3 times daily 23 1741            ROS  Negative  Vital Signs:    Temp:  [36.7 °C (98 °F)-37 °C (98.6 °F)] 36.7 °C (98 °F)  Heart Rate:  [75-78] 78  Resp:  [16-18] 16  BP: (157-177)/(70-78) 177/78    Temp (72hrs), Av.7 °C (98 °F), Min:36.4 °C (97.6 °F), Max:37 °C (98.6 °F)      Physical Exam    Gen: Aox3  HEENT: OP clear  Neck: Supple  LAD: No cervical LAD  Lungs: CTAB  CV: RRR no murmurs  Abd: Soft NTND +BS  Ext: Foot dressing unchanged  Skin: no rash  Neuro: II-XII intact        Lines, Drains, Airways, Wounds:  Peripheral IV (Adult) 23 Anterior;Left Forearm (Active)   Number of days: 1       Labs:    Lab Results   Component Value Date    WBC 9.29 2023    HGB 9.1 (L) 2023    HCT 30.2 (L) 2023    MCV 81.0 (L) 2023     2023     Lab Results   Component Value Date    GLUCOSE 138 (H) 2023    CALCIUM 8.3 (L) 2023     2023    K 4.3 2023    CO2 21 2023     2023    BUN 24 2023    CREATININE 3.9 (H) 2023     Lab Results   Component Value  Date    ALT 16 05/04/2018    AST 11 05/04/2018    ALKPHOS 60 05/04/2018    BILITOT 0.6 05/04/2018         Patient Active Problem List   Diagnosis Code    History of CVA (cerebrovascular accident) Z86.73    Type 2 diabetes mellitus (CMS/HCC) E11.9    Lipid disorder E78.9    Hypertension I10    Acute hematogenous osteomyelitis of left foot (CMS/HCC) M86.072    Anemia D64.9    Leukocytosis D72.829    Hyponatremia E87.1    Elevated serum creatinine R79.89    Bacteremia R78.81    Gas gangrene (CMS/HCC) A48.0    Medication management Z79.899    Streptococcal sepsis (CMS/HCC) A40.9    Necrotizing fasciitis (CMS/HCC) M72.6    Open wound of ankle and foot S91.009A, S91.309A    Hypokalemia E87.6    Localized edema R60.0    Type 2 diabetes mellitus with diabetic peripheral angiopathy and gangrene, with long-term current use of insulin (CMS/HCC) E11.52, Z79.4    Type 2 diabetes mellitus with foot ulcer, with long-term current use of insulin (CMS/HCC) E11.621, L97.509, Z79.4    Essential hypertension I10    Chronic multifocal osteomyelitis, left ankle and foot (CMS/HCC) M86.372    History of necrotizing fasciitis Z87.39    Lipid disorder E78.9    History of CVA (cerebrovascular accident) Z86.73    Claustrophobia F40.240    CVA (cerebral vascular accident) (CMS/HCC) I63.9    Type 2 diabetes mellitus with skin complication, with long-term current use of insulin (CMS/HCC) E11.628, Z79.4    Dyslipidemia E78.5    GERD (gastroesophageal reflux disease) K21.9    Iron deficiency E61.1    Leg wound, left S81.802A    Infection of left foot L08.9    Pre-op evaluation Z01.818    Acute kidney injury superimposed on CKD (CMS/[facility]) N17.9, N18.9    ANURADHA (acute kidney injury) (CMS/HCC) N17.9     * Infection of left foot  Assessment & Plan  Recommend 6 weeks of treatment  Ciprofloxacin 400 mg daily, the dose may be adjusted if the renal function improves.   Flagyl 500 mg orally 3 times daily  Daptomycin 700 mg  every other day  I have listed amoxicillin as an allergy due to suspected AIN    I will sign off          Severo Dias MD  9/26/20231:33 PM

## 2023-09-26 NOTE — PROGRESS NOTES
Subjective:   Patient seen at bedside for POD#5 s/p left foot incision and drainage, bone biopsy, and graft application. NAD. No overnight events. Dressing clean, dry, and intact.   Discussed that we are still waiting on final infectious disease recommendations. Denies any N/V/F/C/CP/SOB.     ROS:   Past Medical/Surgical history, Allergies, Meds reviewed in detail as charted  FH/SH reviewed in detail as charted  Review of Systems:  Head and Neck: No complaints  Chest : No complaints  Abdomen: No Complaints  Constitutional: Unremarkable     Vitals: I have reviewed the patient's current vital signs as documented in the patient's EMR.    Radiology: No new Radiology.    Labs:   CBC Results       09/26/23 09/25/23 09/24/23     0517 0457 0342    WBC 9.29 8.77 7.87    RBC 3.73 3.59 3.60    HGB 9.1 8.9 8.7    HCT 30.2 28.5 29.3    MCV 81.0 79.4 81.4    MCH 24.4 24.8 24.2    MCHC 30.1 31.2 29.7     292 340        BMP Results       09/26/23 09/25/23 09/24/23     0517 0457 0342     138 140    K 4.3 3.8 3.8    Cl 104 105 107    CO2 21 23 23    Glucose 138 80 75    BUN 24 24 22    Creatinine 3.9 3.8 3.4    Calcium 8.3 8.3 8.3    Anion Gap 12 10 10    EGFR 15.8 16.3 18.5        Objective:   -Vascular: DP/PT pulses are palpable B/L.  CRT is less than 3 seconds.  Skin temperature is warm from leg to digits bilaterally, slightly warmer to left lower extremity.  -Ortho: Left lower extremity Chopart amputation noted.  No pain noted on palpation left lower extremity.  -Neuro: Light touch and epicritic sensation is intact.  -Derm: Grafts to posterior ankle and distal stump are intact and secured with staples.  Grafts appear to be well adhered.  Tissue underlying grafts appears to be fibrogranular in nature, but difficult to appreciate the full wound base as there is presence of overlying graft.  There are multiple hyperkeratotic skin islands noted to the left lower extremity.    Assessment:  Harvey Lucero Jr. is a 63  y.o. male presenting POD#5 s/p left foot incision and drainage, bone biopsy, and graft application.    Plan:  -Patient examined, evaluated, and treated. All questions and concerns addressed  -Dressing includes Adaptic overlying the grafts, gauze, ABD, Kerlix, and Ace bandage for compression.  -OR findings: Necrotic tissue debrided. Mild purulence expressed. Graft applied to dorsal wound and posterior wound.   -OR specimen:               Bone to pathology: Acute osteomyelitis              Bone to microbiology: Proteus vulgaris, Streptococcus constellatus,  Enterococcus faecalis  -Blood cultures: No growth to date  -Unasyn was discontinued and linezolid, cipro, and flagyl were started per ID recommendation.  -Patient is nonweightbearing to left lower extremity.  -Pain modalities on board as needed  -Patient is likely to require at minimum 6 weeks of IV antibiotics.  -Discharge pending final antibiotic recommendations per ID team.  -KULWINDER today  -Appreciate CM/SW assistance with discharge  -Patient is to follow-up with Dr. Tee outpatient for continued management.  -Please contact the on call Podiatry resident with any questions or concerns.     Iftikhar Jimenes, PGY-1  Pager #[phone]

## 2023-09-26 NOTE — DISCHARGE INSTRUCTIONS
Wound Care Discharge Instructions   [facility]   Activity:   · No strenuous exercise or heavy lifting (over 10 pounds) until your follow up with doctor  · No driving until following up with doctor  · Walking is the best exercise but you should keep weight off of your foot to help with healing and avoid pressure to the area. Use a walker or wheel chair.  · Keep your affected foot elevated on two pillows while in bed and sitting in a chair to decrease swelling  Nutrition:   · Eating more protein will help with wound healing, If possible, add protein to your diet to help increase healing factors.  Wound Care Instructions:   · Your wound care dressing may be removed by a home care nurse or healthcare trained family member.  · Remove the dressing, cleanse the wound with wound cleanser or normal saline. Wounds should be dressed with Adaptic, Aquacell, 4x4 gauze, ABD pad, Kerlix wrap, and ACE wrap every other day.  · No soaking your foot until wound is completely healed.  Medications:   - You should stop taking Lisinopril and Januvia upon discharge. Your labetolol dose was increased to 200 mg twice a day. F/u with your nephrologist and PCP upon discharge. You should continue all other blood pressure medications. We recommend getting lab work done with nephrology next week.  · Resume all other home medications unless otherwise directed by doctor or nurse practitioner  Reasons to Call:   · Severe and persistent shortness of breath not relieved with rest  · Fever above 101.5 oF  · Redness, swelling or drainage from incision  Follow Up Appointment:   · Call to schedule an appointment with your physician/surgeon after discharge  · Dr. Samuel Bryan  · Dr. Fabricio Tee  . Dr. Jen Urban  · [facility]- [phone]   Or [facility]   Dr. [facility] - [phone]  [facility] - [phone]  Dr.Morrison Alvarez  [facility] [phone]               PICC Insertion, Care After  Refer to this sheet in the next few weeks. These instructions provide you with information on caring for yourself after your procedure. Your health care provider may also give you more specific instructions. Your treatment has been planned according to current medical practices, but problems sometimes occur. Call your health care provider if you have any problems or questions after your procedure.  What can I expect after the procedure?  After your procedure, it is typical to have the following:  Mild discomfort at the insertion site. This should not last more than a day.  Follow these instructions at home:  Rest at home for the remainder of the day after the procedure.  You may bend your arm and move it freely. If your PICC is near or at the bend of your elbow, avoid activity with repeated motion at the elbow.  Avoid lifting heavy objects as instructed by your health care provider.  Avoid using a crutch with the arm on the same side as your PICC. You may need to use a walker.  Bandage Care  Keep your PICC bandage (dressing) clean and dry to prevent infection.  Ask your health care provider when you may shower. To keep the dressing dry, cover the PICC with plastic wrap and tape before showering. If the dressing does become wet, replace it right after the shower.  Do not soak in the bath, swim, or use hot tubs when you have a PICC.  Change the PICC dressing as instructed by your health care provider.  Change your PICC dressing if it becomes loose or wet.  General PICC Care  Check the PICC insertion site daily for leakage, redness, swelling, or pain.  Flush the PICC as directed by your health care provider. Let your health care provider know right away if the PICC is difficult to flush or does not flush. Do not use force to flush the PICC.  Do not use a syringe that is less than 10 mL to flush the PICC.  Never pull or tug on the  "PICC.  Avoid blood pressure checks on the arm with the PICC.  Keep your PICC identification card with you at all times.  Do not take the PICC out yourself. Only a trained health care professional should remove the PICC.  Contact a health care provider if:  You have pain in your arm, ear, face, or teeth.  You have fever or chills.  You have drainage from the PICC insertion site.  You have redness or palpate a \"cord\" around the PICC insertion site.  You cannot flush the catheter.  Get help right away if:  You have swelling in the arm in which the PICC is inserted.  This information is not intended to replace advice given to you by your health care provider. Make sure you discuss any questions you have with your health care provider.  Document Released: 10/08/2014 Document Revised: 05/25/2017 Document Reviewed: 10/10/2014  [employer] Interactive Patient Education © 2017 [employer]  PICC Information:    Insertion Date: DATE: 09/26/2023    Insertion Site:right basilic vein    Device Type:double lumen PICC    Length: 42 cm    Arm Circumference: 29 cm    Chest X-Ray Results: SVC confirmed with ECG technology    External Catheter Length: 0    Last Dressing Change: DATE: 09/26/2023    Power Inject PICC Line? : yes    Last Cap Change: DATE: 09/26/2023    Keep this information in a safe place and share with your provider  and/or infusion nurse as needed.   "

## 2023-09-26 NOTE — PLAN OF CARE
Care Coordination Discharge Plan Summary    Admission Assessment Summary    General Information  Readmission Within the last 30 days: no previous admission in last 30 days  Does patient have a : No  Patient-Specific Goals (include timeframe):      Living Arrangements  Arrived From: home  Current Living Arrangements: home  People in Home: child(maeve), adult, spouse  Home Accessibility:    Living Arrangement Comments: 2 SH, TX on 1st floor, bed/bath on 2nd floor    Social Determinants of Health - Screenings  Housing Stability  In the last 12 months, was there a time when you were not able to pay the mortgage or rent on time?: No  In the last 12 months, how many places have you lived?: 1  In the last 12 months, was there a time when you did not have a steady place to sleep or slept in a shelter (including now)?: No  Financial Resource Strain  How hard is it for you to pay for the very basics like food, housing, medical care, and heating?: Not hard at all  Transportation Needs  In the past 12 months, has lack of transportation kept you from medical appointments or from getting medications?: No  In the past 12 months, has lack of transportation kept you from meetings, work, or from getting things needed for daily living?: No    Functional Status Prior to Admission  Assistive Device/Animal Currently Used at Home: none  Functional Status Comments: Pt states he is independent at home and uses no dme  IADL Comments: Pt states he is independent at home and uses no dme    Discharge Needs Assessment    Concerns to be Addressed: discharge planning  Current Discharge Risk: physical impairment  Anticipated Changes Related to Illness: inability to care for self    Discharge Plan Summary    Patient Choice  Offered/Gave Vendor List: yes  Patient's Choice of Community Agency(s): Pt denies [facility]  Patient and/or patient guardian/advocate was made aware of their right to choose a provider. A list of eligible providers was  presented and reviewed with the patient and/or patient guardian/advocate in written and/or verbal form. The list delineates providers in the patients desired geographic area who are participating in the Medicare program and/or providers contracted with the patients primary insurance. The Medicare list and quality ratings were obtained from the Medicare.gov [medicare.gov] website.    Concerns / Comments: DC to home with [facility] for IV abx    Discharge Plan:  Disposition/Destination: Home Health Care - Other / Home       Connection to Community  Not applicable    Community Resources:      Discharge Transportation:  Is Out of Hospital DNR needed at Discharge: no  Does patient need discharge transport? No (wife to transport)         Per chart review, pt has discharge order in for dc today and will need PICC line and at home IV abx.    CC contacted Margareth with [facility] to inform of discharge order. Margareth verifies pt's infusion service is covered at 100% and her nurse will obtain pre-cert for abx. Margareth states she will call pt with PICC/IV abx education and scheduling.     CC met with pt and pt's wife, Marianne, at bedside to update on plan for at home IV abx. Both are in agreement with plan and state they have done at home IV abx before. CC informed them to have phone nearby and to expect a call from [facility] about scheduling and teaching. Pt and wife declined additional C SN when CC offered.Wife to transport pt home when ready. CC to follow.     Addm 2:01: CC notified that pt is currently having PICC placed. CC updated Margareth of [facility]. Margareth informs [facility] will send delivery of meds and flushes to pt's home tomorrow and will call to update pt today. Margareth states their intake intake RN said it's okay to discharge pt and they will request authorization on med.    Dispo- home with [facility] for IV abx  Transportation- wife

## 2023-09-26 NOTE — PLAN OF CARE
Plan of Care Review  Plan of Care Reviewed With: patient  Progress: no change  Outcome Evaluation: Patient has no complaints of pain in LLE. [place] CDI. Supervision with walker OOB. Abx given.

## 2023-09-26 NOTE — PROGRESS NOTES
Hospital Medicine Service -  Daily Progress Note       SUBJECTIVE   Interval History: Patient seen and examined earlier this morning. No acute complaints  Denies any CP or SOB. No abdominal pain.    OBJECTIVE      Vital signs in last 24 hours:  Temp:  [36.9 °C (98.4 °F)-37.1 °C (98.7 °F)] 36.9 °C (98.5 °F)  Heart Rate:  [70-86] 77  Resp:  [12-18] 16  BP: 169/71  No intake or output data in the 24 hours ending 09/26/23 0851    PHYSICAL EXAMINATION      Physical Exam  General: NAD, calm, pleasant  HEENT: atraumatic and normocephalic  Eyes:) Normal  Cardiovascular: RRR, no murmurs; S1/S2+  Pulmonary: CTAB; no rales, rhonchi or wheezing  Gi: Soft, nontender, nondistended  Ext: LLE wrapped  Neuro: Alert and oriented x3; no sensory or motor deficits  Psych: Calm, appropriate answers     LINES, CATHETERS, DRAINS, AIRWAYS, AND WOUNDS   Lines, Drains, and Airways:  Wounds (agree with documentation and present on admission):  Peripheral IV (Adult) 09/20/23 Anterior;Left Forearm (Active)   Number of days: 2       Peripheral IV (Adult) 09/21/23 Left Hand (Active)   Number of days: 1       Wound Surgical Left Foot (Active)   Number of days: 1647       Wound Venous Ulcer Left;Lateral Foot (Active)   Number of days: 1486       Surgical Incision Foot (Active)   Number of days: 1         Comments:      LABS / IMAGING / TELE      Labs  Results from last 7 days   Lab Units 09/26/23  0517   WBC K/uL 9.29   HEMOGLOBIN g/dL 9.1*   HEMATOCRIT % 30.2*   PLATELETS K/uL 270     Results from last 7 days   Lab Units 09/26/23  0517   SODIUM mEQ/L 137   POTASSIUM mEQ/L 4.3   CHLORIDE mEQ/L 104   CO2 mEQ/L 21   BUN mg/dL 24   CREATININE mg/dL 3.9*   GLUCOSE mg/dL 138*   CALCIUM mg/dL 8.3*         Imaging  I have independently reviewed the pertinent imaging from the last 24 hrs.      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 Also eosinophils in urine so antibiotics changed from PCN  - ensure adequate hydration  - avoid nephrotoxins  - monitor urine outpt    Hypertension  Assessment & Plan  Uncontrolled previously. Now improved BP  -Cont amlodipine and hydralazine as ordered.  Now also on labetalol - increased dose to 200 mg bid today  -Holding ACE inhibitor for ANURADHA  -Continue to monitor pressures  -nephrology following  - optimize pain control    History of CVA (cerebrovascular accident)  Assessment & Plan  -Continue plavix and statin    * Infection of left foot  Assessment & Plan  -Events noted  -Continue care per podiatry. S/P debridement, bone biopsy   -Continue abx per ID  -Follow cultures  -Continue supportive care       VTE Assessment: [place]    VTE Prophylaxis:  Current anticoagulants:  enoxaparin (LOVENOX) syringe 30 mg, subcutaneous, Daily (6p)      Code Status: Full Code      Estimated Discharge Date: 9/26/2023       Disposition Planning: per podiatry     Vasu Whaley MD  9/26/2023

## 2023-09-27 NOTE — DISCHARGE SUMMARY
Inpatient Discharge Summary    BRIEF OVERVIEW  Admitting Provider:  H&P Notes 8/27/2023 to 9/27/2023         Date of Service Author Author Type Status Note Type File Time    09/19/23 1928 Kali Randhawa DPM Resident Signed H&P 09/19/23 1945          Attending Provider: No att. providers found Attending phys phone: N/A  Primary Care Physician at Discharge: ALLISON Avitia [phone]    Admission Date: 9/19/2023     Discharge Date: 9/26/2023    Primary Discharge Diagnosis  Infection of left foot    Secondary Discharge Diagnosis  Active Hospital Problems    Diagnosis Date Noted    ANURADHA (acute kidney injury) (CMS/[facility]) 09/20/2023    Infection of left foot 09/19/2023    Pre-op evaluation 09/19/2023    Acute kidney injury superimposed on CKD (CMS/[facility]) 09/19/2023    Leg wound, left 03/12/2019    Medication management 01/07/2019    Anemia 01/04/2019    History of CVA (cerebrovascular accident)     Type 2 diabetes mellitus (CMS/[facility])     Hypertension       Resolved Hospital Problems   No resolved problems to display.       DETAILS OF HOSPITAL STAY    Operative Procedures Performed  Procedure(s):  INCISION & DRAINAGE/KNEE/LEG/ FOOT/ANKLE, STRAVIX GRAFT/BONE BIOPSY    Consults:   Consult Notes 8/27/2023 to 9/27/2023         Date of Service Author Author Type Status Note Type File Time    09/20/23 1503 Severo Dias MD Physician Signed Consults 09/20/23 1510    09/20/23 1255 Nickolas Post MD Physician Signed Consults 09/20/23 1432    09/20/23 1006 Tresa Cortes, WADE Case Manager Signed Consults 09/20/23 1007    09/19/23 2249 Kristie Retana DO Physician Addendum Consults 09/20/23 0300          Consult Orders During Admission:  IP CONSULT TO CASE MANAGEMENT  IP CONSULT TO SOCIAL WORK  IP CONSULT TO HOSPITALIST  IP CONSULT TO INFECTIOUS DISEASE  IP CONSULT TO NEPHROLOGY  IP CONSULT TO IV TEAM     Procedures: 9/21 Left foot incision and drainage with bone biopsy and graft  "application  Pertinent Test Results: CBC, BMP, CRP, wound cxs, X-rays    Imaging  X-RAY ANKLE LEFT 3+ VIEWS    Result Date: 9/20/2023  IMPRESSION: See comment.    X-RAY FOOT LEFT 3+ VIEWS    Result Date: 9/20/2023  IMPRESSION: See comment.  COMMENT: 4 views were obtained of the left foot and 4 views were obtained of the  left ankle and compared with 1/17/2019. 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 Periosteal  reaction is present and should be compared with prior imaging as this is likely  from chronic osteomyelitis, ongoing infection not excluded. Soft tissue swelling  is present. Soft tissue air is likely within an overlying wound, clinically  Correlate.        Presenting Problem/History of Present Illness  Infection of left foot [L08.9]     Wound of left lower extremity, subsequent encounter.  Patient is known to Dr. Tee and has been following him many years with regard to his left lower extremity wounds.  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  With the result of a proximal foot amputation, the procedures were successful and the patient was left with multiple left lower extremity wounds that have been tended to over the years.  He has undergone various skin grafts and wound care visits in an attempt to get the wounds closed.  He reports that he has had great improvement recently with significant decrease in the wound sizes.   However, sometime last week he reports that he began feeling \"sick\".  Over the last week he has been experiencing bouts of subjective fevers and rigors and overall fatigue.  He reports that there are some days where he is completely fine and then the cycle continues.  He has not taken his temperature recently so he is unsure of what they have gotten up to.  He reports having tried " Augmentin and most recently azithromycin without success.  He was seen in the office for continued wound care but due to the raised concern and previous history he was admitted to the hospital for further work-up and evaluation.    Exam on Day of Discharge  Patient seen and examined on day of discharge.  Patient feels well and is medically stable for discharge with adequate pain control.  -Vascular: DP/PT pulses are palpable B/L.  CRT is less than 3 seconds.  Skin temperature is warm from leg to distal foot bilaterally, slightly warmer to left lower extremity.  -Ortho: Left lower extremity Chopart amputation noted.  No pain noted on palpation left lower extremity.  -Neuro: Light touch and epicritic sensation is intact.  -Derm: Grafts to posterior ankle and distal stump are intact and secured with staples.  Grafts appear to be well adhered.  Tissue underlying grafts appears to be fibrogranular in nature, but difficult to appreciate the full wound base as there is presence of overlying graft.  There are multiple hyperkeratotic skin islands noted to the left lower extremity.    Hospital Course  Patient presented to [facility] on 9/19/2023 for infection of left foot and ankle wounds.  Wound cultures were obtained, and he was started on vancomycin and Zosyn.  ID consulted for antibiotic recommendations.  HMS and nephrology were consulted for preoperative risk assessment and medical management.  CM/SW was consulted for discharge planning.  PT/OT were consulted for evaluation and treatment.  Patient was taken to the OR on 9/21/2023 for a left foot incision and drainage with bone biopsy and graft application over the wounds.  Patient was instructed to be NWB to the LLE with use of crutches, walker, or wheelchair.  On 9/22/2023 bone biopsy came back as positive for osteomyelitis.  ID discontinued Zosyn and vancomycin and recommended starting Unasyn 3 g IV every 8 hours. Risks and complications of pursuing 6 weeks of  IV antibiotics versus a proximal amputation were clearly explained to the patient. Patient not able to be discharged on 9/25/2023 as his kidney function was not stable for discharge and ID still finalizing IV antibiotic recommendations.  On 9/26/2023 ID recommended patient receive daptomycin 710 mg through PICC line along with oral ciprofloxacin 500 mg and oral metronidazole 500 mg, as patient still wanted to pursue outpatient IV antibiotics instead of a proximal amputation. Patient approved for IV antibiotic therapy and is stable for discharge on 9/26/2023.  He is to follow-up with Dr. Tee outpatient.    Discharge Orders     Medication List      START taking these medications    amLODIPine 10 mg tablet  Commonly known as: NORVASC  Start taking on: September 27, 2023  Take 1 tablet (10 mg total) by mouth daily.  Dose: 10 mg     ciprofloxacin 500 mg tablet  Commonly known as: CIPRO  Start taking on: September 27, 2023  Take 1 tablet (500 mg total) by mouth daily Indications: Bone/Joint infection.  Dose: 500 mg     hydrALAZINE 50 mg tablet  Commonly known as: APRESOLINE  Take 1 tablet (50 mg total) by mouth every 8 (eight) hours.  Dose: 50 mg     labetaloL 200 mg tablet  Commonly known as: NORMODYNE  Take 1 tablet (200 mg total) by mouth every 12 (twelve) hours.  Dose: 200 mg     metroNIDAZOLE 500 mg tablet  Commonly known as: FLAGYL  Take 1 tablet (500 mg total) by mouth 3 (three) times a day Indications: Bone/Joint infection.  Dose: 500 mg     ondansetron 4 mg tablet  Commonly known as: ZOFRAN  Take 1 tablet (4 mg total) by mouth every 8 (eight) hours as needed for nausea or vomiting for up to 7 days.  Dose: 4 mg     sodium chloride 0.9 % parenteral solution 50 mL with DAPTOmycin 350 mg recon soln 710 mg  Start taking on: September 28, 2023  Infuse 710 mg into a venous catheter every other day Indications: Bone/Joint infection.  Dose: 8 mg/kg        CHANGE how you take these medications    rosuvastatin 10 mg  "tablet  Commonly known as: CRESTOR  Start taking on: September 27, 2023  Take 1 tablet (10 mg total) by mouth every morning.  Dose: 10 mg  What changed:   · medication strength  · how much to take  · when to take this        CONTINUE taking these medications    acetaminophen 500 mg tablet  Commonly known as: TYLENOL  Take 1,000 mg by mouth every 6 (six) hours as needed for mild pain.  Dose: 1,000 mg     BASAGLAR KWIKPEN U-100 INSULIN 100 unit/mL (3 mL) pen  28 Units nightly.  Dose: 28 Units  Generic drug: insulin glargine U-100     blood-glucose meter kit  for blood glucose monitoring     clopidogreL 75 mg tablet  Commonly known as: PLAVIX  TAKE 1 TABLET BY MOUTH EVERY DAY     lancets misc  for blood glucose monitoring 3x day     ONETOUCH ULTRA TEST strip  for blood glucose monitoring 3-4x day  Generic drug: blood sugar diagnostic     * pen needle, diabetic 32 gauge x 1/4\" needle  Commonly known as: NOVOFINE 32  For insulin administration once daily     * pen needle, diabetic 32 gauge x 1/4\" needle  Commonly known as: BD ULTRA-FINE MICRO PEN NEEDLE  For blood sugar monitoring 3x day  Dx:  E11.9         * This list has 2 medication(s) that are the same as other medications prescribed for you. Read the directions carefully, and ask your doctor or other care provider to review them with you.            STOP taking these medications    amoxicillin 500 mg capsule  Commonly known as: AMOXIL     lisinopriL 40 mg tablet  Commonly known as: PRINIVIL     SITagliptin phosphate 100 mg tablet  Commonly known as: JANUVIA              Outpatient Follow-Ups  Encounter Information    This patient does not currently have any appointments scheduled.       Referrals:  No orders of the defined types were placed in this encounter.      Active Issues Requiring Follow-up  Issue: OM of left foot and LLE wounds  What is Needed: Follow up with Dr. Tee for podiatric care, follow-up with infectious disease for antibiotic dosing, and " follow-up with nephrology outpatient.      Test Results Pending at Discharge  Unresulted Labs (From admission, onward)    None            Discharge Disposition  Disposition: Home Health Care - Other  Destination: Home      Code Status at Discharge: Prior  Physician Order for Life-Sustaining Treatment Document Status      No documents found

## 2023-09-27 NOTE — UM PHYSICIAN REVIEW NOTE
Utilization Secondary Review Note      Patient Name: Harvey Lucero Jr.      MRN: [id number]]    Admission: 9/19-9/26 - [facility]   Direct admission from podiatry for left foot infection   Failed outpatient Abx    Leukocytosis; acute on CKD   Xrays concerning for osteomyelitis     9/19 - started vanco/Zosyn w/ ID consult    9/20 - ongoing IV Abx   IVF for ANURADHA    9/21 - to OR with podiatry - I&D, bone biopsy, graft    Received 1 unit PRBCs in OR   Maintained on IV Zosyn based on wound cxs   Required IV labetalol x 2 for hypertension    9/22 - Abx adjusted to IV Unasyn    Maintained on IVF    9/23 - awaiting micro results in order to allow for appropriate Abx recs   IV labetalol x 2 given for ongoing elevated BPs    9/25 - Abx adjusted due to concern for AIN      9/26 - Bone path c/w acute osteomyelitis   Recommended for 6 weeks IV antibiotics   Discharged to home         A request will be placed to payor for Peer to Peer.      Stefanie Robins MD  9/27/2023

## 2023-10-03 NOTE — UM PHYSICIAN REVIEW NOTE
Discussed case with Dr. Gustafson.  Denial overturned.  Inpatient approved.    Stefanie Robins MD  10/03/23

## 2024-09-09 NOTE — PROGRESS NOTES
Hyperbaric Oxygen Therapy Treatment Summary    Patient Name: Harvey Lucero Jr.             : 1960  Date of Visit: 3/8/2019    Indication for Treatment:   Chronic Refractory Osteomyelitis (Unresponsive to [employer] Medical and Surgical Managrment): Laterality: Left    Comments:      Treatment Summary:   Continue present Hyperbaric Oxygen Treatment Plan: Treatment 2.4 YADI x 90 minutes with 5 minute airbreak x 2      Response To Treatment:  Physician was present during total Hyperbaric Oxygen Treatment including the ascent and decent portions.  Post-Treatment: The patient tolerated today's HBO Treatment without complication       Home

## 2025-03-13 NOTE — PROGRESS NOTES
Hyperbaric Oxygen Therapy Treatment Summary    Patient Name: Harvey Lucero Jr.             : 1960  Date of Visit: 2019    Indication for Treatment:   Chronic Refractory Osteomyelitis (Unresponsive to Conventional Medical and Surgical Managrment): Laterality: Left  Site: foot/ankle    Comments: Discussion with Dr. Tee this morning indicates that he may be ready to have a follow-up debridement and distal bone resection with grafting done in approximately 2 weeks.  We will continue the hyperbaric oxygen therapy treatment.      Treatment Summary:   Continue present Hyperbaric Oxygen Treatment Plan: Treatment 2.4 YADI x 90 minutes with 5 minute airbreak x 2      Response To Treatment:  Physician was present during total Hyperbaric Oxygen Treatment including the ascent and decent portions.  Post-Treatment: The patient tolerated today's HBO Treatment without complication.  His blood glucose levels remain in good control and stable before and after his hyperbaric oxygen therapy treatments.  He feels well and is discharged from the center in stable condition.       Patient arrives via EMS to the ED. Per EMS \"flu like symptoms since 3am. Nausea, vomiting, diarrhea, body aches.\"

## (undated) DEVICE — COVER LIGHTHANDLE

## (undated) DEVICE — BLADE SCALPEL #15

## (undated) DEVICE — GAUZE 8X4 16 PLY RFID DOUBLE XRAY

## (undated) DEVICE — HANDPIECE VERSAJET EXACT 45º X 14MM

## (undated) DEVICE — APPLICATOR CHLORAPREP 26ML ORANGE TINT

## (undated) DEVICE — TOWEL SURGICAL W17XL27IN BLUE COTTON STANDARD PREWASHED DELI

## (undated) DEVICE — ***USE 57698*** SLEEVE FLOWTRON DVT CALF SINGLE USE

## (undated) DEVICE — DRAPE STERI TOWEL PLASTIC 18X24

## (undated) DEVICE — BLANKET UPPER BODY BAIR HUGGER

## (undated) DEVICE — TIP SUCTION YANKAUER

## (undated) DEVICE — TRAY JAMSHIDI BONE MARROW

## (undated) DEVICE — TUBE SUCTION 1/4INX20FT STERILE

## (undated) DEVICE — SLEEVE SCD COMFORT KNEE LENGTH MED

## (undated) DEVICE — Device

## (undated) DEVICE — APPLICATOR CHLORAPREP 10.5ML ORANGE TINT

## (undated) DEVICE — GAUZE XEROFORM 5X9 STERILE/OVERWRAPPED PACKET

## (undated) DEVICE — ***USE 138498*** SUTURE PROLENE 1  8455H

## (undated) DEVICE — SUTURE ORTHOCORD (PACK OF 12)

## (undated) DEVICE — SOLN IRRIG .9%SOD 1000ML

## (undated) DEVICE — SYRINGE DISP LUER-LOK 10 CC

## (undated) DEVICE — SOLN IRRIG .9%SOD 3L

## (undated) DEVICE — PENCIL ESU HANDSWITCHING W/HOL

## (undated) DEVICE — MANIFOLD FOUR PORT NEPTUNE

## (undated) DEVICE — BLADE SCALPEL #10

## (undated) DEVICE — DRESSING ADAPTIC 3INX8IN

## (undated) DEVICE — PAD GROUND ELECTROSURGICAL W/CORD

## (undated) DEVICE — ***USE 138467*** SUTURE ETHILON 3-0   1669H

## (undated) DEVICE — STERISTRIP 1/2INX4IN

## (undated) DEVICE — STAPLER SKIN

## (undated) DEVICE — DRESSING TEGADERM 8INX12IN

## (undated) DEVICE — BANDAGE KERLIX STERILE 4.5INX 4.1YDS

## (undated) DEVICE — ***USE 48234***DRESSING V.A.C. GRANUFOAM MEDIUM SENSA

## (undated) DEVICE — GLOVE SZ 8 PROTEXIS CLASSIC LATEX

## (undated) DEVICE — GLOVE SZ 8 PROTEXIS PI

## (undated) DEVICE — GOWN SIRUS FABRNF RAGLAN XL ST 28/CS

## (undated) DEVICE — SHEET DRAPE 3/4 REVERSEFOLD 53X77

## (undated) DEVICE — CAST PADDING 4IN

## (undated) DEVICE — BANDAGE COMPRESSION W4INXL5.5YD ELASTIC LF PREMIUM DOUBLE CL

## (undated) DEVICE — BLADE SCALPEL #11

## (undated) DEVICE — ***USE 149537*** SPANDAGE #6 NET BANDAGE MEDC

## (undated) DEVICE — COVER LIGHTHANDLE (STERILE SINGLE PA

## (undated) DEVICE — DRESSING SPONGE ALL GAUZE 4X4 STER 10PK

## (undated) DEVICE — SPONGE LAP DISPOSABLE 18X18

## (undated) DEVICE — DRESSING ABD STER LGE 8X10

## (undated) DEVICE — SUTURE PROLENE  0   8434H

## (undated) DEVICE — SOLN IRRIG .9%SOD 500ML

## (undated) DEVICE — NEEDLE DISP HYPO 25GX1-1/2IN

## (undated) DEVICE — CULTURETTE

## (undated) DEVICE — PASSER SWANSON SUTURE

## (undated) DEVICE — NEEDLE DISP HYPO 18GX1-1/2IN

## (undated) DEVICE — MANIFOLD SINGLE PORT NEPTUNE

## (undated) DEVICE — DRAPE HALF STERILE

## (undated) DEVICE — CANISTER INFOVAC 500ML W/GEL- 10 PACK

## (undated) DEVICE — GLOVE SZ 8 LINER PROTEXIS PI BL

## (undated) DEVICE — SUTURE MONOCRYL 3-0  Y497G

## (undated) DEVICE — GOWN SURGICAL REINFORCED X-LAR

## (undated) DEVICE — ***USE 138508*** SUTURE PROLENE  2-0 8685H

## (undated) DEVICE — STAPLER SKIN ROTATING HEAD WIDE  PRW35

## (undated) DEVICE — GLOVE SURG PROTEXIS PF 8

## (undated) DEVICE — BANDAGE ACE XL 6 X 15

## (undated) DEVICE — ***USE 56900*** SUTURE CHROMIC GUT 3-0 G122H

## (undated) DEVICE — DRESSING V.A.C. GRANUFOAM LARGE SENSA

## (undated) DEVICE — GAUZE XEROFORM 1X8 NON-STERILE PACKET

## (undated) DEVICE — SET HANDPIECE INTERPULSE

## (undated) DEVICE — SALINE .9% 3 LITER BAG

## (undated) DEVICE — DERMACARRIERS II1.5 TO 1

## (undated) DEVICE — GAUZE PACKING IODOFORM 1INX5YD

## (undated) DEVICE — NEEDLE HYPODERMIC PRO 18G X 1 1/2 IN

## (undated) DEVICE — BANDAGE COHESIVE 4IN

## (undated) DEVICE — TRAY WET SKIN PREP PREMIUM

## (undated) DEVICE — BLANKET TORSO 540

## (undated) DEVICE — STAPLER SKIN 35W PROXIMATE PLUS

## (undated) DEVICE — ***USE 56962*** SUTURE VICRYL 3-0  J497H

## (undated) DEVICE — ***USE 138578*** SUTURE MONOCRYL 2-0 Y317H

## (undated) DEVICE — NEEDLE DISP HYPO 22GX1-1/2IN

## (undated) DEVICE — DRAPE-U NON-STERILE

## (undated) DEVICE — SPECIMEN TRAP MUCOUS 40CC

## (undated) DEVICE — PACK HAND

## (undated) DEVICE — KIT SWAB COLLECTION

## (undated) DEVICE — DRESSING SPONGE GAUZE 4X4 STER

## (undated) DEVICE — PACK RFID HAND

## (undated) DEVICE — BANDAGE CONFORM 3IN STERILE

## (undated) DEVICE — MASTISOL LIQUID ADHESIVE

## (undated) DEVICE — SOLN IRRIG STER WATER 1000ML

## (undated) DEVICE — CASTING ROLL PLASTER 4IN

## (undated) DEVICE — ***USE 138530*** SUTURE VICRYL 2-0 J259H CT-1 UNDYED

## (undated) DEVICE — BANDAGE ELASTIC 4IN MEDC

## (undated) DEVICE — FIBERWIRE #2 W/TAPERED NEEDLE

## (undated) DEVICE — SYRINGE 10CC NO NEEDLE ST

## (undated) DEVICE — LITE GLOVE 1 HANDLE COVER

## (undated) DEVICE — DRESSING TEGADERM 4X4 3/4